# Patient Record
Sex: FEMALE | Race: WHITE
[De-identification: names, ages, dates, MRNs, and addresses within clinical notes are randomized per-mention and may not be internally consistent; named-entity substitution may affect disease eponyms.]

---

## 2017-01-03 ENCOUNTER — HOSPITAL ENCOUNTER (INPATIENT)
Dept: HOSPITAL 74 - JER | Age: 82
LOS: 3 days | Discharge: HOME | DRG: 811 | End: 2017-01-06
Payer: COMMERCIAL

## 2017-01-03 VITALS — BODY MASS INDEX: 30.7 KG/M2

## 2017-01-03 DIAGNOSIS — I44.7: ICD-10-CM

## 2017-01-03 DIAGNOSIS — G90.01: ICD-10-CM

## 2017-01-03 DIAGNOSIS — N18.2: ICD-10-CM

## 2017-01-03 DIAGNOSIS — Z95.0: ICD-10-CM

## 2017-01-03 DIAGNOSIS — Z86.73: ICD-10-CM

## 2017-01-03 DIAGNOSIS — D50.8: Primary | ICD-10-CM

## 2017-01-03 DIAGNOSIS — Z86.711: ICD-10-CM

## 2017-01-03 DIAGNOSIS — I12.9: ICD-10-CM

## 2017-01-03 DIAGNOSIS — N17.9: ICD-10-CM

## 2017-01-03 DIAGNOSIS — F02.80: ICD-10-CM

## 2017-01-03 DIAGNOSIS — K64.8: ICD-10-CM

## 2017-01-03 DIAGNOSIS — F41.9: ICD-10-CM

## 2017-01-03 DIAGNOSIS — I25.118: ICD-10-CM

## 2017-01-03 DIAGNOSIS — I25.2: ICD-10-CM

## 2017-01-03 DIAGNOSIS — E53.8: ICD-10-CM

## 2017-01-03 DIAGNOSIS — K31.811: ICD-10-CM

## 2017-01-03 DIAGNOSIS — G20: ICD-10-CM

## 2017-01-03 DIAGNOSIS — E78.5: ICD-10-CM

## 2017-01-03 DIAGNOSIS — K44.9: ICD-10-CM

## 2017-01-03 DIAGNOSIS — Z87.891: ICD-10-CM

## 2017-01-03 DIAGNOSIS — Z95.5: ICD-10-CM

## 2017-01-03 DIAGNOSIS — J44.9: ICD-10-CM

## 2017-01-03 DIAGNOSIS — G30.9: ICD-10-CM

## 2017-01-03 DIAGNOSIS — I48.0: ICD-10-CM

## 2017-01-03 LAB
ALBUMIN SERPL-MCNC: 3.1 G/DL (ref 3.4–5)
ALP SERPL-CCNC: 100 U/L (ref 45–117)
ALT SERPL-CCNC: 14 U/L (ref 12–78)
ANION GAP SERPL CALC-SCNC: 6 MMOL/L (ref 8–16)
ANISOCYTOSIS BLD QL SMEAR: (no result)
AST SERPL-CCNC: 14 U/L (ref 15–37)
BASO STIPL BLD QL SMEAR: (no result)
BASOPHILS # BLD: 0.7 % (ref 0–2)
BILIRUB SERPL-MCNC: 0.1 MG/DL (ref 0.2–1)
CALCIUM SERPL-MCNC: 8.3 MG/DL (ref 8.5–10.1)
CO2 SERPL-SCNC: 27 MMOL/L (ref 21–32)
CREAT SERPL-MCNC: 1.6 MG/DL (ref 0.55–1.02)
DEPRECATED RDW RBC AUTO: 18.3 % (ref 11.6–15.6)
DEPRECATED RDW RBC AUTO: 18.5 % (ref 11.6–15.6)
EOSINOPHIL # BLD: 1.5 % (ref 0–4.5)
GLUCOSE SERPL-MCNC: 110 MG/DL (ref 74–106)
HYPOCHROMIA BLD QL SMEAR: (no result)
INR BLD: 2.1 (ref 0.82–1.09)
MCH RBC QN AUTO: 20.2 PG (ref 25.7–33.7)
MCH RBC QN AUTO: 20.4 PG (ref 25.7–33.7)
MCHC RBC AUTO-ENTMCNC: 28.8 G/DL (ref 32–36)
MCHC RBC AUTO-ENTMCNC: 29 G/DL (ref 32–36)
MCV RBC: 69.7 FL (ref 80–96)
MCV RBC: 71.1 FL (ref 80–96)
MICROCYTES BLD QL SMEAR: (no result)
NEUTROPHILS # BLD: 75 % (ref 42.8–82.8)
OVALOCYTES BLD QL SMEAR: (no result)
PLATELET # BLD AUTO: 350 K/MM3 (ref 134–434)
PLATELET # BLD EST: ADEQUATE 10*3/UL
PLATELET COMMENT2: (no result)
PMV BLD: (no result) FL (ref 7.5–11.1)
PMV BLD: 6.6 FL (ref 7.5–11.1)
POIKILOCYTOSIS BLD QL SMEAR: (no result)
POLYCHROMASIA BLD QL SMEAR: (no result)
PROT SERPL-MCNC: 6.7 G/DL (ref 6.4–8.2)
PT PNL PPP: 23.4 SEC (ref 9.98–11.88)
TARGETS BLD QL SMEAR: (no result)
TROPONIN I SERPL-MCNC: < 0.02 NG/ML (ref 0–0.05)
WBC # BLD AUTO: 7.4 K/MM3 (ref 4–10)
WBC # BLD AUTO: 8.4 K/MM3 (ref 4–10)

## 2017-01-03 PROCEDURE — 30233N1 TRANSFUSION OF NONAUTOLOGOUS RED BLOOD CELLS INTO PERIPHERAL VEIN, PERCUTANEOUS APPROACH: ICD-10-PCS

## 2017-01-03 PROCEDURE — P9058 RBC, L/R, CMV-NEG, IRRAD: HCPCS

## 2017-01-03 NOTE — PDOC
History of Present Illness





- General


History Source: Family


Exam Limitations: Dementia





- History of Present Illness


Initial Comments: 





01/03/17 20:17


The patient is a 85 .year old female with a pacemaker and a PMH of anemia, 

stroke, MI, HTN, hyperlipidemia, COPD, CAD, and dementia presents to the ED 

with two weeks of dyspnea on exertion.  As per daughter at bed side, notes 

patient is SOB while walking to the bathroom.  Patients SOB is relieved upon 

rest. Daughter denies chest pain, coughing fever, chills, diaphoresis.





Daughter denies urinary complaints, abdominal pain, vomiting, diarrhea. 

Daughter denies any recent travels or sick contacts.  Patient is able to 

ambulate with a walker. 





Allergies: penicillin


Past surgical history: partial left lung removed (1995), cholecystectomy 


Social history: denies tobacco use and alcohol use


PCP -  Dr. Arun Rowan








<Nhung Nuñez - Last Filed: 01/03/17 20:53>





<Celine Almanzar - Last Filed: 01/04/17 00:15>





- General


History Source: Patient, Family





<ChanoKris - Last Filed: 01/04/17 00:18>





- General


Chief Complaint: Shortness of Breath


Stated Complaint: PCP SENT, SOB, CHEST DISCOMFORT


Time Seen by Provider: 01/03/17 19:57





Past History





<Nhung Nuñez - Last Filed: 01/03/17 20:53>





<Celine Almanzar - Last Filed: 01/04/17 00:15>





- Past Medical History


Anemia: Yes


Asthma: No


Cancer: Yes (BREAST LUMP)


Cardiac Disorders: Yes (ppm cva)


CVA: Yes


COPD: Yes


CHF: No


Dementia: Yes


Diabetes: No


GI Disorders: Yes


 Disorders: No


HTN: Yes


Hypercholesterolemia: Yes


Liver Disease: No


Psychiatric Problems: Yes


Seizures: No


Thyroid Disease: No





- Surgical History


Abdominal Surgery: No


Appendectomy: No


Cardiac Surgery: No


Cholecystectomy: Yes


Lung Surgery: Yes (partial left lung removed 1995)


Neurologic Surgery: No


Orthopedic Surgery: No





- Immunization History


Immunization Up to Date: Yes





- Psycho/Social/Smoking Cessation Hx


Anxiety: No


Suicidal Ideation: No


Smoking Status: No


Smoking History: Never smoked


Have you smoked in the past 12 months: No


Number of Cigarettes Smoked Daily: 0


If you are a former smoker, when did you quit?: 40 YEARS AGO


Information on smoking cessation initiated: No


'Breaking Loose' booklet given: 05/24/14


Hx Alcohol Use: No


Drug/Substance Use Hx: No


Substance Use Type: None


Hx Substance Use Treatment: No





<Kris Madden - Last Filed: 01/04/17 00:18>





- Past Medical History


Allergies/Adverse Reactions: 


 Allergies











Allergy/AdvReac Type Severity Reaction Status Date / Time


 


Penicillins Allergy Mild  Verified 01/03/17 15:24


 


Shellfish Allergy Mild  Verified 01/03/17 15:24


 


oats Allergy   Verified 01/03/17 15:24











Home Medications: 


Ambulatory Orders





Isosorbide Mononitrate [Monoket -] 30 mg PO DAILY #0 tab.sr.24h 02/23/12 


Ramipril [Altace] 2.5 mg PO 1200 #0 capsule 02/23/12 


Ranitidine HCl [Zantac] 150 mg PO BID #0 tablet 02/23/12 


Simvastatin [Zocor -] 20 mg PO HS 05/24/14 


Diltiazem Cd [Cardizem Cd -] 180 mg PO DAILY 01/08/16 


Gabapentin [Neurontin] 100 mg PO BID 01/08/16 


Cholecalciferol (Vitamin D3) [Vitamin D3] 5,000 unit PO WEEKLY 01/29/16 


Ferrous Sulfate [Feosol] 325 mg PO MOWEFR 01/29/16 


Quetiapine Fumarate [Seroquel -] 50 mg PO BID  tablet 02/02/16 


Carvedilol [Coreg -] 6.25 mg PO BID #60 tablet 02/14/16 


Warfarin Na [Coumadin -] 1 mg PO HS #0  02/14/16 


Aspirin [Virgil Chewable Aspirin] 81 mg PO DAILY 07/28/16 











**Review of Systems





- Review of Systems


Able to Perform ROS?: Yes


Comments:: 


01/03/17 20:36


**ROS given by daughter


CONSTITUTIONAL:


Absent: fever, chills, diaphoresis, generalized weakness, malaise, loss of 

appetite


HEENT:


Absent: rhinorrhea, nasal congestion, throat pain, throat swelling, difficulty 

swallowing, mouth swelling,


ear pain, eye pain, visual Changes


CARDIOVASCULAR:


Absent: chest pain, syncope, palpitations, irregular heart rate, lightheadedness

, peripheral edema


RESPIRATORY:


+Dyspnea on exertion (2 weeks) Absent: cough, orthopnea, wheezing, stridor, 

hemoptysis


GASTROINTESTINAL:


Absent: abdominal pain, abdominal distension, nausea, vomiting, diarrhea, 

constipation, melena,


hematochezia


GENITOURINARY:


Absent: dysuria, frequency, urgency, hesitancy, hematuria, flank pain, genital 

pain


MUSCULOSKELETAL:


Absent: myalgia, arthralgia, joint swelling


SKIN:


Absent: rash, itching, pallor


NEUROLOGIC:


Absent: headache, focal weakness or paresthesias, dizziness, unsteady gait, 

seizure, mental status


changes, bladder or bowel incontinence











<Nhung Nuñez - Last Filed: 01/03/17 20:53>





*Physical Exam





- Vital Signs


 Last Vital Signs











Temp Pulse Resp BP Pulse Ox


 


 97.6 F   69   20   121/57   100 


 


 01/03/17 15:24  01/03/17 15:24  01/03/17 15:24  01/03/17 15:24  01/03/17 15:24














- Physical Exam


Comments: 





01/03/17 20:37


GENERAL:


Well developed, well nourished. Awake and alert. No acute distress.


HEENT:


Normocephalic, atraumatic. PERRLA, EOMI. No raccoon or montoya signs. No 

conjunctival pallor. Sclera are non-icteric. Moist mucous


membranes. Oropharynx is clear.


NECK:


Supple. Full ROM. No JVD. Carotid pulses 2+ and symmetric, without bruits. No 

thyromegaly. No


lymphadenopathy.


CARDIOVASCULAR:


Regular rate and rhythm. No murmurs, rubs, or gallops. Distal pulses are 2+ and 

symmetric.


PULMONARY:


No evidence of respiratory distress. Lungs clear to auscultation bilaterally. 

No wheezing, rales or rhonchi.


ABDOMINAL:


Soft. Non-tender. Obese.  No rebound or guarding. No organomegaly. Normoactive 

bowel


sounds.


MUSCULOSKELETAL


Normal range of motion at all joints. No bony deformities or tenderness. No CVA 

tenderness.


EXTREMITIES:


No cyanosis. No clubbing. No edema. No calf tenderness.


SKIN


Small bilateral abrasions to both sides of the temporal regions. Warm and dry. 

Normal capillary refill. No rashes. No jaundice.


NEUROLOGICAL:


Alert, awake, appropriate. Cranial nerves 2-12 intact. No deficits to light 

touch and temperature in face,


upper extremities and lower extremities. No motor deficits in the in face, 

upper extremities and lower


extremities. 


PSYCHIATRIC:


Cooperative. Good eye contact. Appropriate mood and affect








<Nhung Nuñez - Last Filed: 01/03/17 20:53>





- Vital Signs


 Last Vital Signs











Temp Pulse Resp BP Pulse Ox


 


 97.6 F   69   20   121/57   100 


 


 01/03/17 15:24  01/03/17 15:24  01/03/17 15:24  01/03/17 15:24  01/03/17 15:24














<Celine Almanzar - Last Filed: 01/04/17 00:15>





- Vital Signs


 Last Vital Signs











Temp Pulse Resp BP Pulse Ox


 


 97.6 F   69   20   121/57   100 


 


 01/03/17 15:24  01/03/17 15:24  01/03/17 15:24  01/03/17 15:24  01/03/17 15:24














<Kris Madden - Last Filed: 01/04/17 00:18>





**Heart Score/ECG Review





- ECG Impressions


Comment:: 





01/03/17 20:53


Sinus rhythm with premature atrial complexes with aberrant conduction @ 81 bpm


Left axis deviation


Moderate voltage criteria for LVH, may be normal variant


Inferior infarct, age undetermined


Abnormal ECG





<RaymoneliseiaNhung - Last Filed: 01/03/17 20:53>





ED Treatment Course





- LABORATORY


CBC & Chemistry Diagram: 


 01/03/17 20:27








<RaymoneliseiaNhung - Last Filed: 01/03/17 20:53>





- LABORATORY


CBC & Chemistry Diagram: 


 01/03/17 22:17





 01/03/17 21:57





- ADDITIONAL ORDERS


Additional order review: 


 Laboratory  Results











  01/03/17





  20:27


 


Fluid Tot Cell Count  Cancelled








 











  01/03/17





  20:27


 


RBC  2.91 L


 


MCV  71.1 L


 


MCHC  28.8 L


 


RDW  18.5 H


 


MPV  TNP


 


Neutrophils %  Y


 


Lymphocytes %  Y














<Celine Almanzar - Last Filed: 01/04/17 00:15>





- LABORATORY


CBC & Chemistry Diagram: 


 01/03/17 22:17





 01/03/17 21:57





- RADIOLOGY


Radiology Studies Ordered: 














 Category Date Time Status


 


 CHEST X-RAY PORTABLE* [RAD] Stat Radiology  01/03/17 19:39 Ordered














<Kris Madden - Last Filed: 01/04/17 00:18>





Medical Decision Making





- Medical Decision Making


01/04/17 00:01


Paged Dr. Oscar Dc (via answering service) at 24:01


Awaiting call back


Case discussed with Dr. Dc at 24:15








<Celine Almanzar - Last Filed: 01/04/17 00:15>





- Medical Decision Making





01/03/17 20:49


Dr. Madden: The scribe's documentation has been prepared under my direction 

and personally reviewed by me in its entirery. I confirm that the note above 

accurately reflects all work, treatment, procedures, and medical decision 

making performed by me.





<Kris Madden - Last Filed: 01/04/17 00:18>





*DC/Admit/Observation/Transfer





- Attestations


Scribe Attestion: 





01/03/17 20:38





Documentation prepared by Nhung Nuñez, acting as medical scribe for Kris Madden MD/DO.





<Nhung Nuñez - Last Filed: 01/03/17 20:53>





<Celine Almanzar - Last Filed: 01/04/17 00:15>





- Discharge Dispostion


Admit: Yes





<Kris Madden - Last Filed: 01/04/17 00:18>


Diagnosis at time of Disposition: 


Anemia


Qualifiers:


 Anemia type: unspecified type Qualified Code(s): D64.9 - Anemia, unspecified





- Discharge Dispostion


Condition at time of disposition: Stable





- Referrals

## 2017-01-04 LAB
ALBUMIN SERPL-MCNC: 2.9 G/DL (ref 3.4–5)
ALP SERPL-CCNC: 93 U/L (ref 45–117)
ALT SERPL-CCNC: 13 U/L (ref 12–78)
ANION GAP SERPL CALC-SCNC: 4 MMOL/L (ref 8–16)
AST SERPL-CCNC: 14 U/L (ref 15–37)
BILIRUB SERPL-MCNC: 0.3 MG/DL (ref 0.2–1)
CALCIUM SERPL-MCNC: 8.3 MG/DL (ref 8.5–10.1)
CO2 SERPL-SCNC: 27 MMOL/L (ref 21–32)
CREAT SERPL-MCNC: 1.4 MG/DL (ref 0.55–1.02)
DEPRECATED RDW RBC AUTO: 21.5 % (ref 11.6–15.6)
DEPRECATED RDW RBC AUTO: 21.5 % (ref 11.6–15.6)
GLUCOSE SERPL-MCNC: 76 MG/DL (ref 74–106)
INR BLD: 2.03 (ref 0.82–1.09)
MCH RBC QN AUTO: 22.6 PG (ref 25.7–33.7)
MCH RBC QN AUTO: 23.3 PG (ref 25.7–33.7)
MCHC RBC AUTO-ENTMCNC: 30.6 G/DL (ref 32–36)
MCHC RBC AUTO-ENTMCNC: 30.7 G/DL (ref 32–36)
MCV RBC: 74 FL (ref 80–96)
MCV RBC: 75.8 FL (ref 80–96)
PLATELET # BLD AUTO: 286 K/MM3 (ref 134–434)
PLATELET # BLD AUTO: 296 K/MM3 (ref 134–434)
PMV BLD: 7 FL (ref 7.5–11.1)
PMV BLD: 7.1 FL (ref 7.5–11.1)
PROT SERPL-MCNC: 6.3 G/DL (ref 6.4–8.2)
PT PNL PPP: 22.6 SEC (ref 9.98–11.88)
WBC # BLD AUTO: 6.3 K/MM3 (ref 4–10)
WBC # BLD AUTO: 6.7 K/MM3 (ref 4–10)

## 2017-01-04 RX ADMIN — RAMIPRIL SCH MG: 2.5 CAPSULE ORAL at 12:46

## 2017-01-04 RX ADMIN — RANITIDINE SCH MG: 150 TABLET ORAL at 21:22

## 2017-01-04 RX ADMIN — ATORVASTATIN CALCIUM SCH MG: 10 TABLET, FILM COATED ORAL at 21:23

## 2017-01-04 RX ADMIN — ISOSORBIDE MONONITRATE SCH MG: 30 TABLET, EXTENDED RELEASE ORAL at 12:46

## 2017-01-04 RX ADMIN — CARVEDILOL SCH MG: 6.25 TABLET, FILM COATED ORAL at 21:23

## 2017-01-04 RX ADMIN — QUETIAPINE FUMARATE SCH MG: 25 TABLET ORAL at 20:38

## 2017-01-04 RX ADMIN — FERROUS SULFATE TAB EC 324 MG (65 MG FE EQUIVALENT) SCH MG: 324 (65 FE) TABLET DELAYED RESPONSE at 10:35

## 2017-01-04 RX ADMIN — RANITIDINE SCH MG: 150 TABLET ORAL at 10:35

## 2017-01-04 RX ADMIN — GABAPENTIN SCH MG: 100 CAPSULE ORAL at 10:35

## 2017-01-04 RX ADMIN — GABAPENTIN SCH MG: 100 CAPSULE ORAL at 21:23

## 2017-01-04 RX ADMIN — CARVEDILOL SCH MG: 6.25 TABLET, FILM COATED ORAL at 10:35

## 2017-01-04 NOTE — CONS
DATE OF CONSULTATION:  01/04/2017

 

TIME OF CONSULTATION:  3:45 to 4:35 p.m.

 

CARDIOLOGY CONSULTATION 

 

REQUESTING PHYSICIAN:  Oscar Dc M.D. 

 

CHIEF COMPLAINT:  Exertional shortness of breath. 

 

HISTORY OF PRESENT ILLNESS:  The patient is an 85-year-old white female with

longstanding history of coronary artery disease status post PCI/stenting, angina

pectoris, history of anemia, hypertension, hypertensive cardiovascular disease,

status post permanent pacemaker for carotid hypersensitivity, status post

cerebrovascular accident without significant residual sequelae, history of

hypercholesterolemia, aortic valve sclerosis, chronic  left bundle branch block,

gastroesophageal reflux disease.  

 

Patient was admitted to the hospital with increasing exertional dyspnea which prior

to admission occurred on minimal exertion.  On questioning, she denies having

orthopnea or paroxysmal nocturnal dyspnea.  No history of chest pain or discomfort

either at rest or with exertion, no history of lightheadedness, dizziness, presyncope

or syncope reported.  No history of cough or expectoration.  No history of pedal

edema.  As noted in the emergency room history, patient apparently had urinary

complaints with abdominal pain, discomfort, nausea, vomiting and diarrhea.  There was

no history of melena or hematemesis.  

 

PAST MEDICAL HISTORY: 

1.  As mentioned in the history of present illness.  

2.  History of parkinsonism.

3.  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

4.  History of pulmonary thromboembolism.

5.  History of hiatus hernia. 

6.  History of chronic obstructive pulmonary disease.  

7.  History of vitamin B12 deficiency.  

8.  History of hiatus hernia.  

9.  History of angiodysplasia of the cecum.  

 

SURGICAL HISTORY:  

1.  Status post left lobectomy.  

2.  Status post cholecystectomy. 

 

SOCIAL HISTORY:  Apparently, used to smoke in the past. No current history of smoking

or alcohol use.  

 

FAMILY HISTORY:  Apparently has history of heart disease.  Brother had carcinoma. 

Mother had carcinoma of the liver and emphysema.  One of her daughters had dissection

of the thoracic aorta and has peacock's syndrome.  Also has coarctation of the aorta. 

 

 

ALLERGIES:  PENICILLIN, SHELLFISH, and IV CONTRAST.

 

MEDICATION:  Current medications are as follows.

1.  Ramipril 2.5 mg p.o. daily.

2.  Gabapentin 100 mg p.o. b.i.d.

3.  Seroquel 50 mg p.o. b.i.d.

4.  Carvedilol 6.25 mg p.o. b.i.d.

5.  Diltiazem  mg p.o. daily.

6.  Zantac 150 mg b.i.d.

7.  Lipitor 10 mg p.o. daily.

8.  Iron supplement 325 mg p.o. daily.

9.  Isosorbide mononitrate 30 mg p.o. daily. 

 

Prior to admission, patient also had been on:

1.  Simvastatin 40 mg, half tablet p.o. daily.

2.  Hydroxyzine 10 mg p.o. t.i.d. p.r.n.

3.  Furosemide 20 mg on Hpvoqnn-Addcjxxtaf-Eecjnuj.  

4.  Vitamin D3, 2000 international units p.o. daily.

5.  Senna 2 tablets p.o. daily.

6.  Nitrostat 0.4 mg sublingually p.r.n. for chest discomfort.  

 

REVIEW OF SYSTEMS:

HEENT:  No history of headaches, diplopia, blurred vision reported.  No history of

epistaxis, hoarseness, tinnitus, or deafness.  

Cardiovascular:  See history of present illness.

Respiratory:  No history of cough, expectoration or hemoptysis.  

Gastrointestinal:  See history of present illness.

Neurological:  No history of seizures, syncope, focal weakness.  No history of

dizziness.  

Endocrine:  No history of polyuria, polydipsia.  No history of intolerance to cold or

warm weather.

Musculoskeletal:  Denies any myalgias or arthralgias.  

 

PHYSICAL EXAMINATION:

General:  An 85-year-old female who is in no acute distress.  There was pallor, there

was no cyanosis, clubbing, or jaundice.  

Vital signs:  Blood pressure was 106/50 mmHg.  Pulse was 75 beats per minute  and

regular.  Weight 168 pounds.  

Neck:  Supple.  No jugulovenous distention.  Carotids were equal and upstrokes were

normal.  No bruits were heard, and no thyromegaly was present.

Heart:  No heaves or thrills.  S1 and S2 are normal.  Ejection systolic murmur, grade

1/6, was heard at the 2nd right intercostal space with early systolic leaking.  No

diastolic murmur or gallops were heard.  

Lungs:  Clear on auscultation.

Chest:  Normal AP diameter, expansion was symmetrical.  There was a well healed left

thoracotomy scar.  

Abdomen:  Protuberant, soft, and nontender.  No hepatosplenomegaly or palpable masses

were felt.  There were well healed, hypogastric scar.

Extremities:  No calf tenderness or dependent edema.  Pulses were equal.  

 

LABORATORY DATA:  As follows.  Hemoglobin on January 3, 2017, was 5.9 g with

microcytic cell indices.  WBC count was 8400.  Following transfusion, hemoglobin on

January 4, 2017, was 7.5 g with microcytic cell indices, platelet count was within

normal limits.  Sodium 143, potassium 4.5, chloride 112, CO2 of 27 mmol/L.  BUN was

26, creatinine 1.4 mg/dL.  Random glucose was 76 mg/dL.  Calcium was 8.3 mg/dL. 

Albumin was 2.9, total protein was 6.3 g/L.  

 

X-ray chest was reported as follows.  Impression:  No significant interval change or

acute lung disease is present.  

 

ECG was not available.  

 

IMPRESSION:

1.  Severe exertional dyspnea, etiology:

a.  Secondary to severe anemia, etiology to be determined most likely related to

gastrointestinal bleeding.

b.  High output left ventricular failure, needs exclusion.

2.  Coronary artery disease, status post percutaneous coronary intervention/stenting,

angina pectoris currently stable.

3.  Status post permanent pacemaker for carotid hypersensitivity.  

4.  Status post cerebrovascular accident.  

5.  Hypercholesterolemia.  

6.  History of left ventricular systolic dysfunction.  

7.  Aortic valve sclerosis.

8.  Hypertension/hypertensive cardiovascular disease.  

9.  History of chronic left bundle branch block.  

10.  History of chronic pruritus of undetermined etiology.

11.  History of gastroesophageal reflux disease.  

12.  History of vitamin D deficiency.

 

RECOMMENDATION:

1.  Concur with correction of anemia and maintain hematocrit between 25 and 30.

2.  Evaluation of anemia.

3.  Continue current therapy.

4.  _____.  

5.  Follow up CBC.

 

Prognosis guarded.

 

Thank you for your referral.

 

 

ARPIT MCKINLEY M.D.

 

[id number]

DD: 01/04/2017 16:44

DT: 01/04/2017 18:04

Job #:  [id number]

## 2017-01-04 NOTE — HP
Admitting History and Physical





- Primary Care Physician


PCP: Arun Rowan





- Admission


Chief Complaint: Unable to obtain


History of Present Illness: 


Mrs Kelsey is a pleasant 85 year old female with dementia who was brought in 

secondary to dyspnea on exertion and found to be anemic. History comes from 

daughter at bedside. She noted over the holiday weekend that Mrs Kelsey was 

becoming short of breath on minimal exertion. She did not look in distress and 

was otherwise doing well, however she was becoming very winded. Daughter did 

not notice any blood in the urine or stool and no melena. She presented to the 

office and was noted to have a drop in her oxygen saturations with minimal 

exertion. She came here and was found to be anemic. She was admitted and 

transfused 2 units.


History Source: Family Member


Limitations to Obtaining History: Dementia





- Past Medical History


CNS: Yes: Alzheimer's, CVA, Dementia, Parkinson's


Cardiovascular: Yes: HTN


Pulmonary: Yes: Cancer, Pulmonary Embolus


Gastrointestinal: Yes: Diverticulosis, Hemorrhoids, Hiatal Hernia, Other (

angiodysplasia of cecum)


...Pregnant: No


Heme/Onc: Yes: B12 Deficiency


Psych: Yes: Anxiety





- Past Surgical History


Past Surgical History: Yes: Cholecystectomy





- Smoking History


Smoking history: Former smoker


Have you smoked in the past 12 months: No


Aproximately how many cigarettes per day: 0


If you are a former smoker, when did you quit?: 40 YEARS AGO





- Alcohol/Substance Use


Hx Alcohol Use: No


History of Substance Use: reports: None





- Social History


Usual Living Arrangement: Yes: With Spouse, With Child


ADL: Family Assistance


History of Recent Travel: No





Home Medications





- Allergies


Allergies/Adverse Reactions: 


 Allergies











Allergy/AdvReac Type Severity Reaction Status Date / Time


 


Penicillins Allergy Mild  Verified 01/03/17 15:24


 


Shellfish Allergy Mild  Verified 01/03/17 15:24


 


oats Allergy   Verified 01/03/17 15:24














- Home Medications


Home Medications: 


Ambulatory Orders





Isosorbide Mononitrate [Monoket -] 30 mg PO DAILY #0 tab.sr.24h 02/23/12 


Ramipril [Altace] 2.5 mg PO 1200 #0 capsule 02/23/12 


Ranitidine HCl [Zantac] 150 mg PO BID #0 tablet 02/23/12 


Simvastatin [Zocor -] 20 mg PO HS 05/24/14 


Diltiazem Cd [Cardizem Cd -] 180 mg PO DAILY 01/08/16 


Gabapentin [Neurontin] 100 mg PO BID 01/08/16 


Cholecalciferol (Vitamin D3) [Vitamin D3] 5,000 unit PO WEEKLY 01/29/16 


Ferrous Sulfate [Feosol] 325 mg PO MOWEFR 01/29/16 


Quetiapine Fumarate [Seroquel -] 50 mg PO BID  tablet 02/02/16 


Carvedilol [Coreg -] 6.25 mg PO BID #60 tablet 02/14/16 


Warfarin Na [Coumadin -] 1 mg PO HS #0  02/14/16 


Aspirin [Virgil Chewable Aspirin] 81 mg PO DAILY 07/28/16 











Family Disease History





- Family Disease History


Family Disease History: Heart Disease: Brother, CA: Mother (emphysema, "liver 

cancer"), Respiratory: Mother, Other: Father (cva), Daughter (aneurysm)





Review of Systems


Unable to obtain ROS, reason: dementia





Physical Examination


Vital Signs: 


 Vital Signs











Temperature  97.7 F   01/04/17 02:48


 


Pulse Rate  75   01/04/17 02:48


 


Respiratory Rate  18   01/04/17 02:48


 


Blood Pressure  106/50   01/04/17 02:48


 


O2 Sat by Pulse Oximetry (%)  99   01/04/17 01:49











Constitutional: Yes: Well Nourished, No Distress, Calm


Eyes: Yes: Conjunctiva Clear, PERRL


HENT: Yes: Atraumatic, Normocephalic


Cardiovascular: Yes: Regular Rate and Rhythm.  No: Gallop, Murmur, Rub


Respiratory: Yes: Regular, CTA Bilaterally.  No: Rales, Rhonchi, Wheezes


Gastrointestinal: Yes: Normal Bowel Sounds, Soft.  No: Distention, Tenderness


Extremities: Yes: WNL


Edema: No


Labs: 


 CBC, BMP





 01/04/17 07:45 





 01/04/17 07:45 











Imaging





- Results


Chest X-ray: Report Reviewed, Image Reviewed





Problem List





- Problems


(1) Anemia


Assessment/Plan: 


-patient presents with symptomatic anemia


-transfused 2 units, will transfuse 1 more unit to place at baseline


-GI consulted, had colonoscopy/endoscopy 2 years ago 


-hematology consulted, may benefit from outpatient iron infusions





Code(s): D64.9 - ANEMIA, UNSPECIFIED   Qualifiers: 


     Anemia type: unspecified type     Qualified Code(s): D64.9 - Anemia, 

unspecified  





(2) Paroxysmal atrial fibrillation


Assessment/Plan: 


-currently in sinus rhythm


-cardiology consulted to comment on coumadin


Code(s): I48.0 - PAROXYSMAL ATRIAL FIBRILLATION





(3) Atherosclerotic heart disease


Code(s): I25.10 - ATHSCL HEART DISEASE OF NATIVE CORONARY ARTERY W/O ANG PCTRS 

  Qualifiers: 


     Coronary Disease-Associated Artery/Lesion type: native artery     Native 

vs. transplanted heart: native heart     Associated angina: without angina     

Qualified Code(s): I25.10 - Atherosclerotic heart disease of native coronary 

artery without angina pectoris  





(4) Chronic kidney disease


Assessment/Plan: 


-slightly worsened with anemia


-continue transfusion


-check in am


Code(s): N18.9 - CHRONIC KIDNEY DISEASE, UNSPECIFIED   Qualifiers: 


     Chronic kidney disease stage: stage 2 (mild)     Qualified Code(s): N18.2 

- Chronic kidney disease, stage 2 (mild)  





(5) Dementia


Assessment/Plan: 


-stable


Code(s): F03.90 - UNSPECIFIED DEMENTIA WITHOUT BEHAVIORAL DISTURBANCE   

Qualifiers: 


     Dementia type: Alzheimer's disease     Alzheimer's disease onset: 

unspecified onset     Dementia behavioral disturbance: without behavioral 

disturbance     Qualified Code(s): G30.9 - Alzheimer's disease, unspecified; 

F02.80 - Dementia in other diseases classified elsewhere without behavioral 

disturbance  





(6) Hypertension


Assessment/Plan: 


-well controlled


-continue home regimen


Code(s): I10 - ESSENTIAL (PRIMARY) HYPERTENSION   Qualifiers: 


     Hypertension type: essential hypertension     Qualified Code(s): I10 - 

Essential (primary) hypertension

## 2017-01-04 NOTE — CONS
DATE OF CONSULTATION:  2017

 

REQUESTING PHYSICIAN:  Jarrett Miller MD

 

HISTORY:  The patient is an 85-year-old female admitted through [facility] emergency room for evaluation of progressive shortness of breath.  She was

found to have a hemoglobin of 5.9.  She is receiving her third unit of packed red

blood cells and I have been asked to evaluate further.  I did see the patient in May

of 2014, at that time, for evaluation of severe iron-deficiency anemia.  She has a

history of bleeding hemorrhoids in the past and a hemorrhoidectomy was performed in

.  There has not been any recurrent gross bleeding noted this time around.  In

, she had a colonoscopy with hemorrhoidal banding.  A nonbleeding AVM was noted

in the cecum at that time.  She also underwent repeat procedures, performed by myself

in May of 2014.  At which time, EGD with biopsy was performed.  Colonoscopy was

performed.  Upper endoscopy revealed esophageal stricture, which was felt to be

either a Schatzki's ring or a peptic stricture above a large hiatal hernia.  She did

have some gastric erosions and colonoscopy revealed colon polyps, vascular ectasias

in the cecum that were cauterized using APC cautery as well as hemorrhoids. Pathology

from the procedure revealed the polyps to be fragments of hyperplastic colonic mucosa

and the transverse colon polyp was a serrated adenoma.  Currently the patient denies

any focal complaints.  Her daughter was present and she states that she is having

normal bowel movements.  There has been no reported melena.  No reported gross rectal

bleeding.  She is maintaining a healthy appetite.  There has been no reported

difficulty swallowing. 

 

PAST MEDICAL HISTORY:  Embolic/hemorrhagic CVA during cardiac catheterization,

coronary artery disease, angioplasty in , hypertension, Alzheimer's, Parkinson's

disease, pulmonary embolism with DVT, hemorrhoidal banding in 2008, left

lung cancer, history of presbyesophagus and a hiatal hernia as well as a Schatzki's

ring/peptic stricture of the distal esophagus, history of diverticulosis found on

colonoscopy, internal hemorrhoids, anxiety disorder, vascular ectasias of the colon,

large hiatal hernia.

 

PAST SURGICAL HISTORY:  Includes left lung cancer resection, hysterectomy 20 years

ago for cancer in situ, repair of fractured nose, and hemorrhoidectomy in .

 

SOCIAL HISTORY:  She was born in the [place]. She is .  She is a former

housewife.  Former smoker.  She smoked socially when she was young.  No history of

alcohol or drug abuse.

 

FAMILY HISTORY:  Father passed away from a stroke.  Mother had liver cancer and

emphysema.  Two siblings, 1  from an MI and 1 sister is unknown.  Four

children, 1 daughter has an aneurysm, 2 have hypertension, and 1 is healthy. 

 

MEDICATIONS:  Medications prior to admission include Altace, montelukast, Zantac,

Zocor, Cardizem CD, Neurontin, vitamin D3, Feosol, Seroquel, Coreg, Coumadin, and

aspirin 81 mg once daily.  Of note, per her daughter Helga, it is reported that she

has not been taking her iron supplementation regularly, as it led to constipation.  

 

REVIEW OF SYSTEMS: She does describe intermittent constipation, especially while she

was on iron supplementation.  She was admitted for evaluation of shortness of breath.

 There has been no reported vomiting.  The remainder of her GI review of systems is

noted in the history of present illness. 

 

PHYSICAL EXAMINATION:   

General:  Patient is found lying comfortably in her bed.  She appeared to be in no

apparent distress.

Vitals:  She is afebrile, pulse 65, blood pressure 116/59.

HEENT:  Sclerae are anicteric.  

Neck:  Supple.  

Heart:  Examination of the heart revealed a regular rate and rhythm.  She did have a

2/6 systolic murmur, heard best at the right sternal border.  

Lungs:  Diminished breath sounds at the bases bilaterally; however, she had poor

inspiratory effort.  

Abdomen:  Examination of the abdomen showed it was not distended, soft.  She had

normoactive bowel sounds and no hepatosplenomegaly is appreciated.  No masses are

palpated.  No hernias detected.  No tenderness is elicited.  

Extremities:  No lower extremity edema.  

Rectal:  Digital rectal examination showed no external lesions and no masses.  She

had light brown stool in the rectal vault, which is trace positive.  

 

LABORATORY EVALUATION:  White blood count 6.3, hemoglobin 7.5, hematocrit 24.4, MCV

of 75.8, platelets of 295, INR of 2.03, sodium of 143, potassium 4.5, chloride 112,

bicarbonate 27, BUN 26, creatinine 1.4, glucose of 76.  Iron studies are pending. 

AST of 14, ALT of 13, alkaline phosphatase of 93.  Total bilirubin is 0.3 with an

albumin of 2.9.  Stool for occult blood was sent and was positive.   

 

RADIOLOGY REPORTS:  Chest x-ray revealed no significant interval change.  

 

IMPRESSION:   An 85-year-old female with profound microcytic anemia.  Previous

endoscopic evaluations revealed large hiatal hernia with Chauncey's erosions as well

as AVMs of the colon, all of which could be contributory to her anemia.  I did

explain to Helga, the patient's daughter, that there could be other ectasias in

other areas of the intestinal tract such as the small bowel and the fact that she is

on an aspirin as well as Coumadin can make bleeding/oozing from these blood vessels

more likely to occur.  There has been no overt bleeding, which makes me suspect that

there is a slow bleeding component to her anemia.  At this point the patient's

daughter, Helga, would like an attempt at conservative measures including blood

transfusion and iron supplementation.  IV iron supplementation should be considered

given that she was unable to tolerate p.o. iron for long periods of time.  If blood

counts continue to dwindle and she is not responding to conservative measures, then

repeat upper endoscopy and colonoscopy would be considered.  I would continue GI

prophylaxis with an H2 blocker and other recommendations pending the above.  

 

I thank you for the consulting opportunity.  

 

 

RADHA FRANKS DO [id number]

DD: 2017 18:54

DT: 2017 20:10

Job #:  [id number]

## 2017-01-05 LAB
ANION GAP SERPL CALC-SCNC: 6 MMOL/L (ref 8–16)
BASOPHILS # BLD: 0.8 % (ref 0–2)
CALCIUM SERPL-MCNC: 8.1 MG/DL (ref 8.5–10.1)
CO2 SERPL-SCNC: 27 MMOL/L (ref 21–32)
CREAT SERPL-MCNC: 1.3 MG/DL (ref 0.55–1.02)
DEPRECATED RDW RBC AUTO: 20.5 % (ref 11.6–15.6)
EOSINOPHIL # BLD: 4.1 % (ref 0–4.5)
GLUCOSE SERPL-MCNC: 75 MG/DL (ref 74–106)
INR BLD: 1.63 (ref 0.82–1.09)
IRON SERPL-MCNC: 30 UG/DL (ref 27–139)
MAGNESIUM SERPL-MCNC: 2.2 MG/DL (ref 1.8–2.4)
MCH RBC QN AUTO: 24.4 PG (ref 25.7–33.7)
MCHC RBC AUTO-ENTMCNC: 32 G/DL (ref 32–36)
MCV RBC: 76.1 FL (ref 80–96)
NEUTROPHILS # BLD: 63 % (ref 42.8–82.8)
PHOSPHATE SERPL-MCNC: 4.4 MG/DL (ref 2.5–4.9)
PLATELET # BLD AUTO: 262 K/MM3 (ref 134–434)
PMV BLD: 6.8 FL (ref 7.5–11.1)
PT PNL PPP: 18.1 SEC (ref 9.98–11.88)
TIBC SERPL-MCNC: 326 UG/DL (ref 250–450)
UIBC SERPL-MCNC: 296 UG/DL (ref 118–369)
WBC # BLD AUTO: 5.7 K/MM3 (ref 4–10)

## 2017-01-05 RX ADMIN — CARVEDILOL SCH MG: 6.25 TABLET, FILM COATED ORAL at 09:51

## 2017-01-05 RX ADMIN — ISOSORBIDE MONONITRATE SCH MG: 30 TABLET, EXTENDED RELEASE ORAL at 09:51

## 2017-01-05 RX ADMIN — QUETIAPINE FUMARATE SCH MG: 25 TABLET ORAL at 09:51

## 2017-01-05 RX ADMIN — RANITIDINE SCH MG: 150 TABLET ORAL at 21:23

## 2017-01-05 RX ADMIN — CARVEDILOL SCH MG: 6.25 TABLET, FILM COATED ORAL at 21:23

## 2017-01-05 RX ADMIN — GABAPENTIN SCH MG: 100 CAPSULE ORAL at 21:23

## 2017-01-05 RX ADMIN — RANITIDINE SCH MG: 150 TABLET ORAL at 09:51

## 2017-01-05 RX ADMIN — ATORVASTATIN CALCIUM SCH MG: 10 TABLET, FILM COATED ORAL at 21:23

## 2017-01-05 RX ADMIN — GABAPENTIN SCH MG: 100 CAPSULE ORAL at 09:52

## 2017-01-05 RX ADMIN — RAMIPRIL SCH MG: 2.5 CAPSULE ORAL at 16:01

## 2017-01-05 RX ADMIN — QUETIAPINE FUMARATE SCH MG: 25 TABLET ORAL at 21:23

## 2017-01-05 NOTE — PN
Progress Note, Physician


History of Present Illness: 


Seen by Dr. Parrish yesterday, dyspnea improving post transfusion, denies chest 

pain. 





- Current Medication List


Current Medications: 


Active Medications





Atorvastatin Calcium (Lipitor -)  10 mg PO HS [facility]


   Last Admin: 01/04/17 21:23 Dose:  10 mg


Carvedilol (Coreg -)  6.25 mg PO BID [facility]


   Last Admin: 01/05/17 09:51 Dose:  6.25 mg


Diltiazem HCl (Cardizem Cd -)  180 mg PO DAILY [facility]


   Last Admin: 01/05/17 09:51 Dose:  180 mg


Ferrous Sulfate (Feosol -)  325 mg PO MoWeFr@1000 [facility]


   Last Admin: 01/04/17 10:35 Dose:  325 mg


Gabapentin (Neurontin -)  100 mg PO BID [facility]


   Last Admin: 01/05/17 09:52 Dose:  100 mg


Isosorbide Mononitrate (Imdur -)  30 mg PO DAILY [facility]


   Last Admin: 01/05/17 09:51 Dose:  30 mg


Quetiapine Fumarate (Seroquel -)  50 mg PO BID@0800,2000 [facility]


   Last Admin: 01/05/17 09:51 Dose:  50 mg


Ramipril (Altace -)  2.5 mg PO DAILY@1200 [facility]


   Last Admin: 01/04/17 12:46 Dose:  2.5 mg


Ranitidine HCl (Zantac -)  150 mg PO BID [facility]


   Last Admin: 01/05/17 09:51 Dose:  150 mg











- Objective


Vital Signs: 


 Vital Signs











Temperature  97.8 F   01/05/17 07:23


 


Pulse Rate  78   01/05/17 09:22


 


Respiratory Rate  20   01/05/17 07:23


 


Blood Pressure  136/57   01/05/17 07:23


 


O2 Sat by Pulse Oximetry (%)  98   01/05/17 09:22











Constitutional: Yes: No Distress, Calm


Neck: Yes: Supple


Cardiovascular: Yes: Regular Rate and Rhythm


Respiratory: Yes: Regular, CTA Bilaterally


Gastrointestinal: Yes: Soft, Hypoactive Bowel Sounds


Edema: No


Labs: 


 CBC, BMP





 01/05/17 06:00 





 01/05/17 08:30 





 INR, PTT











INR  1.63  (0.82-1.09)  H  01/05/17  06:00    














Problem List





- Problems


(1) Anemia


Code(s): D64.9 - ANEMIA, UNSPECIFIED   Qualifiers: 


     Anemia type: unspecified type     Qualified Code(s): D64.9 - Anemia, 

unspecified  





(2) Systolic dysfunction without heart failure


Code(s): I51.9 - HEART DISEASE, UNSPECIFIED





(3) Paroxysmal atrial fibrillation


Code(s): I48.0 - PAROXYSMAL ATRIAL FIBRILLATION





(4) GI bleed


Code(s): K92.2 - GASTROINTESTINAL HEMORRHAGE, UNSPECIFIED   Qualifiers: 


     GI bleed type/associated pathology: angiodysplasia of stomach and duodenum

        Qualified Code(s): K31.811 - Angiodysplasia of stomach and duodenum 

with bleeding  





(5) Atherosclerotic heart disease


Code(s): I25.10 - ATHSCL HEART DISEASE OF NATIVE CORONARY ARTERY W/O ANG PCTRS 

  Qualifiers: 


     Coronary Disease-Associated Artery/Lesion type: native artery     Native 

vs. transplanted heart: native heart     Associated angina: without angina     

   Qualified Code(s): I25.10 - Atherosclerotic heart disease of native coronary 

artery without angina pectoris  





(6) Chronic kidney disease


Code(s): N18.9 - CHRONIC KIDNEY DISEASE, UNSPECIFIED   Qualifiers: 


     Chronic kidney disease stage: stage 2 (mild)     Qualified Code(s): N18.2 

- Chronic kidney disease, stage 2 (mild)  





(7) Dementia


Code(s): F03.90 - UNSPECIFIED DEMENTIA WITHOUT BEHAVIORAL DISTURBANCE   

Qualifiers: 


     Dementia type: Alzheimer's disease     Alzheimer's disease onset: 

unspecified onset     Dementia behavioral disturbance: without behavioral 

disturbance     Qualified Code(s): G30.9 - Alzheimer's disease, unspecified; 

F02.80 - Dementia in other diseases classified elsewhere without behavioral 

disturbance  





(8) Hypercholesteremia


Code(s): E78.0 - PURE HYPERCHOLESTEROLEMIA * DO NOT USE *





(9) Hypertension


Code(s): I10 - ESSENTIAL (PRIMARY) HYPERTENSION   Qualifiers: 


     Hypertension type: essential hypertension     Qualified Code(s): I10 - 

Essential (primary) hypertension  





(10) Presence of permanent cardiac pacemaker


Code(s): Z95.0 - PRESENCE OF CARDIAC PACEMAKER





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


Code(s): G90.01 - CAROTID SINUS SYNCOPE








Assessment/Plan


1. PAUL referable to severe anemia and high output failure with underlying 

hiatal hernia/Chauncey erosions, colonic AVM


2. CAD s/p PCI (stent), angina pectoris


3. s/p PPM for carotid hypersensitivity


4. h/o stroke


5. Hyperlipidemia


6. LV systolic dysfunction


7. HTN/HCVD


8. PAF->SR [id number] of 6 off a/c currently


9. Acute on CKD (pre-renal) improving post transfusion


10. Dementia





P:1. Monitor Hgb post transfusion, GI eval appreciated, iron supplementation


2. Continue Lipitor 10 qhs, carvedilol 6.25 bid, Cardizem  qd, Imdur 30 qd

, Altace 2,5 qd


3. GI prophylaxis, would resume coumadin per INR with caution one month after 

healing, hemostasis achieved 


4. Plan of care d/w patient and

## 2017-01-05 NOTE — EKG
Test Reason : 

Blood Pressure : ***/*** mmHG

Vent. Rate : 074 BPM     Atrial Rate : 074 BPM

   P-R Int : 172 ms          QRS Dur : 096 ms

    QT Int : 394 ms       P-R-T Axes : 057 -33 020 degrees

   QTc Int : 437 ms

 

SINUS RHYTHM WITH PREMATURE SUPRAVENTRICULAR COMPLEXES

LEFT AXIS DEVIATION

MODERATE VOLTAGE CRITERIA FOR LVH, MAY BE NORMAL VARIANT

INFERIOR INFARCT (CITED ON OR BEFORE 07-FEB-2016)

ABNORMAL ECG

WHEN COMPARED WITH ECG OF 09-FEB-2016 15:05,

QRS DURATION HAS DECREASED

NON-SPECIFIC CHANGE IN ST SEGMENT IN ANTERIOR LEADS

Confirmed by STEPHANIE DHALIWAL MD ([id number]) on 1/5/2017 4:29:10 PM

 

Referred By:             Confirmed By:STEPHANIE DHALIWAL MD

## 2017-01-05 NOTE — PN
Progress Note, Physician


Chief Complaint: 


Ms Kelsey says she is feeling well today. No cp, sob, n/v.





- Current Medication List


Current Medications: 


Active Medications





Atorvastatin Calcium (Lipitor -)  [facility] mg PO HS [facility]


   Last Admin: 01/04/17 21:23 Dose:  10 mg


Carvedilol (Coreg -)  6.25 mg PO BID [facility]


   Last Admin: 01/05/17 09:51 Dose:  6.25 mg


Diltiazem HCl (Cardizem Cd -)  180 mg PO DAILY [facility]


   Last Admin: 01/05/17 09:51 Dose:  180 mg


Ferrous Sulfate (Feosol -)  325 mg PO MoWeFr@1000 [facility]


   Last Admin: 01/04/17 10:35 Dose:  325 mg


Gabapentin (Neurontin -)  100 mg PO BID [facility]


   Last Admin: 01/05/17 09:52 Dose:  100 mg


Isosorbide Mononitrate (Imdur -)  30 mg PO DAILY [facility]


   Last Admin: 01/05/17 09:51 Dose:  30 mg


Quetiapine Fumarate (Seroquel -)  50 mg PO BID@0800,2000 [facility]


   Last Admin: 01/05/17 09:51 Dose:  50 mg


Ramipril (Altace -)  2.5 mg PO DAILY@1200 [facility]


   Last Admin: 01/04/17 12:46 Dose:  2.5 mg


Ranitidine HCl (Zantac -)  150 mg PO BID [facility]


   Last Admin: 01/05/17 09:51 Dose:  150 mg











- Objective


Vital Signs: 


 Vital Signs











Temperature  97.8 F   01/05/17 07:23


 


Pulse Rate  78   01/05/17 09:22


 


Respiratory Rate  20   01/05/17 07:23


 


Blood Pressure  136/57   01/05/17 07:23


 


O2 Sat by Pulse Oximetry (%)  98   01/05/17 09:22











Constitutional: Yes: Well Nourished, No Distress, Calm


Cardiovascular: Yes: Regular Rate and Rhythm.  No: Gallop, Murmur, Rub


Respiratory: Yes: Regular, CTA Bilaterally.  No: Rales, Rhonchi, Wheezes


Gastrointestinal: Yes: Normal Bowel Sounds, Soft.  No: Distention, Tenderness


Extremities: Yes: WNL


Edema: No


Labs: 


 CBC, BMP





 01/05/17 06:00 





 01/05/17 08:30 





 INR, PTT











INR  1.63  (0.82-1.09)  H  01/05/17  06:00    














Problem List





- Problems


(1) Anemia


Code(s): D64.9 - ANEMIA, UNSPECIFIED   Qualifiers: 


     Anemia type: unspecified type     Qualified Code(s): D64.9 - Anemia, 

unspecified  





(2) Paroxysmal atrial fibrillation


Code(s): I48.0 - PAROXYSMAL ATRIAL FIBRILLATION





(3) Atherosclerotic heart disease


Code(s): I25.10 - ATHSCL HEART DISEASE OF NATIVE CORONARY ARTERY W/O ANG PCTRS 

  Qualifiers: 


     Coronary Disease-Associated Artery/Lesion type: native artery     Native 

vs. transplanted heart: native heart     Associated angina: without angina     

   Qualified Code(s): I25.10 - Atherosclerotic heart disease of native coronary 

artery without angina pectoris  





(4) Chronic kidney disease


Code(s): N18.9 - CHRONIC KIDNEY DISEASE, UNSPECIFIED   Qualifiers: 


     Chronic kidney disease stage: stage 2 (mild)     Qualified Code(s): N18.2 

- Chronic kidney disease, stage 2 (mild)  





(5) Dementia


Code(s): F03.90 - UNSPECIFIED DEMENTIA WITHOUT BEHAVIORAL DISTURBANCE   

Qualifiers: 


     Dementia type: Alzheimer's disease     Alzheimer's disease onset: 

unspecified onset     Dementia behavioral disturbance: without behavioral 

disturbance     Qualified Code(s): G30.9 - Alzheimer's disease, unspecified; 

F02.80 - Dementia in other diseases classified elsewhere without behavioral 

disturbance  





(6) Hypertension


Code(s): I10 - ESSENTIAL (PRIMARY) HYPERTENSION   Qualifiers: 


     Hypertension type: essential hypertension     Qualified Code(s): I10 - 

Essential (primary) hypertension  








Assessment/Plan


(1) Anemia


Assessment/Plan: 


-patient improving, will give 1 more unit of blood to bring to baseline


-appreciate GI assistance


-awaiting hematology consult


-possible discharge tomorrow


Code(s): D64.9 - ANEMIA, UNSPECIFIED   Qualifiers: 


     Anemia type: unspecified type     Qualified Code(s): D64.9 - Anemia, 

unspecified  





(2) Paroxysmal atrial fibrillation


Assessment/Plan: 


-currently in sinus rhythm


-appreciate cardiology assistance


-holding coumadin per cardiology recommendation


Code(s): I48.0 - PAROXYSMAL ATRIAL FIBRILLATION





(3) Atherosclerotic heart disease


-stable


-continue home medication


Code(s): I25.10 - ATHSCL HEART DISEASE OF NATIVE CORONARY ARTERY W/O ANG PCTRS 

  Qualifiers: 


     Coronary Disease-Associated Artery/Lesion type: native artery     Native 

vs. transplanted heart: native heart     Associated angina: without angina     

Qualified Code(s): I25.10 - Atherosclerotic heart disease of native coronary 

artery without angina pectoris  





(4) Chronic kidney disease


Assessment/Plan: 


-improved with transfusion


Code(s): N18.9 - CHRONIC KIDNEY DISEASE, UNSPECIFIED   Qualifiers: 


     Chronic kidney disease stage: stage 2 (mild)     Qualified Code(s): N18.2 

- Chronic kidney disease, stage 2 (mild)  





(5) Dementia


Assessment/Plan: 


-stable


Code(s): F03.90 - UNSPECIFIED DEMENTIA WITHOUT BEHAVIORAL DISTURBANCE   

Qualifiers: 


     Dementia type: Alzheimer's disease     Alzheimer's disease onset: 

unspecified onset     Dementia behavioral disturbance: without behavioral 

disturbance     Qualified Code(s): G30.9 - Alzheimer's disease, unspecified; 

F02.80 - Dementia in other diseases classified elsewhere without behavioral 

disturbance  





(6) Hypertension


Assessment/Plan: 


-well controlled


-continue home regimen


Code(s): I10 - ESSENTIAL (PRIMARY) HYPERTENSION   Qualifiers: 


     Hypertension type: essential hypertension     Qualified Code(s): I10 - 

Essential (primary) hypertension

## 2017-01-05 NOTE — CONSULT
Consult - text type





- Consultation


Consultation Note: 


The patient is a 85 .year old female with a pacemaker and a PMH of anemia, 

stroke, MI, HTN, hyperlipidemia, COPD, CAD, and dementia presents to the ED 

with two weeks of dyspnea on exertion.  


Daughter denies urinary complaints, abdominal pain, vomiting, diarrhea. 

Daughter denies any recent travels or sick contacts.  Patient is able to 

ambulate with a walker. 





Allergies: penicillin


Past surgical history: partial left lung removed (1995), cholecystectomy 


Social history: denies tobacco use and alcohol use








- Past Medical History


Anemia: Yes


Cancer: Yes (BREAST LUMP)


Cardiac Disorders: Yes (ppm cva)


cad


CVA: Yes


COPD: Yes


Dementia: Yes


GI Disorders: Yes


HTN: Yes


Hypercholesterolemia: Yes








- Surgical History


Cholecystectomy: Yes


Lung Surgery: Yes (partial left lung removed 1995)








- Immunization History


Immunization Up to Date: Yes





- Psycho/Social/Smoking Cessation Hx


Smoking History: Never smoked





- Past Medical History


Allergies/Adverse Reactions: 


 Allergies











Allergy/AdvReac Type Severity Reaction Status Date / Time


 


Penicillins Allergy Mild  Verified 01/03/17 15:24


 


Shellfish Allergy Mild  Verified 01/03/17 15:24


 


oats Allergy   Verified 01/03/17 15:24











Home Medications: 


Ambulatory Orders





Isosorbide Mononitrate [Monoket -] 30 mg PO DAILY #0 tab.sr.24h 02/23/12 


Ramipril [Altace] 2.5 mg PO 1200 #0 capsule 02/23/12 


Ranitidine HCl [Zantac] 150 mg PO BID #0 tablet 02/23/12 


Simvastatin [Zocor -] 20 mg PO HS 05/24/14 


Diltiazem Cd [Cardizem Cd -] 180 mg PO DAILY 01/08/16 


Gabapentin [Neurontin] 100 mg PO BID 01/08/16 


Cholecalciferol (Vitamin D3) [Vitamin D3] 5,000 unit PO WEEKLY 01/29/16 


Ferrous Sulfate [Feosol] 325 mg PO MOWEFR 01/29/16 


Quetiapine Fumarate [Seroquel -] 50 mg PO BID  tablet 02/02/16 


Carvedilol [Coreg -] 6.25 mg PO BID #60 tablet 02/14/16 


Warfarin Na [Coumadin -] 1 mg PO HS #0  02/14/16 


Aspirin [Virgil Chewable Aspirin] 81 mg PO DAILY 07/28/16 





 Current Medications





Atorvastatin Calcium (Lipitor -)  10 mg PO HS [facility]


   Last Admin: 01/05/17 21:23 Dose:  10 mg


Carvedilol (Coreg -)  6.25 mg PO BID [facility]


   Last Admin: 01/05/17 21:23 Dose:  6.25 mg


Diltiazem HCl (Cardizem Cd -)  180 mg PO DAILY [facility]


   Last Admin: 01/05/17 09:51 Dose:  180 mg


Ferrous Sulfate (Feosol -)  325 mg PO MoWeFr@1000 [facility]


   Last Admin: 01/04/17 10:35 Dose:  325 mg


Gabapentin (Neurontin -)  100 mg PO BID [facility]


   Last Admin: 01/05/17 21:23 Dose:  100 mg


Isosorbide Mononitrate (Imdur -)  30 mg PO DAILY [facility]


   Last Admin: 01/05/17 09:51 Dose:  30 mg


Quetiapine Fumarate (Seroquel -)  50 mg PO BID@0800,2000 [facility]


   Last Admin: 01/06/17 08:06 Dose:  50 mg


Ramipril (Altace -)  2.5 mg PO DAILY@1200 [facility]


   Last Admin: 01/05/17 16:01 Dose:  2.5 mg


Ranitidine HCl (Zantac -)  150 mg PO BID [facility]


   Last Admin: 01/05/17 21:23 Dose:  150 mg














*Physical Exam








 Last Vital Signs











Temp Pulse Resp BP Pulse Ox


 


 98.1 F   65   20   134/53   98 


 


 01/06/17 06:00  01/06/17 06:00  01/06/17 06:00  01/06/17 06:00  01/05/17 21:00














HEENT: OMAR, EOM Intact


Oropharynx: No thrush, No mucositis


Cor: RSR, No murmurs, No gallops


Lungs: Clear to P&A


Abd: Soft, Normal bowel sounds, No organomegaly


Ext:No significant edema








A/P





84 y/o patient with htn, hyperlipidemia, cad, stroke,cad, copd, dementia,iron 

deficiency anemia


gi consult noted---endoscopic evaluations showed lisa erosions, avms in 

colon.





iron deficiency anemia -- iron saturation low---due to avms


on feosol





will consider iv iron





discuuses

## 2017-01-06 VITALS — HEART RATE: 70 BPM | TEMPERATURE: 97.3 F

## 2017-01-06 VITALS — SYSTOLIC BLOOD PRESSURE: 134 MMHG | DIASTOLIC BLOOD PRESSURE: 53 MMHG

## 2017-01-06 LAB
ANION GAP SERPL CALC-SCNC: 5 MMOL/L (ref 8–16)
BASOPHILS # BLD: 0.6 % (ref 0–2)
CALCIUM SERPL-MCNC: 7.8 MG/DL (ref 8.5–10.1)
CO2 SERPL-SCNC: 28 MMOL/L (ref 21–32)
CREAT SERPL-MCNC: 1.2 MG/DL (ref 0.55–1.02)
DEPRECATED RDW RBC AUTO: 20.6 % (ref 11.6–15.6)
DEPRECATED RDW RBC AUTO: 20.9 % (ref 11.6–15.6)
EOSINOPHIL # BLD: 4.1 % (ref 0–4.5)
GLUCOSE SERPL-MCNC: 68 MG/DL (ref 74–106)
MAGNESIUM SERPL-MCNC: 2.1 MG/DL (ref 1.8–2.4)
MCH RBC QN AUTO: 25.1 PG (ref 25.7–33.7)
MCH RBC QN AUTO: 25.5 PG (ref 25.7–33.7)
MCHC RBC AUTO-ENTMCNC: 32.2 G/DL (ref 32–36)
MCHC RBC AUTO-ENTMCNC: 32.8 G/DL (ref 32–36)
MCV RBC: 77.6 FL (ref 80–96)
MCV RBC: 77.8 FL (ref 80–96)
NEUTROPHILS # BLD: 65.7 % (ref 42.8–82.8)
PHOSPHATE SERPL-MCNC: 4 MG/DL (ref 2.5–4.9)
PLATELET # BLD AUTO: 234 K/MM3 (ref 134–434)
PLATELET # BLD AUTO: 243 K/MM3 (ref 134–434)
PMV BLD: 7 FL (ref 7.5–11.1)
PMV BLD: 7.1 FL (ref 7.5–11.1)
WBC # BLD AUTO: 6.3 K/MM3 (ref 4–10)
WBC # BLD AUTO: 6.3 K/MM3 (ref 4–10)

## 2017-01-06 RX ADMIN — RANITIDINE SCH MG: 150 TABLET ORAL at 10:00

## 2017-01-06 RX ADMIN — FERROUS SULFATE TAB EC 324 MG (65 MG FE EQUIVALENT) SCH MG: 324 (65 FE) TABLET DELAYED RESPONSE at 10:00

## 2017-01-06 RX ADMIN — RAMIPRIL SCH MG: 2.5 CAPSULE ORAL at 13:37

## 2017-01-06 RX ADMIN — CARVEDILOL SCH MG: 6.25 TABLET, FILM COATED ORAL at 10:00

## 2017-01-06 RX ADMIN — ISOSORBIDE MONONITRATE SCH MG: 30 TABLET, EXTENDED RELEASE ORAL at 10:00

## 2017-01-06 RX ADMIN — QUETIAPINE FUMARATE SCH MG: 25 TABLET ORAL at 08:06

## 2017-01-06 RX ADMIN — GABAPENTIN SCH MG: 100 CAPSULE ORAL at 10:00

## 2017-01-06 NOTE — DS
Physical Examination


Vital Signs: 


 Vital Signs











Temperature  97.3 F L  01/06/17 14:05


 


Pulse Rate  70   01/06/17 14:05


 


Respiratory Rate  20   01/06/17 14:05


 


Blood Pressure  134/53   01/06/17 06:00


 


O2 Sat by Pulse Oximetry (%)  98   01/05/17 21:00











Labs: 


 CBC, BMP





 01/06/17 06:00 





 01/06/17 06:00 











Discharge Summary


Reason For Visit: ANEMIA


Current Active Problems





Carotid sinus hypersensitivity (Acute) 


Closed sacral fracture (Acute) 


Deficient knowledge of percutaneous coronary intervention (PCI) and stenting (

Acute) 


Pacemaker (Acute) 


Pubic ramus fracture (Acute) 


Systolic dysfunction without heart failure (Chronic) 








Condition: Stable





- Instructions


Diet, Activity, Other Instructions: 


resume previous diet and activity


Referrals: 


Arun Rowan MD [Primary Care Provider] - 


Lee Parrish MD [Staff Physician] - 


Abdulaziz Stern MD [Staff Physician] - 


Maykel Kamara MD [Staff Physician] - 


Disposition: HOME





- Home Medications


Comprehensive Discharge Medication List: 


Ambulatory Orders





Isosorbide Mononitrate [Monoket -] 30 mg PO DAILY #0 tab.sr.24h 02/23/12 


Ramipril [Altace] 2.5 mg PO 1200 #0 capsule 02/23/12 


Ranitidine HCl [Zantac] 150 mg PO BID #0 tablet 02/23/12 


Simvastatin [Zocor -] 20 mg PO HS 05/24/14 


Diltiazem Cd [Cardizem Cd -] 180 mg PO DAILY 01/08/16 


Gabapentin [Neurontin] 100 mg PO BID 01/08/16 


Cholecalciferol (Vitamin D3) [Vitamin D3] 5,000 unit PO WEEKLY 01/29/16 


Ferrous Sulfate [Feosol] 325 mg PO MOWEFR 01/29/16 


Quetiapine Fumarate [Seroquel -] 50 mg PO BID  tablet 02/02/16 


Carvedilol [Coreg -] 6.25 mg PO BID #60 tablet 02/14/16

## 2017-01-06 NOTE — PN
Progress Note (short form)





- Note


Progress Note: 


Patient seen and examined


Discussed with daughter at bedside.


Patient known to me since lung cancer surgery in 1995. 


Presented with profound anemia secondary to bleeding from stomach and duodenal 

AVMs.


Multiple co- morbid medical issues. 


 Last Vital Signs











Temp Pulse Resp BP Pulse Ox


 


 98.1 F   65   20   134/53   98 


 


 01/06/17 06:00  01/06/17 06:00  01/06/17 06:00  01/06/17 06:00  01/05/17 21:00











HEENT: OMAR, EOM Intact


Oropharynx: No thrush, No mucositis,dentures


Cor: seems irregular, No murmurs, No gallops


Lungs: Clear to P&A


Abd: Soft, Normal bowel sounds, No organomegaly


Ext:No significant edema


Skin: No rashes, Integument intact


 CBC, BMP





 01/06/17 06:00 





 01/06/17 06:00 





 Current Medications











Generic Name Dose Route Start Last Admin





  Trade Name Freq  PRN Reason Stop Dose Admin


 


Atorvastatin Calcium  10 mg 01/04/17 22:00 01/05/17 21:23





  Lipitor -  PO   10 mg





  HS HENRY   Administration


 


Carvedilol  6.25 mg 01/04/17 10:00 01/06/17 10:00





  Coreg -  PO   6.25 mg





  BID HENRY   Administration


 


Diltiazem HCl  180 mg 01/04/17 10:00 01/06/17 10:00





  Cardizem Cd -  PO   180 mg





  DAILY HENRY   Administration


 


Ferrous Sulfate  325 mg 01/04/17 10:00 01/06/17 10:00





  Feosol -  PO   325 mg





  MoWeFr@1000 HENRY   Administration


 


Gabapentin  100 mg 01/04/17 10:00 01/06/17 10:00





  Neurontin -  PO   100 mg





  BID HENRY   Administration


 


Isosorbide Mononitrate  30 mg 01/04/17 10:44 01/06/17 10:00





  Imdur -  PO   30 mg





  DAILY HENRY   Administration


 


Quetiapine Fumarate  50 mg 01/04/17 20:00 01/06/17 08:06





  Seroquel -  PO   50 mg





  BID@0800,2000 HENRY   Administration


 


Ramipril  2.5 mg 01/04/17 12:00 01/05/17 16:01





  Altace -  PO   2.5 mg





  DAILY@1200 HENRY   Administration


 


Ranitidine HCl  150 mg 01/04/17 10:00 01/06/17 10:00





  Zantac -  PO   150 mg





  BID HENRY   Administration








Impression:





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


Hx of PAF.


Currently off a/c. 


In future, need to assess question of risk/benefit ratio of A/c in setting of 

PAF





Plan:





Will give IV Venofer.


Would continue outpatient oral Fe++. If not tolerated, can receive IV venofer 

as outpatient

## 2017-12-26 ENCOUNTER — HOSPITAL ENCOUNTER (OUTPATIENT)
Dept: HOSPITAL 74 - FER | Age: 82
Setting detail: OBSERVATION
Discharge: HOME | End: 2017-12-26
Attending: INTERNAL MEDICINE | Admitting: INTERNAL MEDICINE
Payer: COMMERCIAL

## 2017-12-26 VITALS — TEMPERATURE: 98.7 F | SYSTOLIC BLOOD PRESSURE: 127 MMHG | DIASTOLIC BLOOD PRESSURE: 57 MMHG | HEART RATE: 76 BPM

## 2017-12-26 VITALS — BODY MASS INDEX: 32.9 KG/M2

## 2017-12-26 DIAGNOSIS — Z79.82: ICD-10-CM

## 2017-12-26 DIAGNOSIS — Z87.891: ICD-10-CM

## 2017-12-26 DIAGNOSIS — Z86.711: ICD-10-CM

## 2017-12-26 DIAGNOSIS — Z86.73: ICD-10-CM

## 2017-12-26 DIAGNOSIS — Z90.2: ICD-10-CM

## 2017-12-26 DIAGNOSIS — Z88.0: ICD-10-CM

## 2017-12-26 DIAGNOSIS — K21.9: ICD-10-CM

## 2017-12-26 DIAGNOSIS — Z79.01: ICD-10-CM

## 2017-12-26 DIAGNOSIS — N18.9: ICD-10-CM

## 2017-12-26 DIAGNOSIS — Z95.0: ICD-10-CM

## 2017-12-26 DIAGNOSIS — E78.5: ICD-10-CM

## 2017-12-26 DIAGNOSIS — D64.9: ICD-10-CM

## 2017-12-26 DIAGNOSIS — G20: ICD-10-CM

## 2017-12-26 DIAGNOSIS — I48.0: ICD-10-CM

## 2017-12-26 DIAGNOSIS — Z85.118: ICD-10-CM

## 2017-12-26 DIAGNOSIS — I25.10: ICD-10-CM

## 2017-12-26 DIAGNOSIS — Z91.013: ICD-10-CM

## 2017-12-26 DIAGNOSIS — I12.9: ICD-10-CM

## 2017-12-26 DIAGNOSIS — J44.9: ICD-10-CM

## 2017-12-26 DIAGNOSIS — F02.80: ICD-10-CM

## 2017-12-26 DIAGNOSIS — R55: Primary | ICD-10-CM

## 2017-12-26 LAB
ALBUMIN SERPL-MCNC: 3.1 G/DL (ref 3.5–5)
ALP SERPL-CCNC: 77 U/L (ref 32–92)
ALT SERPL-CCNC: 6 U/L (ref 10–40)
AMORPH PHOS CRY URNS QL MICRO: (no result) /HPF
ANION GAP SERPL CALC-SCNC: 3 MMOL/L (ref 8–16)
ANION GAP SERPL CALC-SCNC: 6 MMOL/L (ref 8–16)
APTT BLD: 28.4 SECONDS (ref 24–38.9)
AST SERPL-CCNC: 30 U/L (ref 10–42)
BACTERIA #/AREA URNS HPF: (no result) /HPF
BASOPHILS # BLD: 1.8 % (ref 0–2)
BILIRUB SERPL-MCNC: 0.9 MG/DL (ref 0.2–1)
BUN SERPL-MCNC: 20 MG/DL (ref 7–18)
BUN SERPL-MCNC: 24 MG/DL (ref 7–18)
CALCIUM SERPL-MCNC: 8.1 MG/DL (ref 8.4–10.2)
CALCIUM SERPL-MCNC: 8.6 MG/DL (ref 8.4–10.2)
CHLORIDE SERPL-SCNC: 111 MMOL/L (ref 98–107)
CHLORIDE SERPL-SCNC: 114 MMOL/L (ref 98–107)
CO2 SERPL-SCNC: 23 MMOL/L (ref 22–28)
CO2 SERPL-SCNC: 23 MMOL/L (ref 22–28)
COLOR UR: YELLOW
CREAT SERPL-MCNC: 1.2 MG/DL (ref 0.6–1.3)
CREAT SERPL-MCNC: 1.4 MG/DL (ref 0.6–1.3)
DEPRECATED RDW RBC AUTO: 15.1 % (ref 11.6–15.6)
EOSINOPHIL # BLD: 2.7 % (ref 0–4.5)
EPITH CASTS URNS QL MICRO: (no result) /HPF
GLUCOSE SERPL-MCNC: 106 MG/DL (ref 74–106)
GLUCOSE SERPL-MCNC: 163 MG/DL (ref 74–106)
HCT VFR BLD CALC: 28.4 % (ref 32.4–45.2)
HGB BLD-MCNC: 9.1 GM/DL (ref 10.7–15.3)
INR BLD: 2.26 (ref 0.82–1.09)
KETONES UR QL STRIP: (no result)
LYMPHOCYTES # BLD: 13.2 % (ref 8–40)
MCH RBC QN AUTO: 26.4 PG (ref 25.7–33.7)
MCHC RBC AUTO-ENTMCNC: 32 G/DL (ref 32–36)
MCV RBC: 82.4 FL (ref 80–96)
MONOCYTES # BLD AUTO: 8.3 % (ref 3.8–10.2)
NEUTROPHILS # BLD: 74 % (ref 42.8–82.8)
PH UR: 6 [PH] (ref 4.5–8)
PLATELET # BLD AUTO: 243 K/MM3 (ref 134–434)
PMV BLD: 8.5 FL (ref 7.5–11.1)
POTASSIUM SERPLBLD-SCNC: 4 MMOL/L (ref 3.5–5.1)
POTASSIUM SERPLBLD-SCNC: 5.6 MMOL/L (ref 3.5–5.1)
PROT SERPL-MCNC: 6.3 G/DL (ref 6.4–8.3)
PROT UR QL STRIP: (no result)
PT PNL PPP: 24.9 SEC (ref 10.2–13)
RBC # BLD AUTO: (no result) /HPF (ref 0–3)
RBC # BLD AUTO: 3.45 M/MM3 (ref 3.6–5.2)
SODIUM SERPL-SCNC: 140 MMOL/L (ref 136–145)
SODIUM SERPL-SCNC: 140 MMOL/L (ref 136–145)
SP GR UR: 1.02 (ref 1–1.02)
UROBILINOGEN UR STRIP-MCNC: 0.2 MG/DL (ref 0.2–1)
WBC # BLD AUTO: 8.3 K/MM3 (ref 4–10.8)
WBC # UR AUTO: (no result) /UL (ref 0–5)

## 2017-12-26 PROCEDURE — G0378 HOSPITAL OBSERVATION PER HR: HCPCS

## 2017-12-26 PROCEDURE — 3E0337Z INTRODUCTION OF ELECTROLYTIC AND WATER BALANCE SUBSTANCE INTO PERIPHERAL VEIN, PERCUTANEOUS APPROACH: ICD-10-PCS | Performed by: EMERGENCY MEDICINE

## 2017-12-26 NOTE — PDOC
*Physical Exam





- Vital Signs


 Last Vital Signs











Temp Pulse Resp BP Pulse Ox


 


 97.4 F L  68   24   100/87   98 


 


 12/26/17 15:05  12/26/17 18:49  12/26/17 18:49  12/26/17 18:49  12/26/17 18:49














ED Treatment Course





- LABORATORY


CBC & Chemistry Diagram: 


 12/26/17 15:50





 12/26/17 21:45





- ADDITIONAL ORDERS


Additional order review: 


 Laboratory  Results











  12/26/17 12/26/17 12/26/17





  17:14 16:24 15:50


 


PT with INR   


 


INR   


 


PTT (Actin FS)   


 


Sodium    140


 


Potassium    5.6 H D


 


Chloride    111 H


 


Carbon Dioxide    23


 


Anion Gap    6 L


 


BUN    24 H D


 


Creatinine    1.4 H


 


Creat Clearance w eGFR    35.65


 


Random Glucose    106  D


 


Lactic Acid   1.6 


 


Calcium    8.6


 


Total Bilirubin    0.9  D


 


AST    30  D


 


ALT    6 L D


 


Alkaline Phosphatase    77


 


Troponin I    0.03


 


Total Protein    6.3 L


 


Albumin    3.1 L


 


Urine Color  Yellow  


 


Urine Appearance  Clear  


 


Urine pH  6.0  


 


Ur Specific Gravity  1.025  


 


Urine Protein  2+ H  


 


Urine Glucose (UA)  Negative  


 


Urine Ketones  1+ H  


 


Urine Blood  Negative  


 


Urine Nitrite  Negative  


 


Urine Bilirubin  Negative  


 


Urine Urobilinogen  0.2  


 


Ur Leukocyte Esterase  Trace H  


 


Urine RBC  0-2  


 


Urine WBC  5-10  


 


Ur Epithelial Cells  Few  


 


Amorphous Urates  Few  


 


Urine Bacteria  Few  














  12/26/17





  15:00


 


PT with INR  24.9 H


 


INR  2.26 H


 


PTT (Actin FS)  28.4


 


Sodium 


 


Potassium 


 


Chloride 


 


Carbon Dioxide 


 


Anion Gap 


 


BUN 


 


Creatinine 


 


Creat Clearance w eGFR 


 


Random Glucose 


 


Lactic Acid 


 


Calcium 


 


Total Bilirubin 


 


AST 


 


ALT 


 


Alkaline Phosphatase 


 


Troponin I 


 


Total Protein 


 


Albumin 


 


Urine Color 


 


Urine Appearance 


 


Urine pH 


 


Ur Specific Gravity 


 


Urine Protein 


 


Urine Glucose (UA) 


 


Urine Ketones 


 


Urine Blood 


 


Urine Nitrite 


 


Urine Bilirubin 


 


Urine Urobilinogen 


 


Ur Leukocyte Esterase 


 


Urine RBC 


 


Urine WBC 


 


Ur Epithelial Cells 


 


Amorphous Urates 


 


Urine Bacteria 








 











  12/26/17





  15:50


 


RBC  3.45 L


 


MCV  82.4


 


MCHC  32.0


 


RDW  15.1  D


 


MPV  8.5


 


Neutrophils %  74.0


 


Lymphocytes %  13.2  D


 


Monocytes %  8.3


 


Eosinophils %  2.7


 


Basophils %  1.8  D














- Medications


Given in the ED: 


ED Medications














Discontinued Medications














Generic Name Dose Route Start Last Admin





  Trade Name Freq  PRN Reason Stop Dose Admin


 


Sodium Chloride  2,000 ml  12/26/17 15:57  12/26/17 16:38





  Normal Saline -  IV  12/26/17 15:58  2,000 ml





  ONCE STA   Administration














Progress Note





- Progress Note


Progress Note: 





Patient was to be transferred to [facility] because of persistent hypotension 

however there is no bed at [facility] and rather than holding patient in the ER 

all night patient has now received 2 L of Normal Saline, her vitals are stable 

with a systolic blood pressure of 140 which is her baseline blood pressure. In 

addition to that she has a normal heart rate. Her cardiac enzymes are negative 

and her lactic acid is normal. She has a normal white count and no left shift. 

And her chemistries were otherwise unremarkable.





If there is a change in patient's clinical condition after she is transferred 

to the floor possible transfer to [facility] can be reevaluated at that time.





Discussed admission with hospitalist who accepted patient for tele observation. 














*DC/Admit/Observation/Transfer


Diagnosis at time of Disposition: 


 Syncope








- Discharge Dispostion


Condition at time of disposition: Improved





- Referrals


Referrals: 


Arun Rowan MD [Staff Physician] - 1 Week


Lee Parrish MD [Staff Physician] - 1 Week





- Patient Instructions


Printed Discharge Instructions:  DI for Syncope in Adults (Fainting)


Additional Instructions: 


Please follow up with Dr. Rowna and Dr. Costello within one week of your discharge.





Please remember to stay well-hydrated.





Try not to strain when you go to the toilet. 





Return to the emergency department for any new or worsening symptoms.





- Post Discharge Activity

## 2017-12-26 NOTE — DS
Physical Exam: 


SUBJECTIVE: Patient seen and examined








OBJECTIVE:





 Vital Signs











 Period  Temp  Pulse  Resp  BP Sys/Card  Pulse Ox


 


 Last 24 Hr  97.4 F-98.7 F  62-76  18-24  /  97-99








PHYSICAL EXAM





GENERAL: The patient is awake, alert, and fully oriented, in no acute distress.


HEAD: Normal with no signs of trauma.


EYES: PERRL, extraocular movements intact, sclera anicteric, conjunctiva clear. 


ENT: Ears normal, nares patent, oropharynx clear without exudates, moist mucous 

membranes.


NECK: Trachea midline, full range of motion, supple. 


LUNGS: Breath sounds equal, clear to auscultation bilaterally, no wheezes, no 

crackles, no accessory muscle use. 


HEART: Regular rate and rhythm, S1, S2 without murmur, rub or gallop.


ABDOMEN: Soft, nontender, nondistended, normoactive bowel sounds, no guarding, 

no rebound, no hepatosplenomegaly, no masses.


EXTREMITIES: 2+ pulses, warm, well-perfused, no edema. 


NEUROLOGICAL: Cranial nerves II through XII grossly intact. Normal speech, gait 

not observed.


PSYCH: Normal mood, normal affect.


SKIN: Warm, dry, normal turgor, no rashes or lesions noted.





LABS


 Laboratory Results - last 24 hr











  12/26/17 12/26/17 12/26/17





  15:00 15:50 15:50


 


WBC    8.3  D


 


RBC    3.45 L


 


Hgb    9.1 L


 


Hct    28.4 L


 


MCV    82.4


 


MCH    26.4  D


 


MCHC    32.0


 


RDW    15.1  D


 


Plt Count    243  D


 


MPV    8.5


 


Neutrophils %    74.0


 


Lymphocytes %    13.2  D


 


Monocytes %    8.3


 


Eosinophils %    2.7


 


Basophils %    1.8  D


 


PT with INR  24.9 H  


 


INR  2.26 H  


 


PTT (Actin FS)  28.4  


 


Sodium   140 


 


Potassium   5.6 H D 


 


Chloride   111 H 


 


Carbon Dioxide   23 


 


Anion Gap   6 L 


 


BUN   24 H D 


 


Creatinine   1.4 H 


 


Creat Clearance w eGFR   35.65 


 


Random Glucose   106  D 


 


Lactic Acid   


 


Calcium   8.6 


 


Total Bilirubin   0.9  D 


 


AST   30  D 


 


ALT   6 L D 


 


Alkaline Phosphatase   77 


 


Troponin I   0.03 


 


Total Protein   6.3 L 


 


Albumin   3.1 L 


 


Urine Color   


 


Urine Appearance   


 


Urine pH   


 


Ur Specific Gravity   


 


Urine Protein   


 


Urine Glucose (UA)   


 


Urine Ketones   


 


Urine Blood   


 


Urine Nitrite   


 


Urine Bilirubin   


 


Urine Urobilinogen   


 


Ur Leukocyte Esterase   


 


Urine RBC   


 


Urine WBC   


 


Ur Epithelial Cells   


 


Amorphous Urates   


 


Urine Bacteria   














  12/26/17 12/26/17 12/26/17





  16:24 17:14 21:45


 


WBC   


 


RBC   


 


Hgb   


 


Hct   


 


MCV   


 


MCH   


 


MCHC   


 


RDW   


 


Plt Count   


 


MPV   


 


Neutrophils %   


 


Lymphocytes %   


 


Monocytes %   


 


Eosinophils %   


 


Basophils %   


 


PT with INR   


 


INR   


 


PTT (Actin FS)   


 


Sodium    140


 


Potassium    4.0  D


 


Chloride    114 H


 


Carbon Dioxide    23


 


Anion Gap    3 L


 


BUN    20 H


 


Creatinine    1.2


 


Creat Clearance w eGFR   


 


Random Glucose    163 H D


 


Lactic Acid  1.6  


 


Calcium    8.1 L


 


Total Bilirubin   


 


AST   


 


ALT   


 


Alkaline Phosphatase   


 


Troponin I   


 


Total Protein   


 


Albumin   


 


Urine Color   Yellow 


 


Urine Appearance   Clear 


 


Urine pH   6.0 


 


Ur Specific Gravity   1.025 


 


Urine Protein   2+ H 


 


Urine Glucose (UA)   Negative 


 


Urine Ketones   1+ H 


 


Urine Blood   Negative 


 


Urine Nitrite   Negative 


 


Urine Bilirubin   Negative 


 


Urine Urobilinogen   0.2 


 


Ur Leukocyte Esterase   Trace H 


 


Urine RBC   0-2 


 


Urine WBC   5-10 


 


Ur Epithelial Cells   Few 


 


Amorphous Urates   Few 


 


Urine Bacteria   Few 














  12/26/17





  22:20


 


WBC 


 


RBC 


 


Hgb 


 


Hct 


 


MCV 


 


MCH 


 


MCHC 


 


RDW 


 


Plt Count 


 


MPV 


 


Neutrophils % 


 


Lymphocytes % 


 


Monocytes % 


 


Eosinophils % 


 


Basophils % 


 


PT with INR 


 


INR 


 


PTT (Actin FS) 


 


Sodium 


 


Potassium 


 


Chloride 


 


Carbon Dioxide 


 


Anion Gap 


 


BUN 


 


Creatinine 


 


Creat Clearance w eGFR 


 


Random Glucose 


 


Lactic Acid 


 


Calcium 


 


Total Bilirubin 


 


AST 


 


ALT 


 


Alkaline Phosphatase 


 


Troponin I  0.04


 


Total Protein 


 


Albumin 


 


Urine Color 


 


Urine Appearance 


 


Urine pH 


 


Ur Specific Gravity 


 


Urine Protein 


 


Urine Glucose (UA) 


 


Urine Ketones 


 


Urine Blood 


 


Urine Nitrite 


 


Urine Bilirubin 


 


Urine Urobilinogen 


 


Ur Leukocyte Esterase 


 


Urine RBC 


 


Urine WBC 


 


Ur Epithelial Cells 


 


Amorphous Urates 


 


Urine Bacteria 











HOSPITAL COURSE:





Date of Admission:12/26/17





Date of Discharge: 12/26/17





Pre hospital course


85 year-old female with a PMH significant for HTN, HLD, CAD s/p stenting s/p PPM

, systolic heart failure, h/o CVA, paroxysmal atrial fibrillation, h/o PE,  

Parkinson's disease, dementia, and angiodysplasia of the cecum s/p GI bleed. 

Patient's daughter, who is her full-time care taker, states patient frequently 

sits for long periods of time on the toilet. She did the same today. Then while 

straining, she passed out. The daughter witnessed the entire episode. The 

patient did not fall or hit her head. She came to after a few minutes and was 

transported to the ED by EMS. The patient has not been ill. She has not had 

fever, sweats, chills. She has not complained of chest pain, palpitations, SOB, 

PAUL, orthopnea, or lower extremity edema. She has not had sick contacts. She 

has not taken antibiotics. 








ER course 


(1) BP 79/56


(2) K 5.6 (hemolyzed)


(3) ECG: V-paced at 68bpm





Subsequent hospital course


Syncope


   --vasovagal episode while sitting/straining on the toilet


   --episode witnessed by daughter, no fall, no head trauma


   --initially hypotensive, given 2L fluid in ED with serial normotensive BP 

readings following resuscitation


   --patient back to baseline per daughter; has been appropriately interacting 

with family, eating


   --serial troponins negative





Hypertension


   --normotensive 





Paroxysmal atrial fibrillation


h/o PE


   --V-paced rhythm on ECG


   --on warfarin, INR therapeutic @ 2.26





Coronary artery disease


   --on isosorbide, simvastatin, cardizem, carvedilol, ASA





Dementia


   --on Seroquel





r/o hyperkalemia


   --initial K 5.6, reported hemolyzed; repeat wnl





Chronic kidney disease


   --Cr 1.4, baseline 1.2


   --given fluids in ED, repeat Cr 1.2








Minutes to complete discharge: 35





Discharge Summary


Reason For Visit: SYNCOPE


Current Active Problems





Syncope (Acute)








Condition: Improved





- Instructions


Diet, Activity, Other Instructions: 


Please follow up with Dr. Rowan and Dr. Costello within one week of your discharge.





Please remember to stay well-hydrated.





Try not to strain when you go to the toilet. 





Return to the emergency department for any new or worsening symptoms.


Referrals: 


Arun Rowan MD [Staff Physician] - 1 Week


Lee Parrish MD [Staff Physician] - 1 Week





- Home Medications


Comprehensive Discharge Medication List: 


Ambulatory Orders





Isosorbide Mononitrate [Monoket -] 30 mg PO DAILY #0 tab.sr.24h 02/23/12 


Ramipril [Altace] 2.5 mg PO 1200 #0 capsule 02/23/12 


Ranitidine HCl [Zantac] 150 mg PO BID #0 tablet 02/23/12 


Simvastatin [Zocor -] 20 mg PO HS 05/24/14 


Diltiazem Cd [Cardizem Cd -] 180 mg PO DAILY 01/08/16 


Gabapentin [Neurontin] 100 mg PO BID 01/08/16 


Cholecalciferol (Vitamin D3) [Vitamin D3] 2,000 unit PO DAILY 01/29/16 


Ferrous Sulfate [Feosol] 325 mg PO MOWEFR 01/29/16 


Quetiapine Fumarate [Seroquel -] 50 mg PO BID  tablet 02/02/16 


Carvedilol [Coreg -] 6.25 mg PO BID #60 tablet 02/14/16 


Aspirin [Adult Aspirin Regimen] 81 mg PO DAILY 12/26/17 


Hydroxyzine HCl 10 mg PO DAILY 12/26/17 


Warfarin Na [Coumadin -] 2 mg PO DAILY 12/26/17 








This patient is new to me today: No


Emergency Visit: Yes


Care time: The patient presented to the Emergency Department on the above date 

and was hospitalized for further evaluation of their emergent condition.


Critical Care patient: No





- Discharge Referral


Referred to [facility] No

## 2017-12-26 NOTE — HP
CHIEF COMPLAINT: Syncope





PCP: 





HISTORY OF PRESENT ILLNESS:


85 year-old female with a PMH significant for HTN, HLD, CAD s/p stenting s/p PPM

, systolic heart failure, h/o CVA, paroxysmal atrial fibrillation, h/o PE,  

Parkinson's disease, dementia, and angiodysplasia of the cecum s/p GI bleed. 

Patient's daughter, who is her full-time care taker, states patient frequently 

sits for long periods of time on the toilet. She did the same today. Then while 

straining, she passed out. The daughter witnessed the entire episode. The 

patient did not fall or hit her head. She came to after a few minutes and was 

transported to the ED by EMS. The patient has not been ill. She has not had 

fever, sweats, chills. She has not complained of chest pain, palpitations, SOB, 

PAUL, orthopnea, or lower extremity edema. She has not had sick contacts. She 

has not taken antibiotics. 


PCP: Dr. Rowan


Cardiology: Dr. Costello





ER course was notable for:


(1) BP 79/56


(2) K 5.6 (hemolyzed)


(3) ECG: V-paced at 68bpm





Recent Travel


No





PAST MEDICAL HISTORY


Hypertension


CVA


Paroxysmal atrial fibrillation


Parkinson's Disease


Dementia


h/o pulmonary embolus





PAST SURGICAL HISTORY


Choleycystectomy





Social History:


Smoking: quit 40 years ago


Alcohol: no


Drugs: no





Family History: non contributory





Allergies


Penicillins Allergy (Mild, Verified 12/26/17 16:46)


Shellfish Allergy (Mild, Verified 12/26/17 16:46)


Oats Allergy (Verified 12/26/17 16:47)


 








HOME MEDICATIONS:


 Home Medications











 Medication  Instructions  Recorded


 


Isosorbide Mononitrate [Monoket -] 30 mg PO DAILY #0 tab.sr.24h 02/23/12


 


Ramipril [Altace] 2.5 mg PO 1200 #0 capsule 02/23/12


 


Ranitidine HCl [Zantac] 150 mg PO BID #0 tablet 02/23/12


 


Simvastatin [Zocor -] 20 mg PO HS 05/24/14


 


Diltiazem Cd [Cardizem Cd -] 180 mg PO DAILY 01/08/16


 


Gabapentin [Neurontin] 100 mg PO BID 01/08/16


 


Cholecalciferol (Vitamin D3) 2,000 unit PO DAILY 01/29/16





[Vitamin D3]  


 


Ferrous Sulfate [Feosol] 325 mg PO MOWEFR 01/29/16


 


Quetiapine Fumarate [Seroquel -] 50 mg PO BID  tablet 02/02/16


 


Carvedilol [Coreg -] 6.25 mg PO BID #60 tablet 02/14/16


 


Aspirin [Adult Aspirin Regimen] 81 mg PO DAILY 12/26/17


 


Hydroxyzine HCl 10 mg PO DAILY 12/26/17


 


Warfarin Na [Coumadin] 2 mg PO DAILY 12/26/17








REVIEW OF SYSTEMS


CONSTITUTIONAL: 


Absent:  fever, chills, diaphoresis, generalized weakness, malaise, loss of 

appetite, weight change


HEENT: 


Absent:  rhinorrhea, nasal congestion, throat pain, throat swelling, difficulty 

swallowing, mouth swelling, ear pain, eye pain, visual changes


CARDIOVASCULAR: 


Absent: chest pain, syncope, palpitations, irregular heart rate, lightheadedness

, peripheral edema


RESPIRATORY: 


Absent: cough, shortness of breath, dyspnea with exertion, orthopnea, wheezing, 

stridor, hemoptysis


GASTROINTESTINAL:


Absent: abdominal pain, abdominal distension, nausea, vomiting, diarrhea, 

constipation, melena, hematochezia


GENITOURINARY: 


Absent: dysuria, frequency, urgency, hesitancy, hematuria, flank pain, genital 

pain


MUSCULOSKELETAL: 


Absent: myalgia, arthralgia, joint swelling, back pain, neck pain


SKIN: 


Absent: rash, itching, pallor


HEMATOLOGIC/IMMUNOLOGIC: 


Absent: easy bleeding, easy bruising, lymphadenopathy, frequent infections


ENDOCRINE:


Absent: unexplained weight gain, unexplained weight loss, heat intolerance, 

cold intolerance


NEUROLOGIC: 


Absent: headache, focal weakness or paresthesias, dizziness, unsteady gait, 

seizure, mental status changes, bladder or bowel incontinence


PSYCHIATRIC: 


Absent: anxiety, depression, suicidal or homicidal ideation, hallucinations.








PHYSICAL EXAMINATION


 Vital Signs - 24 hr











  12/26/17 12/26/17 12/26/17





  15:05 15:45 16:20


 


Temperature 97.4 F L  


 


Pulse Rate 62  


 


Pulse Rate [  64 67





Right Radial]   


 


Respiratory 20 20 





Rate   


 


Blood Pressure 92/50  


 


Blood Pressure  79/56 94/65





[Right Arm]   


 


O2 Sat by Pulse 97  





Oximetry (%)   














  12/26/17 12/26/17 12/26/17





  17:00 18:09 18:49


 


Temperature   


 


Pulse Rate   


 


Pulse Rate [ 68 67 68





Right Radial]   


 


Respiratory  20 24





Rate   


 


Blood Pressure   


 


Blood Pressure 105/91 98/72 100/87





[Right Arm]   


 


O2 Sat by Pulse   98





Oximetry (%)   














  12/26/17 12/26/17





  20:00 20:01


 


Temperature  


 


Pulse Rate  72


 


Pulse Rate [ 72 





Right Radial]  


 


Respiratory 20 





Rate  


 


Blood Pressure  


 


Blood Pressure 142/100 





[Right Arm]  


 


O2 Sat by Pulse 99 97





Oximetry (%)  











GENERAL: A&Ox2 


HEAD: Normal with no signs of trauma.


EYES: Pupils equal, round and reactive to light, extraocular movements intact, 

sclera anicteric, conjunctiva clear. No lid lag.


EARS, NOSE, THROAT: Ears normal, nares patent, oropharynx clear without 

exudates. Moist mucous membranes.


NECK: Normal range of motion, supple without lymphadenopathy, JVD, or masses.


LUNGS: Breath sounds equal, clear to auscultation bilaterally. No wheezes, and 

no crackles. No accessory muscle use.


HEART: Regular rate and rhythm, normal S1 and S2; +murmur


ABDOMEN: Soft, nontender, not distended, normoactive bowel sounds, no guarding, 

no rebound, no masses.  No hepatomegaly or  splenomegaly. 


MUSCULOSKELETAL: Normal range of motion at all joints. No bony deformities or 

tenderness. No CVA tenderness.


UPPER EXTREMITIES: 2+ pulses, warm, well-perfused. No cyanosis. No clubbing. No 

peripheral edema.


LOWER EXTREMITIES: 2+ pulses, warm, well-perfused. No calf tenderness. No 

peripheral edema. 


NEUROLOGICAL:  Cranial nerves II-XII intact. Normal speech. Normal gait.





 Laboratory Results - last 24 hr











  12/26/17 12/26/17 12/26/17





  15:00 15:50 15:50


 


WBC    8.3  D


 


RBC    3.45 L


 


Hgb    9.1 L


 


Hct    28.4 L


 


MCV    82.4


 


MCH    26.4  D


 


MCHC    32.0


 


RDW    15.1  D


 


Plt Count    243  D


 


MPV    8.5


 


Neutrophils %    74.0


 


Lymphocytes %    13.2  D


 


Monocytes %    8.3


 


Eosinophils %    2.7


 


Basophils %    1.8  D


 


PT with INR  24.9 H  


 


INR  2.26 H  


 


PTT (Actin FS)  28.4  


 


Sodium   140 


 


Potassium   5.6 H D 


 


Chloride   111 H 


 


Carbon Dioxide   23 


 


Anion Gap   6 L 


 


BUN   24 H D 


 


Creatinine   1.4 H 


 


Creat Clearance w eGFR   35.65 


 


Random Glucose   106  D 


 


Lactic Acid   


 


Calcium   8.6 


 


Total Bilirubin   0.9  D 


 


AST   30  D 


 


ALT   6 L D 


 


Alkaline Phosphatase   77 


 


Troponin I   0.03 


 


Total Protein   6.3 L 


 


Albumin   3.1 L 


 


Urine Color   


 


Urine Appearance   


 


Urine pH   


 


Ur Specific Gravity   


 


Urine Protein   


 


Urine Glucose (UA)   


 


Urine Ketones   


 


Urine Blood   


 


Urine Nitrite   


 


Urine Bilirubin   


 


Urine Urobilinogen   


 


Ur Leukocyte Esterase   


 


Urine RBC   


 


Urine WBC   


 


Ur Epithelial Cells   


 


Amorphous Urates   


 


Urine Bacteria   














  12/26/17 12/26/17





  16:24 17:14


 


WBC  


 


RBC  


 


Hgb  


 


Hct  


 


MCV  


 


MCH  


 


MCHC  


 


RDW  


 


Plt Count  


 


MPV  


 


Neutrophils %  


 


Lymphocytes %  


 


Monocytes %  


 


Eosinophils %  


 


Basophils %  


 


PT with INR  


 


INR  


 


PTT (Actin FS)  


 


Sodium  


 


Potassium  


 


Chloride  


 


Carbon Dioxide  


 


Anion Gap  


 


BUN  


 


Creatinine  


 


Creat Clearance w eGFR  


 


Random Glucose  


 


Lactic Acid  1.6 


 


Calcium  


 


Total Bilirubin  


 


AST  


 


ALT  


 


Alkaline Phosphatase  


 


Troponin I  


 


Total Protein  


 


Albumin  


 


Urine Color   Yellow


 


Urine Appearance   Clear


 


Urine pH   6.0


 


Ur Specific Gravity   1.025


 


Urine Protein   2+ H


 


Urine Glucose (UA)   Negative


 


Urine Ketones   1+ H


 


Urine Blood   Negative


 


Urine Nitrite   Negative


 


Urine Bilirubin   Negative


 


Urine Urobilinogen   0.2


 


Ur Leukocyte Esterase   Trace H


 


Urine RBC   0-2


 


Urine WBC   5-10


 


Ur Epithelial Cells   Few


 


Amorphous Urates   Few


 


Urine Bacteria   Few











ASSESSMENT/PLAN


85 year-old female with a PMH significant for HTN, HLD, CAD s/p stenting s/p PPM

, systolic heart failure, h/o CVA, paroxysmal atrial fibrillation, h/o PE,  

Parkinson's disease, dementia, and angiodysplasia of the cecum s/p GI bleed. 

Placed on observation for syncopal episode. 





Syncope


   --vasovagal episode while sitting/straining on the toilet


   --episode witnessed by daughter, no fall, no head trauma


   --initially hypotensive, given 2L fluid in ED with serial normotensive BP 

readings following resuscitation


   --patient back to baseline per daughter; has been appropriately interacting 

with family, eating





Hypertension


   --normotensive 





Paroxysmal atrial fibrillation


h/o PE


   --V-paced rhythm on ECG


   --on warfarin, INR therapeutic @ 2.26





Coronary artery disease


   --on isosorbide, simvastatin, cardizem, carvedilol, ASA





Dementia


   --on Seroquel





Hyperkalemia


   --initial K 5.6, reported hemolyzed; will get repeat





Chronic kidney disease


   --Cr 1.4, baseline 1.2


   --given fluids in ED, repeat bmp pending





DVT prophylaxis: on coumadin





Dispo: continued observation




















Visit type





- Emergency Visit


Emergency Visit: Yes


ED Registration Date: 12/26/17


Care time: The patient presented to the Emergency Department on the above date 

and was hospitalized for further evaluation of their emergent condition.





- New Patient


This patient is new to me today: Yes


Date on this admission: 12/26/17





- Critical Care


Critical Care patient: No

## 2017-12-26 NOTE — PDOC
History of Present Illness





- General


History Source: Patient


Exam Limitations: No Limitations





- History of Present Illness


Initial Comments: 





12/26/17 16:25


86 y.o female with significant past medical history of anemia, CVA, HTN, HLD, 

COPD, and CAD s/p pacemaker, who presents to the emergency room today BIBA from 

home s/p witnessed syncopal episode while having a bowel movement just prior to 

arrival to the ED. Denies head trauma, and LOC. The patients daughter explains 

that the patient became weak and limp while straining on the toilet. Denies 

bloody or tarry stool. The daughter states that this has occurred once before 

when the patient takes all of her medications at the same time. The patient is 

acting and talking normally as per her daughter. She is ambulatory with a 

walker at baseline. The daughter notes that she recently had bloodwork done by 

her PCP last week, which came back normal with her Coumadin level around 2.3. 





Denies fever, chills, nausea, vomiting. 


Denies bloody or tarry stool. 


Denies recent illness. 


Denies chest pain, SOB, cough. 


Denies leg swelling. 





Allergies: penicillin, shellfish


Surgical hx: cholecystectomy, Partial left lobectomy s/p Lung Cancer 1995 


Social Hx: Former tobacco use. The patient lives with her daughter who is her 

primary care taker.


PCP: Dr. Pablo Rowan 


Cardiologist: Dr. Parrish








<Mishel Chahal - Last Filed: 12/26/17 16:25>





<Galen Wright - Last Filed: 12/26/17 18:39>





- General


Chief Complaint: Syncope/Near Syncope


Stated Complaint: VASOVAGAL EPISODE


Time Seen by Provider: 12/26/17 15:25





Past History





<Mishel Chahal - Last Filed: 12/26/17 16:25>





- Past Medical History


Anemia: Yes


Asthma: No


Cancer: Yes (BREAST LUMP)


Cardiac Disorders: Yes (ppm cva hx stroke)


CVA: Yes


COPD: Yes


CHF: No


Dementia: Yes


Diabetes: No


GI Disorders: Yes (GI bleed, GERD)


 Disorders: No


HTN: Yes


Hypercholesterolemia: Yes


Liver Disease: No


Psychiatric Problems: Yes


Seizures: No


Thyroid Disease: No





- Surgical History


Abdominal Surgery: No


Appendectomy: No


Cardiac Surgery: No


Cholecystectomy: Yes


Lung Surgery: Yes (partial left lung removed 1995)


Neurologic Surgery: No


Orthopedic Surgery: No





- Immunization History


Immunization Up to Date: Yes





- Suicide/Smoking/Psychosocial Hx


Smoking Status: No


Smoking History: Former smoker


Have you smoked in the past 12 months: No


Number of Cigarettes Smoked Daily: 0


If you are a former smoker, when did you quit?: 40 YEARS AGO


'Breaking Loose' booklet given: 05/24/14


Hx Alcohol Use: No


Drug/Substance Use Hx: No


Substance Use Type: None


Hx Substance Use Treatment: No





<Galen Wright - Last Filed: 12/26/17 18:39>





- Past Medical History


Allergies/Adverse Reactions: 


 Allergies











Allergy/AdvReac Type Severity Reaction Status Date / Time


 


Penicillins Allergy Mild  Verified 12/26/17 16:46


 


Shellfish Allergy Mild  Verified 12/26/17 16:46


 


oats Allergy   Verified 12/26/17 16:47











Home Medications: 


Ambulatory Orders





Isosorbide Mononitrate [Monoket -] 30 mg PO DAILY #0 tab.sr.24h 02/23/12 


Ramipril [Altace] 2.5 mg PO 1200 #0 capsule 02/23/12 


Ranitidine HCl [Zantac] 150 mg PO BID #0 tablet 02/23/12 


Simvastatin [Zocor -] 20 mg PO HS 05/24/14 


Diltiazem Cd [Cardizem Cd -] 180 mg PO DAILY 01/08/16 


Gabapentin [Neurontin] 100 mg PO BID 01/08/16 


Cholecalciferol (Vitamin D3) [Vitamin D3] 2,000 unit PO DAILY 01/29/16 


Ferrous Sulfate [Feosol] 325 mg PO MOWEFR 01/29/16 


Quetiapine Fumarate [Seroquel -] 50 mg PO BID  tablet 02/02/16 


Carvedilol [Coreg -] 6.25 mg PO BID #60 tablet 02/14/16 


Aspirin [Adult Aspirin Regimen] 81 mg PO DAILY 12/26/17 


Hydroxyzine HCl 10 mg PO DAILY 12/26/17 


Warfarin Na [Coumadin] 2 mg PO DAILY 12/26/17 











**Review of Systems





- Review of Systems


Able to Perform ROS?: Yes


Comments:: 





12/26/17 16:25


A complete review of 10 out of 10 review of systems is taken and is negative 

apart from what is previously mentioned below and in the HPI.





<Mishel Chahal - Last Filed: 12/26/17 16:25>





*Physical Exam





- Physical Exam


Comments: 





12/26/17 16:25


Vitals: Triage Vital signs reviewed


General Appearance:  no acute distress, well nourished well developed


Head: Atraumatic


Eyes: Pupils equal reactive round, extraocular movement intact


Cardiac: Regular rate and rhythym, no murmurs, no rubs, no gallops


Lungs: Clear to auscultation bilateral, good air movement bilaterally


Abdomen:  Soft, non distended, normal bowel sounds, non tender to palpation


Extremities: Full range of motion to all extremities, no cyanosis, clubbing, or 

edema


Skin:  Warm and dry, no rashes or lesions, no rash, no petechiae


Neuro:  Cranial Nerves 2-12 grossly intact, Strength intact to all extremities, 

Sensation intact to all extremities


Psych: Normal mood, normal affect








<Mishel Chahal - Last Filed: 12/26/17 16:25>





**Heart Score/ECG Review


  ** #1





12/26/17 15:56


EKG performed at 15:39 demonstrates rate of 68, paced rhythm. 





<Mishel Chahal - Last Filed: 12/26/17 16:25>





ED Treatment Course





- LABORATORY


CBC & Chemistry Diagram: 


 12/26/17 15:50





 12/26/17 15:50





<Mishel Chahal - Last Filed: 12/26/17 16:25>





- LABORATORY


CBC & Chemistry Diagram: 


 12/26/17 15:50





 12/26/17 15:50





<Galen Wright - Last Filed: 12/26/17 18:39>





Medical Decision Making





- Medical Decision Making





12/26/17 18:38





History and examination consistent with vasovagal syncope upon arrival to the 

emergency Department patient with attention systolic of 79. Patient mentating 

well no acute complaints labs cultures lactic ordered





IV fluids ordered





Reevaluation 6 PM no clear evidence of infection on chest x-ray urinalysis or 

laboratory analysis. Lactic acid within normal limits





However despite IV fluid resuscitation patient blood pressure not back to her 

baseline. Family did state that she took all of her blood pressure medications 

all at the same time and this may be a contributing factor given patient's age 

comorbidities and slight hypotension with syncopal episode earlier we will 

admit to telemetry at [facility] for further management.








<Galen Wright - Last Filed: 12/26/17 18:39>





*DC/Admit/Observation/Transfer





- Attestations


Scribe Attestion: 





12/26/17 16:26





Documentation prepared by YAZAN Figueroa, acting as medical scribe 

for Galen Wright MD





<Mishel Chahal - Last Filed: 12/26/17 16:25>





- Discharge Dispostion


Admit: Yes





<Galen Wright - Last Filed: 12/26/17 18:39>


Diagnosis at time of Disposition: 


 Syncope








- Discharge Dispostion


Condition at time of disposition: Fair

## 2017-12-27 NOTE — EKG
Test Reason : 

Blood Pressure : ***/*** mmHG

Vent. Rate : 068 BPM     Atrial Rate : 065 BPM

   P-R Int : 000 ms          QRS Dur : 144 ms

    QT Int : 468 ms       P-R-T Axes : 190 -72 140 degrees

   QTc Int : 497 ms

 

*** POOR DATA QUALITY, INTERPRETATION MAY BE ADVERSELY AFFECTED

Ventricular-paced rhythm

Underlying rhythm appears to be atrial fibrillation

ABNORMAL ECG

WHEN COMPARED WITH ECG OF 03-JAN-2017 15:24,

ELECTRONIC VENTRICULAR PACEMAKER HAS REPLACED SINUS RHYTHM

Confirmed by EUGENE RUANO MD ([id number]) on 12/27/2017 11:38:17 AM

 

Referred By: DR BRADY           Confirmed By:EUGENE RUANO MD

## 2018-01-26 ENCOUNTER — HOSPITAL ENCOUNTER (INPATIENT)
Dept: HOSPITAL 74 - JER | Age: 83
LOS: 6 days | Discharge: HOME | DRG: 813 | End: 2018-02-01
Attending: INTERNAL MEDICINE | Admitting: INTERNAL MEDICINE
Payer: COMMERCIAL

## 2018-01-26 VITALS — BODY MASS INDEX: 34.3 KG/M2

## 2018-01-26 DIAGNOSIS — I69.351: ICD-10-CM

## 2018-01-26 DIAGNOSIS — F02.80: ICD-10-CM

## 2018-01-26 DIAGNOSIS — K44.9: ICD-10-CM

## 2018-01-26 DIAGNOSIS — I95.9: ICD-10-CM

## 2018-01-26 DIAGNOSIS — R19.7: ICD-10-CM

## 2018-01-26 DIAGNOSIS — D68.32: Primary | ICD-10-CM

## 2018-01-26 DIAGNOSIS — I48.91: ICD-10-CM

## 2018-01-26 DIAGNOSIS — J44.9: ICD-10-CM

## 2018-01-26 DIAGNOSIS — E86.0: ICD-10-CM

## 2018-01-26 DIAGNOSIS — D50.9: ICD-10-CM

## 2018-01-26 DIAGNOSIS — E78.5: ICD-10-CM

## 2018-01-26 DIAGNOSIS — Z86.711: ICD-10-CM

## 2018-01-26 DIAGNOSIS — K21.9: ICD-10-CM

## 2018-01-26 DIAGNOSIS — G20: ICD-10-CM

## 2018-01-26 DIAGNOSIS — Z66: ICD-10-CM

## 2018-01-26 DIAGNOSIS — K55.21: ICD-10-CM

## 2018-01-26 DIAGNOSIS — Z90.2: ICD-10-CM

## 2018-01-26 DIAGNOSIS — Z87.891: ICD-10-CM

## 2018-01-26 DIAGNOSIS — E53.8: ICD-10-CM

## 2018-01-26 DIAGNOSIS — I11.0: ICD-10-CM

## 2018-01-26 DIAGNOSIS — Z95.0: ICD-10-CM

## 2018-01-26 DIAGNOSIS — Z95.5: ICD-10-CM

## 2018-01-26 DIAGNOSIS — I25.118: ICD-10-CM

## 2018-01-26 DIAGNOSIS — D62: ICD-10-CM

## 2018-01-26 DIAGNOSIS — Z85.118: ICD-10-CM

## 2018-01-26 DIAGNOSIS — Z79.01: ICD-10-CM

## 2018-01-26 DIAGNOSIS — I48.0: ICD-10-CM

## 2018-01-26 DIAGNOSIS — I50.32: ICD-10-CM

## 2018-01-26 LAB
ALBUMIN SERPL-MCNC: 3.1 G/DL (ref 3.4–5)
ALP SERPL-CCNC: 85 U/L (ref 45–117)
ALT SERPL-CCNC: 13 U/L (ref 12–78)
ANION GAP SERPL CALC-SCNC: 8 MMOL/L (ref 8–16)
APTT BLD: 38.2 SECONDS (ref 26.9–34.4)
AST SERPL-CCNC: 10 U/L (ref 15–37)
BASOPHILS # BLD: 0.7 % (ref 0–2)
BASOPHILS # BLD: 1 % (ref 0–2)
BILIRUB SERPL-MCNC: 0.2 MG/DL (ref 0.2–1)
BNP SERPL-MCNC: 6971.03 PG/ML (ref 5–450)
BUN SERPL-MCNC: 26 MG/DL (ref 7–18)
CALCIUM SERPL-MCNC: 8 MG/DL (ref 8.5–10.1)
CHLORIDE SERPL-SCNC: 111 MMOL/L (ref 98–107)
CO2 SERPL-SCNC: 25 MMOL/L (ref 21–32)
CREAT SERPL-MCNC: 1.6 MG/DL (ref 0.55–1.02)
DEPRECATED RDW RBC AUTO: 16 % (ref 11.6–15.6)
DEPRECATED RDW RBC AUTO: 16.1 % (ref 11.6–15.6)
EOSINOPHIL # BLD: 1.8 % (ref 0–4.5)
EOSINOPHIL # BLD: 2 % (ref 0–4.5)
GLUCOSE SERPL-MCNC: 98 MG/DL (ref 74–106)
HCT VFR BLD CALC: 19.9 % (ref 32.4–45.2)
HCT VFR BLD CALC: 23.1 % (ref 32.4–45.2)
HGB BLD-MCNC: 6 GM/DL (ref 10.7–15.3)
HGB BLD-MCNC: 7.2 GM/DL (ref 10.7–15.3)
INR BLD: 3.12 (ref 0.82–1.09)
LYMPHOCYTES # BLD: 15.9 % (ref 8–40)
LYMPHOCYTES # BLD: 18.6 % (ref 8–40)
MCH RBC QN AUTO: 23.8 PG (ref 25.7–33.7)
MCH RBC QN AUTO: 24.3 PG (ref 25.7–33.7)
MCHC RBC AUTO-ENTMCNC: 30.2 G/DL (ref 32–36)
MCHC RBC AUTO-ENTMCNC: 31.1 G/DL (ref 32–36)
MCV RBC: 78.2 FL (ref 80–96)
MCV RBC: 78.7 FL (ref 80–96)
MONOCYTES # BLD AUTO: 10.7 % (ref 3.8–10.2)
MONOCYTES # BLD AUTO: 12.9 % (ref 3.8–10.2)
NEUTROPHILS # BLD: 65.5 % (ref 42.8–82.8)
NEUTROPHILS # BLD: 70.9 % (ref 42.8–82.8)
PH BLDV: 7.33 [PH] (ref 7.32–7.42)
PLATELET # BLD AUTO: 268 K/MM3 (ref 134–434)
PLATELET # BLD AUTO: 278 K/MM3 (ref 134–434)
PMV BLD: 7.5 FL (ref 7.5–11.1)
PMV BLD: 7.6 FL (ref 7.5–11.1)
POTASSIUM SERPLBLD-SCNC: 4.4 MMOL/L (ref 3.5–5.1)
PROT SERPL-MCNC: 6.2 G/DL (ref 6.4–8.2)
PT PNL PPP: 35.3 SEC (ref 9.98–11.88)
RBC # BLD AUTO: 2.53 M/MM3 (ref 3.6–5.2)
RBC # BLD AUTO: 2.96 M/MM3 (ref 3.6–5.2)
SODIUM SERPL-SCNC: 144 MMOL/L (ref 136–145)
VENOUS PC02: 47.4 MMHG (ref 38–52)
VENOUS PO2: 28.5 MMHG (ref 28–48)
WBC # BLD AUTO: 4.8 K/MM3 (ref 4–10)
WBC # BLD AUTO: 5.2 K/MM3 (ref 4–10)

## 2018-01-26 PROCEDURE — P9058 RBC, L/R, CMV-NEG, IRRAD: HCPCS

## 2018-01-26 PROCEDURE — P9038 RBC IRRADIATED: HCPCS

## 2018-01-26 PROCEDURE — 30233H1 TRANSFUSION OF NONAUTOLOGOUS WHOLE BLOOD INTO PERIPHERAL VEIN, PERCUTANEOUS APPROACH: ICD-10-PCS | Performed by: INTERNAL MEDICINE

## 2018-01-26 RX ADMIN — RANITIDINE SCH MG: 150 TABLET ORAL at 22:37

## 2018-01-26 RX ADMIN — ATORVASTATIN CALCIUM SCH MG: 10 TABLET, FILM COATED ORAL at 22:36

## 2018-01-26 RX ADMIN — GABAPENTIN SCH MG: 100 CAPSULE ORAL at 22:36

## 2018-01-26 NOTE — PN
Progress Note (short form)





- Note


Progress Note: 





GI Consult: Nara was examined in the ER with her daughters providing history. 

Will complete consultation tomorrow. Suspect vascular ectasia bleed.

## 2018-01-26 NOTE — PDOC
History of Present Illness





- General


Stated Complaint: Shortness of Breath


Time Seen by Provider: 01/26/18 12:04





- History of Present Illness


Initial Comments: 


 86 year old female with history of HTN, CHF (EF 17%), CVA (2006 with residual 

right sided sensitivity), chronic anemia, CAD s/p stenting pacemaker, 

paroxysmal atrial fibrillation, and remote PE, Parkinson's disease with dementia

, and angiodysplasia of the cecum (c/w GI bleed) presenting with hypotension in 

the setting of diarrhea, sob, and general malaise. The symptoms started two 

days prior with NB diarrhea which evolved into SOB and malaise today. Denies 

fevers, nausea, vomiting, diarrhea, palpitations, constipation, chest pain, or 

blood from any orifice. Her PCP is Dr. Rowan and her cardiologist is Dr. Parrish


01/26/18 12:16








Past History





- Past Medical History


Allergies/Adverse Reactions: 


 Allergies











Allergy/AdvReac Type Severity Reaction Status Date / Time


 


oats Allergy Intermediate  Verified 01/26/18 12:25


 


Penicillins Allergy Mild  Verified 01/26/18 12:25


 


Shellfish Allergy Mild  Verified 01/26/18 12:25











Home Medications: 


Ambulatory Orders





Isosorbide Mononitrate [Monoket -] 30 mg PO DAILY #0 tab.sr.24h 02/23/12 


Ramipril [Altace] 2.5 mg PO 1200 #0 capsule 02/23/12 


Ranitidine HCl [Zantac] 150 mg PO BID #0 tablet 02/23/12 


Simvastatin [Zocor -] 20 mg PO HS 05/24/14 


Diltiazem Cd [Cardizem Cd -] 180 mg PO DAILY 01/08/16 


Gabapentin [Neurontin] 100 mg PO BID 01/08/16 


Cholecalciferol (Vitamin D3) [Vitamin D3] 2,000 unit PO DAILY 01/29/16 


Ferrous Sulfate [Feosol] 325 mg PO MOWEFR 01/29/16 


Quetiapine Fumarate [Seroquel -] 50 mg PO BID  tablet 02/02/16 


Carvedilol [Coreg -] 6.25 mg PO BID #60 tablet 02/14/16 


Aspirin [Adult Aspirin Regimen] 81 mg PO DAILY 12/26/17 


Hydroxyzine HCl 10 mg PO DAILY 12/26/17 


Warfarin Na [Coumadin -] 2 mg PO DAILY 12/26/17 








Anemia: Yes


Asthma: No


Cancer: Yes (BREAST LUMP)


Cardiac Disorders: Yes (ppm cva hx stroke)


CVA: Yes


COPD: Yes


CHF: No


DVT: No


Dementia: Yes


Diabetes: No


GI Disorders: Yes (GI bleed, GERD)


 Disorders: No


HTN: Yes


Hypercholesterolemia: Yes


Liver Disease: No


Psychiatric Problems: Yes


Seizures: No


Thyroid Disease: No





- Surgical History


Abdominal Surgery: No


Appendectomy: No


Cardiac Surgery: No


Cholecystectomy: Yes


Lung Surgery: Yes (partial left lung removed 1995)


Neurologic Surgery: No


Orthopedic Surgery: No





- Immunization History


Immunization Up to Date: Yes





- Suicide/Smoking/Psychosocial Hx


Smoking Status: No


Smoking History: Former smoker


Have you smoked in the past 12 months: No


Number of Cigarettes Smoked Daily: 0


If you are a former smoker, when did you quit?: 40 YEARS AGO


'Breaking Loose' booklet given: 05/24/14


Hx Alcohol Use: No


Drug/Substance Use Hx: No


Substance Use Type: None


Hx Substance Use Treatment: No





**Review of Systems





- Review of Systems


Constitutional: No: Chills, Diaphoresis


HEENTM: No: Blurred Vision


Respiratory: Yes: Shortness of Breath.  No: Cough, Stridor, Wheezing


Cardiac (ROS): Yes: Irregular Heart Rate, Syncope.  No: Chest Pain, Edema, 

Palpitations, Chest Tightness


ABD/GI: Yes: Diarrhea.  No: Abdominal Distended, Nausea, Vomiting


: No: Dysuria, Discharge, Frequency, Urgency


Neurological: No: Headache, Numbness, Paresthesia


Endocrine: No: Excessive Sweating, Intolerance to Heat


Hematologic/Lymphatic: Yes: Anemia





*Physical Exam





- Physical Exam


General Appearance: Yes: Nourished, Appropriately Dressed.  No: Apparent 

Distress


HEENT: positive: EOMI, KAY, Normal ENT Inspection, Normal Voice


Neck: positive: Normal Thyroid, Supple.  negative: Tender, Trachea midline (

slightly deviated to the left), Rigid


Respiratory/Chest: negative: Chest Tender, Lungs Clear (Bibasilar crackles), 

Normal Breath Sounds, Respiratory Distress


Cardiovascular: positive: Regular Rhythm, Regular Rate


Gastrointestinal/Abdominal: positive: Normal Bowel Sounds, Flat, Soft.  negative

: Tender


Musculoskeletal: negative: Normal Inspection (Right sided hypersentivity with 

lack of voluntary movement as residual from stroke.)


Extremity: positive: Normal Capillary Refill, Normal Inspection, Tender (tender 

right UE).  negative: Normal Range of Motion


Integumentary: positive: Normal Color, Dry, Warm


Neurologic: positive: Alert, Normal Mood/Affect, Normal Response.  negative: 

Fully Oriented (To person)





ED Treatment Course





- LABORATORY


CBC & Chemistry Diagram: 


 01/26/18 12:15





 01/26/18 12:04





Medical Decision Making





- Medical Decision Making


 86 year old female with history of CA (no recent chemo), CHF (EF 17% 2/16),and 

chronic anemia (vascular dysplasia) presenting with hypoxia on pulse ox, 

diarrhea, and general malaise for the past two days. This is most concerning 

for some underlying infection. Will order sepsis lab order set and gently 

hydrate with 250 mL. Patient's pressures are in 90s systolic and SATing well on 

RA. will reevaluate after labs return.


01/26/18 12:33





HgB of 6.0 on labs with BNP of 7K but relatively clear CXR. Will transfuse and 

admit for workup of anemia.


01/26/18 15:10








*DC/Admit/Observation/Transfer


Diagnosis at time of Disposition: 


Anemia


Qualifiers:


 Anemia type: other cause Other causes of anemia: other cause, not classified 

Qualified Code(s): D64.89 - Other specified anemias








- Discharge Dispostion


Disposition: HOME


Condition at time of disposition: Stable


Admit: Yes





- Referrals


Referrals: 


Arun Rowan MD [Primary Care Provider] - 





- Patient Instructions





- Post Discharge Activity

## 2018-01-26 NOTE — PDOC
Attending Attestation





- Resident


Resident Name: Leida Richardson





- ED Attending Attestation


I have performed the following: I have examined & evaluated the patient, The 

case was reviewed & discussed with the resident, I agree w/resident's findings 

& plan, Exceptions are as noted





- HPI


HPI: 





01/26/18 13:19


"Patient is a 86 year old female with a significant past medical history of HTN

, HLD, CAD s/p stenting s/p PPM, systolic heart failure, h/o CVA, paroxysmal 

atrial fibrillation, PE,  Parkinson's disease, dementia, and angiodysplasia of 

the cecum s/p GI bleed who presents to the ED with complaints of SOB that began 

earlier this week.  As per patient's daughter, patient began to experiencing 

diarrhea and weakness earlier this week prompting her to schedule a home 

physician visit.  She reports physician reported nothing abnormal but does 

state she scheduled a second at home physician visit after diarrhea continued.  

Patient's daughter states patient began to experience Sob with exertion 2 days 

ago, stating patient was unable to walk 2 steps without getting winded. Pt also 

was unable to lay flat due to difficulty breathing.  She reports patient began 

to experience general malaise this morning, prompting her to call her PCP who 

then referred patient to the ED for further evaluation.





Denies chest pain. Denies fevers, chills. Denies nausea, vomiting. Denies 

contact with sick individuals, out of state travelling. Denies any other 

symptoms. 





Allergies: Penicillin, Shellfish, Oats. 


Social history: Former smoker (40 years). No alcohol. No illicit drugs. 


Surgical history: Cholecystectomy


PMD: Dr. Rowan


Cardiologist: Dr. Parrish





- Physicial Exam


PE: 





01/30/18 07:47


"GENERAL: Awake, alert, and fully oriented, in no acute distress


HEAD: No signs of trauma


EYES: PERRLA, EOMI, sclera anicteric, conjunctiva clear


ENT: Auricles normal inspection, hearing grossly normal, nares patent, 

oropharynx clear without exudates. Moist mucosa


NECK: Nontender, no stepoffs, Normal ROM, supple, no lymphadenopathy, JVD, or 

masses


LUNGS: bibasilar rales


HEART: Regular rate and rhythm, normal S1 and S2, no murmurs, rubs or gallops


ABDOMEN: Soft, nontender, normoactive bowel sounds.  No guarding, no rebound.  

No masses


EXTREMITIES: Normal range of motion, no edema.  No clubbing or cyanosis. No 

cords, erythema, or tenderness


NEUROLOGICAL: Cranial nerves II through XII intact. 5/5 strength and sensation 

in all extremities, Normal speech, normal gait


SKIN: Warm, Dry, normal turgor, no rashes or lesions noted.


"





- Medical Decision Making





01/26/18 13:18


86 F with SOB. Exam notable for bibasilar rales. Concerning for CHF 

exacerbation (last echo showed EF 17%). Pt also markedly pale on exam, 

concerning for anemia. Pt has h/o GI bleed and is on coumadin.


- Labs, T&S, trop, BNP


- CXR


- Gentle 250cc bolus for hypotension


- Inotropes as needed

## 2018-01-26 NOTE — HP
Admitting History and Physical





- Primary Care Physician


PCP: Arun Rowan





- Admission


Chief Complaint: diarrhea, weakness


History of Present Illness: 


 is an 86 year old female with pmh of HTN, Diastolic heart failure(EF17%)

, Paroxysmal Afib, Parkinson Disease w/ dementia, CVA(2006), Chronic anemia, 

CAD s/p PPM, lung Ca(s/p lobectomy), and GI bleed, who presents with 4 days of 

intermittent diarrhea, generalized weakness, and increasing sob on exertion. 

Daughter reports she noticed pt has been sleeping more, and hasnt been able to 

ambulate at home as well as before without sob. Pt takes po iron at home,this 

was stopped because of gi discomfort. Daughter reports she and her sis had some 

gastroenteritis recently. Pt also reports dysuria. Pt denies any chest pain, 

fevers, chills, hematochezia,melena, hematoemesis, recent hospitalization, 

recent use of antibiotics, or recent travel.  


History Source: Patient, Family Member, Caregiver





- Past Medical History


CNS: Yes: Alzheimer's, CVA, Dementia, Parkinson's


Cardiovascular: Yes: HTN


Pulmonary: Yes: Cancer, Pulmonary Embolus


Gastrointestinal: Yes: Diverticulosis, GI Bleed, Hemorrhoids, Hiatal Hernia, 

Other (angiodysplasia of cecum)


Heme/Onc: Yes: Anemia, B12 Deficiency


Psych: Yes: Anxiety





- Past Surgical History


Past Surgical History: Yes: Cholecystectomy, Colonoscopy, Hysterectomy, 

Permanent Pacemaker


Additional Past Surgical History: 


lobectomy





- Advance Directives


Advance Directives: Yes: DNR





- Smoking History


Smoking history: Former smoker


Have you smoked in the past 12 months: No


Aproximately how many cigarettes per day: 0


If you are a former smoker, when did you quit?: 40 YEARS AGO





- Alcohol/Substance Use


Hx Alcohol Use: No


History of Substance Use: reports: None





- Social History


ADL: Family Assistance


History of Recent Travel: No





Home Medications





- Allergies


Allergies/Adverse Reactions: 


 Allergies











Allergy/AdvReac Type Severity Reaction Status Date / Time


 


oats Allergy Intermediate  Verified 01/26/18 12:25


 


Penicillins Allergy Mild  Verified 01/26/18 12:25


 


Shellfish Allergy Mild  Verified 01/26/18 12:25














- Home Medications


Home Medications: 


Ambulatory Orders





Isosorbide Mononitrate [Monoket -] 30 mg PO DAILY #0 tab.sr.24h 02/23/12 


Ramipril [Altace] 2.5 mg PO 1200 #0 capsule 02/23/12 


Ranitidine HCl [Zantac] 150 mg PO BID #0 tablet 02/23/12 


Simvastatin [Zocor -] 20 mg PO HS 05/24/14 


Diltiazem Cd [Cardizem Cd -] 180 mg PO DAILY 01/08/16 


Gabapentin [Neurontin] 100 mg PO BID 01/08/16 


Cholecalciferol (Vitamin D3) [Vitamin D3] 2,000 unit PO DAILY 01/29/16 


Ferrous Sulfate [Feosol] 325 mg PO MOWEFR 01/29/16 


Quetiapine Fumarate [Seroquel -] 50 mg PO BID  tablet 02/02/16 


Carvedilol [Coreg -] 6.25 mg PO BID #60 tablet 02/14/16 


Aspirin [Adult Aspirin Regimen] 81 mg PO DAILY 12/26/17 


Hydroxyzine HCl 10 mg PO DAILY 12/26/17 


Warfarin Na [Coumadin -] 2 mg PO DAILY 12/26/17 











Family Disease History





- Family Disease History


Family Disease History: Heart Disease: Brother, Sister, CA: Mother (emphysema, 

"liver cancer"), Respiratory: Mother, Other: Father (cva), Daughter (aneurysm)





Review of Systems


Findings/Remarks: 


As per HPI





Physical Examination


Vital Signs: 


 Vital Signs











Temperature  98.5 F   01/26/18 14:07


 


Pulse Rate  69   01/26/18 14:07


 


Respiratory Rate  25 H  01/26/18 14:07


 


Blood Pressure  99/57   01/26/18 14:07


 


O2 Sat by Pulse Oximetry (%)  99   01/26/18 14:07











Constitutional: Yes: No Distress, Calm


Eyes: Yes: Conjunctiva Clear


Cardiovascular: Yes: Pulse Irregular, Murmur, S1, S2


Respiratory: Yes: Regular, Diminished, Rales (bibasilar)


Gastrointestinal: Yes: WNL, Normal Bowel Sounds, Soft, Abdomen, Obese.  No: 

Distention, Tenderness


Extremities: Yes: WNL.  No: Erythema


Edema: No


Neurological: Yes: WNL, Alert


Psychiatric: Yes: WNL, Alert


Labs: 


 CBC, BMP





 01/26/18 12:15 





 01/26/18 12:04 











Imaging





- Results


Chest X-ray: Report Reviewed





Problem List





- Problems


(1) Diarrhea


Assessment/Plan: 


Intermittent, 6 episodes/day, appears chronic or related to stopping iron


cdiff test ordered


Monitor bmp


GI consulted


Code(s): R19.7 - DIARRHEA, UNSPECIFIED   


Qualifiers: 


   Diarrhea type: unspecified type   Qualified Code(s): R19.7 - Diarrhea, 

unspecified   





(2) Anemia


Assessment/Plan: 


Acute on Chronic anemia, pt hypotensive


Pt receiving 1unit pbcs


Monitor post transfusion volume status considering chf


Post transfusion cbc 


Hold off on IVF considering chf


Hematology consulted 


Code(s): D64.9 - ANEMIA, UNSPECIFIED   


Qualifiers: 


   Other causes of anemia: chronic disease, other 





(3) Dehydration


Assessment/Plan: 


secondary to diarrhea 


monitor vitals


Hold off on IVF for now


Encourage po intake


Code(s): E86.0 - DEHYDRATION   





(4) Pacemaker


Assessment/Plan: 


ventricular paced


Code(s): Z95.0 - PRESENCE OF CARDIAC PACEMAKER   





(5) Atherosclerotic heart disease


Assessment/Plan: 


s/p PPM


holding aspirin


continue simvastatin


Code(s): I25.10 - ATHSCL HEART DISEASE OF NATIVE CORONARY ARTERY W/O ANG PCTRS 

  


Qualifiers: 


   Coronary Disease-Associated Artery/Lesion type: native artery   Native vs. 

transplanted heart: native heart   Associated angina: without angina   

Qualified Code(s): I25.10 - Atherosclerotic heart disease of native coronary 

artery without angina pectoris   





(6) Atrial fibrillation


Assessment/Plan: 


coagulated on coumadin, supratherapeutic


hold coumadin tonight


monitor inr


Code(s): I48.91 - UNSPECIFIED ATRIAL FIBRILLATION   


Qualifiers: 


   Atrial fibrillation type: paroxysmal   Qualified Code(s): I48.0 - Paroxysmal 

atrial fibrillation   





(7) Chronic kidney disease


Assessment/Plan: 


Cr elevated secondary to fluid loss


will monitor 


Code(s): N18.9 - CHRONIC KIDNEY DISEASE, UNSPECIFIED   


Qualifiers: 


   Chronic kidney disease stage: stage 2 (mild)   Qualified Code(s): N18.2 - 

Chronic kidney disease, stage 2 (mild)   





(8) Hypertension


Assessment/Plan: 


hold meds for now considering hypotension/anemia


Code(s): I10 - ESSENTIAL (PRIMARY) HYPERTENSION   


Qualifiers: 


   Hypertension type: essential hypertension   Qualified Code(s): I10 - 

Essential (primary) hypertension   





(9) Weakness


Assessment/Plan: 


secondary to acute anemia


will treat underlying illness and monitor for improvement 


Code(s): R53.1 - WEAKNESS   





(10) CHF (congestive heart failure)


Assessment/Plan: 


chronic, stable


low Na diet


daily weights


will monitor 


cardiology consulted


Code(s): I50.9 - HEART FAILURE, UNSPECIFIED   


Qualifiers: 


   Congestive heart failure type: diastolic   Congestive heart failure 

chronicity: chronic   Qualified Code(s): I50.32 - Chronic diastolic (congestive

) heart failure   





(11) Dementia


Assessment/Plan: 


stable


continue home meds


Code(s): F03.90 - UNSPECIFIED DEMENTIA WITHOUT BEHAVIORAL DISTURBANCE   


Qualifiers: 


   Dementia type: Parkinson's disease   Dementia behavioral disturbance: 

without behavioral disturbance   Qualified Code(s): G20 - Parkinson's disease; 

F02.80 - Dementia in other diseases classified elsewhere without behavioral 

disturbance; F02.80 - Dementia in other diseases classified elsewhere without 

behavioral disturbance; F02.80 - Dementia in other diseases classified 

elsewhere without behavioral disturbance   





(12) Dysuria


Assessment/Plan: 


Pt reports dysuria, pt afebrile, wbc wnl


UA/UC ordered 


Code(s): R30.0 - DYSURIA

## 2018-01-27 LAB
ALBUMIN SERPL-MCNC: 2.7 G/DL (ref 3.4–5)
ALP SERPL-CCNC: 78 U/L (ref 45–117)
ALT SERPL-CCNC: 12 U/L (ref 12–78)
ANION GAP SERPL CALC-SCNC: 9 MMOL/L (ref 8–16)
APPEARANCE UR: CLEAR
APTT BLD: 32.7 SECONDS (ref 26.9–34.4)
AST SERPL-CCNC: 14 U/L (ref 15–37)
BASOPHILS # BLD: 0.7 % (ref 0–2)
BASOPHILS # BLD: 0.8 % (ref 0–2)
BILIRUB SERPL-MCNC: 0.4 MG/DL (ref 0.2–1)
BILIRUB UR STRIP.AUTO-MCNC: NEGATIVE MG/DL
BUN SERPL-MCNC: 20 MG/DL (ref 7–18)
CALCIUM SERPL-MCNC: 7.8 MG/DL (ref 8.5–10.1)
CHLORIDE SERPL-SCNC: 115 MMOL/L (ref 98–107)
CO2 SERPL-SCNC: 23 MMOL/L (ref 21–32)
COLOR UR: (no result)
CREAT SERPL-MCNC: 1.4 MG/DL (ref 0.55–1.02)
DEPRECATED RDW RBC AUTO: 16.2 % (ref 11.6–15.6)
DEPRECATED RDW RBC AUTO: 16.8 % (ref 11.6–15.6)
EOSINOPHIL # BLD: 1.8 % (ref 0–4.5)
EOSINOPHIL # BLD: 2.2 % (ref 0–4.5)
EPITH CASTS URNS QL MICRO: (no result) /HPF
GLUCOSE SERPL-MCNC: 76 MG/DL (ref 74–106)
HCT VFR BLD CALC: 21.8 % (ref 32.4–45.2)
HCT VFR BLD CALC: 26.3 % (ref 32.4–45.2)
HGB BLD-MCNC: 6.9 GM/DL (ref 10.7–15.3)
HGB BLD-MCNC: 8.4 GM/DL (ref 10.7–15.3)
INR BLD: 2.56 (ref 0.82–1.09)
KETONES UR QL STRIP: NEGATIVE
LEUKOCYTE ESTERASE UR QL STRIP.AUTO: (no result)
LYMPHOCYTES # BLD: 14.1 % (ref 8–40)
LYMPHOCYTES # BLD: 18.4 % (ref 8–40)
MAGNESIUM SERPL-MCNC: 1.8 MG/DL (ref 1.8–2.4)
MCH RBC QN AUTO: 24.2 PG (ref 25.7–33.7)
MCH RBC QN AUTO: 25.4 PG (ref 25.7–33.7)
MCHC RBC AUTO-ENTMCNC: 31.4 G/DL (ref 32–36)
MCHC RBC AUTO-ENTMCNC: 31.9 G/DL (ref 32–36)
MCV RBC: 77.1 FL (ref 80–96)
MCV RBC: 79.8 FL (ref 80–96)
MONOCYTES # BLD AUTO: 10.8 % (ref 3.8–10.2)
MONOCYTES # BLD AUTO: 12.3 % (ref 3.8–10.2)
MUCOUS THREADS URNS QL MICRO: (no result)
NEUTROPHILS # BLD: 67.8 % (ref 42.8–82.8)
NEUTROPHILS # BLD: 71.1 % (ref 42.8–82.8)
NITRITE UR QL STRIP: NEGATIVE
PH UR: 5 [PH] (ref 5–8)
PHOSPHATE SERPL-MCNC: 4.2 MG/DL (ref 2.5–4.9)
PLATELET # BLD AUTO: 248 K/MM3 (ref 134–434)
PLATELET # BLD AUTO: 273 K/MM3 (ref 134–434)
PMV BLD: 7.3 FL (ref 7.5–11.1)
PMV BLD: 7.6 FL (ref 7.5–11.1)
POTASSIUM SERPLBLD-SCNC: 4 MMOL/L (ref 3.5–5.1)
PROT SERPL-MCNC: 5.7 G/DL (ref 6.4–8.2)
PROT UR QL STRIP: NEGATIVE
PROT UR QL STRIP: NEGATIVE
PT PNL PPP: 28.9 SEC (ref 9.98–11.88)
RBC # BLD AUTO: 2.83 M/MM3 (ref 3.6–5.2)
RBC # BLD AUTO: 3.3 M/MM3 (ref 3.6–5.2)
RBC # UR STRIP: NEGATIVE /UL
SODIUM SERPL-SCNC: 147 MMOL/L (ref 136–145)
SP GR UR: 1.01 (ref 1–1.03)
UROBILINOGEN UR STRIP-MCNC: NEGATIVE MG/DL (ref 0.2–1)
WBC # BLD AUTO: 4.9 K/MM3 (ref 4–10)
WBC # BLD AUTO: 5.4 K/MM3 (ref 4–10)

## 2018-01-27 RX ADMIN — CARVEDILOL SCH MG: 6.25 TABLET, FILM COATED ORAL at 10:53

## 2018-01-27 RX ADMIN — ATORVASTATIN CALCIUM SCH MG: 10 TABLET, FILM COATED ORAL at 22:03

## 2018-01-27 RX ADMIN — PANTOPRAZOLE SODIUM SCH MG: 40 TABLET, DELAYED RELEASE ORAL at 10:53

## 2018-01-27 RX ADMIN — ISOSORBIDE MONONITRATE SCH MG: 30 TABLET, EXTENDED RELEASE ORAL at 10:53

## 2018-01-27 RX ADMIN — CARVEDILOL SCH MG: 6.25 TABLET, FILM COATED ORAL at 22:03

## 2018-01-27 RX ADMIN — ACETAMINOPHEN PRN MG: 325 TABLET ORAL at 16:51

## 2018-01-27 RX ADMIN — GABAPENTIN SCH MG: 100 CAPSULE ORAL at 22:03

## 2018-01-27 RX ADMIN — RANITIDINE SCH MG: 150 TABLET ORAL at 22:03

## 2018-01-27 RX ADMIN — VITAMIN D, TAB 1000IU (100/BT) SCH UNIT: 25 TAB at 10:53

## 2018-01-27 RX ADMIN — RANITIDINE SCH MG: 150 TABLET ORAL at 10:54

## 2018-01-27 RX ADMIN — RAMIPRIL SCH MG: 2.5 CAPSULE ORAL at 10:54

## 2018-01-27 RX ADMIN — PANTOPRAZOLE SODIUM SCH MG: 40 TABLET, DELAYED RELEASE ORAL at 22:03

## 2018-01-27 RX ADMIN — GABAPENTIN SCH MG: 100 CAPSULE ORAL at 10:53

## 2018-01-27 NOTE — EKG
Test Reason : 

Blood Pressure : ***/*** mmHG

Vent. Rate : 079 BPM     Atrial Rate : 208 BPM

   P-R Int : 000 ms          QRS Dur : 094 ms

    QT Int : 356 ms       P-R-T Axes : 000 -37 070 degrees

   QTc Int : 408 ms

 

ATRIAL FIBRILLATION

Ventricular-paced rhythm

LEFT AXIS DEVIATION

LOW VOLTAGE QRS

INFERIOR INFARCT , AGE UNDETERMINED

CANNOT RULE OUT ANTERIOR INFARCT , AGE UNDETERMINED

ABNORMAL ECG

 

Confirmed by MD JANEY, DEJUAN ([id number]) on 1/27/2018 12:20:18 PM

 

Referred By:             Confirmed By:DEJUAN TOTH MD

## 2018-01-27 NOTE — CON.GI
Consult


Consult Specialty:: Gastroenterology


Referred by:: Dr Miller


Reason for Consultation:: Anemia





- History of Present Illness


Chief Complaint: SOB and weakness


History of Present Illness: 





86F with OMS has been found by her daughters ( who provided this data when I 

saw Nara in the ER last evening) to be increasingly lethargic and dyspneic with 

minimal exertion. She takes iron for chronic iron deficiency anemia but this 

was stooped due to worseinig constipation. She has noted some loose stools 

soince stopping it. No hematemesis or rectal bleeding. No abdominal pain. Nara 

has been eating well. She last had an EGD and colonoscopy with Dr. Strong on 5/ 27/14 when she was found to have Chauncey erosions in her large hiatal hernia 

and a short segment esophageal stricture. No Hancock's on biopsies.She denies 

dysphagia. Colonoscopy led to the removal of transverse colon serrated adenoma 

and a hyperplastic hepatic polyp. Cecal angiodysplasias were cauterized and she 

was found to have moderate sigmoid diverticulosis. She remains on coumadin.  

See data attached to the chart





- History Source


History Provided By: Family Member


Limitations to Obtaining History: Dementia





- Past Medical History


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 ), 

Dementia, Parkinson's


Cardio/Vascular: Yes: Aneurysm (AAA), CAD (angioplasty 2005), Deep Vein 

Thrombosis, HTN, Other (PPM)


Pulmonary: Yes: Cancer (left sided lung cancer resected [facility]), Pulmonary 

Embolus


Gastrointestinal: Yes: Constipation, Diverticulosis, GI Bleed, Hemorrhoids (

banded 2008), Hiatal Hernia (with Chauncey erosions 2014), Other (angiodysplasia 

of cecum, serrated tx colon adenoma removed [id number])


Reproductive: Yes: Postmenopausal, Other (hysterectomy for carcinoma in situ )


...Pregnant: No


Psych: Yes: Anxiety





- Past Surgical History


Past Surgical History: Yes: Cholecystectomy, Colonoscopy, Hysterectomy, 

Permanent Pacemaker, Thoracotomy (left cancer thoracotomy 1999 [facility]), Upper 

Endoscopy


Additional Surgical History: hemorrhoidectomy 2009.  nasal fracture repair





- Alcohol/Substance Use


Hx Alcohol Use: No


History of Substance Use: reports: None





- Smoking History


Smoking history: Former smoker


Have you smoked in the past 12 months: No


Aproximately how many cigarettes per day: 0


If you are a former smoker, when did you quit?: 40 YEARS AGO





- Social History


Usual Living Arrangement: With Child


ADL: Family Assistance


Occupation: housewife


Place of Birth: [place]


History of Recent Travel: No





Home Medications





- Allergies


Allergies/Adverse Reactions: 


 Allergies











Allergy/AdvReac Type Severity Reaction Status Date / Time


 


oats Allergy Intermediate  Verified 01/26/18 12:25


 


Penicillins Allergy Mild  Verified 01/26/18 12:25


 


Shellfish Allergy Mild  Verified 01/26/18 12:25














- Home Medications


Home Medications: 


Ambulatory Orders





Isosorbide Mononitrate [Monoket -] 30 mg PO DAILY #0 tab.sr.24h 02/23/12 


Ramipril [Altace] 2.5 mg PO 1200 #0 capsule 02/23/12 


Ranitidine HCl [Zantac] 150 mg PO BID #0 tablet 02/23/12 


Simvastatin [Zocor -] 20 mg PO HS 05/24/14 


Diltiazem Cd [Cardizem Cd -] 180 mg PO DAILY 01/08/16 


Gabapentin [Neurontin] 100 mg PO BID 01/08/16 


Cholecalciferol (Vitamin D3) [Vitamin D3] 2,000 unit PO DAILY 01/29/16 


Ferrous Sulfate [Feosol] 325 mg PO MOWEFR 01/29/16 


Quetiapine Fumarate [Seroquel -] 50 mg PO BID  tablet 02/02/16 


Carvedilol [Coreg -] 6.25 mg PO BID #60 tablet 02/14/16 


Aspirin [Adult Aspirin Regimen] 81 mg PO DAILY 12/26/17 


Warfarin Na [Coumadin -] 1 mg PO Q2D 12/26/17 


Pantoprazole Sodium [Protonix] 40 mg PO BID 01/26/18 











Family Disease History





- Family Disease History


Family Disease History: Heart Disease: Brother, Sister, CA: Mother (emphysema, 

"liver cancer"), Respiratory: Mother, Other: Father (cva), Daughter (aneurysm)





Review of Systems





- Review of Systems


Constitutional: reports: Lethargy, Malaise, Weakness


Eyes: reports: No Symptoms


HENT: reports: No Symptoms


Neck: reports: No Symptoms


Cardiovascular: reports: Palpitations


Respiratory: reports: Exercise Intolerance, SOB on Exertion


Gastrointestinal: reports: Diarrhea





Physical Exam-GI


Vital Signs: 


 Vital Signs











Temperature  97.9 F   01/27/18 09:00


 


Pulse Rate  112 H  01/27/18 09:00


 


Respiratory Rate  20   01/27/18 09:00


 


Blood Pressure  141/92   01/27/18 09:00


 


O2 Sat by Pulse Oximetry (%)  99   01/27/18 09:00








CBC,CMP











WBC  4.9 K/mm3 (4.0-10.0)   01/27/18  06:00    


 


RBC  2.83 M/mm3 (3.60-5.2)  L  01/27/18  06:00    


 


Hgb  6.9 GM/dL (10.7-15.3)  L*  01/27/18  06:00    


 


Hct  21.8 % (32.4-45.2)  L  01/27/18  06:00    


 


MCV  77.1 fl (80-96)  L  01/27/18  06:00    


 


MCH  24.2 pg (25.7-33.7)  L  01/27/18  06:00    


 


MCHC  31.4 g/dl (32.0-36.0)  L  01/27/18  06:00    


 


RDW  16.2 % (11.6-15.6)  H  01/27/18  06:00    


 


Plt Count  248 K/MM3 (134-434)   01/27/18  06:00    


 


MPV  7.6 fl (7.5-11.1)   01/27/18  06:00    


 


Neutrophils %  67.8 % (42.8-82.8)   01/27/18  06:00    


 


Lymphocytes %  18.4 % (8-40)   01/27/18  06:00    


 


Monocytes %  10.8 % (3.8-10.2)  H  01/27/18  06:00    


 


Eosinophils %  2.2 % (0-4.5)   01/27/18  06:00    


 


Basophils %  0.8 % (0-2.0)   01/27/18  06:00    


 


Sodium  147 mmol/L (136-145)  H  01/27/18  06:00    


 


Potassium  4.0 mmol/L (3.5-5.1)   01/27/18  06:00    


 


Chloride  115 mmol/L ()  H  01/27/18  06:00    


 


Carbon Dioxide  23 mmol/L (21-32)   01/27/18  06:00    


 


Anion Gap  9  (8-16)   01/27/18  06:00    


 


BUN  20 mg/dL (7-18)  H  01/27/18  06:00    


 


Creatinine  1.4 mg/dL (0.55-1.02)  H  01/27/18  06:00    


 


Creat Clearance w eGFR  35.65  (>60)   01/27/18  06:00    


 


Random Glucose  76 mg/dL ()   01/27/18  06:00    


 


Lactic Acid  1.1 mmol/L (0.0-2.0)   01/26/18  13:00    


 


Calcium  7.8 mg/dL (8.5-10.1)  L  01/27/18  06:00    


 


Phosphorus  4.2 mg/dL (2.5-4.9)   01/27/18  06:00    


 


Magnesium  1.8 mg/dL (1.8-2.4)   01/27/18  06:00    


 


Ferritin  Cancelled   01/27/18  06:00    


 


Total Bilirubin  0.4 mg/dL (0.2-1.0)  D 01/27/18  06:00    


 


AST  14 U/L (15-37)  L  01/27/18  06:00    


 


ALT  12 U/L (12-78)   01/27/18  06:00    


 


Alkaline Phosphatase  78 U/L ()   01/27/18  06:00    


 


Creatine Kinase  73 IU/L ()   01/26/18  12:04    


 


Troponin I  < 0.02 ng/ml (0.00-0.05)   01/26/18  12:04    


 


B-Natriuretic Peptide  6971.03 pg/ml (5-450)  H  01/26/18  12:04    


 


Total Protein  5.7 g/dl (6.4-8.2)  L  01/27/18  06:00    


 


Albumin  2.7 g/dl (3.4-5.0)  L  01/27/18  06:00    








 Current Medications











Generic Name Dose Route Start Last Admin





  Trade Name Freq  PRN Reason Stop Dose Admin


 


Atorvastatin Calcium  10 mg  01/26/18 22:00  01/26/18 22:36





  Lipitor -  PO   10 mg





  HS [facility]   Administration


 


Carvedilol  6.25 mg  01/27/18 10:00  01/27/18 10:53





  Coreg -  PO   6.25 mg





  BID [facility]   Administration


 


Cholecalciferol  2,000 unit  01/27/18 10:00  01/27/18 10:53





  Vitamin D3 -  PO   2,000 unit





  DAILY [facility]   Administration


 


Diltiazem HCl  180 mg  01/27/18 10:00  01/27/18 10:53





  Cardizem Cd -  PO   180 mg





  DAILY [facility]   Administration


 


Ferrous Sulfate  325 mg  01/29/18 10:00  





  Feosol -  PO   





  MoWeFr@1000 [facility]EN[facility]   


 


Gabapentin  100 mg  01/26/18 22:00  01/27/18 10:53





  Neurontin -  PO   100 mg





  BID [facility]   Administration


 


Hydroxyzine HCl  10 mg  01/27/18 10:00  





  Atarax -  PO   





  DAILY [facility]   


 


Isosorbide Mononitrate  30 mg  01/27/18 10:00  01/27/18 10:53





  Imdur -  PO   30 mg





  DAILY [facility]   Administration


 


Pantoprazole Sodium  40 mg  01/27/18 10:00  01/27/18 10:53





  Protonix -  PO   40 mg





  BID [facility]   Administration


 


Quetiapine Fumarate  50 mg  01/26/18 22:00  01/27/18 10:55





  Seroquel -  PO   50 mg





  BID [facility]   Administration


 


Ramipril  2.5 mg  01/27/18 10:00  01/27/18 10:54





  Altace -  PO   2.5 mg





  DAILY [facility]   Administration


 


Ranitidine HCl  150 mg  01/26/18 22:00  01/27/18 10:54





  Zantac -  PO   150 mg





  BID [facility]   Administration


 


Warfarin Sodium  1 mg  01/27/18 18:00  





  Coumadin -  PO   





  Q2D@1800 [facility]   











Constitutional: Yes: Calm


Eyes: Yes: Conjunctiva Clear


HENT: Yes: Atraumatic


Neck: Yes: Supple


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

left sided PPM)


Respiratory: Yes: CTA Bilaterally


Gastrointestinal Inspection: Yes: Distention, Scars (healed lap choly and 

suprapubic incisions)


...Auscultate: Yes: Normoactive Bowel Sounds


...Palpate: Yes: Soft, Other (nontender)


...Percussion: Yes: Tympanitic


...Rectal Exam: Yes: Guaiac Positive (dark brown but strongly guaiac positive 

stool), Hemorrhoids/External


Neurological: Yes: Other (communicative but confused)


Labs: 


 CBC, BMP





 01/27/18 06:00 





 01/27/18 06:00 





 INR, PTT











INR  2.56  (0.82-1.09)  H  01/27/18  06:00    














Problem List





- Problems


(1) Iron deficiency anemia due to chronic blood loss


Assessment/Plan: 


Suspect anemia and occult bleeding reflects bleeding from the cecal and 

probably small bowel vascular ectasia aggravated by her supratherapeutic INR. 

Given constipation problems leading to noncompliance would strongly consider 

parenteral replacement. Would also consider need for continued warfarin. Would 

aspirin suffice? PPI should be continued to prevent recurrent Chauncey erosion 

bleeding. Daughters do not want elective endoscopy tro be undertaken.  


Code(s): D50.0 - IRON DEFICIENCY ANEMIA SECONDARY TO BLOOD LOSS (CHRONIC)   





(2) Angiodysplasia of cecum


Code(s): K55.20 - ANGIODYSPLASIA OF COLON WITHOUT HEMORRHAGE   





(3) Diverticula of colon


Code(s): K57.30 - DVRTCLOS OF LG INT W/O PERFORATION OR ABSCESS W/O BLEEDING   





(4) Serrated adenoma of colon


Code(s): D12.6 - BENIGN NEOPLASM OF COLON, UNSPECIFIED   





(5) Chauncey lesion, chronic


Code(s): K25.7 - CHRONIC GASTRIC ULCER WITHOUT HEMORRHAGE OR PERFORATION   





(6) Hiatal hernia with GERD


Code(s): K21.9 - GASTRO-ESOPHAGEAL REFLUX DISEASE WITHOUT ESOPHAGITIS; K44.9 - 

DIAPHRAGMATIC HERNIA WITHOUT OBSTRUCTION OR GANGRENE   





(7) Constipation by delayed colonic transit


Code(s): K59.01 - SLOW TRANSIT CONSTIPATION   





(8) Stricture esophagus


Code(s): K22.2 - ESOPHAGEAL OBSTRUCTION

## 2018-01-27 NOTE — HP
Admitting History and Physical





- Admission


Chief Complaint: Anemia, concern for GI bleed


History of Present Illness: 





 is an 87 y/o female with a PMHx of 


History Source: Patient, Family Member


Limitations to Obtaining History: No Limitations





- Past Medical History


CNS: Yes: Alzheimer's, CVA, Dementia, Parkinson's


Cardiovascular: Yes: HTN


Pulmonary: Yes: Cancer, Pulmonary Embolus


Gastrointestinal: Yes: Diverticulosis, GI Bleed, Hemorrhoids, Hiatal Hernia, 

Other (angiodysplasia of cecum)


...Pregnant: No


Heme/Onc: Yes: Anemia, B12 Deficiency


Psych: Yes: Anxiety





- Past Surgical History


Past Surgical History: Yes: Cholecystectomy, Colonoscopy, Hysterectomy, 

Permanent Pacemaker


Additional Past Surgical History: 


lobectomy





- Advance Directives


Advance Directives: Yes: DNR





- Smoking History


Smoking history: Former smoker


Have you smoked in the past 12 months: No


Aproximately how many cigarettes per day: 0


If you are a former smoker, when did you quit?: 40 YEARS AGO





- Alcohol/Substance Use


Hx Alcohol Use: No


History of Substance Use: reports: None





- Social History


ADL: Family Assistance


History of Recent Travel: No





Home Medications





- Allergies


Allergies/Adverse Reactions: 


 Allergies











Allergy/AdvReac Type Severity Reaction Status Date / Time


 


oats Allergy Intermediate  Verified 01/26/18 12:25


 


Penicillins Allergy Mild  Verified 01/26/18 12:25


 


Shellfish Allergy Mild  Verified 01/26/18 12:25














- Home Medications


Home Medications: 


Ambulatory Orders





Isosorbide Mononitrate [Monoket -] 30 mg PO DAILY #0 tab.sr.24h 02/23/12 


Ramipril [Altace] 2.5 mg PO 1200 #0 capsule 02/23/12 


Ranitidine HCl [Zantac] 150 mg PO BID #0 tablet 02/23/12 


Simvastatin [Zocor -] 20 mg PO HS 05/24/14 


Diltiazem Cd [Cardizem Cd -] 180 mg PO DAILY 01/08/16 


Gabapentin [Neurontin] 100 mg PO BID 01/08/16 


Cholecalciferol (Vitamin D3) [Vitamin D3] 2,000 unit PO DAILY 01/29/16 


Ferrous Sulfate [Feosol] 325 mg PO MOWEFR 01/29/16 


Quetiapine Fumarate [Seroquel -] 50 mg PO BID  tablet 02/02/16 


Carvedilol [Coreg -] 6.25 mg PO BID #60 tablet 02/14/16 


Aspirin [Adult Aspirin Regimen] 81 mg PO DAILY 12/26/17 


Warfarin Na [Coumadin -] 1 mg PO Q2D 12/26/17 


Pantoprazole Sodium [Protonix] 40 mg PO BID 01/26/18 











Family Disease History





- Family Disease History


Family Disease History: Heart Disease: Brother, Sister, CA: Mother (emphysema, 

"liver cancer"), Respiratory: Mother, Other: Father (cva), Daughter (aneurysm)





Review of Systems





- Review of Systems


Constitutional: reports: Lethargy, Weakness


Eyes: reports: No Symptoms


HENT: reports: No Symptoms


Neck: reports: No Symptoms


Cardiovascular: reports: Edema


Respiratory: reports: No Symptoms


Gastrointestinal: reports: No Symptoms, Diarrhea


Genitourinary: reports: No Symptoms


Breasts: reports: No Symptoms Reported


Musculoskeletal: reports: No Symptoms


Integumentary: reports: No Symptoms


Neurological: reports: No Symptoms


Endocrine: reports: No Symptoms


Hematology/Lymphatic: reports: No Symptoms


Psychiatric: reports: No Symptoms





Physical Examination


Vital Signs: 


 Vital Signs











Temperature  97.9 F   01/27/18 09:00


 


Pulse Rate  112 H  01/27/18 09:00


 


Respiratory Rate  20   01/27/18 09:00


 


Blood Pressure  141/92   01/27/18 09:00


 


O2 Sat by Pulse Oximetry (%)  99   01/26/18 22:00











Constitutional: Yes: Well Nourished, Calm


Eyes: Yes: WNL


HENT: Yes: WNL


Neck: Yes: WNL


Cardiovascular: Yes: WNL


Respiratory: Yes: WNL


Gastrointestinal: Yes: WNL


Renal/: Yes: WNL


Breast(s): Yes: WNL


Musculoskeletal: Yes: WNL


Integumentary: Yes: WNL


Neurological: Yes: WNL


...Motor Strength: WNL


Psychiatric: Yes: WNL


Labs: 


 CBC, BMP





 01/27/18 06:00 





 01/27/18 06:00 











Problem List





- Problems


(1) Anemia


Code(s): D64.9 - ANEMIA, UNSPECIFIED   


Qualifiers: 


   Other causes of anemia: chronic disease, other 





Assessment/Plan





Ms

## 2018-01-27 NOTE — PN
Progress Note, Physician


Chief Complaint: 


Pt lying in bed in no acute distress. Pt reports malaise but otherwise feeling 

okay. Denies chest pain, sob, fevers, or n/v/d.





- Current Medication List


Current Medications: 


Active Medications





Atorvastatin Calcium (Lipitor -)  10 mg PO HS [facility]


   Last Admin: 01/26/18 22:36 Dose:  10 mg


Carvedilol (Coreg -)  6.25 mg PO BID [facility]


Cholecalciferol (Vitamin D3 -)  2,000 unit PO DAILY [facility]


Diltiazem HCl (Cardizem Cd -)  180 mg PO DAILY [facility]


Ferrous Sulfate (Feosol -)  325 mg PO MoWeFr@1000 [facility]


Furosemide (Lasix Injection -)  20 mg IVPUSH ONCE ONE


   Stop: 01/27/18 12:01


Gabapentin (Neurontin -)  100 mg PO BID [facility]


   Last Admin: 01/26/18 22:36 Dose:  100 mg


Hydroxyzine HCl (Atarax -)  10 mg PO DAILY [facility]


Isosorbide Mononitrate (Imdur -)  30 mg PO DAILY [facility]


Pantoprazole Sodium (Protonix -)  40 mg PO BID [facility]


Quetiapine Fumarate (Seroquel -)  50 mg PO BID [facility]


   Last Admin: 01/26/18 22:39 Dose:  25 mg


Ramipril (Altace -)  2.5 mg PO DAILY [facility]


Ranitidine HCl (Zantac -)  150 mg PO BID [facility]


   Last Admin: 01/26/18 22:37 Dose:  150 mg


Warfarin Sodium (Coumadin -)  1 mg PO Q2D@1800 [facility]











- Objective


Vital Signs: 


 Vital Signs











Temperature  98.1 F   01/27/18 05:37


 


Pulse Rate  121 H  01/27/18 05:37


 


Respiratory Rate  20   01/27/18 05:37


 


Blood Pressure  119/77   01/27/18 05:37


 


O2 Sat by Pulse Oximetry (%)  99   01/26/18 22:00











Constitutional: Yes: No Distress, Calm


Eyes: Yes: WNL, Conjunctiva Clear


Cardiovascular: Yes: Tachycardia, Pulse Irregular, Murmur, S1, S2.  No: Gallop, 

Rub


Respiratory: Yes: Diminished, On Nasal O2, Rales (bibasilar).  No: Accessory 

Muscle Use, Rhonchi, SOB, Tachypnea


Gastrointestinal: Yes: WNL, Normal Bowel Sounds, Soft, Abdomen, Obese.  No: 

Distention, Tenderness


Musculoskeletal: Yes: WNL


Extremities: Yes: WNL


Edema: No


Neurological: Yes: WNL, Alert, Oriented


Psychiatric: Yes: WNL, Alert, Oriented


Labs: 


 CBC, BMP





 01/27/18 06:00 





 01/27/18 06:00 





 INR, PTT











INR  2.56  (0.82-1.09)  H  01/27/18  06:00    














Problem List





- Problems


(1) Anemia


Code(s): D64.9 - ANEMIA, UNSPECIFIED   


Qualifiers: 


   Other causes of anemia: chronic disease, other 





(2) Diarrhea


Code(s): R19.7 - DIARRHEA, UNSPECIFIED   


Qualifiers: 


   Diarrhea type: unspecified type   Qualified Code(s): R19.7 - Diarrhea, 

unspecified   





(3) Dehydration


Code(s): E86.0 - DEHYDRATION   





(4) Pacemaker


Code(s): Z95.0 - PRESENCE OF CARDIAC PACEMAKER   





(5) Atherosclerotic heart disease


Code(s): I25.10 - ATHSCL HEART DISEASE OF NATIVE CORONARY ARTERY W/O ANG PCTRS 

  


Qualifiers: 


   Coronary Disease-Associated Artery/Lesion type: native artery   Native vs. 

transplanted heart: native heart   Associated angina: without angina   

Qualified Code(s): I25.10 - Atherosclerotic heart disease of native coronary 

artery without angina pectoris   





(6) Atrial fibrillation


Code(s): I48.91 - UNSPECIFIED ATRIAL FIBRILLATION   


Qualifiers: 


   Atrial fibrillation type: paroxysmal   Qualified Code(s): I48.0 - Paroxysmal 

atrial fibrillation   





(7) Chronic kidney disease


Code(s): N18.9 - CHRONIC KIDNEY DISEASE, UNSPECIFIED   


Qualifiers: 


   Chronic kidney disease stage: stage 2 (mild)   Qualified Code(s): N18.2 - 

Chronic kidney disease, stage 2 (mild)   





(8) Hypertension


Code(s): I10 - ESSENTIAL (PRIMARY) HYPERTENSION   


Qualifiers: 


   Hypertension type: essential hypertension   Qualified Code(s): I10 - 

Essential (primary) hypertension   





(9) Weakness


Code(s): R53.1 - WEAKNESS   





(10) CHF (congestive heart failure)


Code(s): I50.9 - HEART FAILURE, UNSPECIFIED   


Qualifiers: 


   Congestive heart failure type: diastolic   Congestive heart failure 

chronicity: chronic   Qualified Code(s): I50.32 - Chronic diastolic (congestive

) heart failure   





(11) Dementia


Code(s): F03.90 - UNSPECIFIED DEMENTIA WITHOUT BEHAVIORAL DISTURBANCE   


Qualifiers: 


   Dementia type: Parkinson's disease   Dementia behavioral disturbance: 

without behavioral disturbance   Qualified Code(s): G20 - Parkinson's disease; 

F02.80 - Dementia in other diseases classified elsewhere without behavioral 

disturbance; F02.80 - Dementia in other diseases classified elsewhere without 

behavioral disturbance; F02.80 - Dementia in other diseases classified 

elsewhere without behavioral disturbance   





(12) Dysuria


Code(s): R30.0 - DYSURIA   





Assessment/Plan


(1) Anemia


Assessment/Plan: 


Acute on Chronic anemia, hx of gi bleed, h/h 6.9/21.8


pt received 1 unit prbcs, 2nt unit today


20mg lasix/CBC post transfusion


Monitor post transfusion volume status considering chf


Hematology consulted for further management of iron def anemia 


GI consulted 


Code(s): D64.9 - ANEMIA, UNSPECIFIED   


Qualifiers: 


   Other causes of anemia: chronic disease, other 





(2) Diarrhea


Assessment/Plan: 


no episodes today


cdiff test pending


will monitor for now


GI consulted


Code(s): R19.7 - DIARRHEA, UNSPECIFIED   


Qualifiers: 


   Diarrhea type: unspecified type   Qualified Code(s): R19.7 - Diarrhea, 

unspecified   





(3) Dehydration


Assessment/Plan: 


secondary to diarrhea 


Improving


Hold off on IVF for now


Encourage po intake


Code(s): E86.0 - DEHYDRATION   





(4) Pacemaker


Assessment/Plan: 


ventricular paced


Code(s): Z95.0 - PRESENCE OF CARDIAC PACEMAKER   





(5) Atherosclerotic heart disease


Assessment/Plan: 


s/p PPM


holding aspirin 


continue simvastatin


Code(s): I25.10 - ATHSCL HEART DISEASE OF NATIVE CORONARY ARTERY W/O ANG PCTRS 

  


Qualifiers: 


   Coronary Disease-Associated Artery/Lesion type: native artery   Native vs. 

transplanted heart: native heart   Associated angina: without angina   

Qualified Code(s): I25.10 - Atherosclerotic heart disease of native coronary 

artery without angina pectoris   





(6) Atrial fibrillation


Assessment/Plan: 


coagulated on coumadin, therapeutic


coumadin 1mg tonight


monitor inr


Code(s): I48.91 - UNSPECIFIED ATRIAL FIBRILLATION   


Qualifiers: 


   Atrial fibrillation type: paroxysmal   Qualified Code(s): I48.0 - Paroxysmal 

atrial fibrillation   





(7) Chronic kidney disease


Assessment/Plan: 


Improved


Code(s): N18.9 - CHRONIC KIDNEY DISEASE, UNSPECIFIED   


Qualifiers: 


   Chronic kidney disease stage: stage 2 (mild)   Qualified Code(s): N18.2 - 

Chronic kidney disease, stage 2 (mild)   





(8) Hypertension


Assessment/Plan: 


continue home meds


avoid aggressive bp control in the setting of hypotension


Code(s): I10 - ESSENTIAL (PRIMARY) HYPERTENSION   


Qualifiers: 


   Hypertension type: essential hypertension   Qualified Code(s): I10 - 

Essential (primary) hypertension   





(9) Weakness


Assessment/Plan: 


secondary to acute anemia


improving


will treat underlying illness and monitor for improvement 


Code(s): R53.1 - WEAKNESS   





(10) CHF (congestive heart failure)


Assessment/Plan: 


chronic, stable, EF 17%


low Na diet


daily weights


will monitor 


cardiology consulted


Code(s): I50.9 - HEART FAILURE, UNSPECIFIED   


Qualifiers: 


   Congestive heart failure type: diastolic   Congestive heart failure 

chronicity: chronic   Qualified Code(s): I50.32 - Chronic diastolic (congestive

) heart failure   





(11) Dementia


Assessment/Plan: 


stable


continue home meds


Code(s): F03.90 - UNSPECIFIED DEMENTIA WITHOUT BEHAVIORAL DISTURBANCE   


Qualifiers: 


   Dementia type: Parkinson's disease   Dementia behavioral disturbance: 

without behavioral disturbance   Qualified Code(s): G20 - Parkinson's disease; 

F02.80 - Dementia in other diseases classified elsewhere without behavioral 

disturbance; F02.80 - Dementia in other diseases classified elsewhere without 

behavioral disturbance; F02.80 - Dementia in other diseases classified 

elsewhere without behavioral disturbance   





(12) Dysuria


Assessment/Plan: 


Pt reports dysuria, pt afebrile, wbc wnl


UA/UC pending


Code(s): R30.0 - DYSURIA

## 2018-01-27 NOTE — CONSULT
Consult


Reason for Consultation:: Anemia, concern for GI Bleed





- History of Present Illness


Chief Complaint: weakness, fatigue


History of Present Illness: 





 is an 87 y/o female with a PMHx of HTN, CHF, A.fib, dementia, CVA, CAD, 

lung Ca (s/p lobectomy) with a history of GI bleeding in the past and chronic 

anemia. Per history obtained from daughter at bedside, patient had a recent 

admission a few months ago where IV iron was recommended to them but she is not 

sure if patient is compliant with this advice or not. Pt says she came into the 

hospital as she has been feeling more weak and tired recently. Her other 

daughter who is NOK and lives with her noticed that she is becoming sob with 

walking a few steps. A few weeks ago, she had diarrhea - possibly 

gastroenteritis. She does not report any change in stool habits, recent weight 

loss etc. 


 





- History Source


History Provided By: Patient, Family Member


Limitations to Obtaining History: No Limitations





- Past Medical History


CNS: Yes: Alzheimer's, CVA, Dementia, Parkinson's


Cardio/Vascular: Yes: HTN


Pulmonary: Yes: Cancer, Pulmonary Embolus


Gastrointestinal: Yes: Diverticulosis, GI Bleed, Hemorrhoids, Hiatal Hernia, 

Other (angiodysplasia of cecum)


...Pregnant: No


Psych: Yes: Anxiety





- Past Surgical History


Past Surgical History: Yes: Cholecystectomy, Colonoscopy, Hysterectomy, 

Permanent Pacemaker





- Alcohol/Substance Use


Hx Alcohol Use: No


History of Substance Use: reports: None





- Smoking History


Smoking history: Former smoker


Have you smoked in the past 12 months: No


Aproximately how many cigarettes per day: 0


If you are a former smoker, when did you quit?: 40 YEARS AGO





- Social History


Usual Living Arrangement: With Child


ADL: Family Assistance


History of Recent Travel: No





Home Medications





- Allergies


Allergies/Adverse Reactions: 


 Allergies











Allergy/AdvReac Type Severity Reaction Status Date / Time


 


oats Allergy Intermediate  Verified 01/26/18 12:25


 


Penicillins Allergy Mild  Verified 01/26/18 12:25


 


Shellfish Allergy Mild  Verified 01/26/18 12:25














- Home Medications


Home Medications: 


Ambulatory Orders





Isosorbide Mononitrate [Monoket -] 30 mg PO DAILY #0 tab.sr.24h 02/23/12 


Ramipril [Altace] 2.5 mg PO 1200 #0 capsule 02/23/12 


Ranitidine HCl [Zantac] 150 mg PO BID #0 tablet 02/23/12 


Simvastatin [Zocor -] 20 mg PO HS 05/24/14 


Diltiazem Cd [Cardizem Cd -] 180 mg PO DAILY 01/08/16 


Gabapentin [Neurontin] 100 mg PO BID 01/08/16 


Cholecalciferol (Vitamin D3) [Vitamin D3] 2,000 unit PO DAILY 01/29/16 


Ferrous Sulfate [Feosol] 325 mg PO MOWEFR 01/29/16 


Quetiapine Fumarate [Seroquel -] 50 mg PO BID  tablet 02/02/16 


Carvedilol [Coreg -] 6.25 mg PO BID #60 tablet 02/14/16 


Aspirin [Adult Aspirin Regimen] 81 mg PO DAILY 12/26/17 


Warfarin Na [Coumadin -] 1 mg PO Q2D 12/26/17 


Pantoprazole Sodium [Protonix] 40 mg PO BID 01/26/18 











Family Disease History





- Family Disease History


Family Disease History: Heart Disease: Brother, Sister, CA: Mother (emphysema, 

"liver cancer"), Respiratory: Mother, Other: Father (cva), Daughter (aneurysm)





Review of Systems





- Review of Systems


Constitutional: reports: Weakness


Eyes: reports: No Symptoms


HENT: reports: No Symptoms


Neck: reports: No Symptoms


Cardiovascular: reports: No Symptoms


Respiratory: reports: No Symptoms, SOB


Gastrointestinal: reports: No Symptoms


Genitourinary: reports: No Symptoms


Breasts: reports: No Symptoms Reported


Musculoskeletal: reports: No Symptoms


Integumentary: reports: No Symptoms


Neurological: reports: No Symptoms


Endocrine: reports: No Symptoms


Hematology/Lymphatic: reports: No Symptoms


Psychiatric: reports: No Symptoms





Physical Exam


Vital Signs: 


 Vital Signs











Temperature  97.9 F   01/27/18 09:00


 


Pulse Rate  112 H  01/27/18 09:00


 


Respiratory Rate  20   01/27/18 09:00


 


Blood Pressure  141/92   01/27/18 09:00


 


O2 Sat by Pulse Oximetry (%)  99   01/26/18 22:00











Constitutional: Yes: Well Nourished, Calm


Eyes: Yes: WNL


HENT: Yes: WNL


Neck: Yes: WNL


Cardiovascular: Yes: Pulse Irregular


Respiratory: Yes: WNL


Gastrointestinal: Yes: WNL


...Rectal Exam: Yes: WNL


Renal/: Yes: WNL


Breast(s): Yes: WNL


Musculoskeletal: Yes: WNL


Extremities: Yes: WNL


Labs: 


 CBC, BMP





 01/27/18 06:00 





 01/27/18 06:00 











Problem List





- Problems


(1) Anemia


Code(s): D64.9 - ANEMIA, UNSPECIFIED   


Qualifiers: 


   Other causes of anemia: chronic disease, other 





Assessment/Plan





87 y/o female with multiple medical commodities admitted with SOB, weakness, 

Hgb at presentation was 6 with MCV of 77.





-patient receiving pRBC transfusions currently


-awaiting iron studies, will follow-up on the same tomorrow


-would recommend checking retic count as well


-low MCV is suggestive of possibly iron deficiency anemia but would like to 

wait for 


iron panel results


-advice checking stool guiac and GI work-up/consult for sources of occult 

bleeding


-BNP very elevated on presentation, likely superimposed CHF as well? check TTE


-agree with infectious work-up fir dysuria symptoms


-discussed possibility of receiving IV iron as outpatient upon discharge weekly 

if found to be iron deficient, patient and her daughter are receptive to this 

idea.

## 2018-01-28 LAB
ANION GAP SERPL CALC-SCNC: 9 MMOL/L (ref 8–16)
APPEARANCE UR: CLEAR
BACTERIA #/AREA URNS HPF: (no result) /HPF
BASOPHILS # BLD: 0.5 % (ref 0–2)
BASOPHILS # BLD: 0.7 % (ref 0–2)
BILIRUB UR STRIP.AUTO-MCNC: NEGATIVE MG/DL
BUN SERPL-MCNC: 18 MG/DL (ref 7–18)
CALCIUM SERPL-MCNC: 8.1 MG/DL (ref 8.5–10.1)
CHLORIDE SERPL-SCNC: 114 MMOL/L (ref 98–107)
CO2 SERPL-SCNC: 24 MMOL/L (ref 21–32)
COLOR UR: (no result)
CREAT SERPL-MCNC: 1.5 MG/DL (ref 0.55–1.02)
DEPRECATED RDW RBC AUTO: 16.6 % (ref 11.6–15.6)
DEPRECATED RDW RBC AUTO: 16.8 % (ref 11.6–15.6)
EOSINOPHIL # BLD: 3.7 % (ref 0–4.5)
EOSINOPHIL # BLD: 4.2 % (ref 0–4.5)
EPITH CASTS URNS QL MICRO: (no result) /HPF
GLUCOSE SERPL-MCNC: 74 MG/DL (ref 74–106)
HCT VFR BLD CALC: 25 % (ref 32.4–45.2)
HCT VFR BLD CALC: 29.9 % (ref 32.4–45.2)
HGB BLD-MCNC: 7.9 GM/DL (ref 10.7–15.3)
HGB BLD-MCNC: 9.8 GM/DL (ref 10.7–15.3)
INR BLD: 2.15 (ref 0.82–1.09)
KETONES UR QL STRIP: NEGATIVE
LEUKOCYTE ESTERASE UR QL STRIP.AUTO: (no result)
LYMPHOCYTES # BLD: 10.7 % (ref 8–40)
LYMPHOCYTES # BLD: 13.4 % (ref 8–40)
MAGNESIUM SERPL-MCNC: 2 MG/DL (ref 1.8–2.4)
MCH RBC QN AUTO: 25.2 PG (ref 25.7–33.7)
MCH RBC QN AUTO: 26.1 PG (ref 25.7–33.7)
MCHC RBC AUTO-ENTMCNC: 31.8 G/DL (ref 32–36)
MCHC RBC AUTO-ENTMCNC: 32.6 G/DL (ref 32–36)
MCV RBC: 79.3 FL (ref 80–96)
MCV RBC: 79.9 FL (ref 80–96)
MONOCYTES # BLD AUTO: 11.5 % (ref 3.8–10.2)
MONOCYTES # BLD AUTO: 11.6 % (ref 3.8–10.2)
MUCOUS THREADS URNS QL MICRO: (no result)
NEUTROPHILS # BLD: 70.1 % (ref 42.8–82.8)
NEUTROPHILS # BLD: 73.6 % (ref 42.8–82.8)
NITRITE UR QL STRIP: NEGATIVE
PH UR: 5 [PH] (ref 5–8)
PHOSPHATE SERPL-MCNC: 4.2 MG/DL (ref 2.5–4.9)
PLATELET # BLD AUTO: 248 K/MM3 (ref 134–434)
PLATELET # BLD AUTO: 264 K/MM3 (ref 134–434)
PMV BLD: 7.3 FL (ref 7.5–11.1)
PMV BLD: 7.4 FL (ref 7.5–11.1)
POTASSIUM SERPLBLD-SCNC: 4 MMOL/L (ref 3.5–5.1)
PROT UR QL STRIP: NEGATIVE
PROT UR QL STRIP: NEGATIVE
PT PNL PPP: 24.3 SEC (ref 9.98–11.88)
RBC # BLD AUTO: 3.15 M/MM3 (ref 3.6–5.2)
RBC # BLD AUTO: 3.75 M/MM3 (ref 3.6–5.2)
RBC # UR STRIP: NEGATIVE /UL
SERUM IRON SATURATION: 5 % (ref 15–55)
SODIUM SERPL-SCNC: 147 MMOL/L (ref 136–145)
SP GR UR: 1.01 (ref 1–1.03)
TIBC SERPL-MCNC: 322 UG/DL (ref 250–450)
UIBC SERPL-MCNC: 307 UG/DL (ref 118–369)
UROBILINOGEN UR STRIP-MCNC: NEGATIVE MG/DL (ref 0.2–1)
WBC # BLD AUTO: 5.7 K/MM3 (ref 4–10)
WBC # BLD AUTO: 6.3 K/MM3 (ref 4–10)

## 2018-01-28 RX ADMIN — CARVEDILOL SCH MG: 6.25 TABLET, FILM COATED ORAL at 10:08

## 2018-01-28 RX ADMIN — PANTOPRAZOLE SODIUM SCH MG: 40 TABLET, DELAYED RELEASE ORAL at 21:16

## 2018-01-28 RX ADMIN — RAMIPRIL SCH: 2.5 CAPSULE ORAL at 10:08

## 2018-01-28 RX ADMIN — GABAPENTIN SCH MG: 100 CAPSULE ORAL at 10:05

## 2018-01-28 RX ADMIN — VITAMIN D, TAB 1000IU (100/BT) SCH UNIT: 25 TAB at 10:06

## 2018-01-28 RX ADMIN — PANTOPRAZOLE SODIUM SCH MG: 40 TABLET, DELAYED RELEASE ORAL at 10:07

## 2018-01-28 RX ADMIN — ISOSORBIDE MONONITRATE SCH MG: 30 TABLET, EXTENDED RELEASE ORAL at 10:06

## 2018-01-28 RX ADMIN — GABAPENTIN SCH MG: 100 CAPSULE ORAL at 21:16

## 2018-01-28 RX ADMIN — ATORVASTATIN CALCIUM SCH MG: 10 TABLET, FILM COATED ORAL at 21:16

## 2018-01-28 RX ADMIN — CARVEDILOL SCH: 6.25 TABLET, FILM COATED ORAL at 21:50

## 2018-01-28 RX ADMIN — ACETAMINOPHEN PRN MG: 325 TABLET ORAL at 04:23

## 2018-01-28 RX ADMIN — ACETAMINOPHEN PRN MG: 325 TABLET ORAL at 12:35

## 2018-01-28 RX ADMIN — RANITIDINE SCH MG: 150 TABLET ORAL at 10:07

## 2018-01-28 NOTE — PN
GI Progress Note


Subjective: 





GI NOte: Hb has again dropped. Requiring more blood. No BM today. Retic is low 

but so are orin levels. Agree with parenteral iron. Will stop coumadin until 

bleeding subsides. Discussed stopping coumadin with the 3rd daughter which I 

now believe includes all of them. Mutually agree to avoid elective endoscopy as 

bleeding is felt to be from angiopdysplasias and any new gastritis or ulcer 

disease can be empirically treated. I have discussed the risk of recurrent CVA 

with coumadin cessation vs CVA due to Hb drops with persistent bleeding. Most 

of the new agents have no antidote so would favor aspirin understanding the 

drop off in effectiveness. Family wants to discuss this issue with Dr Rowan    





- Objective


Vital Signs: 


 Vital Signs











Temperature  97.4 F L  01/28/18 10:45


 


Pulse Rate  109 H  01/28/18 10:45


 


Respiratory Rate  18   01/28/18 10:45


 


Blood Pressure  107/63   01/28/18 10:45


 


O2 Sat by Pulse Oximetry (%)  97   01/28/18 09:00








 Laboratory Tests











  01/26/18 01/26/18 01/26/18





  12:04 12:15 12:15


 


Hgb   6.0 L* D 


 


Retic Count   


 


INR    3.12 H D


 


BUN  26 H  


 


Creatinine  1.6 H  














  01/26/18 01/27/18 01/27/18





  20:35 06:00 06:00


 


Hgb  7.2 L D  6.9 L* 


 


Retic Count   


 


INR    2.56 H


 


BUN   


 


Creatinine   














  01/27/18 01/27/18 01/28/18





  06:00 15:00 06:20


 


Hgb   8.4 L D  7.9 L


 


Retic Count   


 


INR   


 


BUN  20 H  


 


Creatinine  1.4 H  














  01/28/18





  06:20


 


Hgb 


 


Retic Count  1.33  D


 


INR 


 


BUN 


 


Creatinine 











Constitutional: No Distress


...Auscultate: Yes: Normoactive Bowel Sounds


...Palpate: Yes: Soft, Other (nontender)


Labs: 


 CBC, BMP





 01/28/18 06:20 





 01/28/18 06:20 





 INR, PTT











INR  2.15  (0.82-1.09)  H  01/28/18  06:20    














Problem List





- Problems


(1) Iron deficiency anemia due to chronic blood loss


Assessment/Plan: 


Continue to suspect anemia and occult bleeding from the cecal and probably 

small bowel vascular ectasias aggravated by coumadin. Agree with parenteral 

replacement. Would consider stopping warfarin in favor of aspirin. PPI should 

be continued to prevent recurrent Chauncey erosion bleeding. Daughters do not 

want elective endoscopy to be undertaken.  


Code(s): D50.0 - IRON DEFICIENCY ANEMIA SECONDARY TO BLOOD LOSS (CHRONIC)   





(2) Angiodysplasia of cecum


Code(s): K55.20 - ANGIODYSPLASIA OF COLON WITHOUT HEMORRHAGE   





(3) Diverticula of colon


Code(s): K57.30 - DVRTCLOS OF LG INT W/O PERFORATION OR ABSCESS W/O BLEEDING   





(4) Serrated adenoma of colon


Code(s): D12.6 - BENIGN NEOPLASM OF COLON, UNSPECIFIED   





(5) Chauncey lesion, chronic


Code(s): K25.7 - CHRONIC GASTRIC ULCER WITHOUT HEMORRHAGE OR PERFORATION   





(6) Hiatal hernia with GERD


Code(s): K21.9 - GASTRO-ESOPHAGEAL REFLUX DISEASE WITHOUT ESOPHAGITIS; K44.9 - 

DIAPHRAGMATIC HERNIA WITHOUT OBSTRUCTION OR GANGRENE   





(7) Constipation by delayed colonic transit


Code(s): K59.01 - SLOW TRANSIT CONSTIPATION   





(8) Stricture esophagus


Assessment/Plan: 


No dysphagia


Code(s): K22.2 - ESOPHAGEAL OBSTRUCTION

## 2018-01-28 NOTE — CONS
DATE OF CONSULTATION:

 

DATE OF DICTATION:  2018 

 

CARDIOLOGY CONSULTATION 

 

CONSULTATION REQUESTED BY:  Jarrett Miller M.D.

 

LOCATION:  [address] [place] [address]

 

CHIEF COMPLAINT:  Lethargy, weakness, shortness of breath.

 

HISTORY OF PRESENT ILLNESS:  The patient is an 86-year-old female with a

long-standing history of coronary artery disease, angina pectoris, status post

PCI/stenting, hypertension, hypertensive cardiovascular disease, history of

congestive heart failure, severe left ventricular systolic dysfunction, paroxysmal

atrial fibrillation, status post cerebrovascular accident following a cardiac

catheterization, treated with intracerebral thrombolysis, history of recurring GI

bleeding partly related to angiodysplasia of the cecum, history of hiatal hernia,

Chauncey ulcerations, ICD/permanent pacemaker. 

 

According to her daughter, she started to become dyspneic with minimal exertion.  She

was lethargic.  Denied any chest pain or discomfort.  They decided to bring her to

the hospital.  She was found to be severely anemic, requiring blood transfusions.  On

questioning, her daughter says there has been no reported chest pain or discomfort,

either at rest or with exertion.  There is no history of palpitations.  No history of

presyncope or syncope.  History of lightheadedness.  No history of cough or

expectoration.  No history of paroxysmal nocturnal dyspnea or orthopnea.  No history

of recent pedal edema. 

 

PAST MEDICAL HISTORY:  

1.  As mentioned in the history of present illness. 

2.  History of hypercholesterolemia. 

3.  History of vitamin D deficiency. 

4.  History of parkinsonism. 

5.  History of intestinal polyps. 

6.  History of diverticulosis. 

 

PAST SURGICAL HISTORY:  

1.  Status post right lobectomy for carcinoma. 

2.  Status post cholecystectomy.

3.  Status post hysterectomy. 

4.  History of hemorrhoidectomy. 

5.  Status post repair of nasal fracture. 

6.  Endoscopies. 

 

SOCIAL HISTORY:  The patient is a .  She has 3 daughters.  One of them has

Perkins syndrome and had dissection of the aortic arch and mild coarctation of the

aorta. 

 

FAMILY HISTORY:  According to her daughter, father apparently  of cerebrovascular

accident.  Cause of mother's death is not known.  She had 2 sisters.  One of them is

 of unknown cause. 

 

ALLERGIES:   

1.  SHELLFISH/IV CONTRAST.

2.  PENICILLIN. 

3.  OATS. 

 

MEDICATIONS:  Prior to admission, the patient was on:

1.  Diltiazem  mg p.o. daily.

2.  Simvastatin 20 mg p.o. daily. 

3.  Ramipril 2.5 mg p.o. daily. 

4.  Isosorbide mononitrate 30 mg p.o. daily.

5.  Gabapentin 100 mg p.o. b.i.d.

6.  Carvedilol 6.25 mg p.o. b.i.d.

7.  Seroquel 50 mg p.o. b.i.d.

8.  Iron supplement 25 mg p.o. daily.

9.  Vitamin D3 at 2000 IU p.o. daily. 

10.  Ranitidine 150 mg p.o. b.i.d.

11.  Coumadin. 

 

Current medications: 

1.  Warfarin 1 mg q. 2 days. 

2.  Ramipril 2.5 mg p.o. daily.

3.  Tylenol 650 mg q4 h. p.r.n. 

4.  Gabapentin 100 mg p.o. b.i.d.

5.  Seroquel 50 mg p.o. b.i.d.

6.  Hydroxyzine 10 mg p.o. daily.

7.  Carvedilol 6.25 mg p.o. b.i.d.

8.  Diltiazem 180 mg p.o. daily.

9.  Zantac 150 mg p.o. b.i.d.

10.  Lipitor 10 mg p.o. daily.

11.  Iron supplement 325 mg p.o. daily.

12.  Pantoprazole 40 mg p.o. b.i.d.

13.  Isosorbide mononitrate 30 mg p.o. daily.

14.  Vitamin D 2000 units p.o. daily. 

 

REVIEW OF SYSTEMS:  

Constitutional:  No history of chills, fever or night sweats.  No history of

unintentional weight loss. 

HEENT:  According to the daughter, no history of headaches, diplopia or blurred

vision.  No history of epistaxis, hoarseness, tinnitus or deafness reported.

Cardiovascular:  See history of present illness.

Respiratory:  No history of cough, expectoration or hemoptysis.

Gastrointestinal:  See history of present illness.  No history of nausea, vomiting,

melena or hematemesis.  No history of abdominal pain or discomfort.  History of

change in bowel habits, partly related to iron supplements.  

Central Nervous System:  History of previous cerebrovascular accident.  History of

dementia.  ? history of parkinsonism.  No history of recent seizures, syncope or

focal weakness. 

Endocrine:  No history of polyuria or polydipsia.  No history of intolerance to cold

or warm weather. 

Musculoskeletal:  No known history of arthralgias or myalgias reported. 

Hematological:  See history of present illness.  No history of excessive ecchymosis

or obvious bleeding.  

 

PHYSICAL EXAMINATION:  

General:  An 86-year-old female who is sitting in a damien, in no acute distress. 

There was pallor.  No cyanosis, clubbing or jaundice.

Vital Signs:  Blood pressure 90/50 mmHg.  Pulse was 120 beats per minute; it is down

to 100 beats per minute.  The patient was afebrile.  Respirations were 20 per minute.

 Oxygen saturation was 99% on 2 L of oxygen. 

Neck:  Supple.  No jugular venous distention.  Carotids were equal and upstrokes were

normal.  No bruits were heard.  No thyromegaly was present. 

Heart:  No heaves or thrills.  PMI was in the 5th intercostal space.  Heart sounds

were distant.  Unable to appreciate murmurs or gallops.  

Lungs:  Clear on auscultation. 

Abdomen:  Soft, nontender.  No hepatosplenomegaly or palpable masses were felt.  

Extremities:  No calf tenderness or dependent edema.  Femoral pulses were 2+. 

Dorsalis pedis pulses were 1+.  Posterior tibial pulses could not be palpated.  

 

ELECTROCARDIOGRAM:  Atrial fibrillation with moderate ventricular response. 

Permanent pacemaker was sensing and pacing appropriately.  Left anterior hemiblock

(non-paced beats).  Poor R-wave progression across the right precordial leads. 

Inferior wall myocardial infarction of indeterminate age cannot be excluded.  

 

RADIOLOGY:  X-ray of the chest:  Since the prior study of 2017, again

noted is a large heart, weak inspiration, sclerotic knob, multilead pacemaker and no

sign of acute process.  

 

LABORATORY DATA:  2018, CBC:  WBC count 4800, hemoglobin 6 g/dL,

hematocrit 19.9%.  There were microcytic cell indices.  Platelet count was 278,000. 

Monocytes were 12.9%.  Later CBC on 2018:  WBC count 5400, hemoglobin 8.9

g/dL, microcytic cell indices.  Platelet count was 273,000.  Monocytes were again

elevated at 12.3%.  

 

Chemistry on 2018:  Sodium 147, potassium 4.0, chloride 115, CO2 of 23,

BUN 20, creatinine 1.4 mg/dL.  

 

BNP on 2018, was 6971.03.  

 

IMPRESSION:

1.  Weakness, fatigue and sinus tachycardia related to severe anemia. 

2.  Coronary artery disease, status post percutaneous coronary intervention/stenting,

stable angina pectoris.

3.  Permanent atrial fibrillation, status post implantable

cardioverter-defibrillator/permanent pacemaker. 

4.  History of severe left ventricular systolic dysfunction.

5.  Hypertension, currently normotensive.  

6.  History of hypercholesterolemia. 

7.  Status post cerebrovascular accident.

8.  History of dementia.

9.  History of parkinsonism. 

10.  History of pruritus of undetermined etiology. 

11.  Chronic kidney disease. 

 

RECOMMENDATIONS:  

1.  In view of recurring GI bleeding causing severe anemia, one could consider

placing a Watchman device to _____ the left atrial appendage.  This needs to be

discussed with the family.

2.  Continue medications as outlined.

3.  Consider discontinuing aspirin. 

4.  Close follow-up of INR. 

5.  Maintain hematocrit around 30. 

6.  History of intestinal angiodysplasia.

 

PROGNOSIS:  Guarded.

 

Thank you for your referral.  

 

 

ARPIT MCKINLEY M.D.

 

[id number]

DD: 2018 17:10

DT: 2018 07:55

Job #:  [id number]

## 2018-01-28 NOTE — PN
Progress Note (short form)





- Note


Progress Note: 





Hematology/Oncology Follow-up Note 





S : Patient is much quieter when I visited her today. As per daughters at 

bedside, this is because she just received seroquel. She is receiving pRBC 

transfusions.





 Last Vital Signs











Temp Pulse Resp BP Pulse Ox


 


 97.4 F L  109 H  18   107/63   97 


 


 01/28/18 10:45  01/28/18 10:45  01/28/18 10:45  01/28/18 10:45  01/27/18 21:00














PE :





AOx2, Pleasant


CTA(BL)


Soft abdomen


no c/c/e








 CBC, BMP





 01/28/18 06:20 





 01/28/18 06:20 





iron studies :  serum iron : 15, % sat : 5, TIBC : 322, ferritin -cancelled








 Current Medications











Generic Name Dose Route Start Last Admin





  Trade Name Freq  PRN Reason Stop Dose Admin


 


Acetaminophen  650 mg  01/27/18 16:39  01/28/18 04:23





  Tylenol -  PO   650 mg





  Q4H PRN   Administration





  PAIN <5   


 


Atorvastatin Calcium  10 mg  01/26/18 22:00  01/27/18 22:03





  Lipitor -  PO   10 mg





  HS HENRY   Administration


 


Carvedilol  6.25 mg  01/27/18 10:00  01/28/18 10:08





  Coreg -  PO   6.25 mg





  BID HENRY   Administration


 


Cholecalciferol  2,000 unit  01/27/18 10:00  01/28/18 10:06





  Vitamin D3 -  PO   2,000 unit





  DAILY HENRY   Administration


 


Diltiazem HCl  180 mg  01/27/18 10:00  01/28/18 10:08





  Cardizem Cd -  PO   Not Given





  DAILY HENRY   


 


Ferrous Sulfate  325 mg  01/29/18 10:00  





  Feosol -  PO   





  MoWeFr@1000 HENRY   


 


Furosemide  10 mg  01/28/18 14:00  





  Lasix Injection -  IVPUSH  01/28/18 14:01  





  ONCE ONE   


 


Gabapentin  100 mg  01/26/18 22:00  01/28/18 10:05





  Neurontin -  PO   100 mg





  BID HENRY   Administration


 


Hydroxyzine HCl  10 mg  01/27/18 10:00  01/28/18 10:05





  Atarax -  PO   10 mg





  DAILY HENRY   Administration


 


Isosorbide Mononitrate  30 mg  01/27/18 10:00  01/28/18 10:06





  Imdur -  PO   30 mg





  DAILY HENRY   Administration


 


Pantoprazole Sodium  40 mg  01/27/18 10:00  01/28/18 10:07





  Protonix -  PO   40 mg





  BID HENRY   Administration


 


Quetiapine Fumarate  50 mg  01/26/18 22:00  01/28/18 10:07





  Seroquel -  PO   50 mg





  BID HENRY   Administration


 


Ramipril  2.5 mg  01/27/18 10:00  01/28/18 10:08





  Altace -  PO   Not Given





  DAILY HENRY   


 


Ranitidine HCl  150 mg  01/26/18 22:00  01/28/18 10:07





  Zantac -  PO   150 mg





  BID HENRY   Administration


 


Warfarin Sodium  1 mg  01/27/18 18:00  01/27/18 17:23





  Coumadin -  PO   1 mg





  Q2D@1800 HENRY   Administration


 


Warfarin Sodium  1 mg  01/28/18 18:00  





  Coumadin -  PO  01/28/18 18:01  





  ONCE@1800 ONE   








 





85 y/o female with multiple medical commodities admitted with SOB, weakness, 

Hgb at presentation was 6 with MCV of 77.





-patient receiving pRBC transfusions currently


-iron studies show the presence of iron deficiency anemia, recommend rechecking 

ferritin and check reticulocytes as well 


-patient is a candidate to receive IV iron therapy in the near future. 

Daughters would like to speak with  tomorrow to figure out the 

logistics of how they can arrange for her to have this as an outpatient. 


-daughters are also leaning towards conservative management of GI bleed. They 

would like to try just PPI first and are not in favor of endoscopy at this 

time. I encouraged them to discuss this further with GI attending.


-she is also on coumadin daily for previous stroke and high risk for 

cardiovascular embolic complications. The pros and cons of this need to be 

weighed and I am in favor of discontinuing coumadin for a while until her GI 

bleeding resolves. 


-BNP very elevated on presentation, likely superimposed CHF as well? check TTE


-agree with infectious work-up for dysuria symptoms


-daughters are on board with above discussed plan for IV iron. All their 

questions were answered. 








Problem List





- Problems


(1) Anemia


Code(s): D64.9 - ANEMIA, UNSPECIFIED   


Qualifiers: 


   Other causes of anemia: chronic disease, other

## 2018-01-28 NOTE — PN
Progress Note, Physician


Chief Complaint: 


Pt sitting in bed in no acute distress. Pt reports she feels better. Denies 

chest pain, sob, fevers, or n/v/d.





- Current Medication List


Current Medications: 


Active Medications





Acetaminophen (Tylenol -)  650 mg PO Q4H PRN


   PRN Reason: PAIN <5


   Last Admin: 01/28/18 04:23 Dose:  650 mg


Atorvastatin Calcium (Lipitor -)  10 mg PO HS [facility]


   Last Admin: 01/27/18 22:03 Dose:  10 mg


Carvedilol (Coreg -)  6.25 mg PO BID [facility]


   Last Admin: 01/27/18 22:03 Dose:  6.25 mg


Cholecalciferol (Vitamin D3 -)  2,000 unit PO DAILY [facility]


   Last Admin: 01/27/18 10:53 Dose:  2,000 unit


Diltiazem HCl (Cardizem Cd -)  180 mg PO DAILY [facility]


   Last Admin: 01/27/18 10:53 Dose:  180 mg


Ferrous Sulfate (Feosol -)  325 mg PO MoWeFr@1000 [facility]


Gabapentin (Neurontin -)  100 mg PO BID [facility]


   Last Admin: 01/27/18 22:03 Dose:  100 mg


Hydroxyzine HCl (Atarax -)  10 mg PO DAILY [facility]


   Last Admin: 01/27/18 11:00 Dose:  10 mg


Isosorbide Mononitrate (Imdur -)  30 mg PO DAILY [facility]


   Last Admin: 01/27/18 10:53 Dose:  30 mg


Pantoprazole Sodium (Protonix -)  40 mg PO BID [facility]


   Last Admin: 01/27/18 22:03 Dose:  40 mg


Quetiapine Fumarate (Seroquel -)  50 mg PO BID [facility]


   Last Admin: 01/27/18 22:03 Dose:  50 mg


Ramipril (Altace -)  2.5 mg PO DAILY [facility]


   Last Admin: 01/27/18 10:54 Dose:  2.5 mg


Ranitidine HCl (Zantac -)  150 mg PO BID [facility]


   Last Admin: 01/27/18 22:03 Dose:  150 mg


Warfarin Sodium (Coumadin -)  1 mg PO Q2D@1800 [facility]


   Last Admin: 01/27/18 17:23 Dose:  1 mg











- Objective


Vital Signs: 


 Vital Signs











Temperature  97.4 F L  01/28/18 06:00


 


Pulse Rate  98 H  01/28/18 06:00


 


Respiratory Rate  18   01/28/18 06:00


 


Blood Pressure  137/80   01/28/18 06:00


 


O2 Sat by Pulse Oximetry (%)  97   01/27/18 21:00











Constitutional: Yes: No Distress, Calm


Eyes: Yes: Conjunctiva Clear


Cardiovascular: Yes: Pulse Irregular, Murmur, S1, S2.  No: Bruit, Gallop


Respiratory: Yes: Regular, Diminished, On Nasal O2, Rales (bibasilar).  No: 

Rhonchi, SOB, Tachypnea, Wheezes


Gastrointestinal: Yes: WNL, Normal Bowel Sounds, Soft, Abdomen, Obese.  No: 

Distention, Tenderness


Edema: No


Neurological: Yes: WNL, Alert, Oriented


Psychiatric: Yes: WNL, Alert, Oriented


Labs: 


 CBC, BMP





 01/28/18 06:20 





 INR, PTT











INR  2.56  (0.82-1.09)  H  01/27/18  06:00    














Problem List





- Problems


(1) Anemia


Code(s): D64.9 - ANEMIA, UNSPECIFIED   


Qualifiers: 


   Other causes of anemia: chronic disease, other 





(2) Diarrhea


Code(s): R19.7 - DIARRHEA, UNSPECIFIED   


Qualifiers: 


   Diarrhea type: unspecified type   Qualified Code(s): R19.7 - Diarrhea, 

unspecified   





(3) Dehydration


Code(s): E86.0 - DEHYDRATION   





(4) Pacemaker


Code(s): Z95.0 - PRESENCE OF CARDIAC PACEMAKER   





(5) Atherosclerotic heart disease


Code(s): I25.10 - ATHSCL HEART DISEASE OF NATIVE CORONARY ARTERY W/O ANG PCTRS 

  


Qualifiers: 


   Coronary Disease-Associated Artery/Lesion type: native artery   Native vs. 

transplanted heart: native heart   Associated angina: without angina   

Qualified Code(s): I25.10 - Atherosclerotic heart disease of native coronary 

artery without angina pectoris   





(6) Atrial fibrillation


Code(s): I48.91 - UNSPECIFIED ATRIAL FIBRILLATION   


Qualifiers: 


   Atrial fibrillation type: paroxysmal   Qualified Code(s): I48.0 - Paroxysmal 

atrial fibrillation   





(7) Chronic kidney disease


Code(s): N18.9 - CHRONIC KIDNEY DISEASE, UNSPECIFIED   


Qualifiers: 


   Chronic kidney disease stage: stage 2 (mild)   Qualified Code(s): N18.2 - 

Chronic kidney disease, stage 2 (mild)   





(8) Hypertension


Code(s): I10 - ESSENTIAL (PRIMARY) HYPERTENSION   


Qualifiers: 


   Hypertension type: essential hypertension   Qualified Code(s): I10 - 

Essential (primary) hypertension   





(9) Weakness


Code(s): R53.1 - WEAKNESS   





(10) CHF (congestive heart failure)


Code(s): I50.9 - HEART FAILURE, UNSPECIFIED   


Qualifiers: 


   Congestive heart failure type: diastolic   Congestive heart failure 

chronicity: chronic   Qualified Code(s): I50.32 - Chronic diastolic (congestive

) heart failure   





(11) Dementia


Code(s): F03.90 - UNSPECIFIED DEMENTIA WITHOUT BEHAVIORAL DISTURBANCE   


Qualifiers: 


   Dementia type: Parkinson's disease   Dementia behavioral disturbance: 

without behavioral disturbance   Qualified Code(s): G20 - Parkinson's disease; 

F02.80 - Dementia in other diseases classified elsewhere without behavioral 

disturbance; F02.80 - Dementia in other diseases classified elsewhere without 

behavioral disturbance; F02.80 - Dementia in other diseases classified 

elsewhere without behavioral disturbance   





(12) Dysuria


Code(s): R30.0 - DYSURIA   





Assessment/Plan


(1) Anemia


Assessment/Plan: 


Acute on Chronic anemia, Iron deficient, h/h 6.9/21.8


pt received total 2 units prbcs


1 unit prbcs today


10mg lasix/CBC post transfusion


Monitor post transfusion volume status considering chf


Hematology following


GI following


Code(s): D64.9 - ANEMIA, UNSPECIFIED   


Qualifiers: 


   Other causes of anemia: chronic disease, other 





(2) Diarrhea


Assessment/Plan: 


most likely related to po iron intolerance 


no episodes today


cdiff test pending


will monitor for now


GI following


Code(s): R19.7 - DIARRHEA, UNSPECIFIED   


Qualifiers: 


   Diarrhea type: unspecified type   Qualified Code(s): R19.7 - Diarrhea, 

unspecified   





(3) Dehydration


Assessment/Plan: 


resolved


Code(s): E86.0 - DEHYDRATION   





(4) Pacemaker


Assessment/Plan: 


ventricular paced


Code(s): Z95.0 - PRESENCE OF CARDIAC PACEMAKER   





(5) Atherosclerotic heart disease


Assessment/Plan: 


s/p PPM


holding aspirin 


continue simvastatin


Code(s): I25.10 - ATHSCL HEART DISEASE OF NATIVE CORONARY ARTERY W/O ANG PCTRS 

  


Qualifiers: 


   Coronary Disease-Associated Artery/Lesion type: native artery   Native vs. 

transplanted heart: native heart   Associated angina: without angina   

Qualified Code(s): I25.10 - Atherosclerotic heart disease of native coronary 

artery without angina pectoris   





(6) Atrial fibrillation


Assessment/Plan: 


coagulated on coumadin, therapeutic


Daughter reports pt alternates takeing 1mg every 2days, and 2mg the other days


will give coumadin 1mg tonight


monitor inr closely


Code(s): I48.91 - UNSPECIFIED ATRIAL FIBRILLATION   


Qualifiers: 


   Atrial fibrillation type: paroxysmal   Qualified Code(s): I48.0 - Paroxysmal 

atrial fibrillation   





(7) Chronic kidney disease


Assessment/Plan: 


Improved


Code(s): N18.9 - CHRONIC KIDNEY DISEASE, UNSPECIFIED   


Qualifiers: 


   Chronic kidney disease stage: stage 2 (mild)   Qualified Code(s): N18.2 - 

Chronic kidney disease, stage 2 (mild)   





(8) Hypertension


Assessment/Plan: 


continue home meds


avoid aggressive bp control


Code(s): I10 - ESSENTIAL (PRIMARY) HYPERTENSION   


Qualifiers: 


   Hypertension type: essential hypertension   Qualified Code(s): I10 - 

Essential (primary) hypertension   





(9) Weakness


Assessment/Plan: 


secondary to acute anemia


improved


will treat underlying illness and monitor for improvement 


Code(s): R53.1 - WEAKNESS   





(10) CHF (congestive heart failure)


Assessment/Plan: 


chronic, stable, EF 17%


low Na diet


daily weights


will monitor 


cardiology following


Code(s): I50.9 - HEART FAILURE, UNSPECIFIED   


Qualifiers: 


   Congestive heart failure type: diastolic   Congestive heart failure 

chronicity: chronic   Qualified Code(s): I50.32 - Chronic diastolic (congestive

) heart failure   





(11) Dementia


Assessment/Plan: 


stable


continue home meds


Code(s): F03.90 - UNSPECIFIED DEMENTIA WITHOUT BEHAVIORAL DISTURBANCE   


Qualifiers: 


   Dementia type: Parkinson's disease   Dementia behavioral disturbance: 

without behavioral disturbance   Qualified Code(s): G20 - Parkinson's disease; 

F02.80 - Dementia in other diseases classified elsewhere without behavioral 

disturbance; F02.80 - Dementia in other diseases classified elsewhere without 

behavioral disturbance; F02.80 - Dementia in other diseases classified 

elsewhere without behavioral disturbance   





(12) Dysuria


Assessment/Plan: 


Pt reports dysuria, pt afebrile, wbc wnl


UA +leuk es, nitrite neg, 


repeat UA today and monitor


Code(s): R30.0 - DYSURIA

## 2018-01-28 NOTE — PN
Progress Note (short form)





- Note


Progress Note: 





86 year old femle dmitted with SOB, lightheadedness and found to be severely 

anemic, requiring multple trasfusion. Known case of CAD s/pPCI/ stenting, LV 

systolic dysfunction, paroxysmal atrial fib. Hypertension, angiodysplasia of 

the cecum and comeron ulceration. Ptient is again receiving blood transfusion.





Active Medications





Acetaminophen (Tylenol -)  650 mg PO Q4H PRN


   PRN Reason: PAIN <5


   Last Admin: 01/28/18 12:35 Dose:  650 mg


Atorvastatin Calcium (Lipitor -)  10 mg PO HS [facility]


   Last Admin: 01/27/18 22:03 Dose:  10 mg


Carvedilol (Coreg -)  6.25 mg PO BID [facility]


   Last Admin: 01/28/18 10:08 Dose:  6.25 mg


Cholecalciferol (Vitamin D3 -)  2,000 unit PO DAILY [facility]


   Last Admin: 01/28/18 10:06 Dose:  2,000 unit


Diltiazem HCl (Cardizem Cd -)  180 mg PO DAILY [facility]


   Last Admin: 01/28/18 10:08 Dose:  Not Given


Ferrous Sulfate (Feosol -)  325 mg PO MoWeFr@1000 [facility]


Gabapentin (Neurontin -)  100 mg PO BID [facility]


   Last Admin: 01/28/18 10:05 Dose:  100 mg


Hydroxyzine HCl (Atarax -)  10 mg PO DAILY [facility]


   Last Admin: 01/28/18 10:05 Dose:  10 mg


Isosorbide Mononitrate (Imdur -)  30 mg PO DAILY [facility]


   Last Admin: 01/28/18 10:06 Dose:  30 mg


Pantoprazole Sodium (Protonix -)  40 mg PO BID [facility]


   Last Admin: 01/28/18 10:07 Dose:  40 mg


Quetiapine Fumarate (Seroquel -)  50 mg PO BID [facility]


   Last Admin: 01/28/18 10:07 Dose:  50 mg


Ramipril (Altace -)  2.5 mg PO DAILY [facility]


   Last Admin: 01/28/18 10:08 Dose:  Not Given


Warfarin Sodium (Coumadin -)  1 mg PO Q2D@1800 [facility]


   Last Admin: 01/27/18 17:23 Dose:  1 mg


Warfarin Sodium (Coumadin -)  1 mg PO ONCE@1800 [facility]


   Stop: 01/28/18 18:01





86 sridevi old femle in no distress, pallor1+, no cyanosis or jaundic


 


                                                                               

                                        Last Vital Signs











Temp Pulse Resp BP Pulse Ox


 


 97.4 F L  87   18   105/65   97 


 


 01/28/18 14:25  01/28/18 14:25  01/28/18 14:25  01/28/18 14:25  01/28/18 09:00








NECK: Supple, no JVD, carotids equl.


HEART: PMI in the 5th ICS, no heaves or thrills, heart sounds are distant.


LUNGS: Clear.


ABDOMEN: Nontender, on organomegaly or maases felt.


EXTREMITIES: No calf tenderness or dependent edema.





 CBC, BMP





 01/28/18 06:20 





 01/28/18 06:20 





1. GI bleeding, source to be determined.


2. CAD, s/p PCI.


3. S/p CVA, post cardiac cath.


4. Paroxysmal atrila fib.


5. H/o severe Lv dysfunction.


6. hypersion.





RECOMMENDATIONS:


1. Ongoing loss of blood, may have to d/c coumadin, till the source of bleeding 

is localised. Need to discuss with the family.


2. Consider a Watchman device to occlude the left atrial appendage. Spoke to 

the daughter and see will speak to her other members of the family.

## 2018-01-29 LAB
ANION GAP SERPL CALC-SCNC: 7 MMOL/L (ref 8–16)
BASOPHILS # BLD: 0.6 % (ref 0–2)
BUN SERPL-MCNC: 19 MG/DL (ref 7–18)
CALCIUM SERPL-MCNC: 7.7 MG/DL (ref 8.5–10.1)
CHLORIDE SERPL-SCNC: 113 MMOL/L (ref 98–107)
CO2 SERPL-SCNC: 27 MMOL/L (ref 21–32)
CREAT SERPL-MCNC: 1.6 MG/DL (ref 0.55–1.02)
DEPRECATED RDW RBC AUTO: 16.7 % (ref 11.6–15.6)
EOSINOPHIL # BLD: 4.5 % (ref 0–4.5)
GLUCOSE SERPL-MCNC: 70 MG/DL (ref 74–106)
HCT VFR BLD CALC: 29.2 % (ref 32.4–45.2)
HGB BLD-MCNC: 9.3 GM/DL (ref 10.7–15.3)
INR BLD: 1.97 (ref 0.82–1.09)
LYMPHOCYTES # BLD: 14.4 % (ref 8–40)
MAGNESIUM SERPL-MCNC: 1.9 MG/DL (ref 1.8–2.4)
MCH RBC QN AUTO: 25.3 PG (ref 25.7–33.7)
MCHC RBC AUTO-ENTMCNC: 32 G/DL (ref 32–36)
MCV RBC: 79.2 FL (ref 80–96)
MONOCYTES # BLD AUTO: 12.9 % (ref 3.8–10.2)
NEUTROPHILS # BLD: 67.6 % (ref 42.8–82.8)
PHOSPHATE SERPL-MCNC: 4.1 MG/DL (ref 2.5–4.9)
PLATELET # BLD AUTO: 262 K/MM3 (ref 134–434)
PMV BLD: 7.3 FL (ref 7.5–11.1)
POTASSIUM SERPLBLD-SCNC: 3.7 MMOL/L (ref 3.5–5.1)
PT PNL PPP: 22.3 SEC (ref 9.98–11.88)
RBC # BLD AUTO: 3.68 M/MM3 (ref 3.6–5.2)
SODIUM SERPL-SCNC: 147 MMOL/L (ref 136–145)
WBC # BLD AUTO: 5.8 K/MM3 (ref 4–10)

## 2018-01-29 RX ADMIN — POTASSIUM CHLORIDE ONE MLS/HR: 149 INJECTION, SOLUTION, CONCENTRATE INTRAVENOUS at 12:19

## 2018-01-29 RX ADMIN — GABAPENTIN SCH MG: 100 CAPSULE ORAL at 10:23

## 2018-01-29 RX ADMIN — ATORVASTATIN CALCIUM SCH MG: 10 TABLET, FILM COATED ORAL at 22:57

## 2018-01-29 RX ADMIN — PANTOPRAZOLE SODIUM SCH MG: 40 TABLET, DELAYED RELEASE ORAL at 22:57

## 2018-01-29 RX ADMIN — VITAMIN D, TAB 1000IU (100/BT) SCH UNIT: 25 TAB at 10:23

## 2018-01-29 RX ADMIN — CARVEDILOL SCH MG: 6.25 TABLET, FILM COATED ORAL at 22:57

## 2018-01-29 RX ADMIN — PANTOPRAZOLE SODIUM SCH MG: 40 TABLET, DELAYED RELEASE ORAL at 10:24

## 2018-01-29 RX ADMIN — FERROUS SULFATE TAB EC 324 MG (65 MG FE EQUIVALENT) SCH MG: 324 (65 FE) TABLET DELAYED RESPONSE at 10:23

## 2018-01-29 RX ADMIN — POTASSIUM CHLORIDE ONE: 149 INJECTION, SOLUTION, CONCENTRATE INTRAVENOUS at 15:39

## 2018-01-29 RX ADMIN — CARVEDILOL SCH: 6.25 TABLET, FILM COATED ORAL at 12:18

## 2018-01-29 RX ADMIN — MAGNESIUM SULFATE IN DEXTROSE ONE MLS/HR: 10 INJECTION, SOLUTION INTRAVENOUS at 12:19

## 2018-01-29 RX ADMIN — ISOSORBIDE MONONITRATE SCH MG: 30 TABLET, EXTENDED RELEASE ORAL at 12:18

## 2018-01-29 RX ADMIN — RAMIPRIL SCH: 2.5 CAPSULE ORAL at 12:18

## 2018-01-29 RX ADMIN — MAGNESIUM SULFATE IN DEXTROSE ONE: 10 INJECTION, SOLUTION INTRAVENOUS at 15:39

## 2018-01-29 RX ADMIN — GABAPENTIN SCH MG: 100 CAPSULE ORAL at 22:57

## 2018-01-29 NOTE — PN
Progress Note (short form)





- Note


Progress Note: 





86 year old femle dmitted with SOB, lightheadedness and found to be severely 

anemic, requiring multple trasfusion. Known case of CAD s/pPCI/ stenting, LV 

systolic dysfunction, paroxysmal atrial fib. Hypertension, angiodysplasia of 

the cecum and comeron ulceration. Patient has on going bleeding, discussion 

with daughters regarding cessation of coumadin and understand the risks of 

systemic embolization. Also discussed the possibility of inserting a Watchman 

device for left atrial appendage closure, provide she is accepted by a tertiary 

care facility.





Active Medications











Generic Name Dose Route Start Last Admin





  Trade Name Freq  PRN Reason Stop Dose Admin


 


Acetaminophen  650 mg  01/27/18 16:39  01/28/18 12:35





  Tylenol -  PO   650 mg





  Q4H PRN   Administration





  PAIN <5   


 


Atorvastatin Calcium  10 mg  01/26/18 22:00  01/28/18 21:16





  Lipitor -  PO   10 mg





  [facility]   Administration


 


Carvedilol  6.25 mg  01/27/18 10:00  01/29/18 12:18





  Coreg -  PO   Not Given





  BID [facility]   


 


Cholecalciferol  2,000 unit  01/27/18 10:00  01/29/18 10:23





  Vitamin D3 -  PO   2,000 unit





  DAILY [facility]   Administration


 


Diltiazem HCl  180 mg  01/27/18 10:00  01/29/18 12:18





  Cardizem Cd -  PO   Not Given





  DAILY [facility]   


 


Ferrous Sulfate  325 mg  01/29/18 10:00  01/29/18 10:23





  Feosol -  PO   325 mg





  MoWeFr@1000 [facility]   Administration


 


Gabapentin  100 mg  01/26/18 22:00  01/29/18 10:23





  Neurontin -  PO   100 mg





  BID [facility]   Administration


 


Hydroxyzine HCl  10 mg  01/27/18 10:00  01/29/18 10:23





  Atarax -  PO   10 mg





  DAILY [facility]   Administration


 


Isosorbide Mononitrate  30 mg  01/27/18 10:00  01/29/18 12:18





  Imdur -  PO   30 mg





  DAILY [facility]   Administration


 


Pantoprazole Sodium  40 mg  01/27/18 10:00  01/29/18 10:24





  Protonix -  PO   40 mg





  BID [facility]   Administration


 


Quetiapine Fumarate  50 mg  01/26/18 22:00  01/29/18 10:23





  Seroquel -  PO   50 mg





  BID [facility]   Administration


 


Ramipril  2.5 mg  01/27/18 10:00  01/29/18 12:18





  Altace -  PO   Not Given





  DAILY [facility]   











86 sride[age] old femle in no distress, pallor1+, no cyanosis or jaundic


 


                                                                               

                                        


 Last Vital Signs











Temp Pulse Resp BP Pulse Ox


 


 98.1 F   104 H  22   118/61   96 


 


 01/29/18 13:10  01/29/18 13:10  01/29/18 13:10  01/29/18 13:10  01/29/18 10:00











NECK: Supple, no JVD, carotids equl.


HEART: PMI in the 5th ICS, no heaves or thrills, heart sounds are distant.


LUNGS: Clear.


ABDOMEN: Nontender, on organomegaly or maases felt.


EXTREMITIES: No calf tenderness or dependent edema.





 


 CBC, BMP





 01/29/18 07:45 





 01/29/18 06:19 








1. GI bleeding, source to be determined.


2. CAD, s/p PCI.


3. S/p CVA, post cardiac cath.


4. Paroxysmal atrila fib.


5. H/o severe Lv dysfunction.


6. Hypertension.





RECOMMENDATIONS:


1. Ongoing loss of blood, may have to d/c coumadin, till the source of bleeding 

is localised.


2. See above discussion, if family agrees will call [facility].

## 2018-01-29 NOTE — PN
Addendum entered and electronically signed by Jarrett Miller MD  01/29/18 14:48

: 





Patient seen with and examined with Maricel Humphrey NP. Agree with physical exam 

and assessment and plan. Long discussion with both daughters about goals of 

care.





Original Note:








Progress Note, Physician


Chief Complaint: 


Pt sitting in bed in no acute distress. Pt didnt talk much today. family said 

she tolerated the blood transfusions. 





- Current Medication List


Current Medications: 


Active Medications





Acetaminophen (Tylenol -)  650 mg PO Q4H PRN


   PRN Reason: PAIN <5


   Last Admin: 01/28/18 12:35 Dose:  650 mg


Atorvastatin Calcium (Lipitor -)  10 mg PO HS [facility]


   Last Admin: 01/28/18 21:16 Dose:  10 mg


Carvedilol (Coreg -)  6.25 mg PO BID [facility]


   Last Admin: 01/29/18 12:18 Dose:  Not Given


Cholecalciferol (Vitamin D3 -)  2,000 unit PO DAILY [facility]


   Last Admin: 01/29/18 10:23 Dose:  2,000 unit


Diltiazem HCl (Cardizem Cd -)  180 mg PO DAILY [facility]


   Last Admin: 01/29/18 12:18 Dose:  Not Given


Ferrous Sulfate (Feosol -)  325 mg PO MoWeFr@1000 [facility]


   Last Admin: 01/29/18 10:23 Dose:  325 mg


Gabapentin (Neurontin -)  100 mg PO BID [facility]


   Last Admin: 01/29/18 10:23 Dose:  100 mg


Hydroxyzine HCl (Atarax -)  10 mg PO DAILY [facility]


   Last Admin: 01/29/18 10:23 Dose:  10 mg


Isosorbide Mononitrate (Imdur -)  30 mg PO DAILY [facility]


   Last Admin: 01/29/18 12:18 Dose:  30 mg


Pantoprazole Sodium (Protonix -)  40 mg PO BID [facility]


   Last Admin: 01/29/18 10:24 Dose:  40 mg


Quetiapine Fumarate (Seroquel -)  50 mg PO BID [facility]


   Last Admin: 01/29/18 10:23 Dose:  50 mg


Ramipril (Altace -)  2.5 mg PO DAILY [facility]


   Last Admin: 01/29/18 12:18 Dose:  Not Given











- Objective


Vital Signs: 


 Vital Signs











Temperature  97.7 F   01/29/18 06:00


 


Pulse Rate  104 H  01/29/18 06:00


 


Respiratory Rate  16   01/29/18 06:00


 


Blood Pressure  115/70   01/29/18 06:00


 


O2 Sat by Pulse Oximetry (%)  96   01/28/18 20:31











Constitutional: Yes: Well Nourished, No Distress, Calm


Cardiovascular: Yes: Pulse Irregular, Murmur.  No: Bradycardia, Bruit, Gallop


Respiratory: Yes: Regular, Diminished, On Nasal O2.  No: Rales, Rhonchi, SOB, 

Tachypnea, Wheezes


Gastrointestinal: Yes: WNL, Normal Bowel Sounds, Soft, Abdomen, Obese.  No: 

Distention, Tenderness


Musculoskeletal: Yes: WNL


Edema: Yes


Edema: LLE: 1+, RLE: 1+


Integumentary: Yes: WNL


Neurological: Yes: WNL, Alert


Psychiatric: Yes: WNL, Alert


Labs: 


 CBC, BMP





 01/29/18 07:45 





 01/29/18 06:19 





 INR, PTT











INR  1.97  (0.82-1.09)  H  01/29/18  06:19    














Problem List





- Problems


(1) Anemia


Code(s): D64.9 - ANEMIA, UNSPECIFIED   


Qualifiers: 


   Other causes of anemia: chronic disease, other 





(2) Diarrhea


Code(s): R19.7 - DIARRHEA, UNSPECIFIED   


Qualifiers: 


   Diarrhea type: unspecified type   Qualified Code(s): R19.7 - Diarrhea, 

unspecified   





(3) Dehydration


Code(s): E86.0 - DEHYDRATION   





(4) Pacemaker


Code(s): Z95.0 - PRESENCE OF CARDIAC PACEMAKER   





(5) Atherosclerotic heart disease


Code(s): I25.10 - ATHSCL HEART DISEASE OF NATIVE CORONARY ARTERY W/O ANG PCTRS 

  


Qualifiers: 


   Coronary Disease-Associated Artery/Lesion type: native artery   Native vs. 

transplanted heart: native heart   Associated angina: without angina   

Qualified Code(s): I25.10 - Atherosclerotic heart disease of native coronary 

artery without angina pectoris   





(6) Atrial fibrillation


Code(s): I48.91 - UNSPECIFIED ATRIAL FIBRILLATION   


Qualifiers: 


   Atrial fibrillation type: paroxysmal   Qualified Code(s): I48.0 - Paroxysmal 

atrial fibrillation   





(7) Chronic kidney disease


Code(s): N18.9 - CHRONIC KIDNEY DISEASE, UNSPECIFIED   


Qualifiers: 


   Chronic kidney disease stage: stage 2 (mild)   Qualified Code(s): N18.2 - 

Chronic kidney disease, stage 2 (mild)   





(8) Hypertension


Code(s): I10 - ESSENTIAL (PRIMARY) HYPERTENSION   


Qualifiers: 


   Hypertension type: essential hypertension   Qualified Code(s): I10 - 

Essential (primary) hypertension   





(9) Weakness


Code(s): R53.1 - WEAKNESS   





(10) CHF (congestive heart failure)


Code(s): I50.9 - HEART FAILURE, UNSPECIFIED   


Qualifiers: 


   Congestive heart failure type: diastolic   Congestive heart failure 

chronicity: chronic   Qualified Code(s): I50.32 - Chronic diastolic (congestive

) heart failure   





(11) Dementia


Code(s): F03.90 - UNSPECIFIED DEMENTIA WITHOUT BEHAVIORAL DISTURBANCE   


Qualifiers: 


   Dementia type: Parkinson's disease   Dementia behavioral disturbance: 

without behavioral disturbance   Qualified Code(s): G20 - Parkinson's disease; 

F02.80 - Dementia in other diseases classified elsewhere without behavioral 

disturbance; F02.80 - Dementia in other diseases classified elsewhere without 

behavioral disturbance; F02.80 - Dementia in other diseases classified 

elsewhere without behavioral disturbance   





(12) Dysuria


Code(s): R30.0 - DYSURIA   





Assessment/Plan


(1) Anemia


Assessment/Plan: 


Acute on Chronic anemia, Iron deficient, h/h stable today


pt received total 3 units prbcs


holding coumadin and aspirin in the setting of bleed


Hematology following, family to decide on watchmans device 


GI following, conservative management per family 


Code(s): D64.9 - ANEMIA, UNSPECIFIED   


Qualifiers: 


   Other causes of anemia: chronic disease, other 





(2) Diarrhea


Assessment/Plan: 


resolved


Code(s): R19.7 - DIARRHEA, UNSPECIFIED   


Qualifiers: 


   Diarrhea type: unspecified type   Qualified Code(s): R19.7 - Diarrhea, 

unspecified   





(3) Dehydration


Assessment/Plan: 


resolved


Code(s): E86.0 - DEHYDRATION   





(4) Pacemaker


Assessment/Plan: 


ventricular paced


Code(s): Z95.0 - PRESENCE OF CARDIAC PACEMAKER   





(5) Atherosclerotic heart disease


Assessment/Plan: 


s/p PPM


holding aspirin 


continue simvastatin


Code(s): I25.10 - ATHSCL HEART DISEASE OF NATIVE CORONARY ARTERY W/O ANG PCTRS 

  


Qualifiers: 


   Coronary Disease-Associated Artery/Lesion type: native artery   Native vs. 

transplanted heart: native heart   Associated angina: without angina   

Qualified Code(s): I25.10 - Atherosclerotic heart disease of native coronary 

artery without angina pectoris   





(6) Atrial fibrillation


Assessment/Plan: 


coagulated on coumadin, sub therapeutic


hold coumadin in the setting of bleed


cardiology following, family considering watchmans device


Code(s): I48.91 - UNSPECIFIED ATRIAL FIBRILLATION   


Qualifiers: 


   Atrial fibrillation type: paroxysmal   Qualified Code(s): I48.0 - Paroxysmal 

atrial fibrillation   





(7) Chronic kidney disease


Assessment/Plan: 


Improved


Code(s): N18.9 - CHRONIC KIDNEY DISEASE, UNSPECIFIED   


Qualifiers: 


   Chronic kidney disease stage: stage 2 (mild)   Qualified Code(s): N18.2 - 

Chronic kidney disease, stage 2 (mild)   





(8) Hypertension


Assessment/Plan: 


continue home meds


avoid aggressive bp control


Code(s): I10 - ESSENTIAL (PRIMARY) HYPERTENSION   


Qualifiers: 


   Hypertension type: essential hypertension   Qualified Code(s): I10 - 

Essential (primary) hypertension   





(9) Weakness


Assessment/Plan: 


secondary to acute anemia


improved


will treat underlying illness and monitor for improvement 


Code(s): R53.1 - WEAKNESS   





(10) CHF (congestive heart failure)


Assessment/Plan: 


chronic, stable, EF 17%


low Na diet


daily weights


will monitor 


cardiology following


PT consult ordered 


Code(s): I50.9 - HEART FAILURE, UNSPECIFIED   


Qualifiers: 


   Congestive heart failure type: diastolic   Congestive heart failure 

chronicity: chronic   Qualified Code(s): I50.32 - Chronic diastolic (congestive

) heart failure   





(11) Dementia


Assessment/Plan: 


stable


continue home meds


Code(s): F03.90 - UNSPECIFIED DEMENTIA WITHOUT BEHAVIORAL DISTURBANCE   


Qualifiers: 


   Dementia type: Parkinson's disease   Dementia behavioral disturbance: 

without behavioral disturbance   Qualified Code(s): G20 - Parkinson's disease; 

F02.80 - Dementia in other diseases classified elsewhere without behavioral 

disturbance; F02.80 - Dementia in other diseases classified elsewhere without 

behavioral disturbance; F02.80 - Dementia in other diseases classified 

elsewhere without behavioral disturbance   





(12) Dysuria


Assessment/Plan: 


UA +leuk es, nitrite neg


will monitor for now


Code(s): R30.0 - DYSURIA   








Pt DNR/DNI per family wishes, discussed with family.

## 2018-01-30 LAB
ANION GAP SERPL CALC-SCNC: 6 MMOL/L (ref 8–16)
BASOPHILS # BLD: 0.3 % (ref 0–2)
BUN SERPL-MCNC: 19 MG/DL (ref 7–18)
CALCIUM SERPL-MCNC: 8 MG/DL (ref 8.5–10.1)
CHLORIDE SERPL-SCNC: 112 MMOL/L (ref 98–107)
CO2 SERPL-SCNC: 28 MMOL/L (ref 21–32)
CREAT SERPL-MCNC: 1.5 MG/DL (ref 0.55–1.02)
DEPRECATED RDW RBC AUTO: 17.2 % (ref 11.6–15.6)
EOSINOPHIL # BLD: 3.4 % (ref 0–4.5)
GLUCOSE SERPL-MCNC: 70 MG/DL (ref 74–106)
HCT VFR BLD CALC: 29.7 % (ref 32.4–45.2)
HGB BLD-MCNC: 9.4 GM/DL (ref 10.7–15.3)
INR BLD: 1.6 (ref 0.82–1.09)
LYMPHOCYTES # BLD: 16.5 % (ref 8–40)
MAGNESIUM SERPL-MCNC: 1.9 MG/DL (ref 1.8–2.4)
MCH RBC QN AUTO: 25.5 PG (ref 25.7–33.7)
MCHC RBC AUTO-ENTMCNC: 31.7 G/DL (ref 32–36)
MCV RBC: 80.2 FL (ref 80–96)
MONOCYTES # BLD AUTO: 12 % (ref 3.8–10.2)
NEUTROPHILS # BLD: 67.8 % (ref 42.8–82.8)
PHOSPHATE SERPL-MCNC: 2.9 MG/DL (ref 2.5–4.9)
PLATELET # BLD AUTO: 221 K/MM3 (ref 134–434)
PMV BLD: 7.7 FL (ref 7.5–11.1)
POTASSIUM SERPLBLD-SCNC: 4.1 MMOL/L (ref 3.5–5.1)
PT PNL PPP: 18.1 SEC (ref 9.98–11.88)
RBC # BLD AUTO: 3.71 M/MM3 (ref 3.6–5.2)
SODIUM SERPL-SCNC: 146 MMOL/L (ref 136–145)
WBC # BLD AUTO: 6.9 K/MM3 (ref 4–10)

## 2018-01-30 RX ADMIN — PANTOPRAZOLE SODIUM SCH: 40 TABLET, DELAYED RELEASE ORAL at 21:18

## 2018-01-30 RX ADMIN — ATORVASTATIN CALCIUM SCH MG: 10 TABLET, FILM COATED ORAL at 21:17

## 2018-01-30 RX ADMIN — GABAPENTIN SCH MG: 100 CAPSULE ORAL at 21:17

## 2018-01-30 RX ADMIN — RAMIPRIL SCH MG: 2.5 CAPSULE ORAL at 11:15

## 2018-01-30 RX ADMIN — ACETAMINOPHEN PRN MG: 325 TABLET ORAL at 11:27

## 2018-01-30 RX ADMIN — ISOSORBIDE MONONITRATE SCH MG: 30 TABLET, EXTENDED RELEASE ORAL at 11:15

## 2018-01-30 RX ADMIN — PANTOPRAZOLE SODIUM SCH MG: 40 TABLET, DELAYED RELEASE ORAL at 11:15

## 2018-01-30 RX ADMIN — GABAPENTIN SCH MG: 100 CAPSULE ORAL at 11:16

## 2018-01-30 RX ADMIN — CARVEDILOL SCH MG: 6.25 TABLET, FILM COATED ORAL at 11:15

## 2018-01-30 RX ADMIN — VITAMIN D, TAB 1000IU (100/BT) SCH UNIT: 25 TAB at 11:15

## 2018-01-30 RX ADMIN — CARVEDILOL SCH: 6.25 TABLET, FILM COATED ORAL at 21:17

## 2018-01-30 NOTE — PN
Progress Note (short form)





- Note


Progress Note: 





Patient  seen and examined 


Denies chest pains or SOB


S/P GI bleed -source not as yet defined 


?? of continuation of a/c  long term  being discussed  with cardiology 


Currently a/c  being resumed 


 Last Vital Signs











Temp Pulse Resp BP Pulse Ox


 


 97.6 F   101 H  20   103/70   96 


 


 01/30/18 18:01  01/30/18 18:01  01/30/18 18:01  01/30/18 18:01  01/30/18 09:40








No icterus 


Lungs - diminished breath sounds 


Cor -RSR


Abd- sof 


Ext- no significant edema 





 CBC, BMP





 01/30/18 06:30 





 01/30/18 06:30 





 Current Medications











Generic Name Dose Route Start Last Admin





  Trade Name Freq  PRN Reason Stop Dose Admin


 


Acetaminophen  650 mg  01/27/18 16:39  01/30/18 11:27





  Tylenol -  PO   650 mg





  Q4H PRN   Administration





  PAIN <5   


 


Atorvastatin Calcium  10 mg  01/26/18 22:00  01/29/18 22:57





  Lipitor -  PO   10 mg





  HS HENRY   Administration


 


Carvedilol  6.25 mg  01/27/18 10:00  01/30/18 11:15





  Coreg -  PO   6.25 mg





  BID HENRY   Administration


 


Cholecalciferol  2,000 unit  01/27/18 10:00  01/30/18 11:15





  Vitamin D3 -  PO   2,000 unit





  DAILY HENRY   Administration


 


Diltiazem HCl  180 mg  01/27/18 10:00  01/30/18 11:16





  Cardizem Cd -  PO   180 mg





  DAILY HENRY   Administration


 


Ferrous Sulfate  325 mg  01/29/18 10:00  01/29/18 10:23





  Feosol -  PO   325 mg





  MoWeFr@1000 HENRY   Administration


 


Gabapentin  100 mg  01/26/18 22:00  01/30/18 11:16





  Neurontin -  PO   100 mg





  BID HENRY   Administration


 


Hydroxyzine HCl  10 mg  01/27/18 10:00  01/30/18 11:16





  Atarax -  PO   10 mg





  DAILY HENRY   Administration


 


Isosorbide Mononitrate  30 mg  01/27/18 10:00  01/30/18 11:15





  Imdur -  PO   30 mg





  DAILY HENRY   Administration


 


Pantoprazole Sodium  40 mg  01/27/18 10:00  01/30/18 11:15





  Protonix -  PO   40 mg





  BID HENRY   Administration


 


Quetiapine Fumarate  50 mg  01/26/18 22:00  01/30/18 11:16





  Seroquel -  PO   50 mg





  BID HENRY   Administration


 


Ramipril  2.5 mg  01/27/18 10:00  01/30/18 11:15





  Altace -  PO   2.5 mg





  DAILY HENRY   Administration








Impression:





Fe++ deficiency


GI blood loss


A/C





Plan:





IV Venofer 


Continue a/c 


?? additional management per cardiology

## 2018-01-30 NOTE — PN
GI Progress Note


Subjective: 





GI NOte: Nara is sitting up with no complaints. She has been eating well. NO 

bloody BMs during this hospital stay with coumadin stopped. Helga and Nara, 

are at the bedside. They are reluctant to accept the Watchman procedure as 

brigidoi father had a bad experience with anesthesia. I told then that I had 

spoken with Maricel Matias NP earlier who asked that I consider repeating an EGD 

before the Watchman procedure as it would mandate a 3 month period of 

uninterrupted  anticoagulation. We discussed the risks of upper endoscopy 

including perforation, hemorrhage and aspiration and of the need for 

anesthesia. They are inclined to decline but will discuss it with Dr. Rowan 

before making a final decision. If nothing else is done then I again asked that 

they consider discontinuing warfarin given the severe anemia Nara has incured. 

We do mutually agree with giving parenteral iron with Dr Stern.  





- Objective


Vital Signs: 


 Vital Signs











Temperature  97.6 F   01/30/18 18:01


 


Pulse Rate  101 H  01/30/18 18:01


 


Respiratory Rate  20   01/30/18 18:01


 


Blood Pressure  103/70   01/30/18 18:01


 


O2 Sat by Pulse Oximetry (%)  96   01/30/18 09:40








 Laboratory Tests











  01/27/18 01/27/18 01/28/18





  06:00 15:00 06:20


 


Hgb   8.4 L D 


 


Retic Count    1.33  D


 


Iron  15 L  


 


TIBC  322  


 


Iron Saturation  5 L  


 


Ferritin   














  01/28/18 01/29/18 01/30/18





  16:05 06:19 06:30


 


Hgb  9.8 L D   9.4 L


 


Retic Count   


 


Iron   


 


TIBC   


 


Iron Saturation   


 


Ferritin   20.629 











Constitutional: No Distress


...Auscultate: Yes: Normoactive Bowel Sounds


...Palpate: Yes: Soft, Other (nontender)


Labs: 


 CBC, BMP





 01/30/18 06:30 





 01/30/18 06:30 





 INR, PTT











INR  1.60  (0.82-1.09)  H  01/30/18  06:30    














Problem List





- Problems


(1) Iron deficiency anemia due to chronic blood loss


Assessment/Plan: 


Anemia due to occult bleeding from the cecal and probably small bowel vascular 

ectasias aggravated by coumadin. Agree with parenteral iron replacement. Would 

consider stopping warfarin in favor of aspirin. PPI should be continued to 

prevent recurrent Chauncey erosion bleeding. Await daughters' decision regarding 

EGD before a Watchman procedure vs no further procedures. They are in a 

position to make an informed consent for the EGD. They do not want Nara 

subjected to a repeat colonoscopy.  They intend to discuss the situation with 

Dr Rowan and then make a decision. 


Code(s): D50.0 - IRON DEFICIENCY ANEMIA SECONDARY TO BLOOD LOSS (CHRONIC)   





(2) Angiodysplasia of cecum


Code(s): K55.20 - ANGIODYSPLASIA OF COLON WITHOUT HEMORRHAGE   





(3) Diverticula of colon


Code(s): K57.30 - DVRTCLOS OF LG INT W/O PERFORATION OR ABSCESS W/O BLEEDING   





(4) Serrated adenoma of colon


Code(s): D12.6 - BENIGN NEOPLASM OF COLON, UNSPECIFIED   





(5) Chauncey lesion, chronic


Code(s): K25.7 - CHRONIC GASTRIC ULCER WITHOUT HEMORRHAGE OR PERFORATION   





(6) Hiatal hernia with GERD


Code(s): K21.9 - GASTRO-ESOPHAGEAL REFLUX DISEASE WITHOUT ESOPHAGITIS; K44.9 - 

DIAPHRAGMATIC HERNIA WITHOUT OBSTRUCTION OR GANGRENE   





(7) Constipation by delayed colonic transit


Code(s): K59.01 - SLOW TRANSIT CONSTIPATION   





(8) Stricture esophagus


Code(s): K22.2 - ESOPHAGEAL OBSTRUCTION

## 2018-01-30 NOTE — PN
Progress Note, Physician


Chief Complaint: 


Pt sitting in chair in no acute distress. Denies any chest pain, sob, n/v/d or 

fever/chills. Daughters at bedside. 





- Current Medication List


Current Medications: 


Active Medications





Acetaminophen (Tylenol -)  650 mg PO Q4H PRN


   PRN Reason: PAIN <5


   Last Admin: 01/30/18 11:27 Dose:  650 mg


Atorvastatin Calcium (Lipitor -)  10 mg PO HS [facility]


   Last Admin: 01/29/18 22:57 Dose:  10 mg


Carvedilol (Coreg -)  6.25 mg PO BID [facility]


   Last Admin: 01/30/18 11:15 Dose:  6.25 mg


Cholecalciferol (Vitamin D3 -)  2,000 unit PO DAILY [facility]


   Last Admin: 01/30/18 11:15 Dose:  2,000 unit


Diltiazem HCl (Cardizem Cd -)  180 mg PO DAILY [facility]


   Last Admin: 01/30/18 11:16 Dose:  180 mg


Ferrous Sulfate (Feosol -)  325 mg PO MoWeFr@1000 [facility]


   Last Admin: 01/29/18 10:23 Dose:  325 mg


Gabapentin (Neurontin -)  100 mg PO BID [facility]


   Last Admin: 01/30/18 11:16 Dose:  100 mg


Hydroxyzine HCl (Atarax -)  10 mg PO DAILY [facility]


   Last Admin: 01/30/18 11:16 Dose:  10 mg


Isosorbide Mononitrate (Imdur -)  30 mg PO DAILY [facility]


   Last Admin: 01/30/18 11:15 Dose:  30 mg


Pantoprazole Sodium (Protonix -)  40 mg PO BID [facility]


   Last Admin: 01/30/18 11:15 Dose:  40 mg


Quetiapine Fumarate (Seroquel -)  50 mg PO BID [facility]


   Last Admin: 01/30/18 11:16 Dose:  50 mg


Ramipril (Altace -)  2.5 mg PO DAILY [facility]


   Last Admin: 01/30/18 11:15 Dose:  2.5 mg











- Objective


Vital Signs: 


 Vital Signs











Temperature  98.2 F   01/30/18 10:50


 


Pulse Rate  115 H  01/30/18 10:50


 


Respiratory Rate  18   01/30/18 10:50


 


Blood Pressure  159/86   01/30/18 10:50


 


O2 Sat by Pulse Oximetry (%)  96   01/29/18 10:00











Constitutional: Yes: Well Nourished, No Distress, Calm


Cardiovascular: Yes: Pulse Irregular, Murmur, S1, S2.  No: Bruit, JVD, Gallop


Respiratory: Yes: WNL, Regular, CTA Bilaterally, Rales (bibasilar).  No: 

Accessory Muscle Use, Cough, SOB, Tachypnea, Wheezes


Gastrointestinal: Yes: WNL, Normal Bowel Sounds, Soft, Abdomen, Obese.  No: 

Distention, Tenderness, Vomiting


Musculoskeletal: Yes: WNL


Extremities: Yes: WNL


Edema: Yes


Edema: LLE: Trace, RLE: Trace


Neurological: Yes: WNL, Alert, Oriented


Psychiatric: Yes: WNL, Alert


Labs: 


 CBC, BMP





 01/30/18 06:30 





 01/30/18 06:30 





 INR, PTT











INR  1.60  (0.82-1.09)  H  01/30/18  06:30    














Problem List





- Problems


(1) Anemia


Code(s): D64.9 - ANEMIA, UNSPECIFIED   


Qualifiers: 


   Other causes of anemia: chronic disease, other 





(2) Diarrhea


Code(s): R19.7 - DIARRHEA, UNSPECIFIED   


Qualifiers: 


   Diarrhea type: unspecified type   Qualified Code(s): R19.7 - Diarrhea, 

unspecified   





(3) Dehydration


Code(s): E86.0 - DEHYDRATION   





(4) Pacemaker


Code(s): Z95.0 - PRESENCE OF CARDIAC PACEMAKER   





(5) Atherosclerotic heart disease


Code(s): I25.10 - ATHSCL HEART DISEASE OF NATIVE CORONARY ARTERY W/O ANG PCTRS 

  


Qualifiers: 


   Coronary Disease-Associated Artery/Lesion type: native artery   Native vs. 

transplanted heart: native heart   Associated angina: without angina   

Qualified Code(s): I25.10 - Atherosclerotic heart disease of native coronary 

artery without angina pectoris   





(6) Atrial fibrillation


Code(s): I48.91 - UNSPECIFIED ATRIAL FIBRILLATION   


Qualifiers: 


   Atrial fibrillation type: paroxysmal   Qualified Code(s): I48.0 - Paroxysmal 

atrial fibrillation   





(7) Chronic kidney disease


Code(s): N18.9 - CHRONIC KIDNEY DISEASE, UNSPECIFIED   


Qualifiers: 


   Chronic kidney disease stage: stage 2 (mild)   Qualified Code(s): N18.2 - 

Chronic kidney disease, stage 2 (mild)   





(8) Hypertension


Code(s): I10 - ESSENTIAL (PRIMARY) HYPERTENSION   


Qualifiers: 


   Hypertension type: essential hypertension   Qualified Code(s): I10 - 

Essential (primary) hypertension   





(9) Weakness


Code(s): R53.1 - WEAKNESS   





(10) CHF (congestive heart failure)


Code(s): I50.9 - HEART FAILURE, UNSPECIFIED   


Qualifiers: 


   Congestive heart failure type: diastolic   Congestive heart failure 

chronicity: chronic   Qualified Code(s): I50.32 - Chronic diastolic (congestive

) heart failure   





(11) Dementia


Code(s): F03.90 - UNSPECIFIED DEMENTIA WITHOUT BEHAVIORAL DISTURBANCE   


Qualifiers: 


   Dementia type: Parkinson's disease   Dementia behavioral disturbance: 

without behavioral disturbance   Qualified Code(s): G20 - Parkinson's disease; 

F02.80 - Dementia in other diseases classified elsewhere without behavioral 

disturbance; F02.80 - Dementia in other diseases classified elsewhere without 

behavioral disturbance; F02.80 - Dementia in other diseases classified 

elsewhere without behavioral disturbance   





(12) Dysuria


Code(s): R30.0 - DYSURIA   





Assessment/Plan


(1) Anemia


Assessment/Plan: 


Acute on Chronic anemia, Iron deficient, h/h stable today


pt received total 3 units prbcs


restarting coumadin tonight


Hematology following, family to decide on watchmans device 


GI following, conservative management per family 


Code(s): D64.9 - ANEMIA, UNSPECIFIED   


Qualifiers: 


   Other causes of anemia: chronic disease, other 





(2) Diarrhea


Assessment/Plan: 


resolved


Code(s): R19.7 - DIARRHEA, UNSPECIFIED   


Qualifiers: 


   Diarrhea type: unspecified type   Qualified Code(s): R19.7 - Diarrhea, 

unspecified   





(3) Dehydration


Assessment/Plan: 


resolved


Code(s): E86.0 - DEHYDRATION   





(4) Pacemaker


Assessment/Plan: 


ventricular paced


Code(s): Z95.0 - PRESENCE OF CARDIAC PACEMAKER   





(5) Atherosclerotic heart disease


Assessment/Plan: 


s/p PPM


holding aspirin 


continue simvastatin


Code(s): I25.10 - ATHSCL HEART DISEASE OF NATIVE CORONARY ARTERY W/O ANG PCTRS 

  


Qualifiers: 


   Coronary Disease-Associated Artery/Lesion type: native artery   Native vs. 

transplanted heart: native heart   Associated angina: without angina   

Qualified Code(s): I25.10 - Atherosclerotic heart disease of native coronary 

artery without angina pectoris   





(6) Atrial fibrillation


Assessment/Plan: 


coagulated on coumadin, sub therapeutic


restart coumadin tonight, discussed with  


cardiology following, family considering watchmans device


Code(s): I48.91 - UNSPECIFIED ATRIAL FIBRILLATION   


Qualifiers: 


   Atrial fibrillation type: paroxysmal   Qualified Code(s): I48.0 - Paroxysmal 

atrial fibrillation   





(7) Chronic kidney disease


Assessment/Plan: 


Improved


Code(s): N18.9 - CHRONIC KIDNEY DISEASE, UNSPECIFIED   


Qualifiers: 


   Chronic kidney disease stage: stage 2 (mild)   Qualified Code(s): N18.2 - 

Chronic kidney disease, stage 2 (mild)   





(8) Hypertension


Assessment/Plan: 


continue home meds


avoid aggressive bp control


Code(s): I10 - ESSENTIAL (PRIMARY) HYPERTENSION   


Qualifiers: 


   Hypertension type: essential hypertension   Qualified Code(s): I10 - 

Essential (primary) hypertension   





(9) Weakness


Assessment/Plan: 


secondary to acute anemia


improved


Code(s): R53.1 - WEAKNESS   





(10) CHF (congestive heart failure)


Assessment/Plan: 


chronic, stable, EF 17%


low Na diet


daily weights


will monitor 


cardiology following


PT as tolerated


Code(s): I50.9 - HEART FAILURE, UNSPECIFIED   


Qualifiers: 


   Congestive heart failure type: diastolic   Congestive heart failure 

chronicity: chronic   Qualified Code(s): I50.32 - Chronic diastolic (congestive

) heart failure   





(11) Dementia


Assessment/Plan: 


stable


continue home meds


Code(s): F03.90 - UNSPECIFIED DEMENTIA WITHOUT BEHAVIORAL DISTURBANCE   


Qualifiers: 


   Dementia type: Parkinson's disease   Dementia behavioral disturbance: 

without behavioral disturbance   Qualified Code(s): G20 - Parkinson's disease; 

F02.80 - Dementia in other diseases classified elsewhere without behavioral 

disturbance; F02.80 - Dementia in other diseases classified elsewhere without 

behavioral disturbance; F02.80 - Dementia in other diseases classified 

elsewhere without behavioral disturbance   





(12) Dysuria


Assessment/Plan: 


UA +leuk es, nitrite neg


will monitor for now


Code(s): R30.0 - DYSURIA   








Pt DNR/DNI per family wishes. 





Plan of care discussed with , . Risks and benefits explained 

to family regarding stopping Coumadin and watchman device procedure. Family to 

discuss and inform us of their decision. Will proceed with watchmans device if 

this is what family wants. For now, will restart Coumadin and possibly outpt 

iron therapy.  informed and agrees with plan.

## 2018-01-30 NOTE — PN
Progress Note (short form)





- Note


Progress Note: 





86 year old female admitted with SOB, lightheadedness and found to be severely 

anemic, requiring multiple transfusion. Known case of CAD s/p PCI/stenting, LV 

systolic dysfunction, paroxysmal atrial fib. Hypertension, angiodysplasia of 

the cecum and comeron ulceration. 





Patient is OOB, no chest pain or discomfort, no SOB reported. Two of her 

daughter were with her and are undecided regarding further management. 

Reiterted the options discussed yesterday. Dr. Miller was present during the 

discussion.


 


Active Medications





Acetaminophen (Tylenol -)  650 mg PO Q4H PRN


   PRN Reason: PAIN <5


   Last Admin: 01/30/18 11:27 Dose:  650 mg


Atorvastatin Calcium (Lipitor -)  10 mg PO HS [facility]


   Last Admin: 01/30/18 21:17 Dose:  10 mg


Carvedilol (Coreg -)  6.25 mg PO BID [facility]


   Last Admin: 01/30/18 21:17 Dose:  Not Given


Cholecalciferol (Vitamin D3 -)  2,000 unit PO DAILY [facility]


   Last Admin: 01/30/18 11:15 Dose:  2,000 unit


Diltiazem HCl (Cardizem Cd -)  180 mg PO DAILY [facility]


   Last Admin: 01/30/18 11:16 Dose:  180 mg


Ferrous Sulfate (Feosol -)  325 mg PO MoWeFr@1000 [facility]


   Last Admin: 01/29/18 10:23 Dose:  325 mg


Gabapentin (Neurontin -)  100 mg PO BID [facility]


   Last Admin: 01/30/18 21:17 Dose:  100 mg


Hydroxyzine HCl (Atarax -)  10 mg PO DAILY [facility]


   Last Admin: 01/30/18 11:16 Dose:  10 mg


Iron Sucrose 100 mg/ Sodium (Chloride)  100 mls @ 100 mls/hr IVPB ONCE ONE


   Stop: 01/31/18 10:59


Isosorbide Mononitrate (Imdur -)  30 mg PO DAILY [facility]


   Last Admin: 01/30/18 11:15 Dose:  30 mg


Pantoprazole Sodium (Protonix -)  40 mg PO BID [facility]


   Last Admin: 01/30/18 21:18 Dose:  Not Given


Quetiapine Fumarate (Seroquel -)  50 mg PO BID [facility]


   Last Admin: 01/30/18 21:17 Dose:  50 mg


Ramipril (Altace -)  2.5 mg PO DAILY [facility]


   Last Admin: 01/30/18 11:15 Dose:  2.5 mg











86 year old female in no distress, pallor1+, no cyanosis or jaundice.


 


                                                                               

                                        


 


 Last Vital Signs











Temp Pulse Resp BP Pulse Ox


 


 97.6 F   101 H  20   103/70   96 


 


 01/30/18 18:01  01/30/18 18:01  01/30/18 18:01  01/30/18 18:01  01/30/18 09:40














NECK: Supple, no JVD, carotids equal.


HEART: PMI in the 5th ICS, no heaves or thrills, heart sounds are distant.


LUNGS: Clear on auscultation.


ABDOMEN: Nontender, on organomegaly or masses felt.


EXTREMITIES: No calf tenderness or dependent edema.





 


 


 CBC, BMP





 01/30/18 06:30 





 01/30/18 06:30 











1. GI bleeding, source to be determined.


2. CAD, stable angina pectoris.


3. S/p CVA, post cardiac cath.


4. Paroxysmal atial fib.


5. H/o severe LV dysfunction.


6. Hypertension.


7. Anemia.





RECOMMENDATIONS:


1. Family wants to discuss with Dr. Rowan before making any further decision.


2. Continue current cardiac therapy.


3. Close f/u of CBC.

## 2018-01-31 LAB
ANION GAP SERPL CALC-SCNC: 6 MMOL/L (ref 8–16)
BASOPHILS # BLD: 0.5 % (ref 0–2)
BUN SERPL-MCNC: 16 MG/DL (ref 7–18)
CALCIUM SERPL-MCNC: 8.1 MG/DL (ref 8.5–10.1)
CHLORIDE SERPL-SCNC: 112 MMOL/L (ref 98–107)
CO2 SERPL-SCNC: 29 MMOL/L (ref 21–32)
CREAT SERPL-MCNC: 1.5 MG/DL (ref 0.55–1.02)
DEPRECATED RDW RBC AUTO: 17.4 % (ref 11.6–15.6)
EOSINOPHIL # BLD: 3.8 % (ref 0–4.5)
GLUCOSE SERPL-MCNC: 81 MG/DL (ref 74–106)
HCT VFR BLD CALC: 30.9 % (ref 32.4–45.2)
HGB BLD-MCNC: 9.6 GM/DL (ref 10.7–15.3)
INR BLD: 1.46 (ref 0.82–1.09)
LYMPHOCYTES # BLD: 17.5 % (ref 8–40)
MAGNESIUM SERPL-MCNC: 2 MG/DL (ref 1.8–2.4)
MCH RBC QN AUTO: 25.1 PG (ref 25.7–33.7)
MCHC RBC AUTO-ENTMCNC: 31 G/DL (ref 32–36)
MCV RBC: 81.1 FL (ref 80–96)
MONOCYTES # BLD AUTO: 12.1 % (ref 3.8–10.2)
NEUTROPHILS # BLD: 66.1 % (ref 42.8–82.8)
PHOSPHATE SERPL-MCNC: 3.3 MG/DL (ref 2.5–4.9)
PLATELET # BLD AUTO: 213 K/MM3 (ref 134–434)
PMV BLD: 7.6 FL (ref 7.5–11.1)
POTASSIUM SERPLBLD-SCNC: 4 MMOL/L (ref 3.5–5.1)
PT PNL PPP: 16.5 SEC (ref 9.98–11.88)
RBC # BLD AUTO: 3.81 M/MM3 (ref 3.6–5.2)
SODIUM SERPL-SCNC: 147 MMOL/L (ref 136–145)
WBC # BLD AUTO: 6 K/MM3 (ref 4–10)

## 2018-01-31 RX ADMIN — PANTOPRAZOLE SODIUM SCH MG: 40 TABLET, DELAYED RELEASE ORAL at 21:02

## 2018-01-31 RX ADMIN — RAMIPRIL SCH MG: 2.5 CAPSULE ORAL at 10:26

## 2018-01-31 RX ADMIN — ATORVASTATIN CALCIUM SCH MG: 10 TABLET, FILM COATED ORAL at 21:02

## 2018-01-31 RX ADMIN — PANTOPRAZOLE SODIUM SCH MG: 40 TABLET, DELAYED RELEASE ORAL at 10:25

## 2018-01-31 RX ADMIN — CARVEDILOL SCH MG: 6.25 TABLET, FILM COATED ORAL at 10:25

## 2018-01-31 RX ADMIN — ASPIRIN SCH MG: 81 TABLET, COATED ORAL at 12:03

## 2018-01-31 RX ADMIN — GABAPENTIN SCH MG: 100 CAPSULE ORAL at 21:02

## 2018-01-31 RX ADMIN — ISOSORBIDE MONONITRATE SCH MG: 30 TABLET, EXTENDED RELEASE ORAL at 10:26

## 2018-01-31 RX ADMIN — GABAPENTIN SCH MG: 100 CAPSULE ORAL at 10:26

## 2018-01-31 RX ADMIN — FERROUS SULFATE TAB EC 324 MG (65 MG FE EQUIVALENT) SCH MG: 324 (65 FE) TABLET DELAYED RESPONSE at 10:26

## 2018-01-31 RX ADMIN — ACETAMINOPHEN PRN MG: 325 TABLET ORAL at 10:28

## 2018-01-31 RX ADMIN — CARVEDILOL SCH: 6.25 TABLET, FILM COATED ORAL at 21:02

## 2018-01-31 RX ADMIN — VITAMIN D, TAB 1000IU (100/BT) SCH UNIT: 25 TAB at 10:25

## 2018-01-31 NOTE — PN
Progress Note (short form)





- Note


Progress Note: 





86 year old female admitted with SOB, lightheadedness and found to be severely 

anemic, requiring multiple transfusion. Known case of CAD s/p PCI/stenting, LV 

systolic dysfunction, paroxysmal atrial fib. Hypertension, angiodysplasia of 

the cecum and comeron ulceration. 





Family does not want any intervention at this point and wants to continue 

medical therapy. She scheduled to receive IV infusion of iron. 


 


Active Medications











Generic Name Dose Route Start Last Admin





  Trade Name Freq  PRN Reason Stop Dose Admin


 


Acetaminophen  650 mg  01/27/18 16:39  01/31/18 10:28





  Tylenol -  PO   650 mg





  Q4H PRN   Administration





  PAIN <5   


 


Aspirin  81 mg  01/31/18 10:00  





  Ecotrin -  PO   





  DAILY HENRY   


 


Atorvastatin Calcium  10 mg  01/26/18 22:00  01/30/18 21:17





  Lipitor -  PO   10 mg





  HS HENRY   Administration


 


Carvedilol  6.25 mg  01/27/18 10:00  01/31/18 10:25





  Coreg -  PO   6.25 mg





  BID HENRY   Administration


 


Cholecalciferol  2,000 unit  01/27/18 10:00  01/31/18 10:25





  Vitamin D3 -  PO   2,000 unit





  DAILY HENRY   Administration


 


Diltiazem HCl  180 mg  01/27/18 10:00  01/31/18 10:26





  Cardizem Cd -  PO   180 mg





  DAILY HENRY   Administration


 


Ferrous Sulfate  325 mg  01/29/18 10:00  01/31/18 10:26





  Feosol -  PO   325 mg





  MoWeFr@1000 HENRY   Administration


 


Gabapentin  100 mg  01/26/18 22:00  01/31/18 10:26





  Neurontin -  PO   100 mg





  BID HENRY   Administration


 


Hydroxyzine HCl  10 mg  01/27/18 10:00  01/31/18 10:26





  Atarax -  PO   10 mg





  DAILY HENRY   Administration


 


Isosorbide Mononitrate  30 mg  01/27/18 10:00  01/31/18 10:26





  Imdur -  PO   30 mg





  DAILY HENRY   Administration


 


Pantoprazole Sodium  40 mg  01/27/18 10:00  01/31/18 10:25





  Protonix -  PO   40 mg





  BID HENRY   Administration


 


Quetiapine Fumarate  50 mg  01/26/18 22:00  01/31/18 10:25





  Seroquel -  PO   50 mg





  BID HENRY   Administration


 


Ramipril  2.5 mg  01/27/18 10:00  01/31/18 10:26





  Altace -  PO   2.5 mg





  DAILY HENRY   Administration

















86 year old female in no distress, pallor1+, no cyanosis or jaundice.


 


                                                                               

                                     


 Last Vital Signs











Temp Pulse Resp BP Pulse Ox


 


 98.3 F   100 H  20   137/81   92 L


 


 01/31/18 06:00  01/31/18 06:00  01/31/18 06:00  01/31/18 06:00  01/30/18 21:00

















NECK: Supple, no JVD, carotids equal.


HEART: PMI in the 5th ICS, no heaves or thrills, heart sounds are distant.


LUNGS: Clear on auscultation.


ABDOMEN: Nontender, on organomegaly or maases felt.


EXTREMITIES: No calf tenderness or dependent edema.


 CBC, BMP





 01/31/18 05:50 





 01/31/18 05:50 














1. GI bleeding, source to be determined, most likely related to angiodysplasia. 


2. CAD, stable angina pectoris.


3. S/p CVA, post cardiac cath.


4. Paroxysmal atrila fib.


5. H/o severe Lv dysfunction.


6. Hypertension.


7. Anemia.





RECOMMENDATIONS:


1. Continue medical therapy. 


2. Outpatient follow up.

## 2018-01-31 NOTE — PN
Progress Note, Physician


Chief Complaint: 


Pt sitting in chair in no acute distress. Denies any chest pain, sob, n/v/d or 

fever/chills. Daughter at bedside and in agreement with plan





- Current Medication List


Current Medications: 


Active Medications





Acetaminophen (Tylenol -)  650 mg PO Q4H PRN


   PRN Reason: PAIN <5


   Last Admin: 01/31/18 10:28 Dose:  650 mg


Aspirin (Ecotrin -)  81 mg PO DAILY [facility]


   Last Admin: 01/31/18 12:03 Dose:  81 mg


Atorvastatin Calcium (Lipitor -)  10 mg PO HS [facility]


   Last Admin: 01/30/18 21:17 Dose:  10 mg


Carvedilol (Coreg -)  6.25 mg PO BID [facility]


   Last Admin: 01/31/18 10:25 Dose:  6.25 mg


Cholecalciferol (Vitamin D3 -)  2,000 unit PO DAILY [facility]


   Last Admin: 01/31/18 10:25 Dose:  2,000 unit


Diltiazem HCl (Cardizem Cd -)  180 mg PO DAILY [facility]


   Last Admin: 01/31/18 10:26 Dose:  180 mg


Ferrous Sulfate (Feosol -)  325 mg PO MoWeFr@1000 [facility]


   Last Admin: 01/31/18 10:26 Dose:  325 mg


Gabapentin (Neurontin -)  100 mg PO BID [facility]


   Last Admin: 01/31/18 10:26 Dose:  100 mg


Hydroxyzine HCl (Atarax -)  10 mg PO DAILY [facility]


   Last Admin: 01/31/18 10:26 Dose:  10 mg


Isosorbide Mononitrate (Imdur -)  30 mg PO DAILY [facility]


   Last Admin: 01/31/18 10:26 Dose:  30 mg


Pantoprazole Sodium (Protonix -)  40 mg PO BID [facility]


   Last Admin: 01/31/18 10:25 Dose:  40 mg


Quetiapine Fumarate (Seroquel -)  50 mg PO BID [facility]


   Last Admin: 01/31/18 10:25 Dose:  50 mg


Ramipril (Altace -)  2.5 mg PO DAILY [facility]


   Last Admin: 01/31/18 10:26 Dose:  2.5 mg











- Objective


Vital Signs: 


 Vital Signs











Temperature  98.2 F   01/31/18 10:00


 


Pulse Rate  88   01/31/18 10:00


 


Respiratory Rate  20   01/31/18 10:00


 


Blood Pressure  132/101   01/31/18 10:00


 


O2 Sat by Pulse Oximetry (%)  92 L  01/30/18 21:00











Constitutional: Yes: Well Nourished, No Distress, Calm


Cardiovascular: Yes: Pulse Irregular, S1, S2.  No: Bruit, Gallop


Respiratory: Yes: Regular, CTA Bilaterally, Diminished, On Nasal O2.  No: Rales

, Rhonchi, Tachypnea, Wheezes


Gastrointestinal: Yes: WNL, Normal Bowel Sounds, Soft, Abdomen, Obese.  No: 

Distention


Extremities: Yes: WNL


Edema: Yes


Edema: LLE: Trace, RLE: Trace


Neurological: Yes: WNL, Alert, Oriented


Psychiatric: Yes: WNL, Alert, Oriented


Labs: 


 CBC, BMP





 01/31/18 05:50 





 01/31/18 05:50 





 INR, PTT











INR  1.46  (0.82-1.09)  H  01/31/18  05:50    














Problem List





- Problems


(1) Anemia


Code(s): D64.9 - ANEMIA, UNSPECIFIED   


Qualifiers: 


   Other causes of anemia: chronic disease, other 





(2) Diarrhea


Code(s): R19.7 - DIARRHEA, UNSPECIFIED   


Qualifiers: 


   Diarrhea type: unspecified type   Qualified Code(s): R19.7 - Diarrhea, 

unspecified   





(3) Dehydration


Code(s): E86.0 - DEHYDRATION   





(4) Pacemaker


Code(s): Z95.0 - PRESENCE OF CARDIAC PACEMAKER   





(5) Atherosclerotic heart disease


Code(s): I25.10 - ATHSCL HEART DISEASE OF NATIVE CORONARY ARTERY W/O ANG PCTRS 

  


Qualifiers: 


   Coronary Disease-Associated Artery/Lesion type: native artery   Native vs. 

transplanted heart: native heart   Associated angina: without angina   

Qualified Code(s): I25.10 - Atherosclerotic heart disease of native coronary 

artery without angina pectoris   





(6) Atrial fibrillation


Code(s): I48.91 - UNSPECIFIED ATRIAL FIBRILLATION   


Qualifiers: 


   Atrial fibrillation type: paroxysmal   Qualified Code(s): I48.0 - Paroxysmal 

atrial fibrillation   





(7) Chronic kidney disease


Code(s): N18.9 - CHRONIC KIDNEY DISEASE, UNSPECIFIED   


Qualifiers: 


   Chronic kidney disease stage: stage 2 (mild)   Qualified Code(s): N18.2 - 

Chronic kidney disease, stage 2 (mild)   





(8) Hypertension


Code(s): I10 - ESSENTIAL (PRIMARY) HYPERTENSION   


Qualifiers: 


   Hypertension type: essential hypertension   Qualified Code(s): I10 - 

Essential (primary) hypertension   





(9) Weakness


Code(s): R53.1 - WEAKNESS   





(10) CHF (congestive heart failure)


Code(s): I50.9 - HEART FAILURE, UNSPECIFIED   


Qualifiers: 


   Congestive heart failure type: diastolic   Congestive heart failure 

chronicity: chronic   Qualified Code(s): I50.32 - Chronic diastolic (congestive

) heart failure   





(11) Dementia


Code(s): F03.90 - UNSPECIFIED DEMENTIA WITHOUT BEHAVIORAL DISTURBANCE   


Qualifiers: 


   Dementia type: Parkinson's disease   Dementia behavioral disturbance: 

without behavioral disturbance   Qualified Code(s): G20 - Parkinson's disease; 

F02.80 - Dementia in other diseases classified elsewhere without behavioral 

disturbance; F02.80 - Dementia in other diseases classified elsewhere without 

behavioral disturbance; F02.80 - Dementia in other diseases classified 

elsewhere without behavioral disturbance   





(12) Dysuria


Code(s): R30.0 - DYSURIA   





Assessment/Plan


(1) Anemia


Assessment/Plan: 


Acute on Chronic anemia, Iron deficient, h/h stable today


pt received total 3 units prbcs


receiving parenteral iron today


Hematology following, plan for outpt long term iron therapy 


GI following, conservative management per family 


Code(s): D64.9 - ANEMIA, UNSPECIFIED   


Qualifiers: 


   Other causes of anemia: chronic disease, other 





(2) Diarrhea


Assessment/Plan: 


resolved


Code(s): R19.7 - DIARRHEA, UNSPECIFIED   


Qualifiers: 


   Diarrhea type: unspecified type   Qualified Code(s): R19.7 - Diarrhea, 

unspecified   





(3) Dehydration


Assessment/Plan: 


resolved


Code(s): E86.0 - DEHYDRATION   





(4) Pacemaker


Assessment/Plan: 


ventricular paced


Code(s): Z95.0 - PRESENCE OF CARDIAC PACEMAKER   





(5) Atherosclerotic heart disease


Assessment/Plan: 


s/p PPM


continue aspirin 81mg


continue simvastatin


Code(s): I25.10 - ATHSCL HEART DISEASE OF NATIVE CORONARY ARTERY W/O ANG PCTRS 

  


Qualifiers: 


   Coronary Disease-Associated Artery/Lesion type: native artery   Native vs. 

transplanted heart: native heart   Associated angina: without angina   

Qualified Code(s): I25.10 - Atherosclerotic heart disease of native coronary 

artery without angina pectoris   





(6) Atrial fibrillation


Assessment/Plan: 


pt and family prefers d/c coumadin, and be on aspirin 


discussed with  


cardiology following


Code(s): I48.91 - UNSPECIFIED ATRIAL FIBRILLATION   


Qualifiers: 


   Atrial fibrillation type: paroxysmal   Qualified Code(s): I48.0 - Paroxysmal 

atrial fibrillation   





(7) Chronic kidney disease


Assessment/Plan: 


Improved


Code(s): N18.9 - CHRONIC KIDNEY DISEASE, UNSPECIFIED   


Qualifiers: 


   Chronic kidney disease stage: stage 2 (mild)   Qualified Code(s): N18.2 - 

Chronic kidney disease, stage 2 (mild)   





(8) Hypertension


Assessment/Plan: 


continue home meds


avoid aggressive bp control


Code(s): I10 - ESSENTIAL (PRIMARY) HYPERTENSION   


Qualifiers: 


   Hypertension type: essential hypertension   Qualified Code(s): I10 - 

Essential (primary) hypertension   





(9) Weakness


Assessment/Plan: 


secondary to acute anemia


improved


Code(s): R53.1 - WEAKNESS   





(10) CHF (congestive heart failure)


Assessment/Plan: 


chronic, stable, EF 17%


low Na diet


daily weights


will monitor 


cardiology following


PT as tolerated


Code(s): I50.9 - HEART FAILURE, UNSPECIFIED   


Qualifiers: 


   Congestive heart failure type: diastolic   Congestive heart failure 

chronicity: chronic   Qualified Code(s): I50.32 - Chronic diastolic (congestive

) heart failure   





(11) Dementia


Assessment/Plan: 


stable


continue home meds


Code(s): F03.90 - UNSPECIFIED DEMENTIA WITHOUT BEHAVIORAL DISTURBANCE   


Qualifiers: 


   Dementia type: Parkinson's disease   Dementia behavioral disturbance: 

without behavioral disturbance   Qualified Code(s): G20 - Parkinson's disease; 

F02.80 - Dementia in other diseases classified elsewhere without behavioral 

disturbance; F02.80 - Dementia in other diseases classified elsewhere without 

behavioral disturbance; F02.80 - Dementia in other diseases classified 

elsewhere without behavioral disturbance   





(12) Dysuria


Assessment/Plan: 


UA +leuk es, nitrite neg


will monitor for now


Code(s): R30.0 - DYSURIA   








Pt DNR/DNI per family wishes. 





Plan of care discussed with , . Pt and family decided on 

discontinuing coumadin, starting aspirin and parenteral iron therapy output 

long term.   informed and agrees with plan.





Dispo: Home tomorrow

## 2018-02-01 VITALS — HEART RATE: 76 BPM | TEMPERATURE: 97.5 F | DIASTOLIC BLOOD PRESSURE: 72 MMHG | SYSTOLIC BLOOD PRESSURE: 140 MMHG

## 2018-02-01 LAB
ANION GAP SERPL CALC-SCNC: 8 MMOL/L (ref 8–16)
BASOPHILS # BLD: 0.8 % (ref 0–2)
BUN SERPL-MCNC: 15 MG/DL (ref 7–18)
CALCIUM SERPL-MCNC: 8.2 MG/DL (ref 8.5–10.1)
CHLORIDE SERPL-SCNC: 112 MMOL/L (ref 98–107)
CO2 SERPL-SCNC: 28 MMOL/L (ref 21–32)
CREAT SERPL-MCNC: 1.5 MG/DL (ref 0.55–1.02)
DEPRECATED RDW RBC AUTO: 17.9 % (ref 11.6–15.6)
EOSINOPHIL # BLD: 4.2 % (ref 0–4.5)
GLUCOSE SERPL-MCNC: 71 MG/DL (ref 74–106)
HCT VFR BLD CALC: 30.3 % (ref 32.4–45.2)
HGB BLD-MCNC: 9.5 GM/DL (ref 10.7–15.3)
INR BLD: 1.79 (ref 0.82–1.09)
LYMPHOCYTES # BLD: 17.3 % (ref 8–40)
MAGNESIUM SERPL-MCNC: 2 MG/DL (ref 1.8–2.4)
MCH RBC QN AUTO: 25.4 PG (ref 25.7–33.7)
MCHC RBC AUTO-ENTMCNC: 31.5 G/DL (ref 32–36)
MCV RBC: 80.6 FL (ref 80–96)
MONOCYTES # BLD AUTO: 13.3 % (ref 3.8–10.2)
NEUTROPHILS # BLD: 64.4 % (ref 42.8–82.8)
PHOSPHATE SERPL-MCNC: 3.6 MG/DL (ref 2.5–4.9)
PLATELET # BLD AUTO: 190 K/MM3 (ref 134–434)
PMV BLD: 7.6 FL (ref 7.5–11.1)
POTASSIUM SERPLBLD-SCNC: 3.7 MMOL/L (ref 3.5–5.1)
PT PNL PPP: 20.2 SEC (ref 9.98–11.88)
RBC # BLD AUTO: 3.76 M/MM3 (ref 3.6–5.2)
SODIUM SERPL-SCNC: 148 MMOL/L (ref 136–145)
WBC # BLD AUTO: 5.1 K/MM3 (ref 4–10)

## 2018-02-01 RX ADMIN — VITAMIN D, TAB 1000IU (100/BT) SCH UNIT: 25 TAB at 10:56

## 2018-02-01 RX ADMIN — CARVEDILOL SCH MG: 6.25 TABLET, FILM COATED ORAL at 10:56

## 2018-02-01 RX ADMIN — GABAPENTIN SCH MG: 100 CAPSULE ORAL at 10:56

## 2018-02-01 RX ADMIN — RAMIPRIL SCH MG: 2.5 CAPSULE ORAL at 10:55

## 2018-02-01 RX ADMIN — ISOSORBIDE MONONITRATE SCH MG: 30 TABLET, EXTENDED RELEASE ORAL at 10:56

## 2018-02-01 RX ADMIN — ASPIRIN SCH MG: 81 TABLET, COATED ORAL at 10:55

## 2018-02-01 RX ADMIN — PANTOPRAZOLE SODIUM SCH MG: 40 TABLET, DELAYED RELEASE ORAL at 10:56

## 2018-02-01 NOTE — DS
Physical Examination


Vital Signs: 


 Vital Signs











Temperature  36.4 C L  02/01/18 10:00


 


Pulse Rate  76   02/01/18 10:00


 


Respiratory Rate  20   02/01/18 10:00


 


Blood Pressure  140/72   02/01/18 10:00


 


O2 Sat by Pulse Oximetry (%)  95   01/31/18 21:00











Labs: 


 CBC, BMP





 02/01/18 06:50 





 02/01/18 06:50 











Discharge Summary


Reason For Visit: ANEMIA


Current Active Problems





Anemia (Chronic)


Angiodysplasia of cecum (Chronic)


CHF (congestive heart failure) (Chronic)


Chauncey lesion, chronic (Chronic)


Constipation by delayed colonic transit (Chronic)


Dementia (Chronic)


Diverticula of colon (Chronic)


Hiatal hernia with GERD (Chronic)


Iron deficiency anemia due to chronic blood loss (Chronic)


Serrated adenoma of colon (Chronic)


Stricture esophagus (Chronic)








Condition: Stable





- Instructions


Diet, Activity, Other Instructions: 


resume previous diet and activity. Drink at least 3 glasses of water with meals 

a day.


Referrals: 


Arun Rowan MD [Primary Care Provider] - 


Abdulaziz Stern MD [Staff Physician] - 


Lee Parrish MD [Staff Physician] - 


Disposition: HOME





- Home Medications


Comprehensive Discharge Medication List: 


Ambulatory Orders





Isosorbide Mononitrate [Monoket -] 30 mg PO DAILY #0 tab.sr.24h 02/23/12 


Ramipril [Altace] 2.5 mg PO 1200 #0 capsule 02/23/12 


Simvastatin [Zocor -] 20 mg PO HS 05/24/14 


Diltiazem Cd [Cardizem Cd -] 180 mg PO DAILY 01/08/16 


Gabapentin [Neurontin] 100 mg PO BID 01/08/16 


Cholecalciferol (Vitamin D3) [Vitamin D3] 2,000 unit PO DAILY 01/29/16 


Ferrous Sulfate [Feosol] 325 mg PO MOWEFR 01/29/16 


Quetiapine Fumarate [Seroquel -] 50 mg PO BID  tablet 02/02/16 


Carvedilol [Coreg -] 6.25 mg PO BID #60 tablet 02/14/16 


Aspirin [Adult Aspirin Regimen] 81 mg PO DAILY 12/26/17 


Pantoprazole Sodium [Protonix] 40 mg PO BID 01/26/18

## 2018-03-03 ENCOUNTER — HOSPITAL ENCOUNTER (INPATIENT)
Dept: HOSPITAL 74 - FER | Age: 83
LOS: 8 days | Discharge: HOME | DRG: 252 | End: 2018-03-11
Attending: NURSE PRACTITIONER | Admitting: INTERNAL MEDICINE
Payer: COMMERCIAL

## 2018-03-03 VITALS — BODY MASS INDEX: 30.6 KG/M2

## 2018-03-03 DIAGNOSIS — Z98.61: ICD-10-CM

## 2018-03-03 DIAGNOSIS — K55.20: ICD-10-CM

## 2018-03-03 DIAGNOSIS — N18.3: ICD-10-CM

## 2018-03-03 DIAGNOSIS — F02.80: ICD-10-CM

## 2018-03-03 DIAGNOSIS — I82.4Z1: Primary | ICD-10-CM

## 2018-03-03 DIAGNOSIS — Z86.73: ICD-10-CM

## 2018-03-03 DIAGNOSIS — N17.9: ICD-10-CM

## 2018-03-03 DIAGNOSIS — R19.7: ICD-10-CM

## 2018-03-03 DIAGNOSIS — G20: ICD-10-CM

## 2018-03-03 DIAGNOSIS — E87.0: ICD-10-CM

## 2018-03-03 DIAGNOSIS — I25.10: ICD-10-CM

## 2018-03-03 DIAGNOSIS — I48.2: ICD-10-CM

## 2018-03-03 DIAGNOSIS — I26.99: ICD-10-CM

## 2018-03-03 DIAGNOSIS — E78.00: ICD-10-CM

## 2018-03-03 DIAGNOSIS — I13.0: ICD-10-CM

## 2018-03-03 DIAGNOSIS — D64.9: ICD-10-CM

## 2018-03-03 DIAGNOSIS — I50.32: ICD-10-CM

## 2018-03-03 DIAGNOSIS — G30.9: ICD-10-CM

## 2018-03-03 LAB
ALBUMIN SERPL-MCNC: 3.1 G/DL (ref 3.5–5)
ALP SERPL-CCNC: 83 U/L (ref 32–92)
ALT SERPL-CCNC: 13 U/L (ref 10–40)
ANION GAP SERPL CALC-SCNC: 5 MMOL/L (ref 8–16)
AST SERPL-CCNC: 22 U/L (ref 10–42)
BASOPHILS # BLD: 0.8 % (ref 0–2)
BILIRUB SERPL-MCNC: 0.3 MG/DL (ref 0.2–1)
BUN SERPL-MCNC: 21 MG/DL (ref 7–18)
CALCIUM SERPL-MCNC: 8.5 MG/DL (ref 8.4–10.2)
CHLORIDE SERPL-SCNC: 107 MMOL/L (ref 98–107)
CO2 SERPL-SCNC: 28 MMOL/L (ref 22–28)
CREAT SERPL-MCNC: 1.4 MG/DL (ref 0.6–1.3)
DEPRECATED RDW RBC AUTO: 18.1 % (ref 11.6–15.6)
EOSINOPHIL # BLD: 3.5 % (ref 0–4.5)
GLUCOSE SERPL-MCNC: 110 MG/DL (ref 74–106)
HCT VFR BLD CALC: 32.8 % (ref 32.4–45.2)
HGB BLD-MCNC: 10.6 GM/DL (ref 10.7–15.3)
LYMPHOCYTES # BLD: 17.5 % (ref 8–40)
MCH RBC QN AUTO: 26.3 PG (ref 25.7–33.7)
MCHC RBC AUTO-ENTMCNC: 32.5 G/DL (ref 32–36)
MCV RBC: 80.9 FL (ref 80–96)
MONOCYTES # BLD AUTO: 8.6 % (ref 3.8–10.2)
NEUTROPHILS # BLD: 69.6 % (ref 42.8–82.8)
PLATELET # BLD AUTO: 226 K/MM3 (ref 134–434)
PMV BLD: 8.6 FL (ref 7.5–11.1)
POTASSIUM SERPLBLD-SCNC: 3.9 MMOL/L (ref 3.5–5.1)
PROT SERPL-MCNC: 6.4 G/DL (ref 6.4–8.3)
RBC # BLD AUTO: 4.06 M/MM3 (ref 3.6–5.2)
SODIUM SERPL-SCNC: 140 MMOL/L (ref 136–145)
WBC # BLD AUTO: 6.7 K/MM3 (ref 4–10.8)

## 2018-03-03 PROCEDURE — C1769 GUIDE WIRE: HCPCS

## 2018-03-03 PROCEDURE — C1880 VENA CAVA FILTER: HCPCS

## 2018-03-03 RX ADMIN — ENOXAPARIN SODIUM SCH MG: 80 INJECTION SUBCUTANEOUS at 21:15

## 2018-03-03 NOTE — PDOC
History of Present Illness





- General


History Source: Patient


Exam Limitations: No Limitations





<Juliocesar Jimenez - Last Filed: 03/03/18 21:13>





- General


History Source: Patient


Exam Limitations: No Limitations





<IndramauricioMelchorReneeedith SALDIVAR - Last Filed: 03/03/18 22:28>





- General


Chief Complaint: Shortness of Breath


Stated Complaint: SOB/NEAR SYNCOPE


Time Seen by Provider: 03/03/18 19:40





- History of Present Illness


Initial Comments: 





03/03/18 20:02


The patient is a 86 year old female, with a significant past medical history of 

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 

CAD s/p stenting pacemaker, paroxysmal atrial fibrillation, and remote PE, 

Parkinson's disease with dementia, and angiodysplasia of the cecum (c/w GI bleed

), who presents to the emergency department via EMS with, difficulty breathing 

and chest tightness. As per patients family, she has had the flu for 

approximately a month now.  They report that prior to the onset of her symptoms 

she felt like she needed to make a bowel movement, however, did not. They 

report moving her back to the couch where her symptoms continued. The family 

reports she has been complaining of a mild upset stomach. As per patient's 

family she was on Coumadin for her Afib, however, was taken off of it in 

January when she was admitted for a GI bleed. She was put only on Aspirin, and 

is not on a further blood thinner for her Afib.





She denies recent fevers, chills, headache or dizziness. She denies recent 

nausea, vomit, diarrhea or constipation. She denies recent  dysuria, frequency, 

urgency or hematuria. 











FAMILY HISTORY:  no pertinent history





SOCIAL HISTORY:  Pt lives with  family.





MEDICATIONS:  reviewed





ALLERGIES:  As per nursing notes











ROS:





General:  No fevers or chills, no weakness, no weight loss 


HEENT: No change in vision.  No sore throat,. No ear pain


CardioVascular:  +Chest tightness.


Respiratory: +Difficulty breathing.


Gastrointestinal:  no nausea, vomiting, diarrhea or constipation,  No rectal 

bleeding


Genitourinary:  No dysuria, hematuria, or frequency


Musculoskeletal:  No joint or muscle pain or swelling


Neurologic: No headache, vertigo, dizziness or loss of consciousness


Psychiatric: nor depression 


Skin: No rashes or easy bruising


Endocrine: no increased thirst or abnormal weight change


Allergic: no skin or latex allergy


All other systems reviewed and normal








Physical Exam:





General: Well-nourished well-developed individual, no acute distress


HEENT: +Dry mucous membranes. Throat: Tonsils normal, no erythema or exudate


Neck: Supple, no meningeal signs, no lymphadenopathy


Eyes::Pupils equal reactive and round, extraocular motion intact


Chest: Nontender to palpation 


Cardiac: S1-S2 normal, regular rate and rhythm, no murmurs rubs or gallops


Respiratory: +Decreased breath sounds bilateral. +Rales at the right base.


Abdomen: Soft, nondistended, normal bowel sounds, nontender to palpation 

diffusely


Extremities: +2+ edema of the right lower extremity without calf tenderness or 

palpable cord.  Left extremity normal. Warm, dry, no cyanosis, or clubbing.


Skin: No rashes


Neuro: Alert and oriented x3, nonfocal exam, grossly intact, normal gait


Psych: Normal mood and affect











Vent rate: 97 bpm


QRS duration: 98 ms


QT/QTc 314/398 ms


P-R-T axes *-51  145





Atrial fibrillation.


Left axis deviation.


Low voltage QRS


Inferior infarct, age undetermined 


Abnormal ECG. 








 (Juliocesar Jimenez)








A portion of this note was documented by scribe services under my direction. I 

have reviewed the details of the note, within reason, and agree with the 

documentation.  The case summary and management plan written by me. 





Medical decision making: This is an 86-year-old female who comes in with her 

family from home for evaluation of shortness of breath. Patient felt like she 

had to go to the bathroom so her family took her to the bathroom and well on 

the toilet she developed shortness of breath. He took her back to the couch 

where she had been sitting and she became more short of breath so they called 

911 and she was brought in for evaluation.





Patient has an extensive medical history Court including coronary artery disease

, congestive heart failure, atrial fibrillation.


Patient here in the emergency room was noted to be tachypnea with complaint of 

some chest tightness.





We'll obtain workup including CBC, comp, EKG, chest x-ray, ultrasound Doppler 

right lower extremity, cardiac profile.


We will reassess and follow up on workup





21:00





Reassessment patient remains clinically unchanged and is stable





Patient had a workup which included ultrasound Doppler of her right lower 

extremity because it was noted to be swollen. Ultrasound Doppler was positive 

for DVT.





EKG was noted to have atrial fibrillation which patient has a history of but 

not otherwise no acute changes





Chest x-ray was negative for any acute pathology as reviewed by me





Patient had a normal CBC and differential.





Patient does have some renal insufficiency so I was uncomfortable giving 

patient IV contrast for CT Rigoberto to rule out PE. However given patient's 

shortness of breath it is likely that she could very well have a PE.





Patient will be started on Lovenox for the DVT and admitted to a inpatient 

telemetry bed. For DVT, chest pain rule out PE, rule out MI. 








 (Juan F Miguel)





Past History





<Juliocesar Jimenez - Last Filed: 03/03/18 21:13>





- Past Medical History


Anemia: Yes


Asthma: No


Cancer: Yes (BREAST LUMP, LYMPH NODE REMOVAL, LUNG CA, PARTIAL LEFT LUNG REMOVED

,)


Cardiac Disorders: Yes (ASHD ppm cARDIAC CATH 2006 stroke 2006  ?MI 1980'S, 

STENT 1995,)


CVA: Yes


COPD: Yes


CHF: No


DVT: No


Dementia: Yes


Diabetes: No


GI Disorders: Yes (GI bleed, GERD, IRRITABLE BOWEL)


 Disorders: Yes (UTI, OVERACTIVE BLADDER)


HTN: Yes


Hypercholesterolemia: Yes


Liver Disease: No


Psychiatric Problems: Yes


Seizures: No


Thyroid Disease: No





- Surgical History


Abdominal Surgery: No


Appendectomy: No


Cardiac Surgery: No


Cholecystectomy: Yes


Lung Surgery: Yes (partial left lung removed 1995)


Neurologic Surgery: No


Orthopedic Surgery: No





- Immunization History


Immunization Up to Date: Yes





- Suicide/Smoking/Psychosocial Hx


Smoking Status: No


Smoking History: Unknown if ever smoked


Have you smoked in the past 12 months: No


Number of Cigarettes Smoked Daily: 0


If you are a former smoker, when did you quit?: 40 YEARS AGO


Information on smoking cessation initiated: No


'Breaking Loose' booklet given: 05/24/14


Hx Alcohol Use: No


Drug/Substance Use Hx: No


Substance Use Type: None


Hx Substance Use Treatment: No





<Juan F Miguel - Last Filed: 03/03/18 22:28>





- Past Medical History


Allergies/Adverse Reactions: 


 Allergies











Allergy/AdvReac Type Severity Reaction Status Date / Time


 


oats Allergy Intermediate  Verified 01/26/18 12:25


 


Penicillins Allergy Mild  Verified 01/26/18 12:25


 


Shellfish Allergy Mild  Verified 01/26/18 12:25











Home Medications: 


Ambulatory Orders





Isosorbide Mononitrate [Monoket -] 30 mg PO DAILY #0 tab.sr.24h 02/23/12 


Ramipril [Altace] 2.5 mg PO 1200 #0 capsule 02/23/12 


Simvastatin [Zocor -] 20 mg PO HS 05/24/14 


Diltiazem Cd [Cardizem Cd -] 180 mg PO DAILY 01/08/16 


Gabapentin [Neurontin] 100 mg PO BID 01/08/16 


Cholecalciferol (Vitamin D3) [Vitamin D3] 2,000 unit PO DAILY 01/29/16 


Quetiapine Fumarate [Seroquel -] 50 mg PO BID  tablet 02/02/16 


Carvedilol [Coreg -] 6.25 mg PO BID #60 tablet 02/14/16 


Aspirin [Adult Aspirin Regimen] 81 mg PO DAILY 12/26/17 


Pantoprazole Sodium [Protonix] 40 mg PO BID 01/26/18 











Cardiac Specific PMH





- Complaint Specific PMHX


Pacemaker: Yes





<Juan F Miguel - Last Filed: 03/03/18 22:28>





- Vital Signs


 Last Vital Signs











Temp Pulse Resp BP Pulse Ox


 


 97.4 F L  88   24   101/71   99 


 


 03/03/18 19:35  03/03/18 21:47  03/03/18 21:47  03/03/18 21:47  03/03/18 21:47














ED Treatment Course





- LABORATORY


CBC & Chemistry Diagram: 


 03/03/18 19:56





 03/03/18 19:56





<Juliocesar Jimenez - Last Filed: 03/03/18 21:13>





- LABORATORY


CBC & Chemistry Diagram: 


 03/03/18 19:56





 03/03/18 19:56





<Juan F Miguel - Last Filed: 03/03/18 22:28>





- ADDITIONAL ORDERS


Additional order review: 


 Laboratory  Results











  03/03/18 03/03/18 03/03/18





  19:56 19:50 19:50


 


Sodium  140  


 


Potassium  3.9  


 


Chloride  107  


 


Carbon Dioxide  28  D  


 


Anion Gap  5 L  


 


BUN  21 H  


 


Creatinine  1.4 H  


 


Creat Clearance w eGFR  35.65  


 


Random Glucose  110 H D  


 


Calcium  8.5  


 


Total Bilirubin  0.3  D  


 


AST  22  D  


 


ALT  13  D  


 


Alkaline Phosphatase  83  


 


Creatine Kinase   25 L 


 


Troponin I    0.04


 


Total Protein  6.4  


 


Albumin  3.1 L  








 











  03/03/18





  19:56


 


RBC  4.06


 


MCV  80.9


 


MCHC  32.5


 


RDW  18.1 H D


 


MPV  8.6


 


Neutrophils %  69.6


 


Lymphocytes %  17.5


 


Monocytes %  8.6


 


Eosinophils %  3.5


 


Basophils %  0.8














- RADIOLOGY


Radiology Studies Ordered: 














 Category Date Time Status


 


 CHEST X-RAY PORTABLE* [RAD] Stat Radiology  03/03/18 19:49 Taken


 


 DUPLEX VASCUL US-1 LEG [US] Stat Ultrasound  03/03/18 20:25 Taken














*DC/Admit/Observation/Transfer





<Juliocesar Jimenez - Last Filed: 03/03/18 21:13>





- Discharge Dispostion


Admit: Yes





<Juan F Miguel - Last Filed: 03/03/18 22:28>


Diagnosis at time of Disposition: 


DVT (deep venous thrombosis)


Qualifiers:


 DVT location: lower extremity Affected thrombotic vein of extremity: popliteal 

Chronicity: acute Laterality: right Qualified Code(s): I82.431 - Acute embolism 

and thrombosis of right popliteal vein





Chest pain


Qualifiers:


 Chest pain type: unspecified Qualified Code(s): R07.9 - Chest pain, unspecified








- Discharge Dispostion


Condition at time of disposition: Stable





- Referrals


Referrals: 


Arun Rowan MD [Primary Care Provider] - 





- Patient Instructions





- Post Discharge Activity





- Attestations


Scribe Attestion: 





03/03/18 20:07





Documentation prepared by Juliocesar Jimenez, acting as medical scribe for 

Juan F Miguel MD. (Juliocesar Jimenez) Normal results given to parent.

## 2018-03-04 LAB
ANION GAP SERPL CALC-SCNC: 10 MMOL/L (ref 8–16)
ANION GAP SERPL CALC-SCNC: 7 MMOL/L (ref 8–16)
BASOPHILS # BLD: 0.9 % (ref 0–2)
BUN SERPL-MCNC: 19 MG/DL (ref 7–18)
BUN SERPL-MCNC: 20 MG/DL (ref 7–18)
CALCIUM SERPL-MCNC: 7.8 MG/DL (ref 8.5–10.1)
CALCIUM SERPL-MCNC: 8.1 MG/DL (ref 8.5–10.1)
CHLORIDE SERPL-SCNC: 111 MMOL/L (ref 98–107)
CHLORIDE SERPL-SCNC: 112 MMOL/L (ref 98–107)
CHOLEST SERPL-MCNC: 126 MG/DL (ref 50–200)
CO2 SERPL-SCNC: 22 MMOL/L (ref 21–32)
CO2 SERPL-SCNC: 26 MMOL/L (ref 21–32)
CREAT SERPL-MCNC: 1.4 MG/DL (ref 0.55–1.02)
CREAT SERPL-MCNC: 1.4 MG/DL (ref 0.55–1.02)
DEPRECATED RDW RBC AUTO: 19.4 % (ref 11.6–15.6)
EOSINOPHIL # BLD: 3.6 % (ref 0–4.5)
GLUCOSE SERPL-MCNC: 150 MG/DL (ref 74–106)
GLUCOSE SERPL-MCNC: 85 MG/DL (ref 74–106)
HCT VFR BLD CALC: 29.6 % (ref 32.4–45.2)
HDLC SERPL-MCNC: 33 MG/DL (ref 40–60)
HGB BLD-MCNC: 9.6 GM/DL (ref 10.7–15.3)
INR BLD: 1.26 (ref 0.82–1.09)
LDLC SERPL CALC-MCNC: 79 MG/DL (ref 5–100)
LYMPHOCYTES # BLD: 22.3 % (ref 8–40)
MCH RBC QN AUTO: 26.4 PG (ref 25.7–33.7)
MCHC RBC AUTO-ENTMCNC: 32.5 G/DL (ref 32–36)
MCV RBC: 81 FL (ref 80–96)
MONOCYTES # BLD AUTO: 11.1 % (ref 3.8–10.2)
NEUTROPHILS # BLD: 62.1 % (ref 42.8–82.8)
PLATELET # BLD AUTO: 166 K/MM3 (ref 134–434)
PMV BLD: 8.1 FL (ref 7.5–11.1)
POTASSIUM SERPLBLD-SCNC: 3.8 MMOL/L (ref 3.5–5.1)
POTASSIUM SERPLBLD-SCNC: 4 MMOL/L (ref 3.5–5.1)
PT PNL PPP: 14.2 SEC (ref 9.98–11.88)
RBC # BLD AUTO: 3.65 M/MM3 (ref 3.6–5.2)
SODIUM SERPL-SCNC: 144 MMOL/L (ref 136–145)
SODIUM SERPL-SCNC: 144 MMOL/L (ref 136–145)
TRIGL SERPL-MCNC: 123 MG/DL (ref 35–160)
WBC # BLD AUTO: 5.9 K/MM3 (ref 4–10)

## 2018-03-04 RX ADMIN — PANTOPRAZOLE SODIUM SCH MG: 40 TABLET, DELAYED RELEASE ORAL at 09:51

## 2018-03-04 RX ADMIN — GABAPENTIN SCH MG: 100 CAPSULE ORAL at 21:37

## 2018-03-04 RX ADMIN — VITAMIN D, TAB 1000IU (100/BT) SCH UNIT: 25 TAB at 09:51

## 2018-03-04 RX ADMIN — PANTOPRAZOLE SODIUM SCH MG: 40 TABLET, DELAYED RELEASE ORAL at 21:38

## 2018-03-04 RX ADMIN — CARVEDILOL SCH MG: 6.25 TABLET, FILM COATED ORAL at 21:36

## 2018-03-04 RX ADMIN — GABAPENTIN SCH MG: 100 CAPSULE ORAL at 09:51

## 2018-03-04 RX ADMIN — ATORVASTATIN CALCIUM SCH MG: 10 TABLET, FILM COATED ORAL at 21:37

## 2018-03-04 RX ADMIN — QUETIAPINE FUMARATE SCH MG: 25 TABLET ORAL at 21:37

## 2018-03-04 RX ADMIN — HEPARIN SODIUM SCH MLS/HR: 5000 INJECTION, SOLUTION INTRAVENOUS at 21:39

## 2018-03-04 RX ADMIN — QUETIAPINE FUMARATE SCH MG: 25 TABLET ORAL at 09:53

## 2018-03-04 RX ADMIN — ASPIRIN SCH MG: 81 TABLET, COATED ORAL at 09:51

## 2018-03-04 RX ADMIN — ISOSORBIDE MONONITRATE SCH MG: 30 TABLET, EXTENDED RELEASE ORAL at 09:51

## 2018-03-04 RX ADMIN — CARVEDILOL SCH MG: 6.25 TABLET, FILM COATED ORAL at 09:51

## 2018-03-04 NOTE — CON.GI
Consult


Consult Specialty:: GI


Reason for Consultation:: Anemia, need for anticoagulation





- History of Present Illness


History of Present Illness: 





Covering for Dr. Kamara, who will continue GI care tomorrow





Chart reviewed. Events noted. Discussed with pt's daughter





An 86F with history of chronic microcytic, hypochromic anemia, PE, cardiac 

disease, parkinson dementia, Chauncey lesions, colorectal angiectasias, 

divertuculosis and colon polyps who was admitted 1 day go with dyspnea at rest. 

Suspected PE/RLE DVT. Coumadin was stopped one month go due to anemia and high 

risk of GI bleeding. ASA was continued at home. Iron po could not be tolerated (

constipation, dyspepsia). Hgb on discharge one month ago and on this admission 

is about the same ~ 9.6 g/dl. No melena, hematochezia, hematemesis reported at 

home or while hospitalized. Lovenox bid started, ASA continued.





- History Source


History Provided By: Patient, Family Member, Medical Record





- Past Medical History


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 

sided residual), Dementia, Parkinson's


Cardio/Vascular: Yes: Aneurysm (AAA), CAD (angioplasty 2005), Deep Vein 

Thrombosis, HTN, Other (PPM)


Pulmonary: Yes: Cancer (left sided lung cancer resected 1999 [facility]), Pulmonary 

Embolus


Gastrointestinal: Yes: Constipation, Diverticulosis, GI Bleed, Hemorrhoids (

banded 2008), Hiatal Hernia (with Chauncey erosions 2014), Other (angiodysplasia 

of cecum, serrated tx colon adenoma removed [id number])


...Pregnant: No


Psych: Yes: Anxiety





- Past Surgical History


Past Surgical History: Yes: Cholecystectomy, Colonoscopy, Hysterectomy, 

Permanent Pacemaker, Thoracotomy (left cancer thoracotomy 1999 [facility]), Upper 

Endoscopy





- Alcohol/Substance Use


Hx Alcohol Use: No


History of Substance Use: reports: None





- Smoking History


Smoking history: Former smoker


Have you smoked in the past 12 months: No


Aproximately how many cigarettes per day: 0


If you are a former smoker, when did you quit?: 40 YEARS AGO





- Social History


Usual Living Arrangement: With Child


ADL: Family Assistance


Occupation: housewife


History of Recent Travel: No





Home Medications





- Allergies


Allergies/Adverse Reactions: 


 Allergies











Allergy/AdvReac Type Severity Reaction Status Date / Time


 


oats Allergy Intermediate  Verified 01/26/18 12:25


 


Penicillins Allergy Mild  Verified 01/26/18 12:25


 


Shellfish Allergy Mild  Verified 01/26/18 12:25














- Home Medications


Home Medications: 


Ambulatory Orders





Isosorbide Mononitrate [Monoket -] 30 mg PO DAILY #0 tab.sr.24h 02/23/12 


Ramipril [Altace] 2.5 mg PO 1200 #0 capsule 02/23/12 


Simvastatin [Zocor -] 20 mg PO HS 05/24/14 


Diltiazem Cd [Cardizem Cd -] 180 mg PO DAILY 01/08/16 


Gabapentin [Neurontin] 100 mg PO BID 01/08/16 


Cholecalciferol (Vitamin D3) [Vitamin D3] 2,000 unit PO DAILY 01/29/16 


Quetiapine Fumarate [Seroquel -] 50 mg PO BID  tablet 02/02/16 


Carvedilol [Coreg -] 6.25 mg PO BID #60 tablet 02/14/16 


Aspirin [Adult Aspirin Regimen] 81 mg PO DAILY 12/26/17 


Pantoprazole Sodium [Protonix] 40 mg PO BID 01/26/18 











Family Disease History





- Family Disease History


Family Disease History: Heart Disease: Brother, Sister, CA: Mother (emphysema, 

"liver cancer"), Respiratory: Mother, Other: Father (cva), Daughter (aneurysm)





Physical Exam-GI


Vital Signs: 


 Vital Signs











Temperature  97.3 F L  03/04/18 13:50


 


Pulse Rate  82   03/04/18 13:50


 


Respiratory Rate  22   03/04/18 13:50


 


Blood Pressure  94/63   03/04/18 13:50


 


O2 Sat by Pulse Oximetry (%)  95   03/04/18 09:00











Constitutional: Yes: No Distress, Calm


...Rectal Exam: Yes: Deferred (will send stool to lab)


Neurological: Yes: Alert


Labs: 


 CBC, BMP





 03/04/18 08:05 





 03/04/18 12:22 





 INR, PTT











INR  1.26  (0.82-1.09)  H  03/04/18  08:05    








 Laboratory Tests











  03/04/18 03/04/18 03/04/18





  08:05 08:05 08:05


 


WBC  5.9  


 


RBC  3.65  


 


Hgb  9.6 L  


 


Hct  29.6 L  


 


MCV  81.0  


 


MCH  26.4  


 


MCHC  32.5  


 


RDW  19.4 H  


 


Plt Count  166  


 


MPV  8.1  


 


Neutrophils %  62.1  


 


Lymphocytes %  22.3  D  


 


Monocytes %  11.1 H  


 


Eosinophils %  3.6  


 


Basophils %  0.9  


 


PT with INR   


 


INR   


 


Sodium   144 


 


Potassium   3.8 


 


Chloride   111 H 


 


Carbon Dioxide   26 


 


Anion Gap   7 L 


 


BUN   20 H 


 


Creatinine   1.4 H 


 


Random Glucose   85 


 


Hemoglobin A1c %    5.1


 


Calcium   7.8 L 


 


Creatine Kinase   39 


 


Troponin I   < 0.02 


 


Triglycerides   123 


 


Cholesterol   126 


 


Total LDL Cholesterol   79 


 


HDL Cholesterol   33 L 














  03/04/18 03/04/18 03/04/18





  08:05 12:22 12:22


 


WBC   


 


RBC   


 


Hgb   


 


Hct   


 


MCV   


 


MCH   


 


MCHC   


 


RDW   


 


Plt Count   


 


MPV   


 


Neutrophils %   


 


Lymphocytes %   


 


Monocytes %   


 


Eosinophils %   


 


Basophils %   


 


PT with INR  14.20 H  


 


INR  1.26 H  


 


Sodium    144


 


Potassium    4.0


 


Chloride    112 H


 


Carbon Dioxide    22


 


Anion Gap    10


 


BUN    19 H


 


Creatinine    1.4 H


 


Random Glucose    150 H


 


Hemoglobin A1c %   


 


Calcium    8.1 L


 


Creatine Kinase   45 


 


Troponin I   < 0.02 


 


Triglycerides   


 


Cholesterol   


 


Total LDL Cholesterol   


 


HDL Cholesterol   














Problem List





- Problems


(1) DVT (deep venous thrombosis)


Code(s): I82.409 - ACUTE EMBOLISM AND THOMBOS UNSP DEEP VN UNSP LOWER EXTREMITY

   


Qualifiers: 


   DVT location: lower extremity   Affected thrombotic vein of extremity: 

popliteal   Chronicity: acute   Laterality: right   Qualified Code(s): I82.431 

- Acute embolism and thrombosis of right popliteal vein   





(2) Chronic anemia


Code(s): D64.9 - ANEMIA, UNSPECIFIED   





Assessment/Plan


No overt signs of GI blood loss at this time. Hgb has been stable. The patient 

has relative contraindication to anticoagulation (history of bleeding 

angietasias, chronic, ?ongoing GI blood loss. Iron deficiency/microcytic, 

hypochromic anemia). An EGD, colonoscopy, multiple stool hemoccults over few 

days, may help guide the decision re anticoagulation and/or IVC filter.





Close monitoring for overt signs of GI bleeding while on Lovenox/ASA


Iron profile


Stool for hemoccult testing


Discuss EGD/colonoscopy with pt's daughter

## 2018-03-04 NOTE — CONS
CARDIOLOGY CONSULTATION

 

DATE OF CONSULTATION:  

 

DATE OF DICTATION:  2018 

 

REQUESTED BY:  Dr. Juan F Roche

 

CHIEF COMPLAINT:  

1.  Swelling of the right lower extremity.

2.  History of shortness of breath.

 

HISTORY OF PRESENT ILLNESS:  The patient is an 86-year-old female.  History was

obtained from her daughter, who brought her initially to [facility] with the sudden onset of shortness of breath while she was

sitting on a toilet and symptoms persisted.  In the emergency room she was diagnosed

to have deep vein thrombosis with partial occlusive thrombosis in the right popliteal

and occlusive thrombosis in the right posterior tibial vein.  According to the ER

physician, CTA of the chest was not done because of underlying renal insufficiency.  

 

There is no clear-cut history of chest pain or discomfort.  No history of paroxysmal

nocturnal dyspnea or orthopnea.  No history of hemoptysis, cough or expectoration.  

The patient has a longstanding of coronary artery disease, status post PCI/stenting,

status post postcardiac catheterization cerebrovascular accident related to systemic

embolization and was treated with intraarterial thrombolysis.  There is a history of

permanent atrial fibrillation, history of carotid hypersensitivity requiring a

permanent pacemaker, history of hypertension, hypercholesterolemia, recurrent GI

bleeding related to angiodysplasia of the cecum.  She also has a history of hiatus

hernia and Chauncey ulcerations.  

The patient recently had influenza that was treated, according to her daughter. 

There is no history of lightheadedness, dizziness, presyncope or syncope.  No history

of palpitations.  No history of diabetes mellitus.  

PAST HISTORY:  As mentioned in the history of present illness.  

History of vitamin D deficiency.

History of forgetfulness which followed the cerebrovascular accident.

History of intestinal polyps.

 

SOCIAL HISTORY:  She is a , has 3 daughters.  One of them is known to have

Perkins syndrome, coarctation of the aorta and aortic arch dissection.  Other

daughters are healthy.

 

FAMILY HISTORY:  According to her daughter, her mother  of unknown causes. 

Father apparently  of cerebrovascular accident.  She had 2 sisters.  One of them

is decreased of unknown cause.

 

ALLERGIES:  

1.  PENICILLIN.

2.  SHELLFISH/IV CONTRAST.

3.  OATS.

CURRENT MEDICATIONS:

1.  Lovenox 70 mg subcutaneously b.i.d. 

2.  Neurontin 100 mg p.o. b.i.d. 

3.  Seroquel 50 mg p.o. t.i.d. and 25 mg on a p.r.n. basis.

4.  Carvedilol 6.25 mg p.o. b.i.d. 

5.  Diltiazem 180 mg p.o. daily.

6.  Atorvastatin 10 mg p.o. daily.

7.  Isosorbide mononitrate 50 mg p.o. daily.

8.  Aspirin 81 mg p.o. daily.

9.  Protonix 40 mg p.o. daily.

10.  Calciferol (vitamin D3) 2000 units p.o. daily.

 

REVIEW OF SYSTEMS:

Constitutional:  No history of chills, fever or night sweats reported.  No history of

unintentional weight loss.

HEENT:  No history of headaches, diplopia, blurred vision or epistaxis.  No history

of tinnitus or deafness reported.

Cardiovascular:  See history of present illness.  History of angina.  Currently

stable.

Respiratory:  See history of present illness.  No history of hemoptysis.

Gastrointestinal:  No history of nausea, vomiting, melena or hematemesis since her

last discharge.  See history of present illness.

Neurological:   See history of present illness.  No history of seizures, presyncope

or syncope.  No history of focal weakness.  

Endocrine:  No history of polyuria or polydipsia.  No history of intolerance to cold

or warm weather.  

Musculoskeletal:  No history of myalgias or arthralgias.

Genitourinary:  No history of dysuria, frequency, hematuria or nocturia reported.  

Hematological:  History of GI bleeding related to angiodysplasia.  No history of

recent bleeding.  No history of ecchymosis.  History of anemia.

 

EXAMINATION:General:  An 86-year-old female who was alert.  There was no pallor,

cyanosis, clubbing or jaundice.Vital Signs:  Blood pressure 129/67 mmHg.  Pulse 76

beats per minute and irregularly irregular.  Respirations were not recorded.  Weight

was not available.  Temperature was 97.4 degrees Fahrenheit.

Neck:  Supple, no jugular venous distention, hepatojugular reflux was negative,

carotids were 2+, upstrokes were normal.  No bruits were heard.   No thyromegaly was

present.

Heart:  PMI in the fifth intercostal space.  No heaves or thrills.  S1 was variable. 

S2 was normal.  A grade 1/6 apical systolic murmur.  No diastolic murmur or gallops

were heard.

Lungs:   Clear on auscultation.

Abdomen:  Soft, protuberant and nontender.  No hepatosplenomegaly or palpable masses

were felt.  Bowel sounds were present.  No bruits were heard.

Extremities:  No calf tenderness.  Homans sign negative.  No dependent edema.  Pulses

were equal.  Dorsalis pedis pulses:  Right was 1+, left was weak.  Posterior tibial

pulses were not palpable.

 

LABORATORY DATA:  ECG on March 3, 2018, at 1958:  Atrial fibrillation with moderate

ventricular response, left anterior hemiblock, poor R wave progression across the

precordial leads, may be related to left inferior hemiblock.  Possibility of anterior

wall myocardial infarction cannot be excluded.  Equivocal evidence for an inferior

wall myocardial infarction of indeterminate age.  Rare anomalous premature QRS

complexes suggesting ventricular ectopy.  Diffuse ST and T wave abnormalities.  No

previous ECG was available for comparison.

CBC 2018:  WBC 5900, hemoglobin 9.6 g/dL, platelet count 166,000. 

Differential was normal except monocytes were elevated at 11.1%.  

 

Chemistry:  Sodium 144, potassium 4.0, chloride 112, CO2 22 mmol/L.  BUN 19,

creatinine 1.4 mg/dL.  Hemoglobin A1C 5.1%.  CK 39 and 45.  Troponin less than 0.02. 

 

Total cholesterol 126, LDL cholesterol 76, HDL cholesterol 33, triglycerides 123

mmol/L.

X-ray chest is not available.  

 

IMPRESSION:

1.  Deep vein thrombosis involving right lower extremity.

2.  Sudden onset of shortness of breath.

a.  Pulmonary thromboembolism needs exclusion.

b.  Left ventricular failure.  

3.  Coronary artery disease status post percutaneous coronary intervention/stenting,

angina pectoris currently stable.  

4.  Permanent atrial fibrillation.

5.  Hypercholesterolemia.

6.  Status post cerebrovascular accident related to systemic embolization.

7.  History of recurring gastrointestinal bleeding related to angiodysplasia.

8.  Status post permanent pacemaker for carotid hypersensitivity.

9.  Chronic kidney disease.

10.  Vitamin D deficiency.

11.  Hiatus hernia.

12.  Status post Chauncey ulceration.

13.  History of diverticulosis.

 

RECOMMENDATIONS:

1.  V/Q scan.

2.  In view of history of recurring GI bleeding in the presence of DVT, need to

consider inferior vena cava filter.

3.  We need to consider Watchman device for left atrial appendage closure.

4.  Follow up CBC.

5.  BNP.

6.  X-ray chest.

 

PROGNOSIS:  Guarded.

Thank you for your referral.

 

Yours sincerely,

 

 

 

ARPIT MCKINLEY M.D.

 

ORIANA/[id number]

DD: 2018 14:01

DT: 2018 16:19

Job #:  [id number]

## 2018-03-04 NOTE — PN
Physical Exam: 


SUBJECTIVE: Patient seen and examined at bedside. Daughter present.








OBJECTIVE:





 Vital Signs











 Period  Temp  Pulse  Resp  BP Sys/Card  Pulse Ox


 


 Last 24 Hr  98 F-98.6 F  86-87  20-24  /64-67  93-95











GENERAL: The patient is awake, alert, and fully oriented, in no acute distress.


LUNGS: Bibasilar crackles  


HEART: Regular rate and rhythm, S1, S2, + murmur


ABDOMEN: Soft, nontender, nondistended, normoactive bowel sounds, no guarding, 

no rebound


EXTREMITIES: 2+ pulses, warm, well-perfused, no edema; right popliteal and 

posterior calf tenderness


NEUROLOGICAL: Cranial nerves II through XII grossly intact. Normal speech, gait 

not observed.











 Laboratory Results - last 24 hr











  03/04/18 03/04/18 03/04/18





  08:05 08:05 08:05


 


WBC  5.9  


 


RBC  3.65  


 


Hgb  9.6 L  


 


Hct  29.6 L  


 


MCV  81.0  


 


MCH  26.4  


 


MCHC  32.5  


 


RDW  19.4 H  


 


Plt Count  166  


 


MPV  8.1  


 


Neutrophils %  62.1  


 


Lymphocytes %  22.3  D  


 


Monocytes %  11.1 H  


 


Eosinophils %  3.6  


 


Basophils %  0.9  


 


PT with INR   


 


INR   


 


Sodium   144 


 


Potassium   3.8 


 


Chloride   111 H 


 


Carbon Dioxide   26 


 


Anion Gap   7 L 


 


BUN   20 H 


 


Creatinine   1.4 H 


 


Random Glucose   85 


 


Hemoglobin A1c %    5.1


 


Calcium   7.8 L 


 


Creatine Kinase   39 


 


Troponin I   < 0.02 


 


Triglycerides   123 


 


Cholesterol   126 


 


Total LDL Cholesterol   79 


 


HDL Cholesterol   33 L 














  03/04/18 03/04/18





  08:05 12:22


 


WBC  


 


RBC  


 


Hgb  


 


Hct  


 


MCV  


 


MCH  


 


MCHC  


 


RDW  


 


Plt Count  


 


MPV  


 


Neutrophils %  


 


Lymphocytes %  


 


Monocytes %  


 


Eosinophils %  


 


Basophils %  


 


PT with INR  14.20 H 


 


INR  1.26 H 


 


Sodium  


 


Potassium  


 


Chloride  


 


Carbon Dioxide  


 


Anion Gap  


 


BUN  


 


Creatinine  


 


Random Glucose  


 


Hemoglobin A1c %  


 


Calcium  


 


Creatine Kinase   45


 


Troponin I   < 0.02


 


Triglycerides  


 


Cholesterol  


 


Total LDL Cholesterol  


 


HDL Cholesterol  








                  Active Medications











Generic Name Dose Route Start Last Admin





  Trade Name Freq  PRN Reason Stop Dose Admin


 


Aspirin  81 mg  03/04/18 10:00  03/04/18 09:51





  Ecotrin -  PO   81 mg





  DAILY HENRY   Administration


 


Atorvastatin Calcium  10 mg  03/04/18 22:00  





  Lipitor -  PO   





  [facility]   


 


Carvedilol  6.25 mg  03/04/18 10:00  03/04/18 09:51





  Coreg -  PO   6.25 mg





  BID HENRY   Administration


 


Cholecalciferol  2,000 unit  03/04/18 10:00  03/04/18 09:51





  Vitamin D3 -  PO   2,000 unit





  DAILY HENRY   Administration


 


Diltiazem HCl  180 mg  03/04/18 10:00  03/04/18 09:51





  Cardizem Cd -  PO   180 mg





  DAILY HENRY   Administration


 


Enoxaparin Sodium  70 mg  03/04/18 10:00  03/04/18 09:52





  Lovenox -  1 mg/kg (70 mg)   70 mg





  SQ   Administration





  BID HENRY   


 


Gabapentin  100 mg  03/04/18 10:00  03/04/18 09:51





  Neurontin -  PO   100 mg





  BID HENRY   Administration


 


Isosorbide Mononitrate  30 mg  03/04/18 10:00  03/04/18 09:51





  Imdur -  PO   30 mg





  DAILY HENRY   Administration


 


Pantoprazole Sodium  40 mg  03/04/18 10:00  03/04/18 09:51





  Protonix -  PO   40 mg





  BID HENRY   Administration


 


Quetiapine Fumarate  50 mg  03/04/18 10:00  03/04/18 09:53





  Seroquel -  PO   50 mg





  BID HENRY   Administration


 


Quetiapine Fumarate  25 mg  03/04/18 06:09  





  Seroquel -  PO   





  DAILY PRN   





  AGITATION   











ASSESSMENT/PLAN:


85 year-old female with a PMH significant for HTN, HLD, CAD s/p stenting s/p PPM

, systolic heart failure, h/o CVA, paroxysmal atrial fibrillation, h/o PE,  

Parkinson's disease, dementia, and angiodysplasia of the cecum with chronic 

blood loss anemia.





Paroxysmal atrial fibrillation


   --patient and her health care providers agreed to discontinue coumadin in 

late January 2018 


   --has been on ASA only


   --in afib, rate controlled


   --introduced possibility of IVC filter to patient and daughter and they 

would like to discuss further





Shortness of breath


h/o PE


   --given Lovenox 70mg at 10:00am today in ED; will start heparin drip at 10:

00pm this evening


   --Cr 1.4, borderline for administration of nephrotoxic contrast; was given 

250cc bolus, Cr this afternoon still 1.4


   --will start NS@50mL/hr and recheck bmp in am


   --hemodynamically stable


   --echo pending


   


Hypertension


   --BP stable


   --continue carvedilol, diltiazem





Hyperlipidemia


   --continue Lipitor





Coronary artery disease


   --continue ASA, carvedilol, Lipitor, Imdur





Systolic heart failure


   --appears euvolemic


   --not on diuretics





Parkinson's disease


   --at baseline


   --continue Gabapentin





Dementia


   --continue Seroquel





Angiodysplasia of the cecum


Chronic blood loss anemia


   --off anticoagulation except ASA





FEN


   Fluids: NS @ 50mL/hr


   Electrolytes: replete as indicated


   Nutrition: low sodium





DVT prophylaxis: received one full dose lovenox; start heparin drip at 10:00pm





Physical therapy evaluation





Dispo: continues to require inpatient care. Full code.





























Visit type





- Emergency Visit


Emergency Visit: Yes


ED Registration Date: 03/04/18


Care time: The patient presented to the Emergency Department on the above date 

and was hospitalized for further evaluation of their emergent condition.





- New Patient


This patient is new to me today: Yes


Date on this admission: 03/04/18





- Critical Care


Critical Care patient: No

## 2018-03-04 NOTE — HP
Admitting History and Physical





- Primary Care Physician


PCP: Arun Rowan





- Admission


Chief Complaint: SOB, Chest Tightness


History of Present Illness: 





This is a 87 y/o woman from home.  Who presents to the ED with her daughter 

with SOB and chest tightness. Patient's daughter reports while the patient was 

having a BM she reported having SOB and chest tightness. Per the daughter the 

patient was taken off her Coumadin- hx GIB. The daughter reports noting 

increased swelling to the patient's right leg.


History Source: Family Member


Limitations to Obtaining History: Dementia





- Past Medical History


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 

sided residual), Dementia, Parkinson's


Cardiovascular: Yes: Aneurysm (AAA), CAD (angioplasty 2005), Deep Vein 

Thrombosis, HTN, Other (PPM)


Pulmonary: Yes: Cancer (left sided lung cancer resected 1999 [facility]), Pulmonary 

Embolus


Gastrointestinal: Yes: Constipation, Diverticulosis, GI Bleed, Hemorrhoids (

banded 2008), Hiatal Hernia (with Chauncey erosions 2014), Other (angiodysplasia 

of cecum, serrated tx colon adenoma removed [id number])


...Pregnant: No


Heme/Onc: Yes: Anemia, B12 Deficiency


Psych: Yes: Anxiety





- Past Surgical History


Past Surgical History: Yes: Cholecystectomy, Colonoscopy, Hysterectomy, 

Permanent Pacemaker, Thoracotomy (left cancer thoracotomy 1999 [facility]), Upper 

Endoscopy





- Advance Directives


Advance Directives: Yes: Health Care Proxy, DNR





- Smoking History


Smoking history: Former smoker


Have you smoked in the past 12 months: No


Aproximately how many cigarettes per day: 0


If you are a former smoker, when did you quit?: 40 YEARS AGO





- Alcohol/Substance Use


Hx Alcohol Use: No


History of Substance Use: reports: None





- Social History


Usual Living Arrangement: Yes: With Child


ADL: Family Assistance


Occupation: housewife


History of Recent Travel: No





Home Medications





- Allergies


Allergies/Adverse Reactions: 


 Allergies











Allergy/AdvReac Type Severity Reaction Status Date / Time


 


oats Allergy Intermediate  Verified 01/26/18 12:25


 


Penicillins Allergy Mild  Verified 01/26/18 12:25


 


Shellfish Allergy Mild  Verified 01/26/18 12:25














- Home Medications


Home Medications: 


Ambulatory Orders





Isosorbide Mononitrate [Monoket -] 30 mg PO DAILY #0 tab.sr.24h 02/23/12 


Ramipril [Altace] 2.5 mg PO 1200 #0 capsule 02/23/12 


Simvastatin [Zocor -] 20 mg PO HS 05/24/14 


Diltiazem Cd [Cardizem Cd -] 180 mg PO DAILY 01/08/16 


Gabapentin [Neurontin] 100 mg PO BID 01/08/16 


Cholecalciferol (Vitamin D3) [Vitamin D3] 2,000 unit PO DAILY 01/29/16 


Quetiapine Fumarate [Seroquel -] 50 mg PO BID  tablet 02/02/16 


Carvedilol [Coreg -] 6.25 mg PO BID #60 tablet 02/14/16 


Aspirin [Adult Aspirin Regimen] 81 mg PO DAILY 12/26/17 


Pantoprazole Sodium [Protonix] 40 mg PO BID 01/26/18 











Family Disease History





- Family Disease History


Family Disease History: Heart Disease: Brother, Sister, CA: Mother (emphysema, 

"liver cancer"), Respiratory: Mother, Other: Father (cva), Daughter (aneurysm)





Review of Systems


Unable to obtain ROS, reason: Dementia





Physical Examination


Vital Signs: 


 Vital Signs











Temperature  98.6 F   03/04/18 01:50


 


Pulse Rate  87   03/04/18 01:50


 


Respiratory Rate  24   03/04/18 01:50


 


Blood Pressure  99/64   03/04/18 01:50


 


O2 Sat by Pulse Oximetry (%)  93 L  03/04/18 00:50











Constitutional: Yes: No Distress, Calm, Obese


Eyes: Yes: Conjunctiva Clear, PERRL


HENT: Yes: WNL, Atraumatic, Normocephalic


Neck: Yes: WNL, Supple, Trachea Midline


Cardiovascular: Yes: Pulse Irregular, Other (PM to LCW)


Respiratory: Yes: Diminished, On Nasal O2


Gastrointestinal: Yes: Soft, Abdomen, Obese, Hyperactive Bowel Sounds


...Rectal Exam: Yes: Deferred


Renal/: Yes: Incontinence


Breast(s): Yes: WNL


Musculoskeletal: Yes: Joint Stiffness


Extremities: Yes: Calf Tenderness (right)


Peripheral Pulses WNL: Yes


Neurological: Yes: Alert, Confusion, Weakness (right deficits)


Psychiatric: Yes: Alert


Labs: 





 Laboratory Results - last 24 hr











  03/04/18





  08:05


 


WBC  5.9


 


RBC  3.65


 


Hgb  9.6 L


 


Hct  29.6 L


 


MCV  81.0


 


MCH  26.4


 


MCHC  32.5


 


RDW  19.4 H


 


Plt Count  166


 


MPV  8.1


 


Neutrophils %  62.1


 


Lymphocytes %  22.3  D


 


Monocytes %  11.1 H


 


Eosinophils %  3.6


 


Basophils %  0.9








 


 Intake & Output











 03/01/18 03/02/18 03/03/18 03/04/18





 23:59 23:59 23:59 23:59


 


Weight    71.033 kg








 Current Medications











Generic Name Dose Route Start Last Admin





  Trade Name Freq  PRN Reason Stop Dose Admin


 


Aspirin  81 mg  03/04/18 10:00  





  Ecotrin -  PO   





  DAILY [facility]   


 


Atorvastatin Calcium  10 mg  03/04/18 22:00  





  Lipitor -  PO   





  [facility]   


 


Carvedilol  6.25 mg  03/04/18 10:00  





  Coreg -  PO   





  BID [facility]   


 


Cholecalciferol  2,000 unit  03/04/18 10:00  





  Vitamin D3 -  PO   





  DAILY [facility]   


 


Diltiazem HCl  180 mg  03/04/18 10:00  





  Cardizem Cd -  PO   





  DAILY [facility]   


 


Enoxaparin Sodium  70 mg  03/04/18 10:00  





  Lovenox -  1 mg/kg (70 mg)   





  SQ   





  BID [facility]   


 


Gabapentin  100 mg  03/04/18 10:00  





  Neurontin -  PO   





  BID [facility]   


 


Sodium Chloride  250 mls @ 50 mls/hr  03/04/18 06:14  03/04/18 07:21





  Normal Saline -  IV  03/04/18 11:13  50 mls/hr





  ASDIR STA   Administration


 


Isosorbide Mononitrate  30 mg  03/04/18 10:00  





  Imdur -  PO   





  DAILY [facility]   


 


Pantoprazole Sodium  40 mg  03/04/18 10:00  





  Protonix -  PO   





  BID [facility]   


 


Quetiapine Fumarate  50 mg  03/04/18 10:00  





  Seroquel -  PO   





  BID [facility]   


 


Quetiapine Fumarate  25 mg  03/04/18 06:09  





  Seroquel -  PO   





  DAILY [facility]   





  [facility]   

















Imaging





- Results


Chest X-ray: Report Reviewed, Image Reviewed





Problem List





- Problems


(1) Hypertension


Code(s): I10 - ESSENTIAL (PRIMARY) HYPERTENSION   


Qualifiers: 


   Hypertension type: essential hypertension   Qualified Code(s): I10 - 

Essential (primary) hypertension   





(2) Renal insufficiency


Code(s): N28.9 - DISORDER OF KIDNEY AND URETER, UNSPECIFIED   





(3) Atrial fibrillation


Code(s): I48.91 - UNSPECIFIED ATRIAL FIBRILLATION   


Qualifiers: 


   Atrial fibrillation type: permanent   Qualified Code(s): I48.2 - Chronic 

atrial fibrillation   





(4) Dementia


Code(s): F03.90 - UNSPECIFIED DEMENTIA WITHOUT BEHAVIORAL DISTURBANCE   


Qualifiers: 


   Dementia type: Parkinson's disease   Dementia behavioral disturbance: 

without behavioral disturbance   Qualified Code(s): G20 - Parkinson's disease; 

F02.80 - Dementia in other diseases classified elsewhere without behavioral 

disturbance; F02.80 - Dementia in other diseases classified elsewhere without 

behavioral disturbance; F02.80 - Dementia in other diseases classified 

elsewhere without behavioral disturbance   





(5) Iron deficiency anemia due to chronic blood loss


Code(s): D50.0 - IRON DEFICIENCY ANEMIA SECONDARY TO BLOOD LOSS (CHRONIC)   





(6) Presence of permanent cardiac pacemaker


Code(s): Z95.0 - PRESENCE OF CARDIAC PACEMAKER   





(7) DVT (deep venous thrombosis)


Code(s): I82.409 - ACUTE EMBOLISM AND THOMBOS UNSP DEEP VN UNSP LOWER EXTREMITY

   


Qualifiers: 


   DVT location: lower extremity   Affected thrombotic vein of extremity: 

popliteal   Chronicity: acute   Laterality: right   Qualified Code(s): I82.431 

- Acute embolism and thrombosis of right popliteal vein   





(8) Chest pain


Code(s): R07.9 - CHEST PAIN, UNSPECIFIED   


Qualifiers: 


   Chest pain type: unspecified   Qualified Code(s): R07.9 - Chest pain, 

unspecified   





(9) SOB (shortness of breath)


Code(s): R06.02 - SHORTNESS OF BREATH   





(10) Hypercholesteremia


Code(s): E78.0 - PURE HYPERCHOLESTEROLEMIA * DO NOT USE *   





Assessment/Plan





This is a 87 y/o woman with a PMHx of Dementia, Afib (no AC), HTN, HLD, CVA (R- 

side weakness), GI Bleed (2014), Anxiety, OA. Admitted to Telemetry for Chest 

Pain r/o MI, DVT of R- Leg.








Plan:


1. Chest Pain r/o MI vs ACS vs PE HEART Score 6, continue cardiac monitoring, 

serial enzymes x3, Appreciate Cardiology consult, Wells Score 3, CTA not done 

in ED given ELENITA, will gently hydrate, repeat BMP then order VQ scan if ELENITA 

resolves. Asa, EKG reviewed, CBC, BMP, Lipid profile, HgbA1C in am.


2. SOB-  Likely secondary to chest pain vs PE, xray reviewed, continue O2, VQ 

Scan-pending


3. DVT- US R-Leg- partially occulsive thrombus popliteal vein, Thrombus 

demonstrated posterior tibial vein, Lovenox 1.5mg/kg given in ED, will continue 

at 1mg/kg BID. Neurovascular checks, monitor CBC, monitor vitals


4. Atrial fibrillation - no AC, [id number] 6, discussed risks and benefits 

with daughter, Appreciate Cardiology Consult, Appreciate GI consult for 

recommendations in light of GIB hx


5. Hypertension- stable, continue home meds


6. Hyperlipidemia- stable, continue home meds


7. CVA- with R- residual weakness, continue to monitor and treat with 

interventions accordingly


8. Dementia- Continue Seroquel, fall precautions


9. DVT ppx- on Lovenox SQ








Dispo: Requires Inpatient Care








Visit type





- Emergency Visit


Emergency Visit: Yes


ED Registration Date: 03/03/18


Care time: The patient presented to the Emergency Department on the above date 

and was hospitalized for further evaluation of their emergent condition.





- New Patient


This patient is new to me today: Yes


Date on this admission: 03/04/18





- Critical Care


Critical Care patient: No





Hospitalist Screening





- Colonoscopy Questionnaire


Colonoscopy Questionnaire: 





Colonoscopy Questionnaire








-   Patient:


50 - 75 years old and never had a screening colonoscopy: No


History of colon or rectal polyps, or CA: Unknown


History of IBD, Crohn's disease or UC: No


History of abdominal radiation therapy as a child: No





-   Relative:


1 with colon or rectal CA, or polyps at age 60 or younger: Unknown


Colon or rectal CA diagnosed at age 45 or younger: No


Multiple relatives with colon or rectal CA: Unknown





-   Outcome:


Screening Result: Negative Screen

## 2018-03-05 LAB
ALBUMIN SERPL-MCNC: 2.7 G/DL (ref 3.4–5)
ALP SERPL-CCNC: 91 U/L (ref 45–117)
ALT SERPL-CCNC: 10 U/L (ref 12–78)
ANION GAP SERPL CALC-SCNC: 8 MMOL/L (ref 8–16)
AST SERPL-CCNC: 14 U/L (ref 15–37)
BASOPHILS # BLD: 1 % (ref 0–2)
BILIRUB SERPL-MCNC: 0.4 MG/DL (ref 0.2–1)
BUN SERPL-MCNC: 16 MG/DL (ref 7–18)
CALCIUM SERPL-MCNC: 7.7 MG/DL (ref 8.5–10.1)
CHLORIDE SERPL-SCNC: 112 MMOL/L (ref 98–107)
CO2 SERPL-SCNC: 25 MMOL/L (ref 21–32)
CREAT SERPL-MCNC: 1.4 MG/DL (ref 0.55–1.02)
DEPRECATED RDW RBC AUTO: 19.3 % (ref 11.6–15.6)
EOSINOPHIL # BLD: 6.4 % (ref 0–4.5)
GLUCOSE SERPL-MCNC: 84 MG/DL (ref 74–106)
HCT VFR BLD CALC: 30.1 % (ref 32.4–45.2)
HGB BLD-MCNC: 9.6 GM/DL (ref 10.7–15.3)
LYMPHOCYTES # BLD: 21.6 % (ref 8–40)
MAGNESIUM SERPL-MCNC: 1.7 MG/DL (ref 1.8–2.4)
MCH RBC QN AUTO: 26.1 PG (ref 25.7–33.7)
MCHC RBC AUTO-ENTMCNC: 31.7 G/DL (ref 32–36)
MCV RBC: 82.3 FL (ref 80–96)
MONOCYTES # BLD AUTO: 9.6 % (ref 3.8–10.2)
NEUTROPHILS # BLD: 61.4 % (ref 42.8–82.8)
PLATELET # BLD AUTO: 185 K/MM3 (ref 134–434)
PMV BLD: 8.4 FL (ref 7.5–11.1)
POTASSIUM SERPLBLD-SCNC: 3.7 MMOL/L (ref 3.5–5.1)
PROT SERPL-MCNC: 5.9 G/DL (ref 6.4–8.2)
RBC # BLD AUTO: 3.66 M/MM3 (ref 3.6–5.2)
SODIUM SERPL-SCNC: 145 MMOL/L (ref 136–145)
WBC # BLD AUTO: 4.9 K/MM3 (ref 4–10)

## 2018-03-05 PROCEDURE — 06H03DZ INSERTION OF INTRALUMINAL DEVICE INTO INFERIOR VENA CAVA, PERCUTANEOUS APPROACH: ICD-10-PCS | Performed by: RADIOLOGY

## 2018-03-05 RX ADMIN — GABAPENTIN SCH MG: 100 CAPSULE ORAL at 09:45

## 2018-03-05 RX ADMIN — PANTOPRAZOLE SODIUM SCH MG: 40 TABLET, DELAYED RELEASE ORAL at 09:45

## 2018-03-05 RX ADMIN — PANTOPRAZOLE SODIUM SCH: 40 TABLET, DELAYED RELEASE ORAL at 21:51

## 2018-03-05 RX ADMIN — POLYETHYLENE GLYCOL 3350 SCH MG: 17 POWDER, FOR SOLUTION ORAL at 21:45

## 2018-03-05 RX ADMIN — PANTOPRAZOLE SODIUM SCH MG: 40 TABLET, DELAYED RELEASE ORAL at 21:45

## 2018-03-05 RX ADMIN — CARVEDILOL SCH MG: 6.25 TABLET, FILM COATED ORAL at 21:44

## 2018-03-05 RX ADMIN — QUETIAPINE FUMARATE SCH MG: 25 TABLET ORAL at 23:45

## 2018-03-05 RX ADMIN — POLYETHYLENE GLYCOL 3350 SCH: 17 POWDER, FOR SOLUTION ORAL at 21:49

## 2018-03-05 RX ADMIN — QUETIAPINE FUMARATE SCH: 25 TABLET ORAL at 21:46

## 2018-03-05 RX ADMIN — ATORVASTATIN CALCIUM SCH MG: 10 TABLET, FILM COATED ORAL at 21:44

## 2018-03-05 RX ADMIN — QUETIAPINE FUMARATE PRN MG: 25 TABLET ORAL at 19:09

## 2018-03-05 RX ADMIN — GABAPENTIN SCH: 100 CAPSULE ORAL at 21:46

## 2018-03-05 RX ADMIN — QUETIAPINE FUMARATE SCH MG: 25 TABLET ORAL at 09:45

## 2018-03-05 RX ADMIN — CARVEDILOL SCH: 6.25 TABLET, FILM COATED ORAL at 21:50

## 2018-03-05 RX ADMIN — ISOSORBIDE MONONITRATE SCH MG: 30 TABLET, EXTENDED RELEASE ORAL at 09:43

## 2018-03-05 RX ADMIN — VITAMIN D, TAB 1000IU (100/BT) SCH UNIT: 25 TAB at 09:43

## 2018-03-05 RX ADMIN — ASPIRIN SCH MG: 81 TABLET, COATED ORAL at 09:44

## 2018-03-05 RX ADMIN — CARVEDILOL SCH MG: 6.25 TABLET, FILM COATED ORAL at 09:45

## 2018-03-05 NOTE — EKG
Test Reason : 

Blood Pressure : ***/*** mmHG

Vent. Rate : 097 BPM     Atrial Rate : 153 BPM

   P-R Int : 000 ms          QRS Dur : 098 ms

    QT Int : 314 ms       P-R-T Axes : 000 -51 145 degrees

   QTc Int : 398 ms

 

ATRIAL FIBRILLATION

LEFT AXIS DEVIATION

LOW VOLTAGE QRS

POOR R WAVE PROGRESSION

INFERIOR INFARCT (CITED ON OR BEFORE 26-JAN-2018)

NONSPECIFIC ST AND T WAVE ABNORMALITY

ABNORMAL ECG

WHEN COMPARED WITH ECG OF 26-JAN-2018 13:46,

NO SIGNIFICANT CHANGE WAS FOUND

Confirmed by EUGENE RUANO MD ([id number]) on 3/5/2018 2:46:02 PM

 

Referred By: ARNOLD ROSSI           Confirmed By:EUGENE RUANO MD

## 2018-03-05 NOTE — PN
Progress Note (short form)





- Note


Progress Note: 





PULMONARY





CONSULTATION DICTATED 3/5/18





IMP DYSPNEA


     RLE DVT, LIKELY ACUTE PE


     ASHD S/P STENTS


     H/O ANEMIA


     H/O GI BLEEDS


     PERMANENT AFIB


     H/O CVA


     H/O LUNG CA S/P RESECTION


     ELENITA











PLAN AC 


       IVC FILTER


       IVF


       MONITOR H+H


       O2


       MONITOR LYTES,RENAL FUNCTION





DR OSEI


      


    


   





Problem List





- Problems


(1) Chronic anemia


Code(s): D64.9 - ANEMIA, UNSPECIFIED   





(2) SOB (shortness of breath)


Code(s): R06.02 - SHORTNESS OF BREATH   





(3) Carotid sinus hypersensitivity


Code(s): G90.01 - CAROTID SINUS SYNCOPE   





(4) DVT (deep venous thrombosis)


Code(s): I82.409 - ACUTE EMBOLISM AND THOMBOS UNSP DEEP VN UNSP LOWER EXTREMITY

   


Qualifiers: 


   DVT location: lower extremity   Affected thrombotic vein of extremity: 

popliteal   Chronicity: acute   Laterality: right   Qualified Code(s): I82.431 

- Acute embolism and thrombosis of right popliteal vein   





(5) Angiodysplasia of cecum


Code(s): K55.20 - ANGIODYSPLASIA OF COLON WITHOUT HEMORRHAGE   





(6) Atherosclerotic heart disease


Code(s): I25.10 - ATHSCL HEART DISEASE OF NATIVE CORONARY ARTERY W/O ANG PCTRS 

  


Qualifiers: 


   Coronary Disease-Associated Artery/Lesion type: native artery   Native vs. 

transplanted heart: native heart   Associated angina: without angina   

Qualified Code(s): I25.10 - Atherosclerotic heart disease of native coronary 

artery without angina pectoris   





(7) Atrial fibrillation


Code(s): I48.91 - UNSPECIFIED ATRIAL FIBRILLATION   


Qualifiers: 


   Atrial fibrillation type: paroxysmal   Qualified Code(s): I48.0 - Paroxysmal 

atrial fibrillation   





(8) CHF (congestive heart failure)


Code(s): I50.9 - HEART FAILURE, UNSPECIFIED   


Qualifiers: 


   Qualified Code(s): I50.32 - Chronic diastolic (congestive) heart failure   





(9) Chauncey lesion, chronic


Code(s): K25.7 - CHRONIC GASTRIC ULCER WITHOUT HEMORRHAGE OR PERFORATION   





(10) Dementia


Code(s): F03.90 - UNSPECIFIED DEMENTIA WITHOUT BEHAVIORAL DISTURBANCE   


Qualifiers: 


   Dementia type: Alzheimer's disease   Alzheimer's disease onset: unspecified 

onset   Dementia behavioral disturbance: without behavioral disturbance   

Qualified Code(s): G30.9 - Alzheimer's disease, unspecified; F02.80 - Dementia 

in other diseases classified elsewhere without behavioral disturbance; F02.80 - 

Dementia in other diseases classified elsewhere without behavioral disturbance; 

F02.80 - Dementia in other diseases classified elsewhere without behavioral 

disturbance   





(11) Hypertension


Code(s): I10 - ESSENTIAL (PRIMARY) HYPERTENSION   


Qualifiers: 


   Hypertension type: essential hypertension   Qualified Code(s): I10 - 

Essential (primary) hypertension   





(12) Iron deficiency anemia due to chronic blood loss


Code(s): D50.0 - IRON DEFICIENCY ANEMIA SECONDARY TO BLOOD LOSS (CHRONIC)   





(13) Presence of permanent cardiac pacemaker


Code(s): Z95.0 - PRESENCE OF CARDIAC PACEMAKER   





(14) Renal insufficiency


Code(s): N28.9 - DISORDER OF KIDNEY AND URETER, UNSPECIFIED

## 2018-03-05 NOTE — CONS
DATE OF CONSULTATION:  03/05/2018

 

REFERRING PHYSICIAN:  Meredith Penn NP

 

HISTORY:  The patient is an 85-year-old white female with a past medical history of

ASHD status post PCI stenting, history of post cardiac catheterization,

cerebrovascular disease secondary to systemic embolization, intraarterial

thrombolysis, history of permanent atrial fibrillation currently not on ACE, history

of GI bleed, anemia, carotid hypersensitivity, status post permanent pacemaker,

hypertension, hypercholesterolemia, history of lung cancer status post partial

resection left lung, angiodysplasia of the cecum, hiatal hernia, Chauncey ulcerations

admitted to [facility] secondary to sudden onset of shortness of

breath.  The patient apparently was on the toilet and developed sudden onset of

shortness of breath.  She denied any complaints of chest pain, nausea, vomiting, or

diaphoresis.  She went to [facility] and had a duplex of the extremities

performed, which revealed a right lower extremity DVT and possible occlusive thrombus

in her right popliteal and an occlusive thrombus in the right posterior tibial vein. 

The patient was transferred to [facility] for further management.  Of note, the

patient has been on anticoagulation until approximately a month ago at which time it

was discontinued secondary to recent hospitalizations secondary to severe anemia with

a hemoglobin of 6 g requiring transfusions.  At the time, it was felt the patient was

at high risk for ACE secondary to bleeding.  The patient on this hospitalization was

placed on heparin drip.  The patient denies any history of DVT or PE in the past. 

There is no history of occupational exposures to chemicals or fumes. 

 

PAST MEDICAL HISTORY:  Again includes ASHD status post stent, AAA, hypertension, CVA

embolic secondary to cardiac catheterization with right side residual, history of

lung cancer status post resection, cholecystectomy, anemia, permanent atrial

fibrillation, carotid hypersensitivity, anemia, colorectal angioectasias,

diverticulosis.

 

REVIEW OF SYSTEMS:  No chest pain, no palpitations.  Positive shortness of breath. 

No cough, no hemoptysis, no abdominal pain, no fever, no chills.

 

CURRENT MEDICATIONS:  Include heparin, Neurontin, Seroquel, Coreg, Cardizem, normal

saline, Lipitor, Imdur, Ecotrin, Protonix, and vitamin D3.

 

SOCIAL HISTORY:  History of tobacco.  Quit years ago.  No occupational exposures.

 

PHYSICAL EXAMINATION: 

General:  The patient is an elderly white female, thin, well developed, well

nourished awake and alert out of bed in chair in no acute distress.

Vital Signs:  She is currently afebrile.  Blood pressure 102/59, respiratory rate is

18, O2 saturation 99% on 2 L.

HEENT:  Normocephalic and atraumatic.

Neck:  Supple.

Heart:  Irregularly irregular S1, S2.

Chest:  Bilateral crackles.  

Abdomen:  Soft.  Bowel sounds positive.

Extremities:  Right lower extremity calf mildly tender and swollen.  

 

WBCs 4.9, hemoglobin 9.6, hematocrit 30.1 with a platelet count of 185,000. 

Chemistries:  BUN 16, creatinine 1.4, BNP 3995.  

 

Chest x-ray reveals prominent hilum.  No infiltrates and no effusions. 

Echocardiogram reveals no evidence of RV dysfunction.  _____ systolic pressure

normal.

 

IMPRESSION:  

1.  Dyspnea.

2.  Right lower extremity deep vein thrombosis likely pulmonary embolus.

3.  History of anemia, gastrointestinal bleed.

4.  Arteriosclerotic heart disease status post stents.

5.  History of cerebrovascular accident.

6.  Permanent atrial fibrillation.

7.  History of lung cancer status post resection.

 

PLAN:  Continue ACE as tolerated.  Monitor hemoglobin and hematocrit.  Obtain IVC

filter.  Supplemental O2. A CTA of the chest when renal function normalizes.

 

 

 

DARWIN OSEI M.D.

 

TATA/[id number]

DD: 03/05/2018 13:35

DT: 03/05/2018 16:10

Job #:  [id number]

## 2018-03-05 NOTE — PN
GI Progress Note


Subjective: 





GI NOte: Nara has developed DVT in within a month's time of stopping warfarin 

for GI bleeding leading to profound anemia. I have discussed the case with  Nara

's daughter and son who were in the room. They are considering having the 

Watchman device and IVC filters placed. They do not want her subjected to 

repeat endoscopies. Her bleeding is felt to reflect vascular ectasias, most of 

which are located in the small intestine. They tell me that Nara's bowel habits 

have been fluctuating between constipation interrupted by explosive diarrhea.  

I explained that this suggest fecal impaction with overflow paradoxical 

diarrhea. 





- Objective


Vital Signs: 


 Vital Signs











Temperature  97.2 F L  03/05/18 18:06


 


Pulse Rate  69   03/05/18 18:06


 


Respiratory Rate  20   03/05/18 18:06


 


Blood Pressure  103/56   03/05/18 18:06


 


O2 Sat by Pulse Oximetry (%)  100   03/05/18 15:32








 Laboratory Tests











  03/04/18 03/05/18





  08:05 06:25


 


Hgb  9.6 L  9.6 L











Constitutional: No Distress


...Auscultate: Yes: Normoactive Bowel Sounds


...Palpate: Yes: Soft, Other


Labs: 


 CBC, BMP





 03/05/18 06:25 





 03/05/18 06:25 





 INR, PTT











INR  1.26  (0.82-1.09)  H  03/04/18  08:05    














Problem List





- Problems


(1) GI bleed


Assessment/Plan: 


Chronic indolent GI bleeding likely due to vascular ectasias that becomes 

accelerated by anticoagulation. Support proceeding with IVC filter and Watchman.


Code(s): K92.2 - GASTROINTESTINAL HEMORRHAGE, UNSPECIFIED   


Qualifiers: 


   GI bleed type/associated pathology: angiodysplasia of stomach and duodenum   

Qualified Code(s): K31.811 - Angiodysplasia of stomach and duodenum with 

bleeding   





(2) Diarrhea


Assessment/Plan: 


Suspect paradoxical diarrhea due to constipation/fecal impaction given the 

alternating pattern. Will Miralax TID x 3 days to relieve this.


Code(s): R19.7 - DIARRHEA, UNSPECIFIED   


Qualifiers: 


   Diarrhea type: unspecified type   Qualified Code(s): R19.7 - Diarrhea, 

unspecified

## 2018-03-06 LAB
ANION GAP SERPL CALC-SCNC: 8 MMOL/L (ref 8–16)
BASOPHILS # BLD: 0.9 % (ref 0–2)
BUN SERPL-MCNC: 16 MG/DL (ref 7–18)
CALCIUM SERPL-MCNC: 7.9 MG/DL (ref 8.5–10.1)
CHLORIDE SERPL-SCNC: 112 MMOL/L (ref 98–107)
CO2 SERPL-SCNC: 24 MMOL/L (ref 21–32)
CREAT SERPL-MCNC: 1.4 MG/DL (ref 0.55–1.02)
DEPRECATED RDW RBC AUTO: 19.6 % (ref 11.6–15.6)
EOSINOPHIL # BLD: 6.7 % (ref 0–4.5)
GLUCOSE SERPL-MCNC: 90 MG/DL (ref 74–106)
HCT VFR BLD CALC: 28.1 % (ref 32.4–45.2)
HGB BLD-MCNC: 9.3 GM/DL (ref 10.7–15.3)
LYMPHOCYTES # BLD: 22.5 % (ref 8–40)
MCH RBC QN AUTO: 26.7 PG (ref 25.7–33.7)
MCHC RBC AUTO-ENTMCNC: 33 G/DL (ref 32–36)
MCV RBC: 80.9 FL (ref 80–96)
MONOCYTES # BLD AUTO: 9.5 % (ref 3.8–10.2)
NEUTROPHILS # BLD: 60.4 % (ref 42.8–82.8)
PLATELET # BLD AUTO: 173 K/MM3 (ref 134–434)
PMV BLD: 8.3 FL (ref 7.5–11.1)
POTASSIUM SERPLBLD-SCNC: 3.9 MMOL/L (ref 3.5–5.1)
RBC # BLD AUTO: 3.47 M/MM3 (ref 3.6–5.2)
SERUM IRON SATURATION: 19 % (ref 15–55)
SODIUM SERPL-SCNC: 144 MMOL/L (ref 136–145)
TIBC SERPL-MCNC: 193 UG/DL (ref 250–450)
UIBC SERPL-MCNC: 156 UG/DL (ref 118–369)
WBC # BLD AUTO: 5.7 K/MM3 (ref 4–10)

## 2018-03-06 RX ADMIN — PANTOPRAZOLE SODIUM SCH: 40 TABLET, DELAYED RELEASE ORAL at 23:49

## 2018-03-06 RX ADMIN — ATORVASTATIN CALCIUM SCH: 10 TABLET, FILM COATED ORAL at 23:49

## 2018-03-06 RX ADMIN — QUETIAPINE FUMARATE SCH: 100 TABLET ORAL at 23:50

## 2018-03-06 RX ADMIN — ISOSORBIDE MONONITRATE SCH MG: 30 TABLET, EXTENDED RELEASE ORAL at 10:54

## 2018-03-06 RX ADMIN — ENOXAPARIN SODIUM SCH: 80 INJECTION SUBCUTANEOUS at 21:08

## 2018-03-06 RX ADMIN — QUETIAPINE FUMARATE SCH MG: 25 TABLET ORAL at 10:55

## 2018-03-06 RX ADMIN — CARVEDILOL SCH MG: 6.25 TABLET, FILM COATED ORAL at 10:55

## 2018-03-06 RX ADMIN — ASPIRIN SCH MG: 81 TABLET, COATED ORAL at 10:55

## 2018-03-06 RX ADMIN — GABAPENTIN SCH: 100 CAPSULE ORAL at 23:49

## 2018-03-06 RX ADMIN — HEPARIN SODIUM SCH: 5000 INJECTION, SOLUTION INTRAVENOUS at 22:00

## 2018-03-06 RX ADMIN — POLYETHYLENE GLYCOL 3350 SCH: 17 POWDER, FOR SOLUTION ORAL at 23:49

## 2018-03-06 RX ADMIN — POLYETHYLENE GLYCOL 3350 SCH GM: 17 POWDER, FOR SOLUTION ORAL at 07:02

## 2018-03-06 RX ADMIN — CARVEDILOL SCH: 6.25 TABLET, FILM COATED ORAL at 23:49

## 2018-03-06 RX ADMIN — GABAPENTIN SCH MG: 100 CAPSULE ORAL at 10:55

## 2018-03-06 RX ADMIN — HEPARIN SODIUM SCH MLS/HR: 5000 INJECTION, SOLUTION INTRAVENOUS at 10:44

## 2018-03-06 RX ADMIN — HEPARIN SODIUM SCH MLS/HR: 5000 INJECTION, SOLUTION INTRAVENOUS at 00:03

## 2018-03-06 NOTE — PN
Progress Note (short form)





- Note


Progress Note: 





Patient admitted with DVT and SOB, underwent IVC filter and to have a V/Q scan 

tomorrow. Known case of CAD, s/p PCI/stentind, s/p CVA, H/o Gi bleedind related 

to angiodysplasia.





Patient became agitated and did not have a V/Q scan





Active Medications











Generic Name Dose Route Start Last Admin





  Trade Name Freq  PRN Reason Stop Dose Admin


 


Acetaminophen  650 mg  03/05/18 02:09  03/05/18 02:19





  Tylenol -  PO   650 mg





  Q6H PRN   Administration





  FEVER   


 


Aspirin  81 mg  03/04/18 10:00  03/06/18 10:55





  Ecotrin -  PO   81 mg





  DAILY [facility]   Administration


 


Atorvastatin Calcium  10 mg  03/04/18 22:00  03/05/18 21:44





  Lipitor -  PO   10 mg





  [facility]   Administration


 


Carvedilol  6.25 mg  03/04/18 10:00  03/06/18 10:55





  Coreg -  PO   6.25 mg





  BID [facility]   Administration


 


Cholecalciferol  2,000 unit  03/04/18 10:00  03/05/18 09:43





  Vitamin D3 -  PO   2,000 unit





  DAILY [facility]   Administration


 


Diltiazem HCl  180 mg  03/04/18 10:00  03/06/18 10:54





  Cardizem Cd -  PO   180 mg





  DAILY [facility]   Administration


 


Gabapentin  100 mg  03/04/18 10:00  03/06/18 10:55





  Neurontin -  PO   100 mg





  BID [facility]   Administration


 


Heparin Sodium (Porcine)  1,000 unit  03/04/18 19:33  03/06/18 10:54





  Heparin -  IVPUSH   1,000 unit





  PRN PRN   Administration





  Heparin   


 


Heparin Sodium (Porcine)  5,000 unit  03/04/18 19:33  





  Heparin -  IVPUSH   





  PRN PRN   





  Heparin   


 


HEPARIN SOD,PORK IN 0.45% NACL  25,000 units in 500 mls @ 22.6 mls/hr  03/04/18 

22:00  03/06/18 10:44





  Heparin-1/2ns 25,000 Units/500  IVPB   780 units/hr





  TITR [facility]   15.6 mls/hr





  Protocol   Administration





  1,130 UNITS/HR   


 


Isosorbide Mononitrate  30 mg  03/04/18 10:00  03/06/18 10:54





  Imdur -  PO   30 mg





  DAILY [facility]   Administration


 


Pantoprazole Sodium  40 mg  03/04/18 10:00  03/05/18 21:51





  Protonix -  PO   Not Given





  BID [facility]   


 


Polyethylene Glycol  17 gm  03/05/18 22:00  03/06/18 07:02





  Miralax (For Daily Use) -  PO   17 gm





  TID [facility]   Administration


 


Quetiapine Fumarate  25 mg  03/04/18 06:09  03/05/18 19:09





  Seroquel -  PO   25 mg





  DAILY PRN   Administration





  AGITATION   


 


Quetiapine Fumarate  100 mg  03/06/18 22:00  





  Seroquel -  PO   





  [facility]   


 


Quetiapine Fumarate  50 mg  03/07/18 10:00  





  Seroquel -  PO   





  DAILY [facility]   











86 year old female in no acute distress.





 


 Last Vital Signs











Temp Pulse Resp BP Pulse Ox


 


 97.7 F   139 H  18   112/57   94 L


 


 03/06/18 18:00  03/06/18 18:00  03/06/18 18:00  03/06/18 18:00  03/06/18 09:00











NECK: Supple, no JVD, carotids 2+.


HEART: PMI in the 5th ICS, S1 variable, S2 normal. Grade I/VI systolic murmur, 

no gallops.


LUNGS: clear on auscultation.


EXTREMITIES: 1+ right nkle edema, no calf tenderness.





 


 CBC, BMP





 03/06/18 06:55 





 03/06/18 07:00 








IMPRESSION:


1. DVT, RLL.


2. PTE need to be excluded.


3. CAD, s/p PCI/Stentng.


4. Atrial fib. 





RECOMMENDATIONS:


1. Continue current cardiac therapy.


2. Close F/u CBC.

## 2018-03-06 NOTE — PN
Physical Exam: 


SUBJECTIVE: Patient seen and examined.








OBJECTIVE:





 Vital Signs











 Period  Temp  Pulse  Resp  BP Sys/Card  Pulse Ox


 


 Last 24 Hr  97.2 F-97.8 F    16-20  /47-73  











GENERAL: The patient is awake, alert, and fully oriented, in no acute distress.


LUNGS: Bibasilar crackles  


HEART: Regular rate and rhythm, S1, S2, + murmur


ABDOMEN: Soft, nontender, nondistended, normoactive bowel sounds, no guarding, 

no rebound


EXTREMITIES: 2+ pulses, warm, well-perfused, no edema; right popliteal and 

posterior calf tenderness


NEUROLOGICAL: Cranial nerves II through XII grossly intact. Normal speech, gait 

not observed.








 Laboratory Results - last 24 hr











  03/05/18 03/05/18 03/05/18





  06:25 06:25 06:25


 


WBC   


 


RBC   


 


Hgb   


 


Hct   


 


MCV   


 


MCH   


 


MCHC   


 


RDW   


 


Plt Count   


 


MPV   


 


Neutrophils %   


 


Lymphocytes %   


 


Monocytes %   


 


Eosinophils %   


 


Basophils %   


 


PTT (Actin FS)   


 


Sodium    145


 


Potassium    3.7


 


Chloride    112 H


 


Carbon Dioxide    25


 


Anion Gap    8


 


BUN    16


 


Creatinine    1.4 H


 


Creat Clearance w eGFR    35.65


 


Random Glucose    84


 


Calcium    7.7 L


 


Magnesium    1.7 L


 


Iron   37 


 


TIBC   193 L 


 


Iron Saturation   19 


 


Ferritin    41.711


 


Total Bilirubin    0.4


 


AST    14 L


 


ALT    10 L


 


Alkaline Phosphatase    91


 


B-Natriuretic Peptide  Cancelled   3995.50 H


 


Total Protein    5.9 L


 


Albumin    2.7 L














  03/05/18 03/05/18 03/06/18





  12:35 21:40 06:55


 


WBC    5.7


 


RBC    3.47 L


 


Hgb    9.3 L


 


Hct    28.1 L


 


MCV    80.9


 


MCH    26.7


 


MCHC    33.0


 


RDW    19.6 H


 


Plt Count    173


 


MPV    8.3


 


Neutrophils %    60.4


 


Lymphocytes %    22.5


 


Monocytes %    9.5


 


Eosinophils %    6.7 H


 


Basophils %    0.9


 


PTT (Actin FS)  147.8 H  58.8 H D 


 


Sodium   


 


Potassium   


 


Chloride   


 


Carbon Dioxide   


 


Anion Gap   


 


BUN   


 


Creatinine   


 


Creat Clearance w eGFR   


 


Random Glucose   


 


Calcium   


 


Magnesium   


 


Iron   


 


TIBC   


 


Iron Saturation   


 


Ferritin   


 


Total Bilirubin   


 


AST   


 


ALT   


 


Alkaline Phosphatase   


 


B-Natriuretic Peptide   


 


Total Protein   


 


Albumin   














  03/06/18





  06:55


 


WBC 


 


RBC 


 


Hgb 


 


Hct 


 


MCV 


 


MCH 


 


MCHC 


 


RDW 


 


Plt Count 


 


MPV 


 


Neutrophils % 


 


Lymphocytes % 


 


Monocytes % 


 


Eosinophils % 


 


Basophils % 


 


PTT (Actin FS)  42.6 H


 


Sodium 


 


Potassium 


 


Chloride 


 


Carbon Dioxide 


 


Anion Gap 


 


BUN 


 


Creatinine 


 


Creat Clearance w eGFR 


 


Random Glucose 


 


Calcium 


 


Magnesium 


 


Iron 


 


TIBC 


 


Iron Saturation 


 


Ferritin 


 


Total Bilirubin 


 


AST 


 


ALT 


 


Alkaline Phosphatase 


 


B-Natriuretic Peptide 


 


Total Protein 


 


Albumin 








                  Active Medications











Generic Name Dose Route Start Last Admin





  Trade Name Freq  PRN Reason Stop Dose Admin


 


Acetaminophen  650 mg  03/05/18 02:09  03/05/18 02:19





  Tylenol -  PO   650 mg





  Q6H PRN   Administration





  FEVER   


 


Aspirin  81 mg  03/04/18 10:00  03/05/18 09:44





  Ecotrin -  PO   81 mg





  DAILY HENRY   Administration


 


Atorvastatin Calcium  10 mg  03/04/18 22:00  03/05/18 21:44





  Lipitor -  PO   10 mg





  HS HENRY   Administration


 


Carvedilol  6.25 mg  03/04/18 10:00  03/05/18 21:50





  Coreg -  PO   Not Given





  BID [facility]   


 


Cholecalciferol  2,000 unit  03/04/18 10:00  03/05/18 09:43





  Vitamin D3 -  PO   2,000 unit





  DAILY HENRY   Administration


 


Diltiazem HCl  180 mg  03/04/18 10:00  03/05/18 09:44





  Cardizem Cd -  PO   180 mg





  DAILY [facility]   Administration


 


Gabapentin  100 mg  03/04/18 10:00  03/05/18 21:46





  Neurontin -  PO   Not Given





  BID HENRY   


 


Heparin Sodium (Porcine)  1,000 unit  03/04/18 19:33  





  Heparin -  IVPUSH   





  PRN PRN   





  Heparin   


 


Heparin Sodium (Porcine)  5,000 unit  03/04/18 19:33  





  Heparin -  IVPUSH   





  PRN PRN   





  Heparin   


 


HEPARIN SOD,PORK IN 0.45% NACL  25,000 units in 500 mls @ 22.6 mls/hr  03/04/18 

22:00  03/06/18 00:03





  Heparin-1/2ns 25,000 Units/500  IVPB   680 units/hr





  TITR HENRY   13.6 mls/hr





  Protocol   Administration





  1,130 UNITS/HR   


 


Isosorbide Mononitrate  30 mg  03/04/18 10:00  03/05/18 09:43





  Imdur -  PO   30 mg





  DAILY HENRY   Administration


 


Pantoprazole Sodium  40 mg  03/04/18 10:00  03/05/18 21:51





  Protonix -  PO   Not Given





  BID HENRY   


 


Polyethylene Glycol  17 gm  03/05/18 22:00  03/06/18 07:02





  Miralax (For Daily Use) -  PO   17 gm





  TID HENRY   Administration


 


Quetiapine Fumarate  50 mg  03/04/18 10:00  03/05/18 23:45





  Seroquel -  PO   50 mg





  BID HENRY   Administration


 


Quetiapine Fumarate  25 mg  03/04/18 06:09  03/05/18 19:09





  Seroquel -  PO   25 mg





  DAILY PRN   Administration





  AGITATION   











ASSESSMENT/PLAN


85 year-old female with a PMH significant for HTN, HLD, CAD s/p stenting s/p PPM

, systolic heart failure, h/o CVA, paroxysmal atrial fibrillation, h/o PE,  

Parkinson's disease, dementia, and angiodysplasia of the cecum with chronic 

blood loss anemia.





Paroxysmal atrial fibrillation


   --patient and her health care providers agreed to discontinue coumadin in 

late January 2018 


   --has been on ASA only


   --in afib, rate controlled


   --IVC filter placed today


   --resume heparin drip


   --patient considering Watchman's procedure





Shortness of breath


h/o PE


r/o PE


   --CTA tomorrow


   --PTT goal 50-70


   


Hypertension


   --BP stable


   --continue carvedilol, diltiazem





Hyperlipidemia


   --continue Lipitor





Coronary artery disease


   --continue ASA, carvedilol, Lipitor, Imdur





Systolic heart dysfunction


   --3/6 Echo: moderate concentric LVH, LV function low normal; RV normal; mild 

MR


   --appears euvolemic


   --not on diuretics





Parkinson's disease


   --at baseline


   --continue Gabapentin





Dementia


   --continue Seroquel





Angiodysplasia of the cecum


Chronic blood loss anemia


   --stable





FEN


   Fluids: NS @ 50mL/hr


   Electrolytes: replete as indicated


   Nutrition: low sodium





DVT prophylaxis: on heparin drip





Physical therapy evaluation





Dispo: continues to require inpatient care. Full code.











Visit type





- Emergency Visit


Emergency Visit: Yes


ED Registration Date: 03/04/18


Care time: The patient presented to the Emergency Department on the above date 

and was hospitalized for further evaluation of their emergent condition.





- New Patient


This patient is new to me today: No





- Critical Care


Critical Care patient: No

## 2018-03-06 NOTE — PN
Progress Note, Physician


Chief Complaint: 


Pt lying in bed in no acute distress, confused, slightly agitated. daughter at 

bedside. 





- Current Medication List


Current Medications: 


Active Medications





Acetaminophen (Tylenol -)  650 mg PO Q6H PRN


   PRN Reason: FEVER


   Last Admin: 03/05/18 02:19 Dose:  650 mg


Aspirin (Ecotrin -)  81 mg PO DAILY [facility]


   Last Admin: 03/05/18 09:44 Dose:  81 mg


Atorvastatin Calcium (Lipitor -)  10 mg PO HS [facility]


   Last Admin: 03/05/18 21:44 Dose:  10 mg


Carvedilol (Coreg -)  6.25 mg PO BID [facility]


   Last Admin: 03/05/18 21:50 Dose:  Not Given


Cholecalciferol (Vitamin D3 -)  2,000 unit PO DAILY [facility]


   Last Admin: 03/05/18 09:43 Dose:  2,000 unit


Diltiazem HCl (Cardizem Cd -)  180 mg PO DAILY [facility]


   Last Admin: 03/05/18 09:44 Dose:  180 mg


Gabapentin (Neurontin -)  100 mg PO BID [facility]


   Last Admin: 03/05/18 21:46 Dose:  Not Given


Heparin Sodium (Porcine) (Heparin -)  1,000 unit IVPUSH PRN PRN


   PRN Reason: Heparin


Heparin Sodium (Porcine) (Heparin -)  5,000 unit IVPUSH PRN PRN


   PRN Reason: Heparin


HEPARIN SOD,PORK IN 0.45% NACL (Heparin-1/2ns 25,000 Units/500)  25,000 units 

in 500 mls @ 22.6 mls/hr IVPB TITR HENRY; 1,130 UNITS/HR


   PRN Reason: Protocol


   Last Admin: 03/06/18 00:03 Dose:  680 units/hr, 13.6 mls/hr


Isosorbide Mononitrate (Imdur -)  30 mg PO DAILY [facility]


   Last Admin: 03/05/18 09:43 Dose:  30 mg


Pantoprazole Sodium (Protonix -)  40 mg PO BID [facility]


   Last Admin: 03/05/18 21:51 Dose:  Not Given


Polyethylene Glycol (Miralax (For Daily Use) -)  17 gm PO TID [facility]


   Last Admin: 03/06/18 07:02 Dose:  17 gm


Quetiapine Fumarate (Seroquel -)  50 mg PO BID [facility]


   Last Admin: 03/05/18 23:45 Dose:  50 mg


Quetiapine Fumarate (Seroquel -)  25 mg PO DAILY PRN


   PRN Reason: AGITATION


   Last Admin: 03/05/18 19:09 Dose:  25 mg











- Objective


Vital Signs: 


 Vital Signs











Temperature  97.5 F L  03/06/18 06:00


 


Pulse Rate  108 H  03/06/18 06:00


 


Respiratory Rate  18   03/06/18 06:00


 


Blood Pressure  109/73   03/06/18 06:00


 


O2 Sat by Pulse Oximetry (%)  94 L  03/05/18 21:00











Constitutional: Yes: Well Nourished, No Distress


Cardiovascular: Yes: Pulse Irregular, Murmur.  No: Bruit, Gallop, Rub


Gastrointestinal: Yes: Normal Bowel Sounds, Soft, Abdomen, Obese.  No: 

Distention, Tenderness


Extremities: Yes: WNL


Edema: No


Neurological: Yes: Alert, Confusion


Psychiatric: Yes: Alert


Labs: 


 CBC, BMP





 03/06/18 06:55 





 03/06/18 07:00 





 INR, PTT











INR  1.26  (0.82-1.09)  H  03/04/18  08:05    














- ....Imaging


Chest X-ray: Report Reviewed





Problem List





- Problems


(1) SOB (shortness of breath)


Code(s): R06.02 - SHORTNESS OF BREATH   





(2) Chronic anemia


Code(s): D64.9 - ANEMIA, UNSPECIFIED   





(3) DVT (deep venous thrombosis)


Code(s): I82.409 - ACUTE EMBOLISM AND THOMBOS UNSP DEEP VN UNSP LOWER EXTREMITY

   


Qualifiers: 


   DVT location: lower extremity   Affected thrombotic vein of extremity: 

popliteal   Chronicity: acute   Laterality: right   Qualified Code(s): I82.431 

- Acute embolism and thrombosis of right popliteal vein   





(4) Angiodysplasia of cecum


Code(s): K55.20 - ANGIODYSPLASIA OF COLON WITHOUT HEMORRHAGE   





(5) Atherosclerotic heart disease


Code(s): I25.10 - ATHSCL HEART DISEASE OF NATIVE CORONARY ARTERY W/O ANG PCTRS 

  


Qualifiers: 


   Coronary Disease-Associated Artery/Lesion type: native artery   Native vs. 

transplanted heart: native heart   Associated angina: without angina   

Qualified Code(s): I25.10 - Atherosclerotic heart disease of native coronary 

artery without angina pectoris   





(6) Atrial fibrillation


Code(s): I48.91 - UNSPECIFIED ATRIAL FIBRILLATION   


Qualifiers: 


   Atrial fibrillation type: permanent   Qualified Code(s): I48.2 - Chronic 

atrial fibrillation   





(7) Hypercholesteremia


Code(s): E78.0 - PURE HYPERCHOLESTEROLEMIA * DO NOT USE *   





(8) Hypertension


Code(s): I10 - ESSENTIAL (PRIMARY) HYPERTENSION   


Qualifiers: 


   Hypertension type: essential hypertension   Qualified Code(s): I10 - 

Essential (primary) hypertension   





(9) Presence of permanent cardiac pacemaker


Code(s): Z95.0 - PRESENCE OF CARDIAC PACEMAKER   





(10) CHF (congestive heart failure)


Code(s): I50.9 - HEART FAILURE, UNSPECIFIED   





(11) Chronic kidney disease


Code(s): N18.9 - CHRONIC KIDNEY DISEASE, UNSPECIFIED   


Qualifiers: 


   Chronic kidney disease stage: stage 2 (mild)   Qualified Code(s): N18.2 - 

Chronic kidney disease, stage 2 (mild)   





(12) Dementia


Code(s): F03.90 - UNSPECIFIED DEMENTIA WITHOUT BEHAVIORAL DISTURBANCE   


Qualifiers: 


   Dementia type: Parkinson's disease   Dementia behavioral disturbance: 

without behavioral disturbance   Qualified Code(s): G20 - Parkinson's disease; 

F02.80 - Dementia in other diseases classified elsewhere without behavioral 

disturbance; F02.80 - Dementia in other diseases classified elsewhere without 

behavioral disturbance; F02.80 - Dementia in other diseases classified 

elsewhere without behavioral disturbance   





(13) CAD (coronary artery disease)


Code(s): I25.10 - ATHSCL HEART DISEASE OF NATIVE CORONARY ARTERY W/O ANG PCTRS 

  


Qualifiers: 


   Coronary Disease-Associated Artery/Lesion type: native artery   Native vs. 

transplanted heart: native heart   Associated angina: without angina   

Qualified Code(s): I25.10 - Atherosclerotic heart disease of native coronary 

artery without angina pectoris   





(14) Pleural effusion


Code(s): J90 - PLEURAL EFFUSION, NOT ELSEWHERE CLASSIFIED   





Assessment/Plan


(1) SOB (shortness of breath)


Assessment/Plan: 


secondary to DVT 


s/p IVC filter , considering watchman's device


developing left pleural effusion


v/q scan unsuccessful due to pt's mental state(uncooperative)


heparin drip


Code(s): R06.02 - SHORTNESS OF BREATH   





(2) Diarrhea


Assessment/Plan: 


possible impaction/constipation


Miralax TID 


GI following 


Code(s): R19.7 - DIARRHEA, UNSPECIFIED   


Qualifiers: 


   Diarrhea type: unspecified type   Qualified Code(s): R19.7 - Diarrhea, 

unspecified   





(3) Chronic anemia


Assessment/Plan: 


Chronic anemia secondary to vascular ectasias, Iron deficient, h/h stable today


GI following, conservative management per family 


Code(s): D64.9 - ANEMIA, UNSPECIFIED   





(4) DVT (deep venous thrombosis)


Assessment/Plan: 


RLL


s/p IVC filter


heparin drip 


Code(s): I82.409 - ACUTE EMBOLISM AND THOMBOS UNSP DEEP VN UNSP LOWER EXTREMITY

   


Qualifiers: 


   DVT location: lower extremity   Affected thrombotic vein of extremity: 

popliteal   Chronicity: acute   Laterality: right   Qualified Code(s): I82.431 

- Acute embolism and thrombosis of right popliteal vein   





(5) Angiodysplasia of cecum


Assessment/Plan: 


as above 


Code(s): K55.20 - ANGIODYSPLASIA OF COLON WITHOUT HEMORRHAGE   





(6) Atherosclerotic heart disease


Assessment/Plan: 


s/p PPM, PCI/stenting


continue aspirin 81mg


continue simvastatin


Code(s): I25.10 - ATHSCL HEART DISEASE OF NATIVE CORONARY ARTERY W/O ANG PCTRS 

  


Qualifiers: 


   Coronary Disease-Associated Artery/Lesion type: native artery   Native vs. 

transplanted heart: native heart   Associated angina: without angina   

Qualified Code(s): I25.10 - Atherosclerotic heart disease of native coronary 

artery without angina pectoris   





(7) Atrial fibrillation


Assessment/Plan: 


rate controlled, on aspirin at home, AC d/c'd last admission due to gib


on heparin drip 


Code(s): I48.91 - UNSPECIFIED ATRIAL FIBRILLATION   


Qualifiers: 


   Atrial fibrillation type: permanent   Qualified Code(s): I48.2 - Chronic 

atrial fibrillation





(8) Hypercholesteremia


Assessment/Plan: 


stable


continue statin 


Code(s): E78.0 - PURE HYPERCHOLESTEROLEMIA * DO NOT USE *   





(9) Hypertension


Assessment/Plan: 


continue home meds


avoid aggressive bp control


Code(s): I10 - ESSENTIAL (PRIMARY) HYPERTENSION   


Qualifiers: 


   Hypertension type: essential hypertension   Qualified Code(s): I10 - 

Essential (primary) hypertension   





(10) Presence of permanent cardiac pacemaker


Assessment/Plan: 


ventricular paced


Code(s): Z95.0 - PRESENCE OF CARDIAC PACEMAKER   





(11) CHF (congestive heart failure)


Assessment/Plan: 


chronic,lv dysfunction


echo shows pEF


low Na diet


daily weights


will monitor 


cardiology following


PT as tolerated


Code(s): I50.9 - HEART FAILURE, UNSPECIFIED   


Qualifiers: 


   Qualified Code(s): I50.32 - Chronic diastolic (congestive) heart failure   





(12) Chronic kidney disease


Assessment/Plan: 


stable


will monitor 


Code(s): N18.9 - CHRONIC KIDNEY DISEASE, UNSPECIFIED   


Qualifiers: 


   Chronic kidney disease stage: stage 2 (mild)   Qualified Code(s): N18.2 - 

Chronic kidney disease, stage 2 (mild)   





(13) Dementia


Assessment/Plan: 


pt irritated, and slightly uncooperative today 


psych consulted and seroquel adjusted


will monitor 


Code(s): F03.90 - UNSPECIFIED DEMENTIA WITHOUT BEHAVIORAL DISTURBANCE   


Qualifiers: 


   Dementia type: Parkinson's disease   Dementia behavioral disturbance: 

without behavioral disturbance   Qualified Code(s): G20 - Parkinson's disease; 

F02.80 - Dementia in other diseases classified elsewhere without behavioral 

disturbance; F02.80 - Dementia in other diseases classified elsewhere without 

behavioral disturbance; F02.80 - Dementia in other diseases classified 

elsewhere without behavioral disturbance  








(14) Pleural effusion


Assessment/Plan: 


developing left pleural effusion on chest xray


possible thoracentesis 


will monitor 


Code(s): J90 - PLEURAL EFFUSION, NOT ELSEWHERE CLASSIFIED   








Pt was made DNR/DNI during last admission by pt and family.

## 2018-03-06 NOTE — CON.PSY
Psychiatry Consult


Chief Complaint: 86 yr old female known to me from 15yrs ago, History of Stroke

, Dementia, Parkinsons disease. Patient sdeen and case discussed with 

gianna>Daughter rteports acute onset of Paranoia and agitation> accusing 

daiughter of trying to kill her. Patient had been on Seroquel 50mg pop bid for 

a lonf time.


Symptoms: reports: Memory Impairment, Paranoia





- Previous Psychiatric Treatment


Outpatient: None


Inpatient: None





- Previous Substance Abuse Treatment


Outpatient: None


Inpatient: None





- Current Medications


Current Medications: 


Active Medications





Acetaminophen (Tylenol -)  650 mg PO Q6H PRN


   PRN Reason: FEVER


   Last Admin: 18 02:19 Dose:  650 mg


Aspirin (Ecotrin -)  81 mg PO DAILY [facility]


   Last Admin: 18 10:55 Dose:  81 mg


Atorvastatin Calcium (Lipitor -)  10 mg PO HS [facility]


   Last Admin: 18 21:44 Dose:  10 mg


Carvedilol (Coreg -)  6.25 mg PO BID [facility]


   Last Admin: 18 10:55 Dose:  6.25 mg


Cholecalciferol (Vitamin D3 -)  2,000 unit PO DAILY [facility]


   Last Admin: 18 09:43 Dose:  2,000 unit


Diltiazem HCl (Cardizem Cd -)  180 mg PO DAILY [facility]


   Last Admin: 18 10:54 Dose:  180 mg


Gabapentin (Neurontin -)  100 mg PO BID [facility]


   Last Admin: 18 10:55 Dose:  100 mg


Heparin Sodium (Porcine) (Heparin -)  1,000 unit IVPUSH PRN PRN


   PRN Reason: Heparin


   Last Admin: 18 10:54 Dose:  1,000 unit


Heparin Sodium (Porcine) (Heparin -)  5,000 unit IVPUSH PRN PRN


   PRN Reason: Heparin


HEPARIN SOD,PORK IN 0.45% NACL (Heparin-1/2ns 25,000 Units/500)  25,000 units 

in 500 mls @ 22.6 mls/hr IVPB TITR [facility]; 1,130 UNITS/HR


   PRN Reason: Protocol


   Last Admin: 18 10:44 Dose:  780 units/hr, 15.6 mls/hr


Isosorbide Mononitrate (Imdur -)  30 mg PO DAILY [facility]


   Last Admin: 18 10:54 Dose:  30 mg


Pantoprazole Sodium (Protonix -)  40 mg PO BID [facility]


   Last Admin: 18 21:51 Dose:  Not Given


Polyethylene Glycol (Miralax (For Daily Use) -)  17 gm PO TID [facility]


   Last Admin: 18 07:02 Dose:  17 gm


Quetiapine Fumarate (Seroquel -)  50 mg PO BID [facility]


   Last Admin: 18 10:55 Dose:  50 mg


Quetiapine Fumarate (Seroquel -)  25 mg PO DAILY [facility]


   PRN Reason: AGITATION


   Last Admin: 18 19:09 Dose:  25 mg











- Allergies


Allergies: 


 Allergies











Allergy/AdvReac Type Severity Reaction Status Date / Time


 


oats Allergy Intermediate  Verified 18 12:25


 


Penicillins Allergy Mild  Verified 18 12:25


 


Shellfish Allergy Mild  Verified 18 12:25














- Current Mental Status Evaluation


Appearance: Disheveled


Attitude: Guarded





- Affect


Affect: Constrictive


Appropriateness: Not Appropriate





- Mood


Mood: Irritable





- Speech/Language


Expressive: Coherent





- Psychomotor Activity


Psychomotor Activity: Slowed





- Thought Process


Thought Process: Circumstantial





- Thought Content


Hallucinations: Absent


Delusions: Present


Type: Persectory, Being Controlled





- Self Perception


Self Perception: No Impairment





- Cognition


Attention: Alert


Orientation: Time


Memory, Immediate Recall: Impaired


Memory, Short Term: 1/3


Memory, Remote with Promptin/3





- Concentration


Serial Sevens Intact: No


Simple Calculations Intact: No





- Abstraction


Proverb Interpretation: Impaired


Judgement: Moderately Impaired





- Insight


Insight: Impaired





- Impulse Control


Impulse Control: Moderately Impaired





- Suicidal Ideation


Suicidal Ideation: No





- Homicidal Ideation


Homicidal Ideation: No





Assessment/Plan





1) will increase seoquerl to 100mg po hs. continue with seroquel 50mg pom am.

## 2018-03-07 LAB
ANION GAP SERPL CALC-SCNC: 9 MMOL/L (ref 8–16)
APPEARANCE UR: CLEAR
BASOPHILS # BLD: 0.6 % (ref 0–2)
BILIRUB UR STRIP.AUTO-MCNC: NEGATIVE MG/DL
BUN SERPL-MCNC: 14 MG/DL (ref 7–18)
CALCIUM SERPL-MCNC: 8.4 MG/DL (ref 8.5–10.1)
CHLORIDE SERPL-SCNC: 110 MMOL/L (ref 98–107)
CO2 SERPL-SCNC: 24 MMOL/L (ref 21–32)
COLOR UR: COLORLESS
CREAT SERPL-MCNC: 1.3 MG/DL (ref 0.55–1.02)
DEPRECATED RDW RBC AUTO: 19.9 % (ref 11.6–15.6)
EOSINOPHIL # BLD: 7.9 % (ref 0–4.5)
EPITH CASTS URNS QL MICRO: (no result) /HPF
GLUCOSE SERPL-MCNC: 79 MG/DL (ref 74–106)
HCT VFR BLD CALC: 28.8 % (ref 32.4–45.2)
HGB BLD-MCNC: 9.3 GM/DL (ref 10.7–15.3)
HYALINE CASTS URNS QL MICRO: 1 /LPF
KETONES UR QL STRIP: NEGATIVE
LEUKOCYTE ESTERASE UR QL STRIP.AUTO: (no result)
LYMPHOCYTES # BLD: 21.2 % (ref 8–40)
MAGNESIUM SERPL-MCNC: 2.3 MG/DL (ref 1.8–2.4)
MCH RBC QN AUTO: 26.5 PG (ref 25.7–33.7)
MCHC RBC AUTO-ENTMCNC: 32.4 G/DL (ref 32–36)
MCV RBC: 81.9 FL (ref 80–96)
MONOCYTES # BLD AUTO: 9.9 % (ref 3.8–10.2)
MUCOUS THREADS URNS QL MICRO: (no result)
NEUTROPHILS # BLD: 60.4 % (ref 42.8–82.8)
NITRITE UR QL STRIP: NEGATIVE
PH UR: 5 [PH] (ref 5–8)
PLATELET # BLD AUTO: 175 K/MM3 (ref 134–434)
PMV BLD: 8.3 FL (ref 7.5–11.1)
POTASSIUM SERPLBLD-SCNC: 4 MMOL/L (ref 3.5–5.1)
PROT UR QL STRIP: NEGATIVE
PROT UR QL STRIP: NEGATIVE
RBC # BLD AUTO: 3.51 M/MM3 (ref 3.6–5.2)
RBC # UR STRIP: NEGATIVE /UL
SODIUM SERPL-SCNC: 143 MMOL/L (ref 136–145)
SP GR UR: 1 (ref 1–1.03)
UROBILINOGEN UR STRIP-MCNC: NEGATIVE MG/DL (ref 0.2–1)
WBC # BLD AUTO: 5.4 K/MM3 (ref 4–10)

## 2018-03-07 RX ADMIN — GABAPENTIN SCH MG: 100 CAPSULE ORAL at 10:10

## 2018-03-07 RX ADMIN — POLYETHYLENE GLYCOL 3350 SCH GM: 17 POWDER, FOR SOLUTION ORAL at 15:00

## 2018-03-07 RX ADMIN — ENOXAPARIN SODIUM SCH: 80 INJECTION SUBCUTANEOUS at 21:08

## 2018-03-07 RX ADMIN — HEPARIN SODIUM SCH MLS/HR: 5000 INJECTION, SOLUTION INTRAVENOUS at 14:00

## 2018-03-07 RX ADMIN — ISOSORBIDE MONONITRATE SCH MG: 30 TABLET, EXTENDED RELEASE ORAL at 10:10

## 2018-03-07 RX ADMIN — POLYETHYLENE GLYCOL 3350 SCH: 17 POWDER, FOR SOLUTION ORAL at 06:00

## 2018-03-07 RX ADMIN — CARVEDILOL SCH MG: 6.25 TABLET, FILM COATED ORAL at 10:10

## 2018-03-07 RX ADMIN — PANTOPRAZOLE SODIUM SCH MG: 40 TABLET, DELAYED RELEASE ORAL at 10:10

## 2018-03-07 RX ADMIN — QUETIAPINE FUMARATE SCH MG: 100 TABLET ORAL at 21:05

## 2018-03-07 RX ADMIN — ATORVASTATIN CALCIUM SCH: 10 TABLET, FILM COATED ORAL at 21:06

## 2018-03-07 RX ADMIN — VITAMIN D, TAB 1000IU (100/BT) SCH UNIT: 25 TAB at 10:11

## 2018-03-07 RX ADMIN — CARVEDILOL SCH MG: 6.25 TABLET, FILM COATED ORAL at 21:05

## 2018-03-07 RX ADMIN — ASPIRIN SCH MG: 81 TABLET, COATED ORAL at 10:10

## 2018-03-07 RX ADMIN — POLYETHYLENE GLYCOL 3350 SCH GM: 17 POWDER, FOR SOLUTION ORAL at 21:04

## 2018-03-07 RX ADMIN — GABAPENTIN SCH: 100 CAPSULE ORAL at 21:06

## 2018-03-07 RX ADMIN — POLYETHYLENE GLYCOL 3350 SCH GM: 17 POWDER, FOR SOLUTION ORAL at 10:10

## 2018-03-07 RX ADMIN — PANTOPRAZOLE SODIUM SCH: 40 TABLET, DELAYED RELEASE ORAL at 21:05

## 2018-03-07 NOTE — PN
Progress Note, Physician


Chief Complaint: 


Pt sitting in chair in no acute distress, appears calm and cooperative. 

daughter at bedside. Denies any chest discomfort, sob, n/v/d or fever/chills





- Current Medication List


Current Medications: 


Active Medications





Acetaminophen (Tylenol -)  650 mg PO Q6H PRN


   PRN Reason: FEVER


   Last Admin: 03/05/18 02:19 Dose:  650 mg


Aspirin (Ecotrin -)  81 mg PO DAILY [facility]


   Last Admin: 03/07/18 10:10 Dose:  81 mg


Atorvastatin Calcium (Lipitor -)  10 mg PO HS [facility]


   Last Admin: 03/06/18 23:49 Dose:  Not Given


Carvedilol (Coreg -)  6.25 mg PO BID [facility]


   Last Admin: 03/07/18 10:10 Dose:  6.25 mg


Cholecalciferol (Vitamin D3 -)  2,000 unit PO DAILY [facility]


   Last Admin: 03/07/18 10:11 Dose:  2,000 unit


Diltiazem HCl (Cardizem Cd -)  180 mg PO DAILY [facility]


   Last Admin: 03/07/18 10:10 Dose:  180 mg


Furosemide (Lasix Injection -)  40 mg IVPUSH ONCE ONE


   Stop: 03/07/18 10:19


Gabapentin (Neurontin -)  100 mg PO BID [facility]


   Last Admin: 03/07/18 10:10 Dose:  100 mg


Heparin Sodium (Porcine) (Heparin -)  1,000 unit IVPUSH PRN PRN


   PRN Reason: Heparin


   Last Admin: 03/06/18 10:54 Dose:  1,000 unit


Heparin Sodium (Porcine) (Heparin -)  5,000 unit IVPUSH PRN PRN


   PRN Reason: Heparin


HEPARIN SOD,PORK IN 0.45% NACL (Heparin-1/2ns 25,000 Units/500)  25,000 units 

in 500 mls @ 22.6 mls/hr IVPB TITR [facility]; 1,130 UNITS/HR


   PRN Reason: Protocol


   Last Admin: 03/06/18 22:00 Dose:  Not Given


Isosorbide Mononitrate (Imdur -)  30 mg PO DAILY [facility]


   Last Admin: 03/07/18 10:10 Dose:  30 mg


Nystatin (Nystop Powder -)  1 applic TP DAILY [facility]


Pantoprazole Sodium (Protonix -)  40 mg PO BID [facility]


   Last Admin: 03/07/18 10:10 Dose:  40 mg


Polyethylene Glycol (Miralax (For Daily Use) -)  17 gm PO TID [facility]


   Last Admin: 03/07/18 10:10 Dose:  17 gm


Quetiapine Fumarate (Seroquel -)  25 mg PO DAILY [facility]


   PRN Reason: AGITATION


   Last Admin: 03/05/18 19:09 Dose:  25 mg


Quetiapine Fumarate (Seroquel -)  100 mg PO HS [facility]


   Last Admin: 03/06/18 23:50 Dose:  Not Given


Quetiapine Fumarate (Seroquel -)  50 mg PO DAILY [facility]


   Last Admin: 03/07/18 10:10 Dose:  50 mg











- Objective


Vital Signs: 


 Vital Signs











Temperature  97.7 F   03/06/18 18:00


 


Pulse Rate  139 H  03/06/18 18:00


 


Respiratory Rate  18   03/06/18 18:00


 


Blood Pressure  112/57   03/06/18 18:00


 


O2 Sat by Pulse Oximetry (%)  94 L  03/06/18 09:00











Constitutional: Yes: Well Nourished, No Distress, Calm


Cardiovascular: Yes: Pulse Irregular.  No: Bruit, Gallop, Murmur


Respiratory: Yes: Regular, Diminished.  No: SOB, Tachypnea, Wheezes


Gastrointestinal: Yes: WNL, Normal Bowel Sounds, Soft, Abdomen, Obese.  No: 

Distention, Tenderness


Genitourinary: Yes: WNL


Extremities: Yes: WNL


Edema: Yes


Edema: LLE: Trace, RLE: Trace


Neurological: Yes: WNL, Alert, Oriented


Psychiatric: Yes: WNL, Alert, Oriented


Labs: 


 CBC, BMP





 03/07/18 06:30 





 03/07/18 06:30 





 INR, PTT











INR  1.26  (0.82-1.09)  H  03/04/18  08:05    














- ....Imaging


Chest X-ray: Report Reviewed





Problem List





- Problems


(1) Chronic kidney disease


Code(s): N18.9 - CHRONIC KIDNEY DISEASE, UNSPECIFIED   


Qualifiers: 


   Chronic kidney disease stage: stage 2 (mild)   Qualified Code(s): N18.2 - 

Chronic kidney disease, stage 2 (mild)   





(2) Hypertension


Code(s): I10 - ESSENTIAL (PRIMARY) HYPERTENSION   


Qualifiers: 


   Hypertension type: essential hypertension   Qualified Code(s): I10 - 

Essential (primary) hypertension   





(3) Atrial fibrillation


Code(s): I48.91 - UNSPECIFIED ATRIAL FIBRILLATION   


Qualifiers: 


   Atrial fibrillation type: permanent   Qualified Code(s): I48.2 - Chronic 

atrial fibrillation   





(4) Dementia


Code(s): F03.90 - UNSPECIFIED DEMENTIA WITHOUT BEHAVIORAL DISTURBANCE   


Qualifiers: 


   Dementia type: Parkinson's disease   Dementia behavioral disturbance: 

without behavioral disturbance   Qualified Code(s): G20 - Parkinson's disease; 

F02.80 - Dementia in other diseases classified elsewhere without behavioral 

disturbance; F02.80 - Dementia in other diseases classified elsewhere without 

behavioral disturbance; F02.80 - Dementia in other diseases classified 

elsewhere without behavioral disturbance   





(5) CHF (congestive heart failure)


Code(s): I50.9 - HEART FAILURE, UNSPECIFIED   





(6) Angiodysplasia of cecum


Code(s): K55.20 - ANGIODYSPLASIA OF COLON WITHOUT HEMORRHAGE   





(7) Presence of permanent cardiac pacemaker


Code(s): Z95.0 - PRESENCE OF CARDIAC PACEMAKER   





(8) DVT (deep venous thrombosis)


Code(s): I82.409 - ACUTE EMBOLISM AND THOMBOS UNSP DEEP VN UNSP LOWER EXTREMITY

   


Qualifiers: 


   DVT location: lower extremity   Affected thrombotic vein of extremity: 

popliteal   Chronicity: acute   Laterality: right   Qualified Code(s): I82.431 

- Acute embolism and thrombosis of right popliteal vein   





(9) SOB (shortness of breath)


Code(s): R06.02 - SHORTNESS OF BREATH   





(10) Chronic anemia


Code(s): D64.9 - ANEMIA, UNSPECIFIED   





(11) CAD (coronary artery disease)


Code(s): I25.10 - ATHSCL HEART DISEASE OF NATIVE CORONARY ARTERY W/O ANG PCTRS 

  


Qualifiers: 


   Coronary Disease-Associated Artery/Lesion type: native artery   Native vs. 

transplanted heart: native heart   Associated angina: without angina   

Qualified Code(s): I25.10 - Atherosclerotic heart disease of native coronary 

artery without angina pectoris   





(12) Pleural effusion


Code(s): J90 - PLEURAL EFFUSION, NOT ELSEWHERE CLASSIFIED   





(13) Atherosclerotic heart disease


Code(s): I25.10 - ATHSCL HEART DISEASE OF NATIVE CORONARY ARTERY W/O ANG PCTRS 

  


Qualifiers: 


   Coronary Disease-Associated Artery/Lesion type: native artery   Native vs. 

transplanted heart: native heart   Associated angina: without angina   

Qualified Code(s): I25.10 - Atherosclerotic heart disease of native coronary 

artery without angina pectoris   





(14) Hypercholesteremia


Code(s): E78.0 - PURE HYPERCHOLESTEROLEMIA * DO NOT USE *   





Assessment/Plan


(1) SOB (shortness of breath)


Assessment/Plan: 


secondary to DVT 


s/p IVC filter , fam considering watchman's device


developing left pleural effusion, lasix 40mg iv today


v/q scan unsuccessful due to pt's mental state(uncooperative) yesterday


heparin drip


Code(s): R06.02 - SHORTNESS OF BREATH   





(2) Diarrhea


Assessment/Plan: 


possible impaction/constipation


Miralax TID 


GI following 


Code(s): R19.7 - DIARRHEA, UNSPECIFIED   


Qualifiers: 


   Diarrhea type: unspecified type   Qualified Code(s): R19.7 - Diarrhea, 

unspecified   





(3) Chronic anemia


Assessment/Plan: 


Chronic anemia secondary to vascular ectasias, Iron deficient, h/h stable today


GI following, conservative management per family 


Code(s): D64.9 - ANEMIA, UNSPECIFIED   





(4) DVT (deep venous thrombosis)


Assessment/Plan: 


RLL


s/p IVC filter


heparin drip 


Code(s): I82.409 - ACUTE EMBOLISM AND THOMBOS UNSP DEEP VN UNSP LOWER EXTREMITY

   


Qualifiers: 


   DVT location: lower extremity   Affected thrombotic vein of extremity: 

popliteal   Chronicity: acute   Laterality: right   Qualified Code(s): I82.431 

- Acute embolism and thrombosis of right popliteal vein   





(5) Angiodysplasia of cecum


Assessment/Plan: 


as above 


Code(s): K55.20 - ANGIODYSPLASIA OF COLON WITHOUT HEMORRHAGE   





(6) Atherosclerotic heart disease


Assessment/Plan: 


s/p PPM, PCI/stenting


continue aspirin 81mg


continue simvastatin


Code(s): I25.10 - ATHSCL HEART DISEASE OF NATIVE CORONARY ARTERY W/O ANG PCTRS 

  


Qualifiers: 


   Coronary Disease-Associated Artery/Lesion type: native artery   Native vs. 

transplanted heart: native heart   Associated angina: without angina   

Qualified Code(s): I25.10 - Atherosclerotic heart disease of native coronary 

artery without angina pectoris   





(7) Atrial fibrillation


Assessment/Plan: 


rate controlled, on aspirin at home, AC d/c'd last admission due to gib


on heparin drip 


cardiology following


Code(s): I48.91 - UNSPECIFIED ATRIAL FIBRILLATION   


Qualifiers: 


   Atrial fibrillation type: permanent   Qualified Code(s): I48.2 - Chronic 

atrial fibrillation





(8) Hypercholesteremia


Assessment/Plan: 


stable


continue statin 


Code(s): E78.0 - PURE HYPERCHOLESTEROLEMIA * DO NOT USE *   





(9) Hypertension


Assessment/Plan: 


continue home meds


avoid aggressive bp control


Code(s): I10 - ESSENTIAL (PRIMARY) HYPERTENSION   


Qualifiers: 


   Hypertension type: essential hypertension   Qualified Code(s): I10 - 

Essential (primary) hypertension   





(10) Presence of permanent cardiac pacemaker


Assessment/Plan: 


ventricular paced


Code(s): Z95.0 - PRESENCE OF CARDIAC PACEMAKER   





(11) CHF (congestive heart failure)


Assessment/Plan: 


chronic,lv dysfunction


echo shows pEF


low Na diet


daily weights


will monitor 


cardiology following


PT as tolerated


Code(s): I50.9 - HEART FAILURE, UNSPECIFIED   


Qualifiers: 


   Qualified Code(s): I50.32 - Chronic diastolic (congestive) heart failure   





(12) Chronic kidney disease


Assessment/Plan: 


stable


will monitor 


Code(s): N18.9 - CHRONIC KIDNEY DISEASE, UNSPECIFIED   


Qualifiers: 


   Chronic kidney disease stage: stage 2 (mild)   Qualified Code(s): N18.2 - 

Chronic kidney disease, stage 2 (mild)   





(13) Dementia


Assessment/Plan: 


calm and cooperative today


seroquel adjusted by psych


psych consult appreciated


will monitor 


Code(s): F03.90 - UNSPECIFIED DEMENTIA WITHOUT BEHAVIORAL DISTURBANCE   


Qualifiers: 


   Dementia type: Parkinson's disease   Dementia behavioral disturbance: 

without behavioral disturbance   Qualified Code(s): G20 - Parkinson's disease; 

F02.80 - Dementia in other diseases classified elsewhere without behavioral 

disturbance; F02.80 - Dementia in other diseases classified elsewhere without 

behavioral disturbance; F02.80 - Dementia in other diseases classified 

elsewhere without behavioral disturbance  





(14) Pleural effusion


Assessment/Plan: 


developing left pleural effusion on chest xray


trial with lasix 40mg today 


discussed with cardiology


will monitor 


Code(s): J90 - PLEURAL EFFUSION, NOT ELSEWHERE CLASSIFIED   








Pt was made DNR/DNI during last admission by pt and family. Awaiting family 

decision on watchman's device

## 2018-03-07 NOTE — PN
GI Progress Note


Subjective: 





GI Note: Having BMs with Miralax but no bleeding. Had IVC device. Daughter 

tells me they are still undecided about Watchman procedure and await to hear 

what anesthesia it requires. Nara became very confused after her procedure 

yesterday. I explained that the same could be expected. 





- Objective


Vital Signs: 


 Vital Signs











Temperature  97.6 F   03/07/18 12:42


 


Pulse Rate  86   03/07/18 12:42


 


Respiratory Rate  18   03/07/18 10:00


 


Blood Pressure  96/59   03/07/18 12:42


 


O2 Sat by Pulse Oximetry (%)  96   03/07/18 09:00








 Laboratory Tests











  03/03/18 03/05/18 03/07/18





  19:56 06:25 06:30


 


Hgb  10.6 L D  9.6 L  9.3 L











Labs: 


 CBC, BMP





 03/07/18 06:30 





 03/07/18 06:30 





 INR, PTT











INR  1.26  (0.82-1.09)  H  03/04/18  08:05    














Assessment/Plan





Continue Maralax


Await decision re: Watchman vs Warfarin

## 2018-03-07 NOTE — PN
Progress Note (short form)





- Note


Progress Note: 





PULMONARY





Denies shortness of breath or chest pain. Mental status improved.





 Last Vital Signs











Temp Pulse Resp BP Pulse Ox


 


 97.7 F   139 H  18   112/57   94 L


 


 03/06/18 18:00  03/06/18 18:00  03/06/18 18:00  03/06/18 18:00  03/06/18 09:00








Gen:  NAD in chair


Heart: RRR


Lung: decreased breath sounds at the bases


Abd: soft, nontender


Ext: no edema





 CBC, BMP





 03/07/18 06:30 





 03/07/18 06:30 





Active Medications





Acetaminophen (Tylenol -)  650 mg PO Q6H PRN


   PRN Reason: FEVER


   Last Admin: 03/05/18 02:19 Dose:  650 mg


Aspirin (Ecotrin -)  81 mg PO DAILY [facility]


   Last Admin: 03/07/18 10:10 Dose:  81 mg


Atorvastatin Calcium (Lipitor -)  10 mg PO HS [facility]


   Last Admin: 03/06/18 23:49 Dose:  Not Given


Carvedilol (Coreg -)  6.25 mg PO BID [facility]


   Last Admin: 03/07/18 10:10 Dose:  6.25 mg


Cholecalciferol (Vitamin D3 -)  2,000 unit PO DAILY [facility]


   Last Admin: 03/07/18 10:11 Dose:  2,000 unit


Diltiazem HCl (Cardizem Cd -)  180 mg PO DAILY [facility]


   Last Admin: 03/07/18 10:10 Dose:  180 mg


Gabapentin (Neurontin -)  100 mg PO BID [facility]


   Last Admin: 03/07/18 10:10 Dose:  100 mg


Heparin Sodium (Porcine) (Heparin -)  1,000 unit IVPUSH PRN PRN


   PRN Reason: Heparin


   Last Admin: 03/06/18 10:54 Dose:  1,000 unit


Heparin Sodium (Porcine) (Heparin -)  5,000 unit IVPUSH PRN PRN


   PRN Reason: Heparin


HEPARIN SOD,PORK IN 0.45% NACL (Heparin-1/2ns 25,000 Units/500)  25,000 units 

in 500 mls @ 22.6 mls/hr IVPB TITR [facility]; 1,130 UNITS/HR


   PRN Reason: Protocol


   Last Admin: 03/06/18 22:00 Dose:  Not Given


Isosorbide Mononitrate (Imdur -)  30 mg PO DAILY [facility]


   Last Admin: 03/07/18 10:10 Dose:  30 mg


Nystatin (Nystop Powder -)  1 applic TP DAILY [facility]


Pantoprazole Sodium (Protonix -)  40 mg PO BID [facility]


   Last Admin: 03/07/18 10:10 Dose:  40 mg


Polyethylene Glycol (Miralax (For Daily Use) -)  17 gm PO TID [facility]


   Last Admin: 03/07/18 10:10 Dose:  17 gm


Quetiapine Fumarate (Seroquel -)  25 mg PO DAILY [facility]


   PRN Reason: AGITATION


   Last Admin: 03/05/18 19:09 Dose:  25 mg


Quetiapine Fumarate (Seroquel -)  100 mg PO HS [facility]


   Last Admin: 03/06/18 23:50 Dose:  Not Given


Quetiapine Fumarate (Seroquel -)  50 mg PO DAILY [facility]


   Last Admin: 03/07/18 10:10 Dose:  50 mg





A/P


RLE DVT s/p IVC Filter


Presumed Acute Pulmonary Emboli


CAD


Atrial Fibrillation


h/o GI Bleed


h/o Lung Ca


h/o CVA


Acute Kidney Injury





-  continue anticoagulation


-  can start oral anticoagulation if no procedure planned


-  O2 as needed


-  lasix


-  monitor urine output, creatinine


-  monitor H/H

## 2018-03-07 NOTE — PN
Progress Note (short form)





- Note


Progress Note: 





Patient admitted with DVT and SOB, underwent IVC filter and to have a V/Q scan 

tomorrow. Known case of CAD, s/p PCI/stenting, s/p CVA, H/o GI bleeding related 

to angiodysplasia.





 Patient is alert and no symtoms reported.No further agitation.





Active Medications





Acetaminophen (Tylenol -)  650 mg PO Q6H PRN


   PRN Reason: FEVER


   Last Admin: 03/05/18 02:19 Dose:  650 mg


Aspirin (Ecotrin -)  81 mg PO DAILY [facility]


   Last Admin: 03/07/18 10:10 Dose:  81 mg


Atorvastatin Calcium (Lipitor -)  10 mg PO HS [facility]


   Last Admin: 03/06/18 23:49 Dose:  Not Given


Carvedilol (Coreg -)  6.25 mg PO BID [facility]


   Last Admin: 03/07/18 10:10 Dose:  6.25 mg


Cholecalciferol (Vitamin D3 -)  2,000 unit PO DAILY [facility]


   Last Admin: 03/07/18 10:11 Dose:  2,000 unit


Diltiazem HCl (Cardizem Cd -)  180 mg PO DAILY [facility]


   Last Admin: 03/07/18 10:10 Dose:  180 mg


Gabapentin (Neurontin -)  100 mg PO BID [facility]


   Last Admin: 03/07/18 10:10 Dose:  100 mg


Heparin Sodium (Porcine) (Heparin -)  1,000 unit IVPUSH PRN PRN


   PRN Reason: Heparin


   Last Admin: 03/06/18 10:54 Dose:  1,000 unit


Heparin Sodium (Porcine) (Heparin -)  5,000 unit IVPUSH PRN PRN


   PRN Reason: Heparin


HEPARIN SOD,PORK IN 0.45% NACL (Heparin-1/2ns 25,000 Units/500)  25,000 units 

in 500 mls @ 22.6 mls/hr IVPB TITR [facility]; 1,130 UNITS/HR


   PRN Reason: Protocol


   Last Admin: 03/06/18 22:00 Dose:  Not Given


Isosorbide Mononitrate (Imdur -)  30 mg PO DAILY [facility]


   Last Admin: 03/07/18 10:10 Dose:  30 mg


Nystatin (Nystop Powder -)  1 applic TP DAILY [facility]


Pantoprazole Sodium (Protonix -)  40 mg PO BID [facility]


   Last Admin: 03/07/18 10:10 Dose:  40 mg


Polyethylene Glycol (Miralax (For Daily Use) -)  17 gm PO TID [facility]


   Last Admin: 03/07/18 10:10 Dose:  17 gm


Quetiapine Fumarate (Seroquel -)  25 mg PO DAILY [facility]


   PRN Reason: AGITATION


   Last Admin: 03/05/18 19:09 Dose:  25 mg


Quetiapine Fumarate (Seroquel -)  100 mg PO HS [facility]


   Last Admin: 03/06/18 23:50 Dose:  Not Given


Quetiapine Fumarate (Seroquel -)  50 mg PO DAILY [facility]


   Last Admin: 03/07/18 10:10 Dose:  50 mg








86 year old female in no acute distress, mild pallor, no cyanosis, clubbing or 

jaundice.





 


 


NECK: Supple, no JVD, carotids 2+.


HEART: PMI in te 5th ICS, S1 variable, S2 normal. Grade I/VI systolic murmur, 

no gallops.


LUNGS: clear on auscultation.


EXTREMITIES: 1+ right ankle edema, no calf tenderness.





 


 CBC, BMP





 03/07/18 06:30 





 03/07/18 06:30 








IMPRESSION:


1. DVT, RLL.


2. PTE need to be excluded.


3. CAD, s/p PCI/Stentng.


4. Atrial fib. 


5. S/p post procedural CVA (embolic).


6. Recurring GI bleed due to angiodysplasia.


7. S/p IVC filter.


8. Chronic anemia.





RECOMMENDATIONS:


1. Continue current cardiac therapy.


2. Close F/u CBC.


3. Watchman  device has been discussed with the daughters, no decision has been 

made.

## 2018-03-08 LAB
ANION GAP SERPL CALC-SCNC: 9 MMOL/L (ref 8–16)
BASOPHILS # BLD: 0.7 % (ref 0–2)
BUN SERPL-MCNC: 14 MG/DL (ref 7–18)
CALCIUM SERPL-MCNC: 7.9 MG/DL (ref 8.5–10.1)
CHLORIDE SERPL-SCNC: 108 MMOL/L (ref 98–107)
CO2 SERPL-SCNC: 26 MMOL/L (ref 21–32)
CREAT SERPL-MCNC: 1.6 MG/DL (ref 0.55–1.02)
DEPRECATED RDW RBC AUTO: 20.3 % (ref 11.6–15.6)
EOSINOPHIL # BLD: 10.5 % (ref 0–4.5)
GLUCOSE SERPL-MCNC: 132 MG/DL (ref 74–106)
HCT VFR BLD CALC: 30.3 % (ref 32.4–45.2)
HGB BLD-MCNC: 9.7 GM/DL (ref 10.7–15.3)
LYMPHOCYTES # BLD: 21.7 % (ref 8–40)
MAGNESIUM SERPL-MCNC: 1.9 MG/DL (ref 1.8–2.4)
MCH RBC QN AUTO: 26.3 PG (ref 25.7–33.7)
MCHC RBC AUTO-ENTMCNC: 32 G/DL (ref 32–36)
MCV RBC: 82.2 FL (ref 80–96)
MONOCYTES # BLD AUTO: 9.1 % (ref 3.8–10.2)
NEUTROPHILS # BLD: 58 % (ref 42.8–82.8)
PLATELET # BLD AUTO: 187 K/MM3 (ref 134–434)
PMV BLD: 8 FL (ref 7.5–11.1)
POTASSIUM SERPLBLD-SCNC: 3.6 MMOL/L (ref 3.5–5.1)
RBC # BLD AUTO: 3.68 M/MM3 (ref 3.6–5.2)
SODIUM SERPL-SCNC: 143 MMOL/L (ref 136–145)
WBC # BLD AUTO: 5 K/MM3 (ref 4–10)

## 2018-03-08 RX ADMIN — NYSTATIN SCH APPLIC: 100000 POWDER TOPICAL at 10:50

## 2018-03-08 RX ADMIN — QUETIAPINE FUMARATE PRN MG: 25 TABLET ORAL at 17:11

## 2018-03-08 RX ADMIN — HEPARIN SODIUM SCH: 5000 INJECTION, SOLUTION INTRAVENOUS at 05:57

## 2018-03-08 RX ADMIN — HEPARIN SODIUM SCH MLS/HR: 5000 INJECTION, SOLUTION INTRAVENOUS at 22:49

## 2018-03-08 RX ADMIN — PANTOPRAZOLE SODIUM SCH MG: 40 TABLET, DELAYED RELEASE ORAL at 21:17

## 2018-03-08 RX ADMIN — CARVEDILOL SCH MG: 6.25 TABLET, FILM COATED ORAL at 10:45

## 2018-03-08 RX ADMIN — GABAPENTIN SCH MG: 100 CAPSULE ORAL at 21:17

## 2018-03-08 RX ADMIN — POLYETHYLENE GLYCOL 3350 SCH GM: 17 POWDER, FOR SOLUTION ORAL at 21:20

## 2018-03-08 RX ADMIN — QUETIAPINE FUMARATE SCH MG: 100 TABLET ORAL at 21:17

## 2018-03-08 RX ADMIN — POLYETHYLENE GLYCOL 3350 SCH: 17 POWDER, FOR SOLUTION ORAL at 05:57

## 2018-03-08 RX ADMIN — ASPIRIN SCH MG: 81 TABLET, COATED ORAL at 10:47

## 2018-03-08 RX ADMIN — GABAPENTIN SCH MG: 100 CAPSULE ORAL at 10:46

## 2018-03-08 RX ADMIN — VITAMIN D, TAB 1000IU (100/BT) SCH UNIT: 25 TAB at 10:47

## 2018-03-08 RX ADMIN — ATORVASTATIN CALCIUM SCH MG: 10 TABLET, FILM COATED ORAL at 21:17

## 2018-03-08 RX ADMIN — VITAMIN D, TAB 1000IU (100/BT) SCH UNIT: 25 TAB at 10:48

## 2018-03-08 RX ADMIN — POLYETHYLENE GLYCOL 3350 SCH GM: 17 POWDER, FOR SOLUTION ORAL at 14:00

## 2018-03-08 RX ADMIN — ISOSORBIDE MONONITRATE SCH MG: 30 TABLET, EXTENDED RELEASE ORAL at 10:47

## 2018-03-08 RX ADMIN — CARVEDILOL SCH MG: 6.25 TABLET, FILM COATED ORAL at 21:18

## 2018-03-08 RX ADMIN — POLYETHYLENE GLYCOL 3350 SCH: 17 POWDER, FOR SOLUTION ORAL at 14:45

## 2018-03-08 RX ADMIN — POLYETHYLENE GLYCOL 3350 SCH GM: 17 POWDER, FOR SOLUTION ORAL at 14:44

## 2018-03-08 RX ADMIN — PANTOPRAZOLE SODIUM SCH MG: 40 TABLET, DELAYED RELEASE ORAL at 10:47

## 2018-03-08 NOTE — PN
Progress Note, Physician


Chief Complaint: 


Pt sitting in chair in no acute distress, appears calm and cooperative. 

daughter at bedside. Denies any chest discomfort, sob, n/v/d or fever/chills





AC options and watchmans device discussed with daughter. Daughter prefers mom 

to be on AC despite GIB risk but would like to know more regarding anesthesia 

involved with watchmans device. 





- Current Medication List


Current Medications: 


Active Medications





Acetaminophen (Tylenol -)  650 mg PO Q6H PRN


   PRN Reason: FEVER


   Last Admin: 03/05/18 02:19 Dose:  650 mg


Aspirin (Ecotrin -)  81 mg PO DAILY [facility]


   Last Admin: 03/07/18 10:10 Dose:  81 mg


Atorvastatin Calcium (Lipitor -)  10 mg PO HS [facility]


   Last Admin: 03/07/18 21:06 Dose:  Not Given


Carvedilol (Coreg -)  6.25 mg PO BID [facility]


   Last Admin: 03/07/18 21:05 Dose:  6.25 mg


Cholecalciferol (Vitamin D3 -)  2,000 unit PO DAILY [facility]


   Last Admin: 03/07/18 10:11 Dose:  2,000 unit


Diltiazem HCl (Cardizem Cd -)  180 mg PO DAILY [facility]


   Last Admin: 03/07/18 10:10 Dose:  180 mg


Gabapentin (Neurontin -)  100 mg PO BID [facility]


   Last Admin: 03/07/18 21:06 Dose:  Not Given


Heparin Sodium (Porcine) (Heparin -)  1,000 unit IVPUSH PRN PRN


   PRN Reason: Heparin


   Last Admin: 03/06/18 10:54 Dose:  1,000 unit


Heparin Sodium (Porcine) (Heparin -)  5,000 unit IVPUSH PRN PRN


   PRN Reason: Heparin


HEPARIN SOD,PORK IN 0.45% NACL (Heparin-1/2ns 25,000 Units/500)  25,000 units 

in 500 mls @ 22.6 mls/hr IVPB TITR [facility]ENRY; 1,130 UNITS/HR


   PRN Reason: Protocol


   Last Admin: 03/08/18 05:57 Dose:  Not Given


Isosorbide Mononitrate (Imdur -)  30 mg PO DAILY [facility]


   Last Admin: 03/07/18 10:10 Dose:  30 mg


Nystatin (Nystop Powder -)  1 applic TP DAILY [facility]


Pantoprazole Sodium (Protonix -)  40 mg PO BID [facility]


   Last Admin: 03/07/18 21:05 Dose:  Not Given


Polyethylene Glycol (Miralax (For Daily Use) -)  17 gm PO TID [facility]


   Last Admin: 03/08/18 05:57 Dose:  Not Given


Quetiapine Fumarate (Seroquel -)  25 mg PO DAILY PRN


   PRN Reason: AGITATION


   Last Admin: 03/05/18 19:09 Dose:  25 mg


Quetiapine Fumarate (Seroquel -)  100 mg PO HS [facility]


   Last Admin: 03/07/18 21:05 Dose:  100 mg


Quetiapine Fumarate (Seroquel -)  50 mg PO DAILY [facility]


   Last Admin: 03/07/18 10:10 Dose:  50 mg











- Objective


Vital Signs: 


 Vital Signs











Temperature  97.9 F   03/07/18 21:00


 


Pulse Rate  66   03/07/18 21:00


 


Respiratory Rate  16   03/07/18 21:00


 


Blood Pressure  141/76   03/07/18 21:00


 


O2 Sat by Pulse Oximetry (%)  96   03/08/18 09:00











Constitutional: Yes: Well Nourished, No Distress, Calm


Cardiovascular: Yes: Pulse Irregular, Murmur.  No: Bruit, Rub


Respiratory: Yes: Regular, Diminished.  No: SOB, Tachypnea, Wheezes


Gastrointestinal: Yes: WNL, Normal Bowel Sounds, Soft, Abdomen, Obese.  No: 

Distention, Tenderness


...Rectal Exam: Yes: Deferred


Genitourinary: Yes: WNL


Edema: No


Neurological: Yes: Alert, Oriented


Psychiatric: Yes: Alert, Oriented


Labs: 


 CBC, BMP





 03/08/18 08:20 





 03/08/18 08:20 





 INR, PTT











INR  1.26  (0.82-1.09)  H  03/04/18  08:05    














Problem List





- Problems


(1) Chronic GI bleeding


Code(s): K92.2 - GASTROINTESTINAL HEMORRHAGE, UNSPECIFIED   





(2) Chronic kidney disease


Code(s): N18.9 - CHRONIC KIDNEY DISEASE, UNSPECIFIED   


Qualifiers: 


   Chronic kidney disease stage: stage 2 (mild)   Qualified Code(s): N18.2 - 

Chronic kidney disease, stage 2 (mild)   





(3) Hypertension


Code(s): I10 - ESSENTIAL (PRIMARY) HYPERTENSION   


Qualifiers: 


   Hypertension type: essential hypertension   Qualified Code(s): I10 - 

Essential (primary) hypertension   





(4) Atrial fibrillation


Code(s): I48.91 - UNSPECIFIED ATRIAL FIBRILLATION   


Qualifiers: 


   Atrial fibrillation type: permanent   Qualified Code(s): I48.2 - Chronic 

atrial fibrillation   





(5) Dementia


Code(s): F03.90 - UNSPECIFIED DEMENTIA WITHOUT BEHAVIORAL DISTURBANCE   


Qualifiers: 


   Dementia type: Parkinson's disease   Dementia behavioral disturbance: 

without behavioral disturbance   Qualified Code(s): G20 - Parkinson's disease; 

F02.80 - Dementia in other diseases classified elsewhere without behavioral 

disturbance; F02.80 - Dementia in other diseases classified elsewhere without 

behavioral disturbance; F02.80 - Dementia in other diseases classified 

elsewhere without behavioral disturbance   





(6) CHF (congestive heart failure)


Code(s): I50.9 - HEART FAILURE, UNSPECIFIED   





(7) Angiodysplasia of cecum


Code(s): K55.20 - ANGIODYSPLASIA OF COLON WITHOUT HEMORRHAGE   





(8) Presence of permanent cardiac pacemaker


Code(s): Z95.0 - PRESENCE OF CARDIAC PACEMAKER   





(9) DVT (deep venous thrombosis)


Code(s): I82.409 - ACUTE EMBOLISM AND THOMBOS UNSP DEEP VN UNSP LOWER EXTREMITY

   


Qualifiers: 


   DVT location: lower extremity   Affected thrombotic vein of extremity: 

popliteal   Chronicity: acute   Laterality: right   Qualified Code(s): I82.431 

- Acute embolism and thrombosis of right popliteal vein   





(10) SOB (shortness of breath)


Code(s): R06.02 - SHORTNESS OF BREATH   





(11) Chronic anemia


Code(s): D64.9 - ANEMIA, UNSPECIFIED   





(12) CAD (coronary artery disease)


Code(s): I25.10 - ATHSCL HEART DISEASE OF NATIVE CORONARY ARTERY W/O ANG PCTRS 

  


Qualifiers: 


   Coronary Disease-Associated Artery/Lesion type: native artery   Native vs. 

transplanted heart: native heart   Associated angina: without angina   

Qualified Code(s): I25.10 - Atherosclerotic heart disease of native coronary 

artery without angina pectoris   





(13) Pleural effusion


Code(s): J90 - PLEURAL EFFUSION, NOT ELSEWHERE CLASSIFIED   





(14) Atherosclerotic heart disease


Code(s): I25.10 - ATHSCL HEART DISEASE OF NATIVE CORONARY ARTERY W/O ANG PCTRS 

  


Qualifiers: 


   Coronary Disease-Associated Artery/Lesion type: native artery   Native vs. 

transplanted heart: native heart   Associated angina: without angina   

Qualified Code(s): I25.10 - Atherosclerotic heart disease of native coronary 

artery without angina pectoris   





(15) Hypercholesteremia


Code(s): E78.0 - PURE HYPERCHOLESTEROLEMIA * DO NOT USE *   





Assessment/Plan


(1) SOB (shortness of breath)


Assessment/Plan: 


improved, secondary to DVT


s/p IVC filter 


v/q scan unsuccessful due to pt's mental state(uncooperative) 


Code(s): R06.02 - SHORTNESS OF BREATH   





(2) Diarrhea


Assessment/Plan: 


possible impaction/constipation


Miralax TID 


GI following 


Code(s): R19.7 - DIARRHEA, UNSPECIFIED   


Qualifiers: 


   Diarrhea type: unspecified type   Qualified Code(s): R19.7 - Diarrhea, 

unspecified   





(3) Chronic anemia


Assessment/Plan: 


Chronic anemia secondary to vascular ectasias, Iron deficient, h/h stable today


GI following, conservative management per family 


Code(s): D64.9 - ANEMIA, UNSPECIFIED   





(4) Chronic GI bleeding


Assessment/Plan: 


chronic slow bleed secondary to vasc ectasias


heme-occult positive


Code(s): K92.2 - GASTROINTESTINAL HEMORRHAGE, UNSPECIFIED   





(4) DVT (deep venous thrombosis)


Assessment/Plan: 


RLL


s/p IVC filter


family prefers to restart coumadin 


heparin to coumadin bridge 


monitor INR 


Code(s): I82.409 - ACUTE EMBOLISM AND THOMBOS UNSP DEEP VN UNSP LOWER EXTREMITY

   


Qualifiers: 


   DVT location: lower extremity   Affected thrombotic vein of extremity: 

popliteal   Chronicity: acute   Laterality: right   Qualified Code(s): I82.431 

- Acute embolism and thrombosis of right popliteal vein   





(5) Angiodysplasia of cecum


Assessment/Plan: 


as above 


Code(s): K55.20 - ANGIODYSPLASIA OF COLON WITHOUT HEMORRHAGE   





(6) Atherosclerotic heart disease


Assessment/Plan: 


s/p PPM, PCI/stenting


continue aspirin 81mg


continue simvastatin


Code(s): I25.10 - ATHSCL HEART DISEASE OF NATIVE CORONARY ARTERY W/O ANG PCTRS 

  


Qualifiers: 


   Coronary Disease-Associated Artery/Lesion type: native artery   Native vs. 

transplanted heart: native heart   Associated angina: without angina   

Qualified Code(s): I25.10 - Atherosclerotic heart disease of native coronary 

artery without angina pectoris   





(7) Atrial fibrillation


Assessment/Plan: 


rate controlled, on aspirin at home, coumadin d/c'd last admission due to gib


restart coumadin today per family 


cardiology following


Code(s): I48.91 - UNSPECIFIED ATRIAL FIBRILLATION   


Qualifiers: 


   Atrial fibrillation type: permanent   Qualified Code(s): I48.2 - Chronic 

atrial fibrillation





(8) Hypercholesteremia


Assessment/Plan: 


stable


continue statin 


Code(s): E78.0 - PURE HYPERCHOLESTEROLEMIA * DO NOT USE *   





(9) Hypertension


Assessment/Plan: 


continue home meds


avoid aggressive bp control


Code(s): I10 - ESSENTIAL (PRIMARY) HYPERTENSION   


Qualifiers: 


   Hypertension type: essential hypertension   Qualified Code(s): I10 - 

Essential (primary) hypertension   





(10) Presence of permanent cardiac pacemaker


Assessment/Plan: 


ventricular paced


Code(s): Z95.0 - PRESENCE OF CARDIAC PACEMAKER   





(11) CHF (congestive heart failure)


Assessment/Plan: 


chronic,lv dysfunction


echo shows pEF


low Na diet


daily weights


will monitor 


cardiology following


PT as tolerated


Code(s): I50.9 - HEART FAILURE, UNSPECIFIED   


Qualifiers: 


   Qualified Code(s): I50.32 - Chronic diastolic (congestive) heart failure   





(12) Chronic kidney disease


Assessment/Plan: 


slightly worsened post diuresis 


will monitor 


Code(s): N18.9 - CHRONIC KIDNEY DISEASE, UNSPECIFIED   


Qualifiers: 


   Chronic kidney disease stage: stage 2 (mild)   Qualified Code(s): N18.2 - 

Chronic kidney disease, stage 2 (mild)   





(13) Dementia


Assessment/Plan: 


calm and cooperative today


seroquel adjusted by psych


psych consult appreciated


will monitor 


Code(s): F03.90 - UNSPECIFIED DEMENTIA WITHOUT BEHAVIORAL DISTURBANCE   


Qualifiers: 


   Dementia type: Parkinson's disease   Dementia behavioral disturbance: 

without behavioral disturbance   Qualified Code(s): G20 - Parkinson's disease; 

F02.80 - Dementia in other diseases classified elsewhere without behavioral 

disturbance; F02.80 - Dementia in other diseases classified elsewhere without 

behavioral disturbance; F02.80 - Dementia in other diseases classified 

elsewhere without behavioral disturbance  





(14) Pleural effusion


Assessment/Plan: 


developing left pleural effusion on chest xray


pt asymptomatic


received lasix yesterday


will monitor


Code(s): J90 - PLEURAL EFFUSION, NOT ELSEWHERE CLASSIFIED   








Pt was made DNR/DNI during last admission by pt and family. 


Pt was discharged during last admission on Aspirin. Coumadin was stopped due to 

GIB/Anemia after discussion family, PCP, cardiology, and GI.  Family expressed 

they prefer conservative management for the GIB and colonoscopy/endoscopy was 

deferred. Pt returned to the hospital this admission with DVT.  Pt and family 

seen by GI, Cardiology, PCP. Option for watchman's device vs AC discussed 

again. Family would like pt back on Coumadin. Risks and benefits discussed with 

family. Watchman's device option can be further explored outpt at this stage.

## 2018-03-08 NOTE — PN
Progress Note, Physician


History of Present Illness: 





PULMONARY








alert,nad,-cp,-sob





- Current Medication List


Current Medications: 


Active Medications





Acetaminophen (Tylenol -)  650 mg PO Q6H PRN


   PRN Reason: FEVER


   Last Admin: 03/05/18 02:19 Dose:  650 mg


Aspirin (Ecotrin -)  81 mg PO DAILY [facility]


   Last Admin: 03/08/18 10:47 Dose:  81 mg


Atorvastatin Calcium (Lipitor -)  10 mg PO HS [facility]


   Last Admin: 03/07/18 21:06 Dose:  Not Given


Carvedilol (Coreg -)  6.25 mg PO BID [facility]


   Last Admin: 03/08/18 10:45 Dose:  6.25 mg


Cholecalciferol (Vitamin D3 -)  2,000 unit PO DAILY [facility]


   Last Admin: 03/08/18 10:48 Dose:  2,000 unit


Diltiazem HCl (Cardizem Cd -)  180 mg PO DAILY [facility]


   Last Admin: 03/08/18 10:45 Dose:  180 mg


Gabapentin (Neurontin -)  100 mg PO BID [facility]


   Last Admin: 03/08/18 10:46 Dose:  100 mg


Heparin Sodium (Porcine) (Heparin -)  1,000 unit IVPUSH PRN PRN


   PRN Reason: Heparin


   Last Admin: 03/06/18 10:54 Dose:  1,000 unit


Heparin Sodium (Porcine) (Heparin -)  5,000 unit IVPUSH PRN PRN


   PRN Reason: Heparin


HEPARIN SOD,PORK IN 0.45% NACL (Heparin-1/2ns 25,000 Units/500)  25,000 units 

in 500 mls @ 22.6 mls/hr IVPB TITR [facility]; 1,130 UNITS/HR


   PRN Reason: Protocol


   Last Admin: 03/08/18 05:57 Dose:  Not Given


Isosorbide Mononitrate (Imdur -)  30 mg PO DAILY [facility]


   Last Admin: 03/08/18 10:47 Dose:  30 mg


Nystatin (Nystop Powder -)  1 applic TP DAILY [facility]


   Last Admin: 03/08/18 10:50 Dose:  1 applic


Pantoprazole Sodium (Protonix -)  40 mg PO BID [facility]


   Last Admin: 03/08/18 10:47 Dose:  40 mg


Polyethylene Glycol (Miralax (For Daily Use) -)  17 gm PO TID [facility]


   Last Admin: 03/08/18 05:57 Dose:  Not Given


Quetiapine Fumarate (Seroquel -)  25 mg PO DAILY [facility]


   PRN Reason: AGITATION


   Last Admin: 03/05/18 19:09 Dose:  25 mg


Quetiapine Fumarate (Seroquel -)  100 mg PO [facility]


   Last Admin: 03/07/18 21:05 Dose:  100 mg


Quetiapine Fumarate (Seroquel -)  50 mg PO DAILY [facility]


   Last Admin: 03/08/18 10:48 Dose:  50 mg


Warfarin Sodium (Coumadin -)  5 mg PO DAILY@1800 [facility]











- Objective


Vital Signs: 


 Vital Signs











Temperature  97.9 F   03/07/18 21:00


 


Pulse Rate  66   03/07/18 21:00


 


Respiratory Rate  16   03/07/18 21:00


 


Blood Pressure  141/76   03/07/18 21:00


 


O2 Sat by Pulse Oximetry (%)  96   03/08/18 09:00











Constitutional: Yes: Well Nourished, Calm


Eyes: Yes: WNL


HENT: Yes: WNL


Neck: Yes: WNL


Cardiovascular: Yes: Pulse Irregular, S1, S2


Respiratory: Yes: CTA Bilaterally


Gastrointestinal: Yes: Normal Bowel Sounds, Soft


Extremities: Yes: WNL


Edema: Yes (min swelling rle)


Labs: 


 CBC, BMP





 03/08/18 08:20 





 03/08/18 08:20 





 INR, PTT











INR  1.26  (0.82-1.09)  H  03/04/18  08:05    














Assessment/Plan





IMP DYSPNEA improved


     RLE DVT, LIKELY ACUTE PE S/P MIVC FILTER


     ASHD S/P STENTS


     H/O ANEMIA


     H/O GI BLEEDS


     PERMANENT AFIB


     H/O CVA


     H/O LUNG CA S/P RESECTION


     ELENITA











PLAN AC 


       IVF


       MONITOR H+H


       O2


       MONITOR LYTES,RENAL FUNCTION





DR OSEI


      


    


   





 Problem List 





- Problems


(1) Hypertension


Code(s): I10 - ESSENTIAL (PRIMARY) HYPERTENSION   


  Qualifiers:    Qualifiers: essential hypertension   Qualifiers: [id number]   

Qualifiers: [id number] 





(2) Renal insufficiency


Code(s): N28.9 - DISORDER OF KIDNEY AND URETER, UNSPECIFIED   





(3) Atrial fibrillation


Code(s): I48.91 - UNSPECIFIED ATRIAL FIBRILLATION   


  Qualifiers:    Qualifiers: paroxysmal   Qualifiers: [id number]   Qualifiers: 

[id number] 





(4) Dementia


Code(s): F03.90 - UNSPECIFIED DEMENTIA WITHOUT BEHAVIORAL DISTURBANCE   


  Qualifiers:    Qualifiers: Alzheimer's disease   Qualifiers: unspecified 

onset   Qualifiers: without behavioral disturbance   Qualifiers: [id number]   

Qualifiers: [id number] 





(5) Carotid sinus hypersensitivity


Code(s): G90.01 - CAROTID SINUS SYNCOPE   





(6) CHF (congestive heart failure)


Code(s): I50.9 - HEART FAILURE, UNSPECIFIED   


  Qualifiers:    Qualifiers: [id number]   Qualifiers: [id number] 





(7) Iron deficiency anemia due to chronic blood loss


Code(s): D50.0 - IRON DEFICIENCY ANEMIA SECONDARY TO BLOOD LOSS (CHRONIC)   





(8) Angiodysplasia of cecum


Code(s): K55.20 - ANGIODYSPLASIA OF COLON WITHOUT HEMORRHAGE   





(9) Presence of permanent cardiac pacemaker


Code(s): Z95.0 - PRESENCE OF CARDIAC PACEMAKER   





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


Code(s): K25.7 - CHRONIC GASTRIC ULCER WITHOUT HEMORRHAGE OR PERFORATION   





(11) DVT (deep venous thrombosis)


Code(s): I82.409 - ACUTE EMBOLISM AND THOMBOS UNSP DEEP VN UNSP LOWER EXTREMITY

   


  Qualifiers:    Qualifiers: lower extremity   Qualifiers: popliteal   

Qualifiers: acute   Qualifiers: right   Qualifiers: [id number]   Qualifiers: 

[id number] 





(12) SOB (shortness of breath)


Code(s): R06.02 - SHORTNESS OF BREATH   





(13) Chronic anemia


Code(s): D64.9 - ANEMIA, UNSPECIFIED   





(14) Atherosclerotic heart disease


Code(s): I25.10 - ATHSCL HEART DISEASE OF NATIVE CORONARY ARTERY W/O ANG PCTRS 

  


  Qualifiers:    Qualifiers: native artery   Qualifiers: native heart   

Qualifiers: without angina   Qualifiers: [id number]   Qualifiers: [id number]

## 2018-03-09 LAB
ANION GAP SERPL CALC-SCNC: 8 MMOL/L (ref 8–16)
BASOPHILS # BLD: 0.7 % (ref 0–2)
BUN SERPL-MCNC: 15 MG/DL (ref 7–18)
CALCIUM SERPL-MCNC: 8.7 MG/DL (ref 8.5–10.1)
CHLORIDE SERPL-SCNC: 109 MMOL/L (ref 98–107)
CO2 SERPL-SCNC: 27 MMOL/L (ref 21–32)
CREAT SERPL-MCNC: 1.7 MG/DL (ref 0.55–1.02)
DEPRECATED RDW RBC AUTO: 20 % (ref 11.6–15.6)
EOSINOPHIL # BLD: 9.4 % (ref 0–4.5)
GLUCOSE SERPL-MCNC: 100 MG/DL (ref 74–106)
HCT VFR BLD CALC: 29.6 % (ref 32.4–45.2)
HGB BLD-MCNC: 9.8 GM/DL (ref 10.7–15.3)
INR BLD: 1.24 (ref 0.82–1.09)
LYMPHOCYTES # BLD: 19.2 % (ref 8–40)
MAGNESIUM SERPL-MCNC: 2 MG/DL (ref 1.8–2.4)
MCH RBC QN AUTO: 27 PG (ref 25.7–33.7)
MCHC RBC AUTO-ENTMCNC: 33 G/DL (ref 32–36)
MCV RBC: 81.7 FL (ref 80–96)
MONOCYTES # BLD AUTO: 9.4 % (ref 3.8–10.2)
NEUTROPHILS # BLD: 61.3 % (ref 42.8–82.8)
PLATELET # BLD AUTO: 193 K/MM3 (ref 134–434)
PMV BLD: 8.4 FL (ref 7.5–11.1)
POTASSIUM SERPLBLD-SCNC: 4.3 MMOL/L (ref 3.5–5.1)
PT PNL PPP: 14 SEC (ref 9.98–11.88)
RBC # BLD AUTO: 3.62 M/MM3 (ref 3.6–5.2)
SODIUM SERPL-SCNC: 144 MMOL/L (ref 136–145)
WBC # BLD AUTO: 5.2 K/MM3 (ref 4–10)

## 2018-03-09 RX ADMIN — POLYETHYLENE GLYCOL 3350 SCH GM: 17 POWDER, FOR SOLUTION ORAL at 14:43

## 2018-03-09 RX ADMIN — CARVEDILOL SCH MG: 6.25 TABLET, FILM COATED ORAL at 10:52

## 2018-03-09 RX ADMIN — POLYETHYLENE GLYCOL 3350 SCH: 17 POWDER, FOR SOLUTION ORAL at 05:15

## 2018-03-09 RX ADMIN — GABAPENTIN SCH MG: 100 CAPSULE ORAL at 10:52

## 2018-03-09 RX ADMIN — VITAMIN D, TAB 1000IU (100/BT) SCH UNIT: 25 TAB at 10:52

## 2018-03-09 RX ADMIN — PANTOPRAZOLE SODIUM SCH MG: 40 TABLET, DELAYED RELEASE ORAL at 10:53

## 2018-03-09 RX ADMIN — QUETIAPINE FUMARATE SCH MG: 100 TABLET ORAL at 21:12

## 2018-03-09 RX ADMIN — NYSTATIN SCH APPLIC: 100000 POWDER TOPICAL at 10:53

## 2018-03-09 RX ADMIN — ISOSORBIDE MONONITRATE SCH MG: 30 TABLET, EXTENDED RELEASE ORAL at 10:52

## 2018-03-09 RX ADMIN — ASPIRIN SCH MG: 81 TABLET, COATED ORAL at 10:52

## 2018-03-09 RX ADMIN — HEPARIN SODIUM SCH: 5000 INJECTION, SOLUTION INTRAVENOUS at 21:02

## 2018-03-09 RX ADMIN — GABAPENTIN SCH: 100 CAPSULE ORAL at 21:14

## 2018-03-09 RX ADMIN — CARVEDILOL SCH: 6.25 TABLET, FILM COATED ORAL at 21:02

## 2018-03-09 RX ADMIN — PANTOPRAZOLE SODIUM SCH: 40 TABLET, DELAYED RELEASE ORAL at 21:14

## 2018-03-09 RX ADMIN — POLYETHYLENE GLYCOL 3350 SCH: 17 POWDER, FOR SOLUTION ORAL at 21:08

## 2018-03-09 RX ADMIN — ATORVASTATIN CALCIUM SCH: 10 TABLET, FILM COATED ORAL at 21:14

## 2018-03-09 NOTE — PN
Progress Note, Physician


History of Present Illness: 





PULMONARY





ALERT,OOB-CHAIR, COMFORTABLE,OCC COUGH,-CP,-SOB





- Current Medication List


Current Medications: 


Active Medications





Acetaminophen (Tylenol -)  650 mg PO Q6H PRN


   PRN Reason: FEVER


   Last Admin: 03/05/18 02:19 Dose:  650 mg


Aspirin (Ecotrin -)  81 mg PO DAILY [facility]


   Last Admin: 03/09/18 10:52 Dose:  81 mg


Atorvastatin Calcium (Lipitor -)  10 mg PO HS [facility]


   Last Admin: 03/08/18 21:17 Dose:  10 mg


Carvedilol (Coreg -)  6.25 mg PO BID [facility]


   Last Admin: 03/09/18 10:52 Dose:  6.25 mg


Cholecalciferol (Vitamin D3 -)  2,000 unit PO DAILY [facility]


   Last Admin: 03/09/18 10:52 Dose:  2,000 unit


Diltiazem HCl (Cardizem Cd -)  180 mg PO DAILY [facility]


   Last Admin: 03/09/18 10:52 Dose:  180 mg


Gabapentin (Neurontin -)  100 mg PO BID [facility]


   Last Admin: 03/09/18 10:52 Dose:  100 mg


Heparin Sodium (Porcine) (Heparin -)  1,000 unit IVPUSH PRN PRN


   PRN Reason: Heparin


   Last Admin: 03/06/18 10:54 Dose:  1,000 unit


Heparin Sodium (Porcine) (Heparin -)  5,000 unit IVPUSH PRN PRN


   PRN Reason: Heparin


HEPARIN SOD,PORK IN 0.45% NACL (Heparin-1/2ns 25,000 Units/500)  25,000 units 

in 500 mls @ 22.6 mls/hr IVPB TITR [facility]; 1,130 UNITS/HR


   PRN Reason: Protocol


   Last Titration: 03/09/18 10:54 Dose:  730 units/hr, 14.6 mls/hr


Isosorbide Mononitrate (Imdur -)  30 mg PO DAILY [facility]


   Last Admin: 03/09/18 10:52 Dose:  30 mg


Nystatin (Nystop Powder -)  1 applic TP DAILY [facility]


   Last Admin: 03/09/18 10:53 Dose:  1 applic


Pantoprazole Sodium (Protonix -)  40 mg PO BID [facility]


   Last Admin: 03/09/18 10:53 Dose:  40 mg


Polyethylene Glycol (Miralax (For Daily Use) -)  17 gm PO TID [facility]


   Last Admin: 03/09/18 05:15 Dose:  Not Given


Quetiapine Fumarate (Seroquel -)  25 mg PO DAILY [facility]


   PRN Reason: AGITATION


   Last Admin: 03/08/18 17:11 Dose:  25 mg


Quetiapine Fumarate (Seroquel -)  100 mg PO HS [facility]


   Last Admin: 03/08/18 21:17 Dose:  100 mg


Quetiapine Fumarate (Seroquel -)  50 mg PO DAILY [facility]


   Last Admin: 03/09/18 10:53 Dose:  50 mg


Warfarin Sodium (Coumadin -)  5 mg PO DAILY@1800 [facility]


Warfarin Sodium (Coumadin -)  5 mg PO ONCE ONE


   Stop: 03/09/18 18:01











- Objective


Vital Signs: 


 Vital Signs











Temperature  97.3 F L  03/09/18 10:00


 


Pulse Rate  97 H  03/09/18 10:00


 


Respiratory Rate  20   03/09/18 10:00


 


Blood Pressure  135/62   03/09/18 10:00


 


O2 Sat by Pulse Oximetry (%)  95   03/08/18 21:00











Constitutional: Yes: Well Nourished, Calm


Eyes: Yes: WNL


HENT: Yes: WNL


Neck: Yes: WNL


Cardiovascular: Yes: Pulse Irregular, S1, S2


Respiratory: Yes: Diminished


Gastrointestinal: Yes: Normal Bowel Sounds, Soft


Extremities: Yes: WNL


Edema: Yes (RLE)


Labs: 


 CBC, BMP





 03/09/18 06:30 





 03/09/18 06:30 





 INR, PTT











INR  1.24  (0.82-1.09)  H  03/09/18  06:30    














Assessment/Plan





IMP DYSPNEA improved


     RLE DVT, LIKELY ACUTE PE S/P IVC FILTER


     ASHD S/P STENTS


     H/O ANEMIA


     H/O GI BLEEDS


     PERMANENT AFIB


     H/O CVA


     H/O LUNG CA S/P RESECTION


     ELENITA WORSENING











PLAN AC 


       IVF


       MONITOR H+H


       O2


       MONITOR LYTES,RENAL FUNCTION





DR OSEI


      


    


   





 Problem List 





- Problems


(1) Hypertension


Code(s): I10 - ESSENTIAL (PRIMARY) HYPERTENSION   


  Qualifiers:    Qualifiers: essential hypertension   Qualifiers: [id number]   

Qualifiers: [id number] 





(2) Renal insufficiency


Code(s): N28.9 - DISORDER OF KIDNEY AND URETER, UNSPECIFIED   





(3) Atrial fibrillation


Code(s): I48.91 - UNSPECIFIED ATRIAL FIBRILLATION   


  Qualifiers:    Qualifiers: paroxysmal   Qualifiers: [id number]   Qualifiers: 

[id number] 





(4) Dementia


Code(s): F03.90 - UNSPECIFIED DEMENTIA WITHOUT BEHAVIORAL DISTURBANCE   


  Qualifiers:    Qualifiers: Alzheimer's disease   Qualifiers: unspecified 

onset   Qualifiers: without behavioral disturbance   Qualifiers: [id number]   

Qualifiers: [id number] 





(5) Carotid sinus hypersensitivity


Code(s): G90.01 - CAROTID SINUS SYNCOPE   





(6) CHF (congestive heart failure)


Code(s): I50.9 - HEART FAILURE, UNSPECIFIED   


  Qualifiers:    Qualifiers: [id number]   Qualifiers: [id number] 





(7) Iron deficiency anemia due to chronic blood loss


Code(s): D50.0 - IRON DEFICIENCY ANEMIA SECONDARY TO BLOOD LOSS (CHRONIC)   





(8) Angiodysplasia of cecum


Code(s): K55.20 - ANGIODYSPLASIA OF COLON WITHOUT HEMORRHAGE   





(9) Presence of permanent cardiac pacemaker


Code(s): Z95.0 - PRESENCE OF CARDIAC PACEMAKER   





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


Code(s): K25.7 - CHRONIC GASTRIC ULCER WITHOUT HEMORRHAGE OR PERFORATION   





(11) DVT (deep venous thrombosis)


Code(s): I82.409 - ACUTE EMBOLISM AND THOMBOS UNSP DEEP VN UNSP LOWER EXTREMITY

   


  Qualifiers:    Qualifiers: lower extremity   Qualifiers: popliteal   

Qualifiers: acute   Qualifiers: right   Qualifiers: [id number]   Qualifiers: 

[id number] 





(12) SOB (shortness of breath)


Code(s): R06.02 - SHORTNESS OF BREATH   





(13) Chronic anemia


Code(s): D64.9 - ANEMIA, UNSPECIFIED   





(14) Atherosclerotic heart disease


Code(s): I25.10 - ATHSCL HEART DISEASE OF NATIVE CORONARY ARTERY W/O ANG PCTRS 

  


  Qualifiers:    Qualifiers: native artery   Qualifiers: native heart   

Qualifiers: without angina   Qualifiers: [id number]   Qualifiers: [id number]

## 2018-03-09 NOTE — PN
Progress Note, Physician


Chief Complaint: 


Pt sitting in chair in no acute distress, appears calm and cooperative. 

daughter at bedside. Denies any chest discomfort, sob, n/v/d or fever/chills 





- Current Medication List


Current Medications: 


Active Medications





Acetaminophen (Tylenol -)  650 mg PO Q6H PRN


   PRN Reason: FEVER


   Last Admin: 03/05/18 02:19 Dose:  650 mg


Aspirin (Ecotrin -)  81 mg PO DAILY [facility]


   Last Admin: 03/09/18 10:52 Dose:  81 mg


Atorvastatin Calcium (Lipitor -)  10 mg PO HS [facility]


   Last Admin: 03/08/18 21:17 Dose:  10 mg


Carvedilol (Coreg -)  6.25 mg PO BID [facility]


   Last Admin: 03/09/18 10:52 Dose:  6.25 mg


Cholecalciferol (Vitamin D3 -)  2,000 unit PO DAILY [facility]


   Last Admin: 03/09/18 10:52 Dose:  2,000 unit


Diltiazem HCl (Cardizem Cd -)  180 mg PO DAILY [facility]


   Last Admin: 03/09/18 10:52 Dose:  180 mg


Gabapentin (Neurontin -)  100 mg PO BID [facility]


   Last Admin: 03/09/18 10:52 Dose:  100 mg


Heparin Sodium (Porcine) (Heparin -)  1,000 unit IVPUSH PRN PRN


   PRN Reason: Heparin


   Last Admin: 03/06/18 10:54 Dose:  1,000 unit


Heparin Sodium (Porcine) (Heparin -)  5,000 unit IVPUSH PRN PRN


   PRN Reason: Heparin


HEPARIN SOD,PORK IN 0.45% NACL (Heparin-1/2ns 25,000 Units/500)  25,000 units 

in 500 mls @ 22.6 mls/hr IVPB TITR HENRY; 1,130 UNITS/HR


   PRN Reason: Protocol


   Last Titration: 03/09/18 10:54 Dose:  730 units/hr, 14.6 mls/hr


Isosorbide Mononitrate (Imdur -)  30 mg PO DAILY [facility]


   Last Admin: 03/09/18 10:52 Dose:  30 mg


Nystatin (Nystop Powder -)  1 applic TP DAILY [facility]


   Last Admin: 03/09/18 10:53 Dose:  1 applic


Pantoprazole Sodium (Protonix -)  40 mg PO BID [facility]


   Last Admin: 03/09/18 10:53 Dose:  40 mg


Polyethylene Glycol (Miralax (For Daily Use) -)  17 gm PO TID [facility]


   Last Admin: 03/09/18 05:15 Dose:  Not Given


Quetiapine Fumarate (Seroquel -)  25 mg PO DAILY PRN


   PRN Reason: AGITATION


   Last Admin: 03/08/18 17:11 Dose:  25 mg


Quetiapine Fumarate (Seroquel -)  100 mg PO HS [facility]


   Last Admin: 03/08/18 21:17 Dose:  100 mg


Quetiapine Fumarate (Seroquel -)  50 mg PO DAILY [facility]


   Last Admin: 03/09/18 10:53 Dose:  50 mg


Warfarin Sodium (Coumadin -)  5 mg PO DAILY@1800 [facility]


Warfarin Sodium (Coumadin -)  5 mg PO ONCE ONE


   Stop: 03/09/18 18:01











- Objective


Vital Signs: 


 Vital Signs











Temperature  97.3 F L  03/09/18 10:00


 


Pulse Rate  97 H  03/09/18 10:00


 


Respiratory Rate  20   03/09/18 10:00


 


Blood Pressure  135/62   03/09/18 10:00


 


O2 Sat by Pulse Oximetry (%)  95   03/08/18 21:00











Constitutional: Yes: Well Nourished, No Distress


Cardiovascular: Yes: Pulse Irregular, Murmur.  No: Bruit, Gallop


Respiratory: Yes: Regular, CTA Bilaterally, Diminished.  No: Rhonchi, SOB, 

Tachypnea, Wheezes


Gastrointestinal: Yes: WNL, Normal Bowel Sounds, Soft, Abdomen, Obese.  No: 

Distention, Tenderness


...Rectal Exam: Yes: Deferred


Genitourinary: Yes: WNL


Edema: Yes


Edema: LLE: Trace, RLE: 1+


Neurological: Yes: WNL, Alert, Oriented


Psychiatric: Yes: WNL, Alert, Oriented


Labs: 


 CBC, BMP





 03/09/18 06:30 





 03/09/18 06:30 





 INR, PTT











INR  1.24  (0.82-1.09)  H  03/09/18  06:30    














Problem List





- Problems


(1) Chronic GI bleeding


Code(s): K92.2 - GASTROINTESTINAL HEMORRHAGE, UNSPECIFIED   





(2) Chronic kidney disease


Code(s): N18.9 - CHRONIC KIDNEY DISEASE, UNSPECIFIED   


Qualifiers: 


   Chronic kidney disease stage: stage 2 (mild)   Qualified Code(s): N18.2 - 

Chronic kidney disease, stage 2 (mild)   





(3) Hypertension


Code(s): I10 - ESSENTIAL (PRIMARY) HYPERTENSION   


Qualifiers: 


   Hypertension type: essential hypertension   Qualified Code(s): I10 - 

Essential (primary) hypertension   





(4) Atrial fibrillation


Code(s): I48.91 - UNSPECIFIED ATRIAL FIBRILLATION   


Qualifiers: 


   Atrial fibrillation type: permanent   Qualified Code(s): I48.2 - Chronic 

atrial fibrillation   





(5) Dementia


Code(s): F03.90 - UNSPECIFIED DEMENTIA WITHOUT BEHAVIORAL DISTURBANCE   


Qualifiers: 


   Dementia type: Parkinson's disease   Dementia behavioral disturbance: 

without behavioral disturbance   Qualified Code(s): G20 - Parkinson's disease; 

F02.80 - Dementia in other diseases classified elsewhere without behavioral 

disturbance; F02.80 - Dementia in other diseases classified elsewhere without 

behavioral disturbance; F02.80 - Dementia in other diseases classified 

elsewhere without behavioral disturbance   





(6) CHF (congestive heart failure)


Code(s): I50.9 - HEART FAILURE, UNSPECIFIED   





(7) Angiodysplasia of cecum


Code(s): K55.20 - ANGIODYSPLASIA OF COLON WITHOUT HEMORRHAGE   





(8) Presence of permanent cardiac pacemaker


Code(s): Z95.0 - PRESENCE OF CARDIAC PACEMAKER   





(9) DVT (deep venous thrombosis)


Code(s): I82.409 - ACUTE EMBOLISM AND THOMBOS UNSP DEEP VN UNSP LOWER EXTREMITY

   


Qualifiers: 


   DVT location: lower extremity   Affected thrombotic vein of extremity: 

popliteal   Chronicity: acute   Laterality: right   Qualified Code(s): I82.431 

- Acute embolism and thrombosis of right popliteal vein   





(10) SOB (shortness of breath)


Code(s): R06.02 - SHORTNESS OF BREATH   





(11) Chronic anemia


Code(s): D64.9 - ANEMIA, UNSPECIFIED   





(12) CAD (coronary artery disease)


Code(s): I25.10 - ATHSCL HEART DISEASE OF NATIVE CORONARY ARTERY W/O ANG PCTRS 

  


Qualifiers: 


   Coronary Disease-Associated Artery/Lesion type: native artery   Native vs. 

transplanted heart: native heart   Associated angina: without angina   

Qualified Code(s): I25.10 - Atherosclerotic heart disease of native coronary 

artery without angina pectoris   





(13) Pleural effusion


Code(s): J90 - PLEURAL EFFUSION, NOT ELSEWHERE CLASSIFIED   





(14) Atherosclerotic heart disease


Code(s): I25.10 - ATHSCL HEART DISEASE OF NATIVE CORONARY ARTERY W/O ANG PCTRS 

  


Qualifiers: 


   Coronary Disease-Associated Artery/Lesion type: native artery   Native vs. 

transplanted heart: native heart   Associated angina: without angina   

Qualified Code(s): I25.10 - Atherosclerotic heart disease of native coronary 

artery without angina pectoris   





(15) Hypercholesteremia


Code(s): E78.0 - PURE HYPERCHOLESTEROLEMIA * DO NOT USE *   





Assessment/Plan


(1) SOB (shortness of breath)


Assessment/Plan: 


improved, secondary to DVT


s/p IVC filter 


v/q scan unsuccessful due to pt's mental state(uncooperative) 


Code(s): R06.02 - SHORTNESS OF BREATH   





(2) Diarrhea


Assessment/Plan: 


improved


Code(s): R19.7 - DIARRHEA, UNSPECIFIED   


Qualifiers: 


   Diarrhea type: unspecified type   Qualified Code(s): R19.7 - Diarrhea, 

unspecified   





(3) Chronic anemia


Assessment/Plan: 


Chronic anemia secondary to vascular ectasias, Iron deficient, h/h stable today


GI following, conservative management per family 


Code(s): D64.9 - ANEMIA, UNSPECIFIED   





(4) Chronic GI bleeding


Assessment/Plan: 


chronic slow bleed secondary to vasc ectasias


heme-occult positive


monitor h/h


Code(s): K92.2 - GASTROINTESTINAL HEMORRHAGE, UNSPECIFIED   





(4) DVT (deep venous thrombosis)


Assessment/Plan: 


RLL


s/p IVC filter


heparin to coumadin bridge 


INR subtherapeutic 


Code(s): I82.409 - ACUTE EMBOLISM AND THOMBOS UNSP DEEP VN UNSP LOWER EXTREMITY

   


Qualifiers: 


   DVT location: lower extremity   Affected thrombotic vein of extremity: 

popliteal   Chronicity: acute   Laterality: right   Qualified Code(s): I82.431 

- Acute embolism and thrombosis of right popliteal vein   





(5) Angiodysplasia of cecum


Assessment/Plan: 


as above 


Code(s): K55.20 - ANGIODYSPLASIA OF COLON WITHOUT HEMORRHAGE   





(6) Atherosclerotic heart disease


Assessment/Plan: 


s/p PPM, PCI/stenting


continue aspirin 81mg


continue simvastatin


Code(s): I25.10 - ATHSCL HEART DISEASE OF NATIVE CORONARY ARTERY W/O ANG PCTRS 

  


Qualifiers: 


   Coronary Disease-Associated Artery/Lesion type: native artery   Native vs. 

transplanted heart: native heart   Associated angina: without angina   

Qualified Code(s): I25.10 - Atherosclerotic heart disease of native coronary 

artery without angina pectoris   





(7) Atrial fibrillation


Assessment/Plan: 


rate controlled, on aspirin at home, coumadin d/c'd last admission due to gib


coumadin tonight, inr subther


cardiology following


Code(s): I48.91 - UNSPECIFIED ATRIAL FIBRILLATION   


Qualifiers: 


   Atrial fibrillation type: permanent   Qualified Code(s): I48.2 - Chronic 

atrial fibrillation





(8) Hypercholesteremia


Assessment/Plan: 


stable


continue statin 


Code(s): E78.0 - PURE HYPERCHOLESTEROLEMIA * DO NOT USE *   





(9) Hypertension


Assessment/Plan: 


continue home meds


avoid aggressive bp control


Code(s): I10 - ESSENTIAL (PRIMARY) HYPERTENSION   


Qualifiers: 


   Hypertension type: essential hypertension   Qualified Code(s): I10 - 

Essential (primary) hypertension   





(10) Presence of permanent cardiac pacemaker


Assessment/Plan: 


ventricular paced


Code(s): Z95.0 - PRESENCE OF CARDIAC PACEMAKER   





(11) CHF (congestive heart failure)


Assessment/Plan: 


chronic,lv dysfunction


echo shows pEF


low Na diet


daily weights


will monitor 


cardiology following


PT as tolerated


Code(s): I50.9 - HEART FAILURE, UNSPECIFIED   


Qualifiers: 


   Qualified Code(s): I50.32 - Chronic diastolic (congestive) heart failure   





(12) Chronic kidney disease


Assessment/Plan: 


slightly worsened post diuresis 


will monitor 


Code(s): N18.9 - CHRONIC KIDNEY DISEASE, UNSPECIFIED   


Qualifiers: 


   Chronic kidney disease stage: stage 2 (mild)   Qualified Code(s): N18.2 - 

Chronic kidney disease, stage 2 (mild)   





(13) Dementia


Assessment/Plan: 


calm and cooperative today


seroquel adjusted by psych


psych consult appreciated


will monitor 


Code(s): F03.90 - UNSPECIFIED DEMENTIA WITHOUT BEHAVIORAL DISTURBANCE   


Qualifiers: 


   Dementia type: Parkinson's disease   Dementia behavioral disturbance: 

without behavioral disturbance   Qualified Code(s): G20 - Parkinson's disease; 

F02.80 - Dementia in other diseases classified elsewhere without behavioral 

disturbance; F02.80 - Dementia in other diseases classified elsewhere without 

behavioral disturbance; F02.80 - Dementia in other diseases classified 

elsewhere without behavioral disturbance  





(14) Pleural effusion


Assessment/Plan: 


developing left pleural effusion on chest xray


pt asymptomatic


s/p lasix 


will monitor


Code(s): J90 - PLEURAL EFFUSION, NOT ELSEWHERE CLASSIFIED   








Pt was made DNR/DNI during last admission by pt and family. 


Pt was discharged during last admission on Aspirin. Coumadin was stopped due to 

GIB/Anemia after discussion family, PCP, cardiology, and GI.  Family expressed 

they prefer conservative management for the GIB and colonoscopy/endoscopy was 

deferred. Pt returned to the hospital this admission with DVT.  Pt and family 

seen by GI, Cardiology, PCP. Option for watchman's device vs AC discussed 

again. Family would like pt back on Coumadin. Risks and benefits discussed with 

family. Watchman's device option can be further explored outpt at this stage.





Dispo: Home once INR therapeutic

## 2018-03-10 LAB
ANION GAP SERPL CALC-SCNC: 10 MMOL/L (ref 8–16)
BASOPHILS # BLD: 0.7 % (ref 0–2)
BUN SERPL-MCNC: 16 MG/DL (ref 7–18)
CALCIUM SERPL-MCNC: 8.3 MG/DL (ref 8.5–10.1)
CHLORIDE SERPL-SCNC: 111 MMOL/L (ref 98–107)
CO2 SERPL-SCNC: 25 MMOL/L (ref 21–32)
CREAT SERPL-MCNC: 1.7 MG/DL (ref 0.55–1.02)
DEPRECATED RDW RBC AUTO: 20.5 % (ref 11.6–15.6)
EOSINOPHIL # BLD: 9.3 % (ref 0–4.5)
GLUCOSE SERPL-MCNC: 85 MG/DL (ref 74–106)
HCT VFR BLD CALC: 27.1 % (ref 32.4–45.2)
HGB BLD-MCNC: 9 GM/DL (ref 10.7–15.3)
INR BLD: 1.96 (ref 0.82–1.09)
LYMPHOCYTES # BLD: 21.4 % (ref 8–40)
MAGNESIUM SERPL-MCNC: 2.3 MG/DL (ref 1.8–2.4)
MCH RBC QN AUTO: 27 PG (ref 25.7–33.7)
MCHC RBC AUTO-ENTMCNC: 33.1 G/DL (ref 32–36)
MCV RBC: 81.7 FL (ref 80–96)
MONOCYTES # BLD AUTO: 10 % (ref 3.8–10.2)
NEUTROPHILS # BLD: 58.6 % (ref 42.8–82.8)
PLATELET # BLD AUTO: 191 K/MM3 (ref 134–434)
PMV BLD: 8.5 FL (ref 7.5–11.1)
POTASSIUM SERPLBLD-SCNC: 4.3 MMOL/L (ref 3.5–5.1)
PT PNL PPP: 22.1 SEC (ref 9.98–11.88)
RBC # BLD AUTO: 3.32 M/MM3 (ref 3.6–5.2)
SODIUM SERPL-SCNC: 146 MMOL/L (ref 136–145)
WBC # BLD AUTO: 5.3 K/MM3 (ref 4–10)

## 2018-03-10 RX ADMIN — QUETIAPINE FUMARATE SCH MG: 100 TABLET ORAL at 21:39

## 2018-03-10 RX ADMIN — PANTOPRAZOLE SODIUM SCH MG: 40 TABLET, DELAYED RELEASE ORAL at 09:48

## 2018-03-10 RX ADMIN — NYSTATIN SCH APPLIC: 100000 POWDER TOPICAL at 09:48

## 2018-03-10 RX ADMIN — VITAMIN D, TAB 1000IU (100/BT) SCH UNIT: 25 TAB at 09:48

## 2018-03-10 RX ADMIN — GABAPENTIN SCH MG: 100 CAPSULE ORAL at 09:48

## 2018-03-10 RX ADMIN — POLYETHYLENE GLYCOL 3350 SCH: 17 POWDER, FOR SOLUTION ORAL at 05:47

## 2018-03-10 RX ADMIN — POLYETHYLENE GLYCOL 3350 SCH GM: 17 POWDER, FOR SOLUTION ORAL at 15:06

## 2018-03-10 RX ADMIN — POLYETHYLENE GLYCOL 3350 SCH: 17 POWDER, FOR SOLUTION ORAL at 21:43

## 2018-03-10 RX ADMIN — GABAPENTIN SCH MG: 100 CAPSULE ORAL at 21:39

## 2018-03-10 RX ADMIN — ASPIRIN SCH MG: 81 TABLET, COATED ORAL at 09:48

## 2018-03-10 RX ADMIN — CARVEDILOL SCH: 6.25 TABLET, FILM COATED ORAL at 21:40

## 2018-03-10 RX ADMIN — ATORVASTATIN CALCIUM SCH MG: 10 TABLET, FILM COATED ORAL at 21:39

## 2018-03-10 RX ADMIN — PANTOPRAZOLE SODIUM SCH MG: 40 TABLET, DELAYED RELEASE ORAL at 21:39

## 2018-03-10 RX ADMIN — CARVEDILOL SCH MG: 6.25 TABLET, FILM COATED ORAL at 09:48

## 2018-03-10 RX ADMIN — ISOSORBIDE MONONITRATE SCH MG: 30 TABLET, EXTENDED RELEASE ORAL at 09:48

## 2018-03-10 NOTE — PN
Progress Note, Physician


History of Present Illness: 





PULMONARY





ALERT,NAD,OOB-CHAIR,-LOWER EXT PAIN,+ COUGH





- Current Medication List


Current Medications: 


Active Medications





Acetaminophen (Tylenol -)  650 mg PO Q6H PRN


   PRN Reason: FEVER


   Last Admin: 03/05/18 02:19 Dose:  650 mg


Aspirin (Ecotrin -)  81 mg PO DAILY [facility]


   Last Admin: 03/10/18 09:48 Dose:  81 mg


Atorvastatin Calcium (Lipitor -)  10 mg PO HS [facility]


   Last Admin: 03/09/18 21:14 Dose:  Not Given


Carvedilol (Coreg -)  6.25 mg PO BID [facility]


   Last Admin: 03/10/18 09:48 Dose:  6.25 mg


Cholecalciferol (Vitamin D3 -)  2,000 unit PO DAILY [facility]


   Last Admin: 03/10/18 09:48 Dose:  2,000 unit


Diltiazem HCl (Cardizem Cd -)  180 mg PO DAILY [facility]


   Last Admin: 03/10/18 09:48 Dose:  180 mg


Gabapentin (Neurontin -)  100 mg PO BID [facility]


   Last Admin: 03/10/18 09:48 Dose:  100 mg


Heparin Sodium (Porcine) (Heparin -)  1,000 unit IVPUSH PRN PRN


   PRN Reason: Heparin


   Last Admin: 03/06/18 10:54 Dose:  1,000 unit


Heparin Sodium (Porcine) (Heparin -)  5,000 unit IVPUSH PRN PRN


   PRN Reason: Heparin


HEPARIN SOD,PORK IN 0.45% NACL (Heparin-1/2ns 25,000 Units/500)  25,000 units 

in 500 mls @ 22.6 mls/hr IVPB TITR [facility]ENRY; 1,130 UNITS/HR


   PRN Reason: Protocol


   Last Admin: 03/09/18 21:02 Dose:  Not Given


Isosorbide Mononitrate (Imdur -)  30 mg PO DAILY [facility]


   Last Admin: 03/10/18 09:48 Dose:  30 mg


Nystatin (Nystop Powder -)  1 applic TP DAILY [facility]


   Last Admin: 03/10/18 09:48 Dose:  1 applic


Pantoprazole Sodium (Protonix -)  40 mg PO BID [facility]


   Last Admin: 03/10/18 09:48 Dose:  40 mg


Polyethylene Glycol (Miralax (For Daily Use) -)  17 gm PO TID [facility]


   Last Admin: 03/10/18 05:47 Dose:  Not Given


Quetiapine Fumarate (Seroquel -)  25 mg PO DAILY PRN


   PRN Reason: AGITATION


   Last Admin: 03/08/18 17:11 Dose:  25 mg


Quetiapine Fumarate (Seroquel -)  100 mg PO [facility]


   Last Admin: 03/09/18 21:12 Dose:  100 mg


Quetiapine Fumarate (Seroquel -)  50 mg PO DAILY [facility]


   Last Admin: 03/10/18 09:49 Dose:  50 mg


Warfarin Sodium (Coumadin -)  5 mg PO DAILY@1800 [facility]











- Objective


Vital Signs: 


 Vital Signs











Temperature  97.9 F   03/09/18 22:00


 


Pulse Rate  80   03/09/18 22:00


 


Respiratory Rate  18   03/09/18 22:00


 


Blood Pressure  90/50   03/09/18 22:00


 


O2 Sat by Pulse Oximetry (%)  95   03/09/18 21:00











Constitutional: Yes: Well Nourished, Calm


Eyes: Yes: WNL


HENT: Yes: WNL


Neck: Yes: WNL


Cardiovascular: Yes: Pulse Irregular, S1, S2


Respiratory: Yes: Diminished


Gastrointestinal: Yes: Normal Bowel Sounds, Soft


Extremities: Yes: WNL


Edema: Yes (RLE)


Labs: 


 CBC, BMP





 03/10/18 06:50 





 03/10/18 06:50 





 INR, PTT











INR  1.96  (0.82-1.09)  H D 03/10/18  06:50    














Assessment/Plan





IMP DYSPNEA improved


     RLE DVT, LIKELY ACUTE PE S/P IVC FILTER


     ASHD S/P STENTS


     H/O ANEMIA


     H/O GI BLEEDS


     PERMANENT AFIB


     H/O CVA


     H/O LUNG CA S/P RESECTION


     ELENITA WORSENING











PLAN AC 


       MONITOR H+H


       O2


       MONITOR LYTES,RENAL FUNCTION





DR OSEI


      


    


   





 Problem List 





- Problems


(1) Hypertension


Code(s): I10 - ESSENTIAL (PRIMARY) HYPERTENSION   


  Qualifiers:    Qualifiers: essential hypertension   Qualifiers: [id number]   

Qualifiers: [id number] 





(2) Renal insufficiency


Code(s): N28.9 - DISORDER OF KIDNEY AND URETER, UNSPECIFIED   





(3) Atrial fibrillation


Code(s): I48.91 - UNSPECIFIED ATRIAL FIBRILLATION   


  Qualifiers:    Qualifiers: paroxysmal   Qualifiers: [id number]   Qualifiers: 

[id number] 





(4) Dementia


Code(s): F03.90 - UNSPECIFIED DEMENTIA WITHOUT BEHAVIORAL DISTURBANCE   


  Qualifiers:    Qualifiers: Alzheimer's disease   Qualifiers: unspecified 

onset   Qualifiers: without behavioral disturbance   Qualifiers: [id number]   

Qualifiers: [id number] 





(5) Carotid sinus hypersensitivity


Code(s): G90.01 - CAROTID SINUS SYNCOPE   





(6) CHF (congestive heart failure)


Code(s): I50.9 - HEART FAILURE, UNSPECIFIED   


  Qualifiers:    Qualifiers: [id number]   Qualifiers: [id number] 





(7) Iron deficiency anemia due to chronic blood loss


Code(s): D50.0 - IRON DEFICIENCY ANEMIA SECONDARY TO BLOOD LOSS (CHRONIC)   





(8) Angiodysplasia of cecum


Code(s): K55.20 - ANGIODYSPLASIA OF COLON WITHOUT HEMORRHAGE   





(9) Presence of permanent cardiac pacemaker


Code(s): Z95.0 - PRESENCE OF CARDIAC PACEMAKER   





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


Code(s): K25.7 - CHRONIC GASTRIC ULCER WITHOUT HEMORRHAGE OR PERFORATION   





(11) DVT (deep venous thrombosis)


Code(s): I82.409 - ACUTE EMBOLISM AND THOMBOS UNSP DEEP VN UNSP LOWER EXTREMITY

   


  Qualifiers:    Qualifiers: lower extremity   Qualifiers: popliteal   

Qualifiers: acute   Qualifiers: right   Qualifiers: [id number]   Qualifiers: 

[id number] 





(12) SOB (shortness of breath)


Code(s): R06.02 - SHORTNESS OF BREATH   





(13) Chronic anemia


Code(s): D64.9 - ANEMIA, UNSPECIFIED   





(14) Atherosclerotic heart disease


Code(s): I25.10 - ATHSCL HEART DISEASE OF NATIVE CORONARY ARTERY W/O ANG PCTRS 

  


  Qualifiers:    Qualifiers: native artery   Qualifiers: native heart   

Qualifiers: without angina   Qualifiers: [id number]   Qualifiers: [id number]

## 2018-03-10 NOTE — PN
Progress Note, Physician


Chief Complaint: 





Patient feels comfortable denies any fever, nausea, vomiting or CVA Pain


History of Present Illness: 


87 y/o woman from home, multiple Co-morbidities present with c/o chest pain and 

SOB , patient has H/O DVT and Afib in the past currently off AC due to GI Bleed

  most likely due to angiodysplesia,  this admission, new Rt LE DVT and PE 

underwent IVC Filter and family opted for Coumadin  today INR 1.98, patient 

previous coumadin dose was 2 mg and 1 mg alternate day. patient also has H/O 

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

),  Parkinson's,  CAD (angioplasty 2005), Deep Vein Thrombosis, HTN, left sided 

lung cancer resected 1999 [facility]), Pulmonary Embolus, Diverticulosis, GI Bleed, 

Hemorrhoids  Hiatal Hernia (with Chauncey arevalos 2014), 





- Current Medication List


Current Medications: 


Active Medications





Acetaminophen (Tylenol -)  650 mg PO Q6H PRN


   PRN Reason: FEVER


   Last Admin: 03/05/18 02:19 Dose:  650 mg


Aspirin (Ecotrin -)  81 mg PO DAILY [facility]


   Last Admin: 03/10/18 09:48 Dose:  81 mg


Atorvastatin Calcium (Lipitor -)  10 mg PO HS [facility]


   Last Admin: 03/09/18 21:14 Dose:  Not Given


Carvedilol (Coreg -)  6.25 mg PO BID [facility]


   Last Admin: 03/10/18 09:48 Dose:  6.25 mg


Cholecalciferol (Vitamin D3 -)  2,000 unit PO DAILY [facility]


   Last Admin: 03/10/18 09:48 Dose:  2,000 unit


Diltiazem HCl (Cardizem Cd -)  180 mg PO DAILY [facility]


   Last Admin: 03/10/18 09:48 Dose:  180 mg


Gabapentin (Neurontin -)  100 mg PO BID [facility]


   Last Admin: 03/10/18 09:48 Dose:  100 mg


Isosorbide Mononitrate (Imdur -)  30 mg PO DAILY [facility]


   Last Admin: 03/10/18 09:48 Dose:  30 mg


Nystatin (Nystop Powder -)  1 applic TP DAILY [facility]


   Last Admin: 03/10/18 09:48 Dose:  1 applic


Pantoprazole Sodium (Protonix -)  40 mg PO BID [facility]


   Last Admin: 03/10/18 09:48 Dose:  40 mg


Polyethylene Glycol (Miralax (For Daily Use) -)  17 gm PO TID [facility]


   Last Admin: 03/10/18 15:06 Dose:  17 gm


Quetiapine Fumarate (Seroquel -)  25 mg PO DAILY PRN


   PRN Reason: AGITATION


   Last Admin: 03/08/18 17:11 Dose:  25 mg


Quetiapine Fumarate (Seroquel -)  100 mg PO HS [facility]


   Last Admin: 03/09/18 21:12 Dose:  100 mg


Quetiapine Fumarate (Seroquel -)  50 mg PO DAILY [facility]


   Last Admin: 03/10/18 09:49 Dose:  50 mg


Warfarin Sodium (Coumadin -)  2 mg PO ONCE ONE


   Stop: 03/10/18 18:01











- Objective


Vital Signs: 


 Vital Signs











Temperature  97.9 F   03/10/18 10:00


 


Pulse Rate  88   03/10/18 10:00


 


Respiratory Rate  18   03/10/18 10:00


 


Blood Pressure  109/56   03/10/18 10:00


 


O2 Sat by Pulse Oximetry (%)  95   03/10/18 10:00














Elderly F comfortable denies chest pain SOB or palpittaion





HEENT: Mm dry , Anemia, PERRLA EOMI





NECK: No JVd No Bruit





CHEST: Kyphosis minimal basal crepes





CVS: S1S2  SM +





ABD: Non tender,no distention





EXT: Trace edema feet, no calf tenderness





CNS: alert, no interval changes.











Labs: 


 CBC, BMP





 03/10/18 06:50 





 03/10/18 06:50 





 INR, PTT











INR  1.96  (0.82-1.09)  H D 03/10/18  06:50    














Problem List





- Problems


(1) VTE (venous thromboembolism)


Assessment/Plan: 


H/O DVT and PE  admitted with SOB and chest pain Rt LE DVT, s/p IVC filter 

family opted for AC despite H/O GI Bleeding, INR therapeutic will hold  Heparin 

drip,  patient previous  Coumadin dose 2 mg will start coumadin 2 mg daily F/U 

INR in am


Code(s): I82.90 - ACUTE EMBOLISM AND THROMBOSIS OF UNSPECIFIED VEIN   





(2) Afib


Assessment/Plan: 


S/P Pacemaker patient was off coumadin admitted with PE and DVT family opted 

for AC despite H/O GI Bleed,  previous coumadin dose 2 and 1 alternate INR 1.96 

after 2 doses of  coumadin 5 mg will Dc Heparin infusion   Coumadin 2 mg bed 

time F/U INr rishi m. 


Code(s): I48.91 - UNSPECIFIED ATRIAL FIBRILLATION   


Qualifiers: 


   Atrial fibrillation type: chronic   Qualified Code(s): I48.2 - Chronic 

atrial fibrillation   





(3) Chronic kidney disease (CKD), stage III (moderate)


Assessment/Plan: 


Renal functions are stable,


Code(s): N18.3 - CHRONIC KIDNEY DISEASE, STAGE 3 (MODERATE)   





(4) Dementia


Assessment/Plan: 


Chronic cont all home meds


Code(s): F03.90 - UNSPECIFIED DEMENTIA WITHOUT BEHAVIORAL DISTURBANCE   


Qualifiers: 


   Dementia type: Parkinson's disease   Dementia behavioral disturbance: 

without behavioral disturbance   Qualified Code(s): G20 - Parkinson's disease; 

F02.80 - Dementia in other diseases classified elsewhere without behavioral 

disturbance; F02.80 - Dementia in other diseases classified elsewhere without 

behavioral disturbance; F02.80 - Dementia in other diseases classified 

elsewhere without behavioral disturbance   





(5) CAD (coronary artery disease)


Assessment/Plan: 


At present no active issue cont all home meds


Code(s): I25.10 - ATHSCL HEART DISEASE OF NATIVE CORONARY ARTERY W/O ANG PCTRS 

  


Qualifiers: 


   Coronary Disease-Associated Artery/Lesion type: native artery   Native vs. 

transplanted heart: native heart   Associated angina: without angina   

Qualified Code(s): I25.10 - Atherosclerotic heart disease of native coronary 

artery without angina pectoris   





(6) Chronic anemia


Assessment/Plan: 


Due to chronic GI Blood loss H/H stable 


Code(s): D64.9 - ANEMIA, UNSPECIFIED   





(7) H/O: CVA (cerebrovascular accident)


Assessment/Plan: 


Rt sided residual weakness cont ASA, Statin, and optimize BP meds


Code(s): Z86.73 - PRSNL HX OF TIA (TIA), AND CEREB INFRC W/O RESID DEFICITS   





(8) Hypernatremia


Assessment/Plan: 


Mild encourage Po Hydration F/U BMP Sr Na 147


Code(s): E87.0 - HYPEROSMOLALITY AND HYPERNATREMIA   





(9) Bacteriuria


Assessment/Plan: 


Afebrile, TWBC normal, no dysuria, U Grew  Group B Strepto Cocci , U WBC 10, 

will defer abx Rpt UA if rising WBC or fever or symptoms  will consider abx.


 


Code(s): R82.71 - BACTERIURIA

## 2018-03-10 NOTE — PN
Progress Note (short form)





- Note


Progress Note: 








CC:  sob





Patient admitted with DVT and SOB, underwent IVC filter. Known case of CAD, s/p 

PCI/stenting, s/p CVA, H/o GI bleeding related to angiodysplasia.





Tele d/c yesterday.  Inr therapeutic today and heparin drip stopped.  Eating 

well according to family.  


no cp sob palps dizzy





Active Medications


 





 Current Medications





Acetaminophen (Tylenol -)  650 mg PO Q6H PRN


   PRN Reason: FEVER


   Last Admin: 03/05/18 02:19 Dose:  650 mg


Aspirin (Ecotrin -)  81 mg PO DAILY [facility]


   Last Admin: 03/10/18 09:48 Dose:  81 mg


Atorvastatin Calcium (Lipitor -)  10 mg PO HS [facility]


   Last Admin: 03/09/18 21:14 Dose:  Not Given


Carvedilol (Coreg -)  6.25 mg PO BID [facility]


   Last Admin: 03/10/18 09:48 Dose:  6.25 mg


Cholecalciferol (Vitamin D3 -)  2,000 unit PO DAILY [facility]


   Last Admin: 03/10/18 09:48 Dose:  2,000 unit


Diltiazem HCl (Cardizem Cd -)  180 mg PO DAILY [facility]


   Last Admin: 03/10/18 09:48 Dose:  180 mg


Gabapentin (Neurontin -)  100 mg PO BID [facility]


   Last Admin: 03/10/18 09:48 Dose:  100 mg


Isosorbide Mononitrate (Imdur -)  30 mg PO DAILY [facility]


   Last Admin: 03/10/18 09:48 Dose:  30 mg


Nystatin (Nystop Powder -)  1 applic TP DAILY [facility]


   Last Admin: 03/10/18 09:48 Dose:  1 applic


Pantoprazole Sodium (Protonix -)  40 mg PO BID [facility]


   Last Admin: 03/10/18 09:48 Dose:  40 mg


Polyethylene Glycol (Miralax (For Daily Use) -)  17 gm PO TID [facility]


   Last Admin: 03/10/18 15:06 Dose:  17 gm


Quetiapine Fumarate (Seroquel -)  25 mg PO DAILY PRN


   PRN Reason: AGITATION


   Last Admin: 03/08/18 17:11 Dose:  25 mg


Quetiapine Fumarate (Seroquel -)  100 mg PO HS [facility]


   Last Admin: 03/09/18 21:12 Dose:  100 mg


Quetiapine Fumarate (Seroquel -)  50 mg PO DAILY [facility]


   Last Admin: 03/10/18 09:49 Dose:  50 mg


Warfarin Sodium (Coumadin -)  2 mg PO ONCE ONE


   Stop: 03/10/18 18:01











 Vital Signs - 24 hr











  03/09/18 03/09/18 03/10/18





  21:00 22:00 10:00


 


Temperature  97.9 F 97.9 F


 


Pulse Rate  80 88


 


Respiratory  18 18





Rate   


 


Blood Pressure  90/50 109/56


 


O2 Sat by Pulse 95  95





Oximetry (%)   














 Intake & Output











 03/08/18 03/09/18 03/10/18 03/11/18





 07:59 07:59 07:59 08:59


 


Intake Total 205.1 535.2 368 444


 


Balance 205.1 535.2 368 444


 


Weight 156 lb 9.6 oz   























nad no jvd


irreg, s1s2 no mrg


cta bl nl eff


awake, alert, appropriate


abd nt nd pos bs


no jaundice diaphoresis


no le e/c/c


 


 


 CBC, BMP





 03/10/18 06:50 





 03/10/18 06:50 





Microbiology





03/07/18 19:55   Urine - Urine Clean Catch   Urine Culture - Final


                              Strep Agalactiae Group B





 Laboratory Tests











  01/26/18 03/05/18 03/10/18





  12:04 06:25 06:50


 


INR    1.96 H D


 


B-Natriuretic Peptide  6971.03 H  3995.50 H 














prior tele:  rate controlled afib, occ vpaced








IMPRESSION:


1. DVT, RLL.


2. ppm


3. CAD, s/p PCI/Stentng.


4. Atrial fib. 


5. S/p post procedural CVA (embolic).


6. Recurring GI bleed due to angiodysplasia.


7. S/p IVC filter.


8. Chronic anemia.





RECOMMENDATIONS:





dvt:


-s/p ivc filter on this admission





ppm:


-normal fcn on tele





cad/pci:


-stable, no angina, no signs acs





afib:


-rate controlled on bb and dilt.  bp on low end of normal, but similar to sbp's 

on past admissioins


-was previously on coumadin but held due to chronic anemia.  Family had been 

deciding between resuming coumadin vs watchman device.  Prior cardiologist 

seeing patient had discussion with family members and explained risks/benefits 

of coumadin, watchman device, or third option of not using AC.  Family decided 

they would like to resume coumadin for now and see how her anemia responds.   

In the past anemia was mild and did not require frequent transfusions so if 

this continues to be the case family would prefer AC instead of the invasive 

procedure of watchman device.  Cont coumadin.  heparin bridge no longer needed, 

inr therapeutic 





hypernatremia


- new today, Cr slightly worse since lasix on 3/7.  encourage po intake.  ok 

for gentle fluids at discretion of pmd.  orthostatic vitals ordered.

## 2018-03-11 VITALS — HEART RATE: 97 BPM | SYSTOLIC BLOOD PRESSURE: 102 MMHG | TEMPERATURE: 97.9 F | DIASTOLIC BLOOD PRESSURE: 56 MMHG

## 2018-03-11 LAB
ANION GAP SERPL CALC-SCNC: 6 MMOL/L (ref 8–16)
APPEARANCE UR: CLEAR
BACTERIA #/AREA URNS HPF: (no result) /HPF
BASOPHILS # BLD: 0.7 % (ref 0–2)
BILIRUB UR STRIP.AUTO-MCNC: NEGATIVE MG/DL
BUN SERPL-MCNC: 19 MG/DL (ref 7–18)
CALCIUM SERPL-MCNC: 8.1 MG/DL (ref 8.5–10.1)
CHLORIDE SERPL-SCNC: 110 MMOL/L (ref 98–107)
CO2 SERPL-SCNC: 27 MMOL/L (ref 21–32)
COLOR UR: (no result)
CREAT SERPL-MCNC: 1.7 MG/DL (ref 0.55–1.02)
DEPRECATED RDW RBC AUTO: 20 % (ref 11.6–15.6)
EOSINOPHIL # BLD: 9.1 % (ref 0–4.5)
EPITH CASTS URNS QL MICRO: (no result) /HPF
GLUCOSE SERPL-MCNC: 91 MG/DL (ref 74–106)
HCT VFR BLD CALC: 28 % (ref 32.4–45.2)
HGB BLD-MCNC: 9.1 GM/DL (ref 10.7–15.3)
INR BLD: 3.35 (ref 0.82–1.09)
KETONES UR QL STRIP: NEGATIVE
LEUKOCYTE ESTERASE UR QL STRIP.AUTO: (no result)
LYMPHOCYTES # BLD: 23.6 % (ref 8–40)
MCH RBC QN AUTO: 26.7 PG (ref 25.7–33.7)
MCHC RBC AUTO-ENTMCNC: 32.3 G/DL (ref 32–36)
MCV RBC: 82.5 FL (ref 80–96)
MONOCYTES # BLD AUTO: 9.7 % (ref 3.8–10.2)
MUCOUS THREADS URNS QL MICRO: (no result)
NEUTROPHILS # BLD: 56.9 % (ref 42.8–82.8)
NITRITE UR QL STRIP: NEGATIVE
PH UR: 5 [PH] (ref 5–8)
PLATELET # BLD AUTO: 194 K/MM3 (ref 134–434)
PMV BLD: 8.4 FL (ref 7.5–11.1)
POTASSIUM SERPLBLD-SCNC: 4.2 MMOL/L (ref 3.5–5.1)
PROT UR QL STRIP: NEGATIVE
PROT UR QL STRIP: NEGATIVE
PT PNL PPP: 37.9 SEC (ref 9.98–11.88)
RBC # BLD AUTO: 3.39 M/MM3 (ref 3.6–5.2)
RBC # UR STRIP: NEGATIVE /UL
SODIUM SERPL-SCNC: 143 MMOL/L (ref 136–145)
SP GR UR: 1.01 (ref 1–1.03)
UROBILINOGEN UR STRIP-MCNC: NEGATIVE MG/DL (ref 0.2–1)
WBC # BLD AUTO: 4.9 K/MM3 (ref 4–10)

## 2018-03-11 RX ADMIN — CARVEDILOL SCH MG: 6.25 TABLET, FILM COATED ORAL at 10:38

## 2018-03-11 RX ADMIN — VITAMIN D, TAB 1000IU (100/BT) SCH UNIT: 25 TAB at 10:39

## 2018-03-11 RX ADMIN — ISOSORBIDE MONONITRATE SCH MG: 30 TABLET, EXTENDED RELEASE ORAL at 10:39

## 2018-03-11 RX ADMIN — NYSTATIN SCH APPLIC: 100000 POWDER TOPICAL at 10:39

## 2018-03-11 RX ADMIN — GABAPENTIN SCH MG: 100 CAPSULE ORAL at 10:38

## 2018-03-11 RX ADMIN — PANTOPRAZOLE SODIUM SCH MG: 40 TABLET, DELAYED RELEASE ORAL at 10:39

## 2018-03-11 RX ADMIN — POLYETHYLENE GLYCOL 3350 SCH: 17 POWDER, FOR SOLUTION ORAL at 06:08

## 2018-03-11 RX ADMIN — ASPIRIN SCH MG: 81 TABLET, COATED ORAL at 10:39

## 2018-03-11 NOTE — DS
Physical Examination


Vital Signs: 


 Vital Signs











Temperature  96.7 F L  03/11/18 06:00


 


Pulse Rate  107 H  03/11/18 06:00


 


Respiratory Rate  20   03/11/18 06:00


 


Blood Pressure  94/50   03/11/18 06:00


 


O2 Sat by Pulse Oximetry (%)  96   03/10/18 21:00























Patient feels comfortable hemodynamically stable





HEENT: Mm moist, mild anemia, PERRLA, EOMI





NECK: No JVD No Bruit





CHEST: CTA B/L





CVS: S1S2 R SM





CHEST: Minimal basal crepts





EXTL Trace daxa afeet





CNS: AOX3 non focal











Labs: 


 CBC, BMP





 03/11/18 06:30 





 03/11/18 06:30 





 Laboratory Results - last 24 hr











  03/11/18 03/11/18 03/11/18





  04:48 06:30 06:30


 


WBC   4.9 


 


RBC   3.39 L 


 


Hgb   9.1 L 


 


Hct   28.0 L 


 


MCV   82.5 


 


MCH   26.7 


 


MCHC   32.3 


 


RDW   20.0 H 


 


Plt Count   194 


 


MPV   8.4 


 


Neutrophils %   56.9 


 


Lymphocytes %   23.6 


 


Monocytes %   9.7 


 


Eosinophils %   9.1 H 


 


Basophils %   0.7 


 


PT with INR    37.90 H


 


INR    3.35 H D


 


Sodium   


 


Potassium   


 


Chloride   


 


Carbon Dioxide   


 


Anion Gap   


 


BUN   


 


Creatinine   


 


Random Glucose   


 


Calcium   


 


Urine Color  Ltyellow  


 


Urine Appearance  Clear  


 


Urine pH  5.0  


 


Ur Specific Gravity  1.013  


 


Urine Protein  Negative  


 


Urine Glucose (UA)  Negative  


 


Urine Ketones  Negative  


 


Urine Blood  Negative  


 


Urine Nitrite  Negative  


 


Urine Bilirubin  Negative  


 


Urine Urobilinogen  Negative  


 


Ur Leukocyte Esterase  3+ H D  


 


Urine WBC (Auto)  38  


 


Urine RBC (Auto)  3  


 


Ur Epithelial Cells  Rare  


 


Urine Bacteria  Rare  


 


Urine Mucus  Rare  














  03/11/18





  06:30


 


WBC 


 


RBC 


 


Hgb 


 


Hct 


 


MCV 


 


MCH 


 


MCHC 


 


RDW 


 


Plt Count 


 


MPV 


 


Neutrophils % 


 


Lymphocytes % 


 


Monocytes % 


 


Eosinophils % 


 


Basophils % 


 


PT with INR 


 


INR 


 


Sodium  143


 


Potassium  4.2


 


Chloride  110 H


 


Carbon Dioxide  27


 


Anion Gap  6 L


 


BUN  19 H


 


Creatinine  1.7 H


 


Random Glucose  91


 


Calcium  8.1 L


 


Urine Color 


 


Urine Appearance 


 


Urine pH 


 


Ur Specific Gravity 


 


Urine Protein 


 


Urine Glucose (UA) 


 


Urine Ketones 


 


Urine Blood 


 


Urine Nitrite 


 


Urine Bilirubin 


 


Urine Urobilinogen 


 


Ur Leukocyte Esterase 


 


Urine WBC (Auto) 


 


Urine RBC (Auto) 


 


Ur Epithelial Cells 


 


Urine Bacteria 


 


Urine Mucus 








 Microbiology





03/07/18 19:55   Urine - Urine Clean Catch   Urine Culture - Final


                            Strep Agalactiae Group B











Discharge Summary


Reason For Visit: DVT, CHEST PAIN


Current Active Problems





Afib (Acute)


Bacteriuria (Acute)


Chronic GI bleeding (Acute)


Chronic kidney disease (CKD), stage III (moderate) (Acute)


H/O: CVA (cerebrovascular accident) (Acute)


Hypernatremia (Acute)


VTE (venous thromboembolism) (Acute)








Procedures: Principal: LE Doppler IVC Filter


Hospital Course: 


87 y/o woman from home, multiple Co-morbidities present with c/o chest pain and 

SOB , patient has H/O DVT and Afib in the past currently off AC due to GI Bleed

  most likely due to angiodysplesia,  this admission, new Rt LE DVT and PE 

underwent IVC Filter and family opted for Coumadin  today INR 1.98, patient 

previous coumadin dose was 2 mg and 1 mg alternate day. patient also has H/O 

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

),  Parkinson's,  CAD (angioplasty 2005), Deep Vein Thrombosis, HTN, left sided 

lung cancer resected [facility]), Pulmonary Embolus, Diverticulosis, GI Bleed, 

Hemorrhoids  Hiatal Hernia (with Chauncey erosions 2014),  W/U shows mild ELENITA, 

RT LE DVT and PE underwent IVC Filter, patient was off AC for GI Bleed , 

watchman device was offered patient a nd family opted to resume AC that she 

istolerating H/H stabl,  U Grew Strept  Group B will order Levofloxacin 250 mg 

for 7 days;


Patient need to contact PMD tomorrow to check INR 


Advised to Stop ASA


Ramipril on Hold for ELENITA





Condition: Stable





- Instructions


Diet, Activity, Other Instructions: 


As advised





Check INR On Monday


Take  coumadin 1 mg bed time tonight


Referrals: 


Arun Rowan MD [Primary Care Provider] - 1 Week





- Home Medications


Comprehensive Discharge Medication List: 


Ambulatory Orders





Isosorbide Mononitrate [Monoket -] 30 mg PO DAILY #0 tab.sr.24h 02/23/12 


Simvastatin [Zocor -] 20 mg PO HS 05/24/14 


Diltiazem Cd [Cardizem Cd -] 180 mg PO DAILY 01/08/16 


Gabapentin [Neurontin] 100 mg PO BID 01/08/16 


Cholecalciferol (Vitamin D3) [Vitamin D3] 2,000 unit PO DAILY 01/29/16 


Quetiapine Fumarate [Seroquel -] 50 mg PO BID  tablet 02/02/16 


Carvedilol [Coreg -] 6.25 mg PO BID #60 tablet 02/14/16 


Aspirin [Adult Aspirin Regimen] 81 mg PO DAILY 12/26/17 


Pantoprazole Sodium [Protonix] 40 mg PO BID 01/26/18 


levoFLOXacin [Levaquin -] 250 mg PO DAILY 7 Days  tablet MDD 1 03/11/18

## 2018-03-18 NOTE — PN
Progress Note (short form)





- Note


Progress Note: 





Patient admitted with DVT and SOB, underwent IVC filter and to have a V/Q scan 

tomorrow. Known case of CAD, s/p PCI/stentind, s/p CVA, H/o Gi bleedind related 

to angiodysplasia.





According to the daughter no chest pain or SOB reported.





Active Medications











Generic Name Dose Route Start Last Admin





  Trade Name Freq  PRN Reason Stop Dose Admin


 


Acetaminophen  650 mg  03/05/18 02:09  03/05/18 02:19





  Tylenol -  PO   650 mg





  Q6H PRN   Administration





  FEVER   


 


Aspirin  81 mg  03/04/18 10:00  03/05/18 09:44





  Ecotrin -  PO   81 mg





  DAILY HENRY   Administration


 


Atorvastatin Calcium  10 mg  03/04/18 22:00  03/04/18 21:37





  Lipitor -  PO   10 mg





  HS HENRY   Administration


 


Carvedilol  6.25 mg  03/04/18 10:00  03/05/18 09:45





  Coreg -  PO   6.25 mg





  BID HENRY   Administration


 


Cholecalciferol  2,000 unit  03/04/18 10:00  03/05/18 09:43





  Vitamin D3 -  PO   2,000 unit





  DAILY HENRY   Administration


 


Diltiazem HCl  180 mg  03/04/18 10:00  03/05/18 09:44





  Cardizem Cd -  PO   180 mg





  DAILY HENRY   Administration


 


Gabapentin  100 mg  03/04/18 10:00  03/05/18 09:45





  Neurontin -  PO   100 mg





  BID HENRY   Administration


 


Heparin Sodium (Porcine)  1,000 unit  03/04/18 19:33  





  Heparin -  IVPUSH   





  PRN PRN   





  Heparin   


 


Heparin Sodium (Porcine)  5,000 unit  03/04/18 19:33  





  Heparin -  IVPUSH   





  PRN PRN   





  Heparin   


 


HEPARIN SOD,PORK IN 0.45% NACL  25,000 units in 500 mls @ 22.6 mls/hr  03/04/18 

22:00  03/05/18 17:56





  Heparin-1/2ns 25,000 Units/500  IVPB   680 units/hr





  TITR HENRY   13.6 mls/hr





  Protocol   Titration





  1,130 UNITS/HR   


 


Isosorbide Mononitrate  30 mg  03/04/18 10:00  03/05/18 09:43





  Imdur -  PO   30 mg





  DAILY HENRY   Administration


 


Pantoprazole Sodium  40 mg  03/04/18 10:00  03/05/18 09:45





  Protonix -  PO   40 mg





  BID HENRY   Administration


 


Polyethylene Glycol  17 gm  03/05/18 22:00  





  Miralax (For Daily Use) -  PO   





  TID HENRY   


 


Quetiapine Fumarate  50 mg  03/04/18 10:00  03/05/18 09:45





  Seroquel -  PO   50 mg





  BID HENRY   Administration


 


Quetiapine Fumarate  25 mg  03/04/18 06:09  03/05/18 19:09





  Seroquel -  PO   25 mg





  DAILY PRN   Administration





  AGITATION   








86 year old female in no acute distress.





 Last Vital Signs











Temp Pulse Resp BP Pulse Ox


 


 97.2 F L  69   20   103/56   100 


 


 03/05/18 18:06  03/05/18 18:06  03/05/18 18:06  03/05/18 18:06  03/05/18 15:32








NECK: Supple, no JVD, carotids 2+.


HEART: PMI in te 5th ICS, S1 variable, S2 normal. Grade I/VI systolic murmur, 

no gallops.


LUNGS: clear on auscultation.


EXTREMITIES: 1+ right ankle edema, no calf tenderness.





 CBC, BMP





 03/05/18 06:25 





 03/05/18 06:25 





IMPRESSION:


1. DVT, RLL.


2. PTE need to be excluded.


3. CAD, s/p PCI/Stentng.


4. Atrial fib. 





RECOMMENDATIONS:


1. Awaiting V/Q scan.


2. Continue current cardiac therapy.


3. Close F/u CBC. Spouse

## 2018-05-22 ENCOUNTER — HOSPITAL ENCOUNTER (INPATIENT)
Dept: HOSPITAL 74 - FER | Age: 83
LOS: 2 days | Discharge: HOME HEALTH | DRG: 308 | End: 2018-05-24
Attending: NURSE PRACTITIONER
Payer: COMMERCIAL

## 2018-05-22 VITALS — BODY MASS INDEX: 29.7 KG/M2

## 2018-05-22 DIAGNOSIS — Z98.61: ICD-10-CM

## 2018-05-22 DIAGNOSIS — I25.10: ICD-10-CM

## 2018-05-22 DIAGNOSIS — I48.91: Primary | ICD-10-CM

## 2018-05-22 DIAGNOSIS — K21.9: ICD-10-CM

## 2018-05-22 DIAGNOSIS — F03.90: ICD-10-CM

## 2018-05-22 DIAGNOSIS — I50.23: ICD-10-CM

## 2018-05-22 DIAGNOSIS — I69.351: ICD-10-CM

## 2018-05-22 LAB
ALBUMIN SERPL-MCNC: 3.2 G/DL (ref 3.5–5)
ALP SERPL-CCNC: 76 U/L (ref 32–92)
ALT SERPL-CCNC: 12 U/L (ref 10–40)
ANION GAP SERPL CALC-SCNC: 8 MMOL/L (ref 8–16)
APPEARANCE UR: (no result)
APTT BLD: 32.1 SECONDS (ref 24–38.9)
AST SERPL-CCNC: 18 U/L (ref 10–42)
BACTERIA #/AREA URNS HPF: (no result) /HPF
BASOPHILS # BLD: 0.6 % (ref 0–2)
BILIRUB SERPL-MCNC: < 0.5 MG/DL (ref 0.2–1)
BUN SERPL-MCNC: 18 MG/DL (ref 7–18)
CALCIUM SERPL-MCNC: 8.4 MG/DL (ref 8.4–10.2)
CHLORIDE SERPL-SCNC: 107 MMOL/L (ref 98–107)
CO2 SERPL-SCNC: 25 MMOL/L (ref 22–28)
COLOR UR: (no result)
CREAT SERPL-MCNC: 1.4 MG/DL (ref 0.6–1.3)
DEPRECATED RDW RBC AUTO: 17.2 % (ref 11.6–15.6)
EOSINOPHIL # BLD: 1.2 % (ref 0–4.5)
EPITH CASTS URNS QL MICRO: (no result) /HPF
GLUCOSE SERPL-MCNC: 97 MG/DL (ref 74–106)
HCT VFR BLD CALC: 32.1 % (ref 32.4–45.2)
HGB BLD-MCNC: 10.3 GM/DL (ref 10.7–15.3)
INR BLD: 2.36 (ref 0.82–1.09)
LEUKOCYTE ESTERASE UR QL STRIP.AUTO: (no result)
LYMPHOCYTES # BLD: 18.9 % (ref 8–40)
MCH RBC QN AUTO: 24.8 PG (ref 25.7–33.7)
MCHC RBC AUTO-ENTMCNC: 32.2 G/DL (ref 32–36)
MCV RBC: 77.1 FL (ref 80–96)
MONOCYTES # BLD AUTO: 7.9 % (ref 3.8–10.2)
NEUTROPHILS # BLD: 71.4 % (ref 42.8–82.8)
PH UR: 6 [PH] (ref 4.5–8)
PLATELET # BLD AUTO: 255 K/MM3 (ref 134–434)
PMV BLD: 8.1 FL (ref 7.5–11.1)
POTASSIUM SERPLBLD-SCNC: 3.7 MMOL/L (ref 3.5–5.1)
PROT SERPL-MCNC: 6.7 G/DL (ref 6.4–8.3)
PT PNL PPP: 26 SEC (ref 10.2–13)
RBC # BLD AUTO: (no result) /HPF (ref 0–3)
RBC # BLD AUTO: 4.17 M/MM3 (ref 3.6–5.2)
SODIUM SERPL-SCNC: 140 MMOL/L (ref 136–145)
SP GR UR: 1.01 (ref 1–1.02)
UROBILINOGEN UR STRIP-MCNC: 0.2 MG/DL (ref 0.2–1)
WBC # BLD AUTO: 6.4 K/MM3 (ref 4–10.8)
WBC # UR AUTO: (no result) /UL (ref 0–5)

## 2018-05-22 RX ADMIN — SODIUM CHLORIDE STA: 9 INJECTION, SOLUTION INTRAVENOUS at 10:36

## 2018-05-22 RX ADMIN — SODIUM CHLORIDE STA MLS/HR: 9 INJECTION, SOLUTION INTRAVENOUS at 10:27

## 2018-05-22 NOTE — PDOC
History of Present Illness





- General


Chief Complaint: Lethargy


Stated Complaint: lethargy


Time Seen by Provider: 05/22/18 10:01





- History of Present Illness


Initial Comments: 





05/22/18 10:26


The patient is an 87 year old female with a history of HTN, CHF (EF 17%), CVA (

2006 with residual right sided sensitivity), chronic anemia, CAD s/p stenting 

pacemaker, paroxysmal atrial fibrillation, and remote PE, Parkinson's disease 

with dementia, and angiodysplasia of the cecum (c/w GI bleed) who presents for 

evaluation of lethargy.  The patient is accompanied by her daughter who assists 

in providing the history.  They report increased lethargy and generalized 

weakness over the past 1 week and the daughter notes that the patient has "not 

seemed herself".  The patient was recently taken off a few of her medications 

and started on lasix and the daughter notes that the patient has been urinating 

more frequently.  A home health care nurse evaluated the patient 1 day ago and 

noted her to be dehydrated and gave the patient a 500 NS bolus which the patient

's daughter notes improved her symptoms momentarily.  They report continued 

symptoms today prompting their presentation to the ED for further evaluation.  

The patient otherwise denies fevers, chills, SOB, chest pain, nausea, vomiting, 

or abdominal pain.  They do note some occasional diarrhea.





Past History





- Past Medical History


Allergies/Adverse Reactions: 


 Allergies











Allergy/AdvReac Type Severity Reaction Status Date / Time


 


oats Allergy Intermediate  Verified 05/22/18 10:02


 


Penicillins Allergy Mild  Verified 05/22/18 10:02


 


Shellfish Allergy Mild  Verified 05/22/18 10:02











Home Medications: 


Ambulatory Orders





Simvastatin [Zocor -] 20 mg PO HS 05/24/14 


Diltiazem Cd [Cardizem Cd -] 180 mg PO DAILY 01/08/16 


Gabapentin [Neurontin] 100 mg PO BID 01/08/16 


Cholecalciferol (Vitamin D3) [Vitamin D3] 2,000 unit PO DAILY 01/29/16 


Quetiapine Fumarate [Seroquel -] 50 mg PO BID  tablet 02/02/16 


Carvedilol [Coreg -] 6.25 mg PO BID #60 tablet 02/14/16 


Furosemide [Lasix] 20 mg PO ASDIR 05/22/18 


Isosorbide Mononitrate [Imdur -] 30 mg PO DAILY 05/22/18 


Ranitidine [Zantac -] 150 mg PO DAILY 05/22/18 


Warfarin Na [Coumadin] 1 mg PO HS 05/22/18 








Anemia: Yes


Asthma: No


Cancer: Yes (BREAST LUMP, LYMPH NODE REMOVAL, LUNG CA, PARTIAL LEFT LUNG REMOVED

,)


Cardiac Disorders: Yes (ASHD ppm cARDIAC CATH 2006 stroke 2006  ?MI 1980'S, 

STENT 1995,)


CVA: Yes


COPD: Yes


CHF: No


DVT: No


Dementia: Yes


Diabetes: No


GI Disorders: Yes (GI bleed, GERD, IRRITABLE BOWEL)


 Disorders: Yes (UTI, OVERACTIVE BLADDER)


HTN: Yes


Hypercholesterolemia: Yes


Liver Disease: No


Psychiatric Problems: Yes


Seizures: No


Thyroid Disease: No





- Surgical History


Abdominal Surgery: No


Appendectomy: No


Cardiac Surgery: Yes (ppm)


Cholecystectomy: Yes


Lung Surgery: Yes (partial left lung removed 1995)


Neurologic Surgery: No


Orthopedic Surgery: No





- Immunization History


Immunization Up to Date: Yes





- Suicide/Smoking/Psychosocial Hx


Smoking Status: No


Smoking History: Former smoker


Have you smoked in the past 12 months: No


Number of Cigarettes Smoked Daily: 0


If you are a former smoker, when did you quit?: 40 YEARS AGO


'Breaking Loose' booklet given: 05/24/14


Hx Alcohol Use: No


Drug/Substance Use Hx: No


Substance Use Type: None


Hx Substance Use Treatment: No





**Review of Systems





- Review of Systems


Comments:: 





05/22/18 10:30


Constitutional: Fatigue.  No fevers, chills, malaise


HEENT: No Rhinorrhea, nasal congestion, visual changes


Cardiovascular: No chest pain, syncope, palpitations, lightheadedness


Respiratory: No Cough, SOB, Hemoptysis,


Gastrointestinal: Diarrhea.  No Abdominal pain, Nausea, Vomiting, Constipation, 

Melena


Genitourinary: Increased Frequency.  No Dysuria, Urgency, Hesitancy, Hematuria, 

Flank pain


Musculoskeletal: No Myalgia, arthralgia


Skin: No rashes, itching, bruising, pallor


Neurologic: Headache.  No Dizziness, Numbness, Weakness, or Tingling


Psychiatric: No Hallucinations. No SI or HI














*Physical Exam





- Physical Exam


Comments: 





05/22/18 10:31


General Appearance: Nourished. No Apparent Distress


HEENT: EOMI, KAY. Dry Mucus Membranes.  No Pharyngeal Erythema, Tonsillar 

Exudate, Tonsillar Erythema


Neck: No Cervical Lymphadenopathy


Respiratory/Chest: Normal Breath Sounds. Bibasilar rales noted on auscultation.

  No Rhonchi, Wheezing


Cardiovascular: Tachycardic and Irregularly Irregular. No Murmur, Gallops, Rubs


Gastrointestinal/Abdominal: Normal Bowel Sounds, Soft. No Guarding, Rebound, 

Tenderness


Musculoskeletal: No CVA Tenderness


Extremity: Normal Capillary Refill


Integumentary: Normal Color, Dry, Warm


Neurologic: Fully Oriented, Alert, Normal Mood/Affect, Normal Response, 











**Heart Score/ECG Review


  ** #1


ECG reviewed & interpreted by me at: 10:33 (Afibt with RVR (Rate 127)  LBBB.  

Widened QRS to 136ms)





ED Treatment Course





- LABORATORY


CBC & Chemistry Diagram: 


 05/22/18 09:55





 05/22/18 09:55





Medical Decision Making





- Medical Decision Making





05/22/18 10:34


The patient is an 87 year old female with a history of HTN, CHF (EF 17%), CVA (

2006 with residual right sided sensitivity), chronic anemia, CAD s/p stenting 

pacemaker, paroxysmal atrial fibrillation, and remote PE, Parkinson's disease 

with dementia, and angiodysplasia of the cecum (c/w GI bleed) who presents for 

evaluation of lethargy.  Differential includes but is not limited to: 

Dehydration, Afib, ELENITA, Infectious, Metabolic Derangement.  EKG performed in 

triage notes afib with RVR.  Given the patient's symptoms, it is possible her 

increased lethargy is due to her afib with RVR.  However, we will obtain a cbc, 

cmp, coags, cardiac enzymes, chest plain film to evaluate further for possible 

etiologies.  We will treat in the meantime with 5mg of Diltiazem and 500ml of 

NS and continue to monitor and reassess.





05/22/18 12:46


CBC, cmp, troponin are unremarkable.  BNP is elevated to 9900.  Chest plain 

film demonstrates pulmonary edema as preliminarily read by our radiologist. 

Given the patient's clinical picture, it is likely her symptoms are due to afib 

with RVR and CHF exacerbation.  We will treat with 20mg of iv lasix here in the 

ED.  The patient will require admission for further management of her symptoms.

  We discussed the case with Dr. Miller who accepted the patient for admission.





*DC/Admit/Observation/Transfer


Diagnosis at time of Disposition: 


 Atrial fibrillation with RVR





CHF exacerbation


Qualifiers:


 Heart failure type: unspecified Qualified Code(s): I50.9 - Heart failure, 

unspecified








- Discharge Dispostion


Condition at time of disposition: Stable


Decision to Admit order: Yes





- Referrals


Referrals: 


Arun Rowan MD [Primary Care Provider] - 





- Patient Instructions





- Post Discharge Activity

## 2018-05-22 NOTE — PDOC
Attending Attestation





- Resident


Resident Name: JoaquinaArtemio





- ED Attending Attestation


I have performed the following: I have examined & evaluated the patient, The 

case was reviewed & discussed with the resident, I agree w/resident's findings 

& plan





- HPI


HPI: 





05/22/18 11:01


88y/o F multiple medical problems including afib, h/o dvt/PE on coumadin, h/o 

CRI presents BIBA from home with her daughter who notes 2-3 days of increasing 

lethargy/generalized weakness. Home visit noted relatively low blood pressures 

so her BP meds were stopped. Yesterday, she was given a bolus of IV fluids 2/2 

apparent dehydration. 


She presents now with persistent weakness, restlessness at night consistent 

with orthopnea. 


no f/c/cp. no cough/vomiting/diarrhea. 








- Physicial Exam


PE: 





05/22/18 11:10


tachypnea, tachycardia, BP wnl, O2 95% on room air -> 100% on 2L


alert, following commands, oriented


no jvd


bibasilar crackles, otherwise clear with good air entry


irregular tachycardia


abd soft


1+ edema b/l, no calf ttp.








- Critical Care Time


Total Critical Care Time: 37


Critical Care Statement: The care of this patient involved high complexity 

decision making to prevent further life threatening deterioration of the patient

's condition and/or to evaluate & treat vital organ system(s) failure or risk 

of failure.





- Medical Decision Making





05/22/18 11:11


 88y/o F p/w increasing generalized weakness/malaise for a few days. BP meds 

stopped 2/2 borderline BP at home. Now presents with Afib with RVR and mild 

respiratory distress with elevated BP, consistent with moderate acute pulmonary 

edema and volume overload. r/o infectious etiology, seems more likely primary 

cardiac. 


labs, ua


ekg, cxr


supplemental O2


rate controlled with diltiazem IV then PO, small lasix dose as Cr at better 

side of her baseline


Hgb stable, no evidence of acute blood loss


will speak with Dr. Rowan/, likely admission





05/22/18 11:27


on my prelim review, cxr consistent with PVC, cardiomegaly. 


INR therapeutic, trop 0.05 and negative. 





**Heart Score/ECG Review


  ** #1


Compared to previous ECG there are: Changes noted (c/w 3/3/18, QRS slightly 

widened though previous nonspecific intraventricular delay)





05/22/18 11:13


afib with rvr at 132, widened QRS compared to baseline, no acute ST changes, 

old inferior Q waves

## 2018-05-22 NOTE — EKG
Test Reason : 

Blood Pressure : ***/*** mmHG

Vent. Rate : 132 BPM     Atrial Rate : 120 BPM

   P-R Int : 000 ms          QRS Dur : 132 ms

    QT Int : 362 ms       P-R-T Axes : 000 -82 087 degrees

   QTc Int : 536 ms

 

ATRIAL FIBRILLATION WITH RAPID VENTRICULAR RESPONSE

LEFT AXIS DEVIATION

NON-SPECIFIC INTRA-VENTRICULAR CONDUCTION BLOCK

ABNORMAL ECG

WHEN COMPARED WITH ECG OF 03-MAR-2018 19:58,

QRS DURATION HAS INCREASED

CRITERIA FOR INFERIOR INFARCT ARE NO LONGER PRESENT

NONSPECIFIC T WAVE ABNORMALITY NO LONGER EVIDENT IN ANTEROLATERAL

LEADS

Confirmed by MD Spike, Artemio ([id number]) on 5/22/2018 4:20:48 PM

 

Referred By: JOHANNA SYED           Confirmed By:Artemio Lala MD

## 2018-05-23 VITALS — TEMPERATURE: 97.5 F

## 2018-05-23 LAB
ANION GAP SERPL CALC-SCNC: 6 MMOL/L (ref 8–16)
BASOPHILS # BLD: 0.7 % (ref 0–2)
BNP SERPL-MCNC: 8566.92 PG/ML (ref 5–450)
BUN SERPL-MCNC: 21 MG/DL (ref 7–18)
CALCIUM SERPL-MCNC: 8.8 MG/DL (ref 8.4–10.2)
CHLORIDE SERPL-SCNC: 105 MMOL/L (ref 98–107)
CO2 SERPL-SCNC: 28 MMOL/L (ref 22–28)
CREAT SERPL-MCNC: 1.6 MG/DL (ref 0.6–1.3)
DEPRECATED RDW RBC AUTO: 17.4 % (ref 11.6–15.6)
EOSINOPHIL # BLD: 1.5 % (ref 0–4.5)
GLUCOSE SERPL-MCNC: 119 MG/DL (ref 74–106)
HCT VFR BLD CALC: 31.3 % (ref 32.4–45.2)
HGB BLD-MCNC: 10.1 GM/DL (ref 10.7–15.3)
LYMPHOCYTES # BLD: 16.3 % (ref 8–40)
MAGNESIUM SERPL-MCNC: 1.9 MG/DL (ref 1.8–2.4)
MCH RBC QN AUTO: 24.9 PG (ref 25.7–33.7)
MCHC RBC AUTO-ENTMCNC: 32.3 G/DL (ref 32–36)
MCV RBC: 77 FL (ref 80–96)
MONOCYTES # BLD AUTO: 7.9 % (ref 3.8–10.2)
NEUTROPHILS # BLD: 73.6 % (ref 42.8–82.8)
PHOSPHATE SERPL-MCNC: 3.9 MG/DL (ref 2.5–4.6)
PLATELET # BLD AUTO: 268 K/MM3 (ref 134–434)
PMV BLD: 8 FL (ref 7.5–11.1)
POTASSIUM SERPLBLD-SCNC: 3.6 MMOL/L (ref 3.5–5.1)
RBC # BLD AUTO: 4.06 M/MM3 (ref 3.6–5.2)
SODIUM SERPL-SCNC: 139 MMOL/L (ref 136–145)
WBC # BLD AUTO: 7.1 K/MM3 (ref 4–10.8)

## 2018-05-23 RX ADMIN — GABAPENTIN SCH MG: 100 CAPSULE ORAL at 10:26

## 2018-05-23 RX ADMIN — DILTIAZEM HYDROCHLORIDE ONE MG: 30 TABLET, FILM COATED ORAL at 03:47

## 2018-05-23 RX ADMIN — CARVEDILOL SCH MG: 6.25 TABLET, FILM COATED ORAL at 21:20

## 2018-05-23 RX ADMIN — QUETIAPINE FUMARATE SCH MG: 25 TABLET ORAL at 21:21

## 2018-05-23 RX ADMIN — QUETIAPINE FUMARATE SCH MG: 25 TABLET ORAL at 10:27

## 2018-05-23 RX ADMIN — CARVEDILOL SCH MG: 6.25 TABLET, FILM COATED ORAL at 10:26

## 2018-05-23 RX ADMIN — GABAPENTIN SCH MG: 100 CAPSULE ORAL at 21:21

## 2018-05-23 RX ADMIN — RANITIDINE SCH MG: 150 TABLET ORAL at 10:27

## 2018-05-23 RX ADMIN — VITAMIN D, TAB 1000IU (100/BT) SCH UNIT: 25 TAB at 10:26

## 2018-05-23 RX ADMIN — DILTIAZEM HYDROCHLORIDE ONE: 30 TABLET, FILM COATED ORAL at 10:25

## 2018-05-23 RX ADMIN — ISOSORBIDE MONONITRATE SCH MG: 30 TABLET, EXTENDED RELEASE ORAL at 10:26

## 2018-05-23 NOTE — CONS
DATE OF CONSULTATION:  2018

 

CARDIOLOGY CONSULTATION 

 

REQUESTING PHYSICIAN:  Radha Zaman N.P. 

 

CHIEF COMPLAINT:

1.  Lethargy.  

2.  Fluctuating blood pressure.  

 

HISTORY OF PRESENT ILLNESS:  The patient is an 87-year-old female with known case of

coronary artery disease, status post PCI, status post postprocedure embolic CVA with

right hemiparesis, history of dementia, history of atrial fibrillation, renal

insufficiency, DVT with partial occlusive thrombosis of the right popliteal vein and

also occlusion involving the right posterior tibial vein, status post inferior vena

cava umbrella, congestive heart failure, hypertension, hypercholesterolemia,

recurrent GI bleeding related to angiodysplasia of the cecum, history of Chauncey

ulceration, hiatus hernia, history of carotid hypersensitivity status post permanent

pacemaker.  History of hypercholesterolemia.  

 

According to her daughter, patient was noted to have lethargy which at times was

severe.  A visiting nurse came to the house and felt that she was dehydrated and gave

her 500 mL of normal saline.  Apparently patient became more alert.  According to the

daughter, she also was experiencing low blood pressure prior to the saline infusion. 

 

 

The symptoms recurred, and she was brought to the hospital.  

 

PAST HISTORY:  As mentioned in the history of present illness.  

 

SOCIAL HISTORY:  , has 3 daughters, 1 of them has Perkins syndrome with

coarctation and also dissection of the aortic arch.  Other daughters are healthy.

 

FAMILY HISTORY:  Mother  of unknown causes.  Father apparently  of a

cerebrovascular accident.  She had 2 sisters, 1 of them is  of unknown

causes.  

 

ALLERGIES: 

1.  PENICILLIN.

2.  INTRAVENOUS CONTRAST.

3.  SHELLFISH.

4.  OATS.  

 

CURRENT MEDICATIONS:

1.  Coumadin 1 mg p.o. daily.

2.  Neurontin 100 mg p.o. b.i.d.

3.  Seroquel 50 mg p.o. b.i.d.

4.  Coreg 6.25 p.o. b.i.d.

5.  Cardizem  mg p.o. daily.

6.  Zantac 150 mg p.o. daily.

7.  Atorvastatin 30 mg p.o. daily.

8.  Vitamin D3 2000 international units p.o. daily.

 

REVIEW OF SYSTEMS:  

Constitutional:  No history of chills, fever, or night sweats.  No history of

unintentional weight loss.  

HEENT:  No history of headaches, diplopia, or blurred vision reported.  No history of

epistaxis.  Denies having tinnitus, or deafness according to her daughter.  

Cardiovascular:  See history of present illness.

Respiratory:  No history of cough, expectoration or hemoptysis.  

Gastrointestinal:  No history of recent nausea, vomiting, melena or hematemesis.  No

history of abdominal pain or discomfort.  No known change in bowel habits.  

Neurological:  See history of present illness.  No history of recent seizures,

syncope.  

Endocrine:  No history of polyuria, polydipsia.  No history of intolerance to cold or

warm weather.  

Musculoskeletal:  No history of myalgias or arthralgias reported.  

Genitourinary:  No history of hematuria, frequency, or dysuria.  

 

PHYSICAL EXAMINATION:

General:  An 87-year-old female at the time of examination was alert, there was no

pallor, cyanosis, clubbing.  

Vital signs:  Blood pressure 79/48 mmHg.  Pulse 88 beats per minute and irregular. 

Temperature 97.2 degrees Fahrenheit.  Weight is not available.   

Neck:  Supple.  No jugulovenous distention, carotids were equal, upstrokes were

normal, no bruits were heard, and no thyromegaly was present.

Heart:  No heaves or thrills.  S1 and S2 were normal.  Grade 1/6 decrescendo systolic

murmur was heard along the left sternal border.  No diastolic murmur or gallops were

heard.

Lungs:  Clear on auscultation.  

Abdomen:  Soft, protuberant, nontender, no hepatosplenomegaly or palpable masses were

felt.  

Extremities:  No calf tenderness.  Right calf was larger than left.  The right lower

extremity had varicosities.  Left had mild varicosities.  No pitting edema.  Femoral

pulses were 1 to 2+.  Dorsalis pedis pulses were weak.  

 

LABORATORY DATA:  On May 23, 2018, WBC count 7100, hemoglobin 10.1 g/dL microcytic

cell indices.  Platelet count 268,000.  Normal differential.  INR on May 22 was 2.36.

 Chemistries on May 23:  sodium 139, potassium 3.6, chloride 105, CO2 of 28.  

 

BUN 21, creatinine 1.6 mg/dL.  Glucose 119 mg/dL.  Magnesium 1.9 mg/dL.  BNP was

9929.66.  Followup BNP was 8566.92.  CK was 37, 36, and 37.  Troponins were less than

0.02 and 0.05.  

 

X-ray chest May 22, 2018.  Impression:  Slightly worse when compared to 2018.  There was progressive left pleural effusion with infiltrate.  There is a

prominent mediastinum with clips, sclerotic knob, and double lead pacemaker.  Right

lung was reported to be clear.  

 

ECG:  Atrial fibrillation with rapid ventricular response.  Left axis deviation. 

Nonspecific _____.  

 

IMPRESSION:

1.  Lethargy, etiology to be determined.  

a.  Related to volume depletion and hypertension.  

2.  Congestive heart failure, possibly worsened by normal saline, volume _____.

3.  Coronary artery disease, status post percutaneous coronary intervention/stenting.

 

4.  Permanent atrial fibrillation.  

5.  History of carotid hypersensitivity.

6.  Status post cerebrovascular accident with right hemiparesis.

7.  History of dementia.

8.  Renal insufficiency.  

9.  Cecal angiodysplasia status post gastrointestinal bleeding.

10.  Hypertension.  Hypertensive cardiovascular disease.  

11.  Hypercholesterolemia.  

12.  Status post inferior vena cava filter.  

13.  Left pleural effusion.  

 

RECOMMENDATION:  

1.  Low dose Lasix, aspiration has elevated BNP and a left effusion.

2.  May need to decrease the dose of diltiazem from 180 to 120 mg and lower if

necessary.  

3.  If left pleural effusion is persistent, consider ultrasound guided thoracentesis

(previous history of carcinoma of the lung).  

4.  Daily weights.

5.  Check blood pressure supine and standing.

 

Prognosis guarded.  

 

Thank you for your referral.  

 

Yours sincerely,

 

 

ARPIT MCKINLEY M.D.

 

[id number]

DD: 2018 19:36

DT: 2018 20:23

Job #:  [id number]

## 2018-05-23 NOTE — HP
( 5/22/18 patient was initially admitted from the emergency department to Dr. Hicks (internal medicine) pending transfer to [facility] cardiac telemetry

, patient was downgraded by emergency department physician at 0400 on 5/23/18 

and transferred to [place] telemetry unit). 





CHIEF COMPLAINT:  lethargy





PCP:  Dr Rowan 





HISTORY OF PRESENT ILLNESS:  patient is a 87-year-old female, with a past 

medical history of paroxysmal A. fib, PPM, DVT, lung cancer, coronary artery 

disease, CVA ( residual right-sided weakness), dementia, vitamin D deficiency 

and GERD. Both daughters at bedside and are primary care givers for the patient

, in addition secondary to patient's dementia she is a poor historian. 

Daughters report patient had ongoing lethargy and generalized weakness for the 

past 5 days. She was evaluated by a nurse practitioner at her home on Monday05/ 21/2018, she was noted to have a labile blood pressure upon assessment and and 

was given 500 mL of normal saline IV.  Daughters report that patient became 

more alert however within 24 hours, lethargy  returned with labile blood 

pressure and she was brought to the emergency department for further evaluation.





ER course was notable for:


(1)chest x-ray progressive left pleural effusion with infiltrate.


(2) bnp 71887


(3) ekg afib with rvr, ventriuclar  rate 132





Recent Travel:  none





PAST MEDICAL HISTORY: see history of present illness





PAST SURGICAL HISTORY:left lobectomy





Social History:resides at home with 2 daughters primary caregiver


Smoking:none


Alcohol: non


Drugs: none





Family History:daughter Perkins syndrome


Allergies





oats Allergy (Intermediate, Verified 05/22/18 10:02)


 


Penicillins Allergy (Mild, Verified 05/22/18 10:02)


 


Shellfish Allergy (Mild, Verified 05/22/18 10:02)


 








HOME MEDICATIONS:


 Home Medications











 Medication  Instructions  Recorded


 


Simvastatin [Zocor -] 20 mg PO HS 05/24/14


 


Diltiazem Cd [Cardizem Cd -] 180 mg PO DAILY 01/08/16


 


Gabapentin [Neurontin] 100 mg PO BID 01/08/16


 


Cholecalciferol (Vitamin D3) 2,000 unit PO DAILY 01/29/16





[Vitamin D3]  


 


Quetiapine Fumarate [Seroquel -] 50 mg PO BID  tablet 02/02/16


 


Carvedilol [Coreg -] 6.25 mg PO BID #60 tablet 02/14/16


 


Furosemide [Lasix] 20 mg PO ASDIR 05/22/18


 


Isosorbide Mononitrate [Imdur -] 30 mg PO DAILY 05/22/18


 


Ranitidine [Zantac -] 150 mg PO DAILY 05/22/18


 


Warfarin Na [Coumadin] 1 mg PO HS 05/22/18








REVIEW OF SYSTEMS


CONSTITUTIONAL: 


present:  generalized weakness, malaise


Absent:  fever, chills, diaphoresis,, loss of appetite, weight change


HEENT: 


Absent:  rhinorrhea, nasal congestion, throat pain, throat swelling, difficulty 

swallowing, mouth swelling, ear pain, eye pain, visual changes


CARDIOVASCULAR: 


Absent: chest pain, syncope, palpitations, irregular heart rate, lightheadedness

, peripheral edema


RESPIRATORY: 


Absent: cough, shortness of breath, dyspnea with exertion, orthopnea, wheezing, 

stridor, hemoptysis


GASTROINTESTINAL:


Absent: abdominal pain, abdominal distension, nausea, vomiting, diarrhea, 

constipation, melena, hematochezia


GENITOURINARY: 


Absent: dysuria, frequency, urgency, hesitancy, hematuria, flank pain, genital 

pain


MUSCULOSKELETAL: 


Absent: myalgia, arthralgia, joint swelling, back pain, neck pain


SKIN: 


Absent: rash, itching, pallor


HEMATOLOGIC/IMMUNOLOGIC: 


Absent: easy bleeding, easy bruising, lymphadenopathy, frequent infections


ENDOCRINE:


Absent: unexplained weight gain, unexplained weight loss, heat intolerance, 

cold intolerance


NEUROLOGIC: 


Absent: headache, focal weakness or paresthesias, dizziness, unsteady gait, 

seizure, mental status changes, bladder or bowel incontinence


PSYCHIATRIC: 


Absent: anxiety, depression, suicidal or homicidal ideation, hallucinations.








PHYSICAL EXAMINATION


 Vital Signs - 24 hr











  05/22/18 05/22/18 05/22/18





  10:03 10:28 10:42


 


Temperature 97.5 F L 99.3 F 


 


Pulse Rate 107 H  


 


Pulse Rate [  128 H 104 H





Apical]   


 


Respiratory 24 26 H 27 H





Rate   


 


Blood Pressure 130/89  


 


Blood Pressure  110/85 113/83





[Left Arm]   


 


O2 Sat by Pulse 95 100 100





Oximetry (%)   














  05/22/18 05/22/18 05/22/18





  11:06 12:43 13:10


 


Temperature   


 


Pulse Rate   


 


Pulse Rate [ 112 H 116 H 94 H





Apical]   


 


Respiratory 27 H 25 H 





Rate   


 


Blood Pressure   


 


Blood Pressure 117/93 103/74 105/85





[Left Arm]   


 


O2 Sat by Pulse  99 





Oximetry (%)   














  05/22/18 05/22/18 05/22/18





  16:13 17:29 18:23


 


Temperature   


 


Pulse Rate   


 


Pulse Rate [ 100 H 105 H 122 H





Apical]   


 


Respiratory 24  





Rate   


 


Blood Pressure   


 


Blood Pressure 115/72 105/81 101/71





[Left Arm]   


 


O2 Sat by Pulse 100  100





Oximetry (%)   














  05/22/18 05/22/18 05/22/18





  19:46 20:56 22:08


 


Temperature   


 


Pulse Rate   


 


Pulse Rate [ 113 H 117 H 86





Apical]   


 


Respiratory 25 H 24 24





Rate   


 


Blood Pressure   


 


Blood Pressure 111/70 110/88 107/81





[Left Arm]   


 


O2 Sat by Pulse 98 100 100





Oximetry (%)   














  05/23/18 05/23/18 05/23/18





  00:32 04:00 07:19


 


Temperature   


 


Pulse Rate  130 H 104 H


 


Pulse Rate [ 88  





Apical]   


 


Respiratory 18 18 





Rate   


 


Blood Pressure  92/68 96/54


 


Blood Pressure 112/76  





[Left Arm]   


 


O2 Sat by Pulse 99 99 





Oximetry (%)   











GENERAL: Awake, alert, and fully oriented, in no acute distress.


HEAD: Normal with no signs of trauma.


EYES: Pupils equal, round and reactive to light, extraocular movements intact, 

sclera anicteric, conjunctiva clear. No lid lag.


EARS, NOSE, THROAT: Ears normal, nares patent, oropharynx clear without 

exudates. dry mucous membranes.


NECK: Normal range of motion, supple without lymphadenopathy, JVD, or masses.


LUNGS: Breath sounds equal, diminished the right lower lobe and crackles to 

left base, RR 22, clear to auscultation bilaterally to apexes, . No wheezes. No 

accessory muscle use.


HEART: irregular rate and rhythm, normal S1 and S2 without murmur, rub or 

gallop.


ABDOMEN: Soft, nontender, not distended, normoactive bowel sounds, no guarding, 

no rebound, no masses.  No hepatomegaly or  splenomegaly. 


MUSCULOSKELETAL: Normal range of motion at all joints. No bony deformities or 

tenderness. No CVA tenderness.


UPPER EXTREMITIES: 2+ pulses, warm, well-perfused. No cyanosis. No clubbing. No 

peripheral edema.


LOWER EXTREMITIES: 2+ pulses, warm, right lower extremity +1 (chronic) left 

lower extremity trace edema,  well-perfused. No calf tenderness.


NEUROLOGICAL:  Cranial nerves II-XII intact. Normal speech. Normal gait.


PSYCHIATRIC: Cooperative. Good eye contact. Appropriate mood and affect.


SKIN: Warm, dry, normal turgor, no rashes or lesions noted, normal capillary 

refill. 


 Laboratory Results - last 24 hr











  05/22/18 05/22/18 05/22/18





  09:55 09:55 09:55


 


WBC  6.4  


 


RBC  4.17  


 


Hgb  10.3 L  


 


Hct  32.1 L  


 


MCV  77.1 L  


 


MCH  24.8 L  


 


MCHC  32.2  


 


RDW  17.2 H  


 


Plt Count  255  


 


MPV  8.1  


 


Neutrophils %  71.4  


 


Lymphocytes %  18.9  


 


Monocytes %  7.9  


 


Eosinophils %  1.2  


 


Basophils %  0.6  


 


PT with INR   26.0 H 


 


INR   2.36 H 


 


PTT (Actin FS)   32.1 


 


Sodium    140


 


Potassium    3.7


 


Chloride    107


 


Carbon Dioxide    25


 


Anion Gap    8


 


BUN    18


 


Creatinine    1.4 H


 


Creat Clearance w eGFR    35.57


 


Random Glucose    97


 


Calcium    8.4


 


Total Bilirubin    < 0.5  D


 


AST    18


 


ALT    12


 


Alkaline Phosphatase    76


 


Creatine Kinase   


 


Troponin I   


 


B-Natriuretic Peptide   


 


Total Protein    6.7


 


Albumin    3.2 L


 


Urine Color   


 


Urine Appearance   


 


Urine pH   


 


Ur Specific Gravity   


 


Urine Protein   


 


Urine Glucose (UA)   


 


Urine Ketones   


 


Urine Blood   


 


Urine Nitrite   


 


Urine Bilirubin   


 


Urine Urobilinogen   


 


Ur Leukocyte Esterase   


 


Urine RBC   


 


Urine WBC   


 


Ur Epithelial Cells   


 


Urine Bacteria   














  05/22/18 05/22/18 05/22/18





  10:23 10:23 11:00


 


WBC   


 


RBC   


 


Hgb   


 


Hct   


 


MCV   


 


MCH   


 


MCHC   


 


RDW   


 


Plt Count   


 


MPV   


 


Neutrophils %   


 


Lymphocytes %   


 


Monocytes %   


 


Eosinophils %   


 


Basophils %   


 


PT with INR   


 


INR   


 


PTT (Actin FS)   


 


Sodium   


 


Potassium   


 


Chloride   


 


Carbon Dioxide   


 


Anion Gap   


 


BUN   


 


Creatinine   


 


Creat Clearance w eGFR   


 


Random Glucose   


 


Calcium   


 


Total Bilirubin   


 


AST   


 


ALT   


 


Alkaline Phosphatase   


 


Creatine Kinase   37 


 


Troponin I  0.05  


 


B-Natriuretic Peptide    9929.66 H


 


Total Protein   


 


Albumin   


 


Urine Color   


 


Urine Appearance   


 


Urine pH   


 


Ur Specific Gravity   


 


Urine Protein   


 


Urine Glucose (UA)   


 


Urine Ketones   


 


Urine Blood   


 


Urine Nitrite   


 


Urine Bilirubin   


 


Urine Urobilinogen   


 


Ur Leukocyte Esterase   


 


Urine RBC   


 


Urine WBC   


 


Ur Epithelial Cells   


 


Urine Bacteria   














  05/22/18 05/23/18 05/23/18





  11:44 00:40 08:17


 


WBC   


 


RBC   


 


Hgb   


 


Hct   


 


MCV   


 


MCH   


 


MCHC   


 


RDW   


 


Plt Count   


 


MPV   


 


Neutrophils %   


 


Lymphocytes %   


 


Monocytes %   


 


Eosinophils %   


 


Basophils %   


 


PT with INR   


 


INR   


 


PTT (Actin FS)   


 


Sodium   


 


Potassium   


 


Chloride   


 


Carbon Dioxide   


 


Anion Gap   


 


BUN   


 


Creatinine   


 


Creat Clearance w eGFR   


 


Random Glucose   


 


Calcium   


 


Total Bilirubin   


 


AST   


 


ALT   


 


Alkaline Phosphatase   


 


Creatine Kinase   36 


 


Troponin I   < 0.02  0.05


 


B-Natriuretic Peptide   8566.92 H 


 


Total Protein   


 


Albumin   


 


Urine Color  Chastity  


 


Urine Appearance  Sl cloudy  


 


Urine pH  6.0  


 


Ur Specific Gravity  1.010  


 


Urine Protein  Negative  


 


Urine Glucose (UA)  Negative  


 


Urine Ketones  Negative  


 


Urine Blood  Negative  


 


Urine Nitrite  Negative  


 


Urine Bilirubin  Negative  


 


Urine Urobilinogen  0.2  


 


Ur Leukocyte Esterase  2+ H  


 


Urine RBC  0-3  


 


Urine WBC  30-50  


 


Ur Epithelial Cells  Moderate  


 


Urine Bacteria  Rare  














  05/23/18





  08:17


 


WBC  7.1


 


RBC  4.06


 


Hgb  10.1 L


 


Hct  31.3 L


 


MCV  77.0 L


 


MCH  24.9 L


 


MCHC  32.3


 


RDW  17.4 H


 


Plt Count  268


 


MPV  8.0


 


Neutrophils %  73.6


 


Lymphocytes %  16.3


 


Monocytes %  7.9


 


Eosinophils %  1.5


 


Basophils %  0.7


 


PT with INR 


 


INR 


 


PTT (Actin FS) 


 


Sodium 


 


Potassium 


 


Chloride 


 


Carbon Dioxide 


 


Anion Gap 


 


BUN 


 


Creatinine 


 


Creat Clearance w eGFR 


 


Random Glucose 


 


Calcium 


 


Total Bilirubin 


 


AST 


 


ALT 


 


Alkaline Phosphatase 


 


Creatine Kinase 


 


Troponin I 


 


B-Natriuretic Peptide 


 


Total Protein 


 


Albumin 


 


Urine Color 


 


Urine Appearance 


 


Urine pH 


 


Ur Specific Gravity 


 


Urine Protein 


 


Urine Glucose (UA) 


 


Urine Ketones 


 


Urine Blood 


 


Urine Nitrite 


 


Urine Bilirubin 


 


Urine Urobilinogen 


 


Ur Leukocyte Esterase 


 


Urine RBC 


 


Urine WBC 


 


Ur Epithelial Cells 


 


Urine Bacteria 











ASSESSMENT/PLAN:








1) cardiovascular


atrial fibrillation with RVR


- continuous telemetry monitoring, ventricular rate 110s on monitoring, restart 

home medications Coreg and Cardizem


- Continue Coumadin home dose


- appreciate input of patient's private cardiologist Dr. Parrish  


Coronary artery disease


- Continue imdur and Zocor


chronic systolic congestive heart failure


-Lasix 20 mg IV given in the emergency department, patient diuresed 600 mL, 

patient appears euvolemic on exam, will hold repeat IV Lasix, chest x-ray PA 

and lateral ordered





2) Pulmonary 


pleural effusion


- chronic left pleural effusion however worsened upon review of ED x-ray, 

pending repeat chest x-ray PA and lateral today





3) nephrology


chronic kidney disease


- creatinine 1.3 close to baseline


- Strict monitoring and nephrotoxic agents caution with diuretics





F/E/N


- Low-sodium diet


- replete electrolytes PRN





PPX 


- coumadin


-  physical therapy evaluation





dispo: patient requires inpatient telemetry monitoring.





Visit type





- Emergency Visit


Emergency Visit: Yes


ED Registration Date: 05/22/18


Care time: The patient presented to the Emergency Department on the above date 

and was hospitalized for further evaluation of their emergent condition.





- New Patient


This patient is new to me today: Yes


Date on this admission: 05/24/18





- Critical Care


Critical Care patient: No





Hospitalist Screening





- Colonoscopy Questionnaire


Colonoscopy Questionnaire: 





Colonoscopy Questionnaire








-   Patient:


50 - 75 years old and never had a screening colonoscopy: Yes


History of colon or rectal polyps, or CA: No


History of IBD, Crohn's disease or UC: No


History of abdominal radiation therapy as a child: No





-   Relative:


1 with colon or rectal CA, or polyps at age 60 or younger: No


Colon or rectal CA diagnosed at age 45 or younger: No


Multiple relatives with colon or rectal CA: No





-   Outcome:


Screening Result: Positive Screen

## 2018-05-24 VITALS — SYSTOLIC BLOOD PRESSURE: 107 MMHG | HEART RATE: 96 BPM | DIASTOLIC BLOOD PRESSURE: 70 MMHG

## 2018-05-24 LAB
ANION GAP SERPL CALC-SCNC: 5 MMOL/L (ref 8–16)
BASOPHILS # BLD: 0.7 % (ref 0–2)
BUN SERPL-MCNC: 24 MG/DL (ref 7–18)
CALCIUM SERPL-MCNC: 8.6 MG/DL (ref 8.4–10.2)
CHLORIDE SERPL-SCNC: 107 MMOL/L (ref 98–107)
CO2 SERPL-SCNC: 28 MMOL/L (ref 22–28)
CREAT SERPL-MCNC: 1.6 MG/DL (ref 0.6–1.3)
DEPRECATED RDW RBC AUTO: 17.2 % (ref 11.6–15.6)
EOSINOPHIL # BLD: 5.3 % (ref 0–4.5)
GLUCOSE SERPL-MCNC: 76 MG/DL (ref 74–106)
HCT VFR BLD CALC: 31.3 % (ref 32.4–45.2)
HGB BLD-MCNC: 9.5 GM/DL (ref 10.7–15.3)
INR BLD: 1.9 (ref 0.82–1.09)
LYMPHOCYTES # BLD: 22.2 % (ref 8–40)
MAGNESIUM SERPL-MCNC: 1.9 MG/DL (ref 1.8–2.4)
MCH RBC QN AUTO: 23.4 PG (ref 25.7–33.7)
MCHC RBC AUTO-ENTMCNC: 30.3 G/DL (ref 32–36)
MCV RBC: 77.3 FL (ref 80–96)
MONOCYTES # BLD AUTO: 10.7 % (ref 3.8–10.2)
NEUTROPHILS # BLD: 61.1 % (ref 42.8–82.8)
PHOSPHATE SERPL-MCNC: 4.1 MG/DL (ref 2.5–4.6)
PLATELET # BLD AUTO: 239 K/MM3 (ref 134–434)
PMV BLD: 8 FL (ref 7.5–11.1)
POTASSIUM SERPLBLD-SCNC: 3.8 MMOL/L (ref 3.5–5.1)
PT PNL PPP: 21 SEC (ref 10.2–13)
RBC # BLD AUTO: 4.05 M/MM3 (ref 3.6–5.2)
SODIUM SERPL-SCNC: 140 MMOL/L (ref 136–145)
WBC # BLD AUTO: 4.7 K/MM3 (ref 4–10.8)

## 2018-05-24 RX ADMIN — RANITIDINE SCH MG: 150 TABLET ORAL at 10:20

## 2018-05-24 RX ADMIN — CARVEDILOL SCH MG: 6.25 TABLET, FILM COATED ORAL at 10:20

## 2018-05-24 RX ADMIN — ISOSORBIDE MONONITRATE SCH MG: 30 TABLET, EXTENDED RELEASE ORAL at 10:20

## 2018-05-24 RX ADMIN — VITAMIN D, TAB 1000IU (100/BT) SCH UNIT: 25 TAB at 10:20

## 2018-05-24 RX ADMIN — QUETIAPINE FUMARATE SCH MG: 25 TABLET ORAL at 10:20

## 2018-05-24 RX ADMIN — GABAPENTIN SCH MG: 100 CAPSULE ORAL at 10:20

## 2018-05-24 NOTE — DS
Physical Exam: 


SUBJECTIVE: Patient seen and examined, sitting comfortably in bed voices no 

complaints wants to go  home. daughter at bedside. 





OBJECTIVE: patient is a 87-year-old female, with a past medical history of 

paroxysmal A. fib, PPM, DVT, lung cancer, coronary artery disease, CVA ( 

residual right-sided weakness), dementia, vitamin D deficiency and GERD. Both 

daughters at bedside and are primary care givers for the patient, in addition 

secondary to patient's dementia she is a poor historian. Daughters report 

patient had ongoing lethargy and generalized weakness for the past 5 days. She 

was evaluated by a nurse practitioner at her home on Monday05/21/2018, she was 

noted to have a labile blood pressure upon assessment and and was given 500 mL 

of normal saline IV.  Daughters report that patient became more alert however 

within 24 hours, lethargy  returned with labile blood pressure and she was 

brought to the emergency department for further evaluation.





ER course was notable for:


(1)chest x-ray progressive left pleural effusion with infiltrate.


(2) bnp [id number]


(3) ekg afib with rvr, ventriuclar  rate 132





 Vital Signs











 Period  Temp  Pulse  Resp  BP Sys/Card  Pulse Ox


 


 Last 24 Hr  97.5 F  70-96  18-18  104-107/58-70  94-96








PHYSICAL EXAM





GENERAL: Awake, alert, and fully oriented, in no acute distress.


HEAD: Normal with no signs of trauma.


EYES: Pupils equal, round and reactive to light, extraocular movements intact, 

sclera anicteric, conjunctiva clear. No lid lag.


EARS, NOSE, THROAT: Ears normal, nares patent, oropharynx clear without 

exudates. dry mucous membranes.


NECK: Normal range of motion, supple without lymphadenopathy, JVD, or masses.


LUNGS: Breath sounds equal, diminished the right lower lobe and crackles to 

left base, RR 22, clear to auscultation bilaterally to apexes, . No wheezes. No 

accessory muscle use.


HEART: irregular rate and rhythm, normal S1 and S2 without murmur, rub or 

gallop.


ABDOMEN: Soft, nontender, not distended, normoactive bowel sounds, no guarding, 

no rebound, no masses.  No hepatomegaly or  splenomegaly. 


MUSCULOSKELETAL: Normal range of motion at all joints. No bony deformities or 

tenderness. No CVA tenderness.


UPPER EXTREMITIES: 2+ pulses, warm, well-perfused. No cyanosis. No clubbing. No 

peripheral edema.


LOWER EXTREMITIES: 2+ pulses, warm, right lower extremity +1 (chronic) left 

lower extremity trace edema,  well-perfused. No calf tenderness.


NEUROLOGICAL:  Cranial nerves II-XII intact. Normal speech. Normal gait.


PSYCHIATRIC: Cooperative. Good eye contact. Appropriate mood and affect.


SKIN: Warm, dry, normal turgor, no rashes or lesions noted, normal capillary 

refill. 





LABS


 Laboratory Results - last 24 hr











  05/24/18 05/24/18 05/24/18





  08:24 08:27 08:27


 


WBC  4.7  D  


 


RBC  4.05  


 


Hgb  9.5 L  


 


Hct  31.3 L  


 


MCV  77.3 L  


 


MCH  23.4 L  


 


MCHC  30.3 L  


 


RDW  17.2 H  


 


Plt Count  239  


 


MPV  8.0  


 


Neutrophils %  61.1  


 


Lymphocytes %  22.2  


 


Monocytes %  10.7 H  


 


Eosinophils %  5.3 H  


 


Basophils %  0.7  


 


PT with INR   21.0 H 


 


INR   1.90 H 


 


Sodium    140


 


Potassium    3.8


 


Chloride    107


 


Carbon Dioxide    28


 


Anion Gap    5 L


 


BUN    24 H


 


Creatinine    1.6 H


 


Random Glucose    76  D


 


Calcium    8.6


 


Phosphorus    4.1


 


Magnesium    1.9








 Microbiology





05/22/18 11:44   Urine - Urine Clean Catch   Urine Culture - Preliminary


                            Beta Hemolytic Strep


                            Lactose Fermenting Neg Bacilli


IMAGING


chest xray: diminished left base, better aeration, no infilitrates no effusions

  





HOSPITAL COURSE:


1) atrial fibrillation with RVR


- continuous telemetry monitoring, ventricular rate 80's continue home 

medications  Coreg and Cardizem


- Continue Coumadin home dose


-consulted patient's private cardiologist Dr. Parrish  


Coronary artery disease


- Continue imdur and Zocor


chronic systolic congestive heart failure


-Lasix 20 mg IV given in the emergency department, patient diuresed 600 mL, 

patient appears euvolemic on exam, home dose





2) Pulmonary 


pleural effusion


- improved on repeat chest xray





3) nephrology


chronic kidney disease


- creatinine 1.12 close to baseline











Date of Admission:05/22/18





Date of Discharge: 05/24/18











Minutes to complete discharge: 45





Discharge Summary


Reason For Visit: CHF,ATRIAL FIB WITH RAPID VENTRICULAR RESPONSE


Current Active Problems





Atrial fibrillation with RVR (Acute)


CHF exacerbation (Acute)








Condition: Stable





- Instructions


Referrals: 


Arun Rowan MD [Primary Care Provider] - 





- Home Medications


Comprehensive Discharge Medication List: 


Ambulatory Orders





RX: Simvastatin [Zocor -] 20 mg PO HS 05/24/14 


RX: Diltiazem Cd [Cardizem Cd -] 180 mg PO DAILY 01/08/16 


RX: Gabapentin [Neurontin] 100 mg PO BID 01/08/16 


RX: Cholecalciferol (Vitamin D3) [Vitamin D3] 2,000 unit PO DAILY 01/29/16 


RX: Quetiapine Fumarate [Seroquel -] 50 mg PO BID  tablet 02/02/16 


RX: Carvedilol [Coreg -] 6.25 mg PO BID #60 tablet 02/14/16 


Furosemide [Lasix] 20 mg PO ASDIR 05/22/18 


Isosorbide Mononitrate [Imdur -] 30 mg PO DAILY 05/22/18 


Ranitidine [Zantac -] 150 mg PO DAILY 05/22/18 


Warfarin Na [Coumadin] 1 mg PO HS 05/22/18

## 2020-02-07 ENCOUNTER — HOSPITAL ENCOUNTER (INPATIENT)
Dept: HOSPITAL 74 - JER | Age: 85
LOS: 13 days | Discharge: HOME | DRG: 291 | End: 2020-02-20
Attending: INTERNAL MEDICINE | Admitting: INTERNAL MEDICINE
Payer: COMMERCIAL

## 2020-02-07 VITALS — BODY MASS INDEX: 24.7 KG/M2

## 2020-02-07 DIAGNOSIS — I25.10: ICD-10-CM

## 2020-02-07 DIAGNOSIS — G30.9: ICD-10-CM

## 2020-02-07 DIAGNOSIS — Z95.0: ICD-10-CM

## 2020-02-07 DIAGNOSIS — E86.0: ICD-10-CM

## 2020-02-07 DIAGNOSIS — R41.0: ICD-10-CM

## 2020-02-07 DIAGNOSIS — J69.0: ICD-10-CM

## 2020-02-07 DIAGNOSIS — N17.9: ICD-10-CM

## 2020-02-07 DIAGNOSIS — Z86.718: ICD-10-CM

## 2020-02-07 DIAGNOSIS — N13.30: ICD-10-CM

## 2020-02-07 DIAGNOSIS — I13.0: Primary | ICD-10-CM

## 2020-02-07 DIAGNOSIS — N28.1: ICD-10-CM

## 2020-02-07 DIAGNOSIS — E87.0: ICD-10-CM

## 2020-02-07 DIAGNOSIS — D50.0: ICD-10-CM

## 2020-02-07 DIAGNOSIS — R13.10: ICD-10-CM

## 2020-02-07 DIAGNOSIS — K44.9: ICD-10-CM

## 2020-02-07 DIAGNOSIS — K22.2: ICD-10-CM

## 2020-02-07 DIAGNOSIS — J96.01: ICD-10-CM

## 2020-02-07 DIAGNOSIS — Z66: ICD-10-CM

## 2020-02-07 DIAGNOSIS — K76.7: ICD-10-CM

## 2020-02-07 DIAGNOSIS — F02.81: ICD-10-CM

## 2020-02-07 DIAGNOSIS — E46: ICD-10-CM

## 2020-02-07 DIAGNOSIS — E87.6: ICD-10-CM

## 2020-02-07 DIAGNOSIS — R62.7: ICD-10-CM

## 2020-02-07 DIAGNOSIS — N39.0: ICD-10-CM

## 2020-02-07 DIAGNOSIS — Z95.5: ICD-10-CM

## 2020-02-07 DIAGNOSIS — E11.22: ICD-10-CM

## 2020-02-07 DIAGNOSIS — J90: ICD-10-CM

## 2020-02-07 DIAGNOSIS — R59.0: ICD-10-CM

## 2020-02-07 DIAGNOSIS — Z79.01: ICD-10-CM

## 2020-02-07 DIAGNOSIS — I50.23: ICD-10-CM

## 2020-02-07 DIAGNOSIS — F01.50: ICD-10-CM

## 2020-02-07 DIAGNOSIS — N20.0: ICD-10-CM

## 2020-02-07 DIAGNOSIS — E88.09: ICD-10-CM

## 2020-02-07 DIAGNOSIS — J98.11: ICD-10-CM

## 2020-02-07 DIAGNOSIS — G20: ICD-10-CM

## 2020-02-07 DIAGNOSIS — I69.351: ICD-10-CM

## 2020-02-07 DIAGNOSIS — I50.9: ICD-10-CM

## 2020-02-07 DIAGNOSIS — I48.0: ICD-10-CM

## 2020-02-07 DIAGNOSIS — Z88.0: ICD-10-CM

## 2020-02-07 DIAGNOSIS — G93.41: ICD-10-CM

## 2020-02-07 DIAGNOSIS — Z86.711: ICD-10-CM

## 2020-02-07 DIAGNOSIS — I27.20: ICD-10-CM

## 2020-02-07 DIAGNOSIS — R64: ICD-10-CM

## 2020-02-07 DIAGNOSIS — K57.90: ICD-10-CM

## 2020-02-07 DIAGNOSIS — N18.3: ICD-10-CM

## 2020-02-07 DIAGNOSIS — F02.80: ICD-10-CM

## 2020-02-07 LAB
ALBUMIN SERPL-MCNC: 2.7 G/DL (ref 3.4–5)
ALP SERPL-CCNC: 96 U/L (ref 45–117)
ALT SERPL-CCNC: 14 U/L (ref 13–61)
ANION GAP SERPL CALC-SCNC: 10 MMOL/L (ref 8–16)
APPEARANCE UR: CLEAR
AST SERPL-CCNC: 16 U/L (ref 15–37)
BACTERIA # UR AUTO: 2.6 /HPF
BASOPHILS # BLD: 0.7 % (ref 0–2)
BILIRUB SERPL-MCNC: 0.3 MG/DL (ref 0.2–1)
BILIRUB UR STRIP.AUTO-MCNC: NEGATIVE MG/DL
BUN SERPL-MCNC: 51.9 MG/DL (ref 7–18)
CALCIUM SERPL-MCNC: 8.5 MG/DL (ref 8.5–10.1)
CASTS URNS QL MICRO: 6 /LPF (ref 0–8)
CHLORIDE SERPL-SCNC: 110 MMOL/L (ref 98–107)
CO2 SERPL-SCNC: 25 MMOL/L (ref 21–32)
COLOR UR: YELLOW
CREAT SERPL-MCNC: 2.5 MG/DL (ref 0.55–1.3)
DEPRECATED RDW RBC AUTO: 18.7 % (ref 11.6–15.6)
EOSINOPHIL # BLD: 8.2 % (ref 0–4.5)
EPITH CASTS URNS QL MICRO: 0.9 /HPF
GLUCOSE SERPL-MCNC: 89 MG/DL (ref 74–106)
HCT VFR BLD CALC: 27 % (ref 32.4–45.2)
HGB BLD-MCNC: 8.2 GM/DL (ref 10.7–15.3)
INR BLD: > 15 (ref 0.83–1.09)
KETONES UR QL STRIP: NEGATIVE
LEUKOCYTE ESTERASE UR QL STRIP.AUTO: NEGATIVE
LYMPHOCYTES # BLD: 11.5 % (ref 8–40)
MCH RBC QN AUTO: 23.4 PG (ref 25.7–33.7)
MCHC RBC AUTO-ENTMCNC: 30.3 G/DL (ref 32–36)
MCV RBC: 77 FL (ref 80–96)
MONOCYTES # BLD AUTO: 13 % (ref 3.8–10.2)
NEUTROPHILS # BLD: 66.6 % (ref 42.8–82.8)
NITRITE UR QL STRIP: NEGATIVE
PH UR: 5 [PH] (ref 5–8)
PLATELET # BLD AUTO: 341 K/MM3 (ref 134–434)
PMV BLD: 7.6 FL (ref 7.5–11.1)
POTASSIUM SERPLBLD-SCNC: 4.5 MMOL/L (ref 3.5–5.1)
PROT SERPL-MCNC: 6.3 G/DL (ref 6.4–8.2)
PROT UR QL STRIP: (no result)
PROT UR QL STRIP: NEGATIVE
PT PNL PPP: > 230 SEC (ref 9.7–13)
RBC # BLD AUTO: 3.51 M/MM3 (ref 3.6–5.2)
RBC # BLD AUTO: 9 /HPF (ref 0–4)
SODIUM SERPL-SCNC: 144 MMOL/L (ref 136–145)
SP GR UR: 1.02 (ref 1.01–1.03)
UROBILINOGEN UR STRIP-MCNC: 0.2 MG/DL (ref 0.2–1)
WBC # BLD AUTO: 6.3 K/MM3 (ref 4–10)
WBC # UR AUTO: 3 /HPF (ref 0–5)

## 2020-02-07 PROCEDURE — P9038 RBC IRRADIATED: HCPCS

## 2020-02-07 PROCEDURE — P9058 RBC, L/R, CMV-NEG, IRRAD: HCPCS

## 2020-02-07 PROCEDURE — A9538 TC99M PYROPHOSPHATE: HCPCS

## 2020-02-07 NOTE — PDOC
Documentation entered by Jennifer Whittaker SCRIBE, acting as scribe for Abdulaziz Link MD.








Abdulaziz Link MD:  This documentation has been prepared by the Panfilo tello Brenda, SCRIBE, under my direction and personally reviewed by me in its 

entirety.  I confirm that the documentation accurately reflects all work, 

treatment, procedures, and medical decision making performed by me.  





Attending Attestation





- Resident


Resident Name: Jeff Parsons





- ED Attending Attestation


I have performed the following: I have examined & evaluated the patient, The 

case was reviewed & discussed with the resident, I agree w/resident's findings 

& plan, Exceptions are as noted





- HPI


HPI: 





02/07/20 17:24


The patient is an 88 year old female with a significant past medical history of 

HTN, CHF (EF 17%), CVA (2006 with residual right sided sensitivity), chronic 

anemia, CAD s/p stenting pacemaker, paroxysmal atrial fibrillation, and remote 

PE, Parkinson's disease with dementia, and angiodysplasia of the cecum (c/w GI 

bleed) who presents to the ED for evaluation of weakness. Per family the 

patient has been generally weak for last several weeks has been having 

evaluations by his PMD as well as by visiting doctors.  She was also treated 

for recent mild UTI. The patient has had persistent general weakness so Dr. Rowan referred the patient to the ER for further evaluation. 





The patient denies chest pain, shortness of breath, headache and dizziness.


Denies fever, chills, nausea, vomiting, diarrhea and constipation.


Denies dysuria, frequency, urgency and hematuria.





Allergies: Oats, Penicillins, Shellfish 


Past surgical history: Stent, pacemaker 


Social history: No reported hx of tobacco use, alcohol use or illicit drug use. 


PCP:  [facility]











- Physicial Exam


PE: 





02/07/20 18:48


GENERAL: The patient is awake, alert


HEAD: Normocephalic, atraumatic.


EYES: extraocular movements intact, sclera anicteric, conjunctiva clear.


ENT: Normal voice,  Moist mucous membranes.


NECK: Normal range of motion, supple


LUNGS: Diminished breath sounds at the right base


HEART: Regular rate and rhythm, normal S1 and S2 without murmur, rub or gallop.


ABDOMEN: Soft, nontender, No guarding, no rebound.  No CVA tenderness








- Medical Decision Making





02/07/20 18:30


88y F hx of afib, dvt/pe on coumadin, CVA with residual R sided weakness 

presents with worsening weakness/appetite, was recently treated for a uti, 

without significantly improved of her generalized weakness and appetite. Pt was 

sent to the hospital by Dr. Rowan for evaluation. 





The patient is at her baseline per the daughter and seems clinically improved 

prior to arrival


will ro occult infection, metabolic derangement


If lab work, UA unremarkable and patient is at her baseline anticipate 

discharge with outpatient follow-up.  


if labs abnormal, will dispo per results

## 2020-02-07 NOTE — PDOC
*Physical Exam





- Vital Signs


 Last Vital Signs











Temp Pulse Resp BP Pulse Ox


 


 98.6 F   84   18   161/117 H  98 


 


 02/07/20 16:10  02/07/20 16:10  02/07/20 16:10  02/07/20 16:10  02/07/20 16:10














ED Treatment Course





- LABORATORY


CBC & Chemistry Diagram: 


 02/07/20 18:00





 02/07/20 18:00





- ADDITIONAL ORDERS


Additional order review: 


 Laboratory  Results











  02/07/20 02/07/20





  18:00 18:00


 


Sodium   144


 


Potassium   4.5


 


Chloride   110 H


 


Carbon Dioxide   25


 


Anion Gap   10


 


BUN   51.9 H


 


Creatinine   2.5 H


 


Est GFR (CKD-EPI)AfAm   19.24


 


Est GFR (CKD-EPI)NonAf   16.60


 


Random Glucose   89


 


Calcium   8.5


 


Total Bilirubin   0.3


 


AST   16


 


ALT   14


 


Alkaline Phosphatase   96


 


Total Protein   6.3 L


 


Albumin   2.7 L


 


Urine Color  Yellow 


 


Urine Appearance  Clear 


 


Urine pH  5.0 


 


Ur Specific Gravity  1.017 


 


Urine Protein  Trace 


 


Urine Glucose (UA)  Negative 


 


Urine Ketones  Negative 


 


Urine Blood  Trace 


 


Urine Nitrite  Negative 


 


Urine Bilirubin  Negative 


 


Urine Urobilinogen  0.2 


 


Ur Leukocyte Esterase  Negative 


 


Urine WBC (Auto)  3 


 


Urine RBC (Auto)  9 


 


Urine Casts (Auto)  6 


 


U Epithel Cells (Auto)  0.9 


 


Urine Bacteria (Auto)  2.6 








 











  02/07/20





  18:00


 


RBC  3.51 L


 


MCV  77.0 L


 


MCHC  30.3 L


 


RDW  18.7 H


 


MPV  7.6


 


Neutrophils %  66.6


 


Lymphocytes %  11.5  D


 


Monocytes %  13.0 H


 


Eosinophils %  8.2 H


 


Basophils %  0.7














Medical Decision Making





- Medical Decision Making





02/07/20 19:44


Sign out from day team





INR >15, ELENITA 1.9 to 2.5. Hgb 8.2 from 9.0. No UTI


CXR - large L pleural effusion w infiltrate/atelectasis, small R pleural 

effusion


Hgb 8.2 from 9.0, Cr 2.5 from 2.0 baseline


EKg afib w RVR, , QTc 428, unchanged





---





89 yo female pmh afib, h/o dvt/PE on coumadin, CVA with residual right sided 

deficits presents to the ED for 1mo progressive weakness and 1wk poor appetite 

that can be attributed to respiratory failure and large L pleural effusion w 

infiltrate. Desat to high 80s on RA, placed on 2L NC. Also has new ELENITA 2.5 from 

2.0 baseline. No evidence of UTI





Placed complimentary call out to Dr Rowan


Admitted m/s Dr Lerma, for L pleural effusion, acute respiratory failure 

requiring supplemental O2, ELENITA








Discharge





- Discharge Information


Problems reviewed: Yes


Clinical Impression/Diagnosis: 


 Decreased appetite, Pleural effusion





Acute respiratory failure


Qualifiers:


 Respiratory failure complication: hypoxia Qualified Code(s): J96.01 - Acute 

respiratory failure with hypoxia





Condition: Improved





- Follow up/Referral


Referrals: 


Arun Rowan MD [Primary Care Provider] - 





- Patient Discharge Instructions





- Post Discharge Activity

## 2020-02-07 NOTE — PDOC
*Physical Exam





- Vital Signs


 Last Vital Signs











Temp Pulse Resp BP Pulse Ox


 


 98.6 F   84   18   161/117 H  97 


 


 02/07/20 16:10  02/07/20 16:10  02/07/20 16:10  02/07/20 16:10  02/07/20 20:28














**Heart Score/ECG Review





- ECG Intrepretation


Rhythm: Irregularly Irregular





- Axis


Axis: Normal





- P and OR


Prominent R with upright T in V1 (true posterior MI): No


Delta Wave(s) Present: No


WPW: No





- QRS


Widened: IVCD (nonspecific)


Poor R Wave Progression: No


Q Wave Present: No





- ST and T


Early Repolarization: No


Non Specific ST-T Wave changes: No





- ECG Impressions


Normal ECG: No


Non-specific ST Elevation: No


Bradycardia: No


Torsades akash Pointes: No


WPW: No





ED Treatment Course





- LABORATORY


CBC & Chemistry Diagram: 


 02/09/20 09:42





 02/09/20 13:30





- ADDITIONAL ORDERS


Additional order review: 


 Laboratory  Results











  02/07/20 02/07/20 02/07/20





  18:00 18:00 18:00


 


PT with INR   > 230.00 H 


 


INR   > 15.00 H* 


 


Sodium    144


 


Potassium    4.5


 


Chloride    110 H


 


Carbon Dioxide    25


 


Anion Gap    10


 


BUN    51.9 H


 


Creatinine    2.5 H


 


Est GFR (CKD-EPI)AfAm    19.24


 


Est GFR (CKD-EPI)NonAf    16.60


 


Random Glucose    89


 


Calcium    8.5


 


Total Bilirubin    0.3


 


AST    16


 


ALT    14


 


Alkaline Phosphatase    96


 


Total Protein    6.3 L


 


Albumin    2.7 L


 


Urine Color  Yellow  


 


Urine Appearance  Clear  


 


Urine pH  5.0  


 


Ur Specific Gravity  1.017  


 


Urine Protein  Trace  


 


Urine Glucose (UA)  Negative  


 


Urine Ketones  Negative  


 


Urine Blood  Trace  


 


Urine Nitrite  Negative  


 


Urine Bilirubin  Negative  


 


Urine Urobilinogen  0.2  


 


Ur Leukocyte Esterase  Negative  


 


Urine WBC (Auto)  3  


 


Urine RBC (Auto)  9  


 


Urine Casts (Auto)  6  


 


U Epithel Cells (Auto)  0.9  


 


Urine Bacteria (Auto)  2.6  








 











  02/07/20





  18:00


 


RBC  3.51 L


 


MCV  77.0 L


 


MCHC  30.3 L


 


RDW  18.7 H


 


MPV  7.6


 


Neutrophils %  66.6


 


Lymphocytes %  11.5  D


 


Monocytes %  13.0 H


 


Eosinophils %  8.2 H


 


Basophils %  0.7














Medical Decision Making





- Medical Decision Making





02/07/20 21:01


Pt signed out to me: 89 yo female pmh afib, h/o dvt/PE on coumadin, CVA with 

residual right sided deficits presents to the ED for 1mo progressive weakness 

and 1wk poor appetite; she has worsening health due to need for home oxygen, 

and lack of home oxygen. SHe has respiratory failure and large L pleural 

effusion w infiltrate. She has CHF exacerbation. Desat to high 80s on RA, 

placed on 2L NC. Also has new ELENITA 2.5 from 2.0 baseline. No evidence of UTI


Pt will be presented to the admitting team.  She appears well in the ER on O2.








Pt still at the ER after night shift.  She will be signed out to the next ER 

day team








INR >15, ELENITA 1.9 to 2.5. Hgb 8.2 from 9.0. No UTI


CXR - large L pleural effusion w infiltrate/atelectasis, small R pleural 

effusion


Hgb 8.2 from 9.0, Cr 2.5 from 2.0 baseline


EKg afib w RVR, , QTc 428, unchanged





---





Admitted m/s Dr Lerma, for L pleural effusion, acute respiratory failure 

requiring supplemental O2, ELENITA





Discharge





- Discharge Information


Problems reviewed: Yes


Clinical Impression/Diagnosis: 


 Decreased appetite, Pleural effusion





Acute respiratory failure


Qualifiers:


 Respiratory failure complication: hypoxia Qualified Code(s): J96.01 - Acute 

respiratory failure with hypoxia





Condition: Improved





- Follow up/Referral





- Patient Discharge Instructions





- Post Discharge Activity

## 2020-02-07 NOTE — HP
CHIEF COMPLAINT:


Failure to thrive, altered mental status 


PCP:


Dr. Rowan 


HISTORY OF PRESENT ILLNESS:


87 y/o F, w/ pmh of multiple Co-morbidities including H/O DVT and Afib, GI 

bleed 2/2 to angiodysplesia, Rt LE DVT, PE underwent IVC Filter and Coumadin 1 

mg weekdays and off weekends, H/O Alzheimer's, CVA (embolic hemorrhagic CVA 

during cardiac cath. R- sided residual),  Parkinson's,  CAD (angioplasty 2005), 

Deep Vein Thrombosis, HTN, left sided lung cancer resected [facility]), 

Diverticulosis, Hemorrhoids, Hiatal Hernia (with Chauncey erosions 2014), CKD, 

CHF, presented to the ED c/o of 1 week hx of changes in mentation, altered 

behavior and decreased PO intake. Family at bedside reports that pt was seen by 

her visit physician a week ago for UTI and was treated with Keflex for 3 days, 

after which all her symptoms began. At the time, Dr. Rowan had also reviewed 

the UA results and did not find the UTI significant to be treated. Pt's 

symptoms waxed and waned throughout the week. Her baseline mentation is of 

waxing and waning nature due to her Alziehmers. But family agrees that her 

confusion was a bit more bizzare this time. Denies any f/c/n/v/d/sob, cp, blood 

in stool, urinary symptoms, recent travels or sick contact. 





ER course was notable for:


(1) CXR: showed left lung infiltrates vs atelectasis, left pleural effusion


(2) EKG: A-fib w/ RVR, right superior axis deviation


(3)





Recent Travel:


denies


PAST MEDICAL HISTORY:


H/O DVT and Afib, GI bleed 2/2 to angiodysplesia, Rt LE DVT, PE underwent IVC 

Filter and Coumadin 1 mg weekdays and off weekends, H/O Alzheimer's, CVA (

embolic hemorrhagic CVA during cardiac cath. R- sided residual),  Parkinson's,  

CAD (angioplasty 2005), Deep Vein Thrombosis, HTN, left sided lung cancer 

resected 1999 [facility]), Diverticulosis, Hemorrhoids, Hiatal Hernia (with Chauncey 

erosions 2014), CKD, CHF,


PAST SURGICAL HISTORY:


Angioplasty 2005,left lung tumor resection 


Social History:


Smoking: denies


Alcohol: denies


Drugs: denies





Allergies





oats Allergy (Intermediate, Verified 02/07/20 17:11)


 


Penicillins Allergy (Mild, Verified 02/07/20 17:11)


 


Shellfish Allergy (Mild, Verified 02/07/20 17:11)


 








HOME MEDICATIONS:


 Home Medications











 Medication  Instructions  Recorded


 


Simvastatin [Zocor -] 20 mg PO HS 05/24/14


 


Diltiazem Cd [Cardizem Cd -] 180 mg PO DAILY 01/08/16


 


Gabapentin [Neurontin] 100 mg PO BID 01/08/16


 


Cholecalciferol (Vitamin D3) 2,000 unit PO DAILY 01/29/16





[Vitamin D3]  


 


Quetiapine Fumarate [Seroquel -] 50 mg PO BID  tablet 02/02/16


 


Carvedilol [Coreg -] 6.25 mg PO BID #60 tablet 02/14/16


 


Furosemide [Lasix] 20 mg PO ASDIR 05/22/18


 


Isosorbide Mononitrate [Imdur -] 30 mg PO DAILY 05/22/18


 


Ranitidine [Zantac -] 150 mg PO DAILY 05/22/18


 


Warfarin Na [Coumadin -] 1 mg PO HS 05/22/18








REVIEW OF SYSTEMS


CONSTITUTIONAL: 


Absent:  fever, chills, diaphoresis


HEENT: 


Absent:  rhinorrhea, nasal congestion, throat pain, ear pain, eye pain, visual 

changes


CARDIOVASCULAR: 


Absent: chest pain, syncope, palpitations, lightheadedness, peripheral edema


RESPIRATORY: 


Admits: dyspnea with exertion,


Absent: cough, shortness of breath, orthopnea, wheezing, 


GASTROINTESTINAL:


Absent: abdominal pain, abdominal distension, nausea, vomiting, diarrhea, melena

, hematochezia


GENITOURINARY: 


Absent: dysuria, frequency, urgency, flank pain, genital pain


MUSCULOSKELETAL: 


Absent: myalgia, arthralgia, joint swelling, 


PSYCHIATRIC: 


Absent: anxiety, depression, suicidal or homicidal ideation, hallucinations.








PHYSICAL EXAMINATION


 Vital Signs - 24 hr











  02/07/20 02/07/20





  16:10 20:28


 


Temperature 98.6 F 


 


Pulse Rate 84 


 


Respiratory 18 





Rate  


 


Blood Pressure 161/117 H 


 


O2 Sat by Pulse 98 97





Oximetry (%)  











GENERAL: Awake, alert, and fully oriented, in no acute distress. Cachetic, 

appears dry, AOx1


EYES: Pupils equal, round and reactive to light, No lid lag.


EARS, NOSE, THROAT: Ears normal, nares patent, oropharynx clear without 

exudates. dry mucous membranes.


NECK: Normal range of motion, supple without lymphadenopathy, JVD, or masses.


LUNGS: Breath sounds equal, clear to auscultation bilaterally. No wheezes, and 

Left Lower lobe crackles. No accessory muscle use.


HEART: Regular rate and rhythm, normal S1 and S2 without murmur, rub or gallop.


ABDOMEN: Soft, nontender, not distended, normoactive bowel sounds, no guarding, 

no rebound, no masses.  


MUSCULOSKELETAL: Normal range of motion at all joints. No bony deformities or 

tenderness. No CVA tenderness.


UPPER EXTREMITIES: 2+ pulses, warm,  No clubbing. No peripheral edema.


LOWER EXTREMITIES: 2+ pulses, warm,  No peripheral edema. 


NEUROLOGICAL:  Cranial nerves II-XII intact. 


SKIN: Warm, dry, normal turgor, 


 Laboratory Results - last 24 hr


CBC,CMP











WBC  6.3 K/mm3 (4.0-10.0)   02/07/20  18:00    


 


RBC  3.51 M/mm3 (3.60-5.2)  L  02/07/20  18:00    


 


Hgb  8.2 GM/dL (10.7-15.3)  L  02/07/20  18:00    


 


Hct  27.0 % (32.4-45.2)  L  02/07/20  18:00    


 


MCV  77.0 fl (80-96)  L  02/07/20  18:00    


 


MCH  23.4 pg (25.7-33.7)  L D 02/07/20  18:00    


 


MCHC  30.3 g/dl (32.0-36.0)  L  02/07/20  18:00    


 


RDW  18.7 % (11.6-15.6)  H  02/07/20  18:00    


 


Plt Count  341 K/MM3 (134-434)  D 02/07/20  18:00    


 


MPV  7.6 fl (7.5-11.1)   02/07/20  18:00    


 


Absolute Neuts (auto)  4.2 K/mm3 (1.5-8.0)   02/07/20  18:00    


 


Neutrophils %  66.6 % (42.8-82.8)   02/07/20  18:00    


 


Lymphocytes %  11.5 % (8-40)  D 02/07/20  18:00    


 


Monocytes %  13.0 % (3.8-10.2)  H  02/07/20  18:00    


 


Eosinophils %  8.2 % (0-4.5)  H  02/07/20  18:00    


 


Basophils %  0.7 % (0-2.0)   02/07/20  18:00    


 


Nucleated RBC %  0 % (0-0)   02/07/20  18:00    


 


Sodium  144 mmol/L (136-145)   02/07/20  18:00    


 


Potassium  4.5 mmol/L (3.5-5.1)   02/07/20  18:00    


 


Chloride  110 mmol/L ()  H  02/07/20  18:00    


 


Carbon Dioxide  25 mmol/L (21-32)   02/07/20  18:00    


 


Anion Gap  10 MMOL/L (8-16)   02/07/20  18:00    


 


BUN  51.9 mg/dL (7-18)  H  02/07/20  18:00    


 


Creatinine  2.5 mg/dL (0.55-1.3)  H  02/07/20  18:00    


 


Est GFR (CKD-EPI)AfAm  19.24   02/07/20  18:00    


 


Est GFR (CKD-EPI)NonAf  16.60   02/07/20  18:00    


 


Random Glucose  89 mg/dL ()   02/07/20  18:00    


 


Calcium  8.5 mg/dL (8.5-10.1)   02/07/20  18:00    


 


Total Bilirubin  0.3 mg/dL (0.2-1)   02/07/20  18:00    


 


AST  16 U/L (15-37)   02/07/20  18:00    


 


ALT  14 U/L (13-61)   02/07/20  18:00    


 


Alkaline Phosphatase  96 U/L ()   02/07/20  18:00    


 


Total Protein  6.3 g/dl (6.4-8.2)  L  02/07/20  18:00    


 


Albumin  2.7 g/dl (3.4-5.0)  L  02/07/20  18:00    

















ASSESSMENT/PLAN:


87 y/o F, w/ pmh of multiple Co-morbidities including H/O DVT and Afib, GI 

bleed 2/2 to angiodysplesia, Rt LE DVT, PE and Coumadin 1 mg weekdays and off 

weekends, H/O Alzheimer's, CVA,  Parkinson's,  CAD (angioplasty 2005),  CKD, CHF

, presented to the ED c/o of 1 week hx of changes in mentation, altered 

behavior and decreased PO intake. 





#Altered mental status


likely 2/2 to dehydration/ decreased PO intake vs worsening Dementia


Head CT ordered- Will need to r/o acute bleed in the setting of elevated INR >

15 and elevated BP at 161/117


Electrolytes wnl


CBC, CMP, 


r/p Trops


BNP


UA neg


UCx- f/u


BCx-f/u





#Left  Pleural effusion 


likely chronic


CXR showed left lung infiltrates vs atelectasis, left pleural effusion





#Anemia


8.3 Hb


As per family, she runs in high 8 Hb


Will monitor CBC q12


SOBT to r/o GI bleeds





#ELENITA on CKD


Cre and BUN elevated from baseline


baseline cre at 


IVF NS at 5


Renal US


Will hold Lasix/diuretics for now





#A-Fib w/ RVR


Currently rate controlled


monitor on tele


Cont Diltiazem





#DVT ppx


Pt off coumdin


INR elevated no need for ppx


SCDs





#FEN


Monitor lytes


IVF NS at 50 


Clear liquids for now





Dispo: f/u CT head, f/u Renal US, monitor overnight, f/u Hb








Visit type





- Emergency Visit


Emergency Visit: Yes


ED Registration Date: 02/07/20


Care time: The patient presented to the Emergency Department on the above date 

and was hospitalized for further evaluation of their emergent condition.





- New Patient


This patient is new to me today: Yes


Date on this admission: 02/09/20





- Critical Care


Critical Care patient: No





ATTENDING PHYSICIAN STATEMENT





I saw and evaluated the patient.


I reviewed the resident's note and discussed the case with the resident.


I agree with the resident's findings and plan as documented.








SUBJECTIVE:








OBJECTIVE:








ASSESSMENT AND PLAN:

## 2020-02-08 LAB
ALBUMIN SERPL-MCNC: 2.9 G/DL (ref 3.4–5)
ALP SERPL-CCNC: 101 U/L (ref 45–117)
ALT SERPL-CCNC: 14 U/L (ref 13–61)
ANION GAP SERPL CALC-SCNC: 10 MMOL/L (ref 8–16)
APTT BLD: 47.6 SECONDS (ref 25.2–36.5)
AST SERPL-CCNC: 12 U/L (ref 15–37)
BASOPHILS # BLD: 0.7 % (ref 0–2)
BILIRUB SERPL-MCNC: 0.4 MG/DL (ref 0.2–1)
BNP SERPL-MCNC: (no result) PG/ML (ref 5–450)
BUN SERPL-MCNC: 47 MG/DL (ref 7–18)
CALCIUM SERPL-MCNC: 8.6 MG/DL (ref 8.5–10.1)
CHLORIDE SERPL-SCNC: 112 MMOL/L (ref 98–107)
CO2 SERPL-SCNC: 23 MMOL/L (ref 21–32)
CREAT SERPL-MCNC: 2.2 MG/DL (ref 0.55–1.3)
DEPRECATED RDW RBC AUTO: 18.7 % (ref 11.6–15.6)
EOSINOPHIL # BLD: 2.5 % (ref 0–4.5)
GLUCOSE SERPL-MCNC: 111 MG/DL (ref 74–106)
HCT VFR BLD CALC: 27.2 % (ref 32.4–45.2)
HGB BLD-MCNC: 8.2 GM/DL (ref 10.7–15.3)
INR BLD: 4.32 (ref 0.83–1.09)
IRON SERPL-MCNC: 21 UG/DL (ref 50–175)
LYMPHOCYTES # BLD: 10.1 % (ref 8–40)
MAGNESIUM SERPL-MCNC: 2.4 MG/DL (ref 1.8–2.4)
MCH RBC QN AUTO: 23.5 PG (ref 25.7–33.7)
MCHC RBC AUTO-ENTMCNC: 30.2 G/DL (ref 32–36)
MCV RBC: 77.8 FL (ref 80–96)
MONOCYTES # BLD AUTO: 11.3 % (ref 3.8–10.2)
NEUTROPHILS # BLD: 75.4 % (ref 42.8–82.8)
PHOSPHATE SERPL-MCNC: 4.4 MG/DL (ref 2.5–4.9)
PLATELET # BLD AUTO: 339 K/MM3 (ref 134–434)
PMV BLD: 7.3 FL (ref 7.5–11.1)
POTASSIUM SERPLBLD-SCNC: 4.7 MMOL/L (ref 3.5–5.1)
PROT SERPL-MCNC: 6.4 G/DL (ref 6.4–8.2)
PT PNL PPP: 51.7 SEC (ref 9.7–13)
RBC # BLD AUTO: 3.5 M/MM3 (ref 3.6–5.2)
SODIUM SERPL-SCNC: 146 MMOL/L (ref 136–145)
TIBC SERPL-MCNC: 279 UG/DL (ref 250–450)
WBC # BLD AUTO: 7.7 K/MM3 (ref 4–10)

## 2020-02-08 RX ADMIN — CARVEDILOL SCH MG: 6.25 TABLET, FILM COATED ORAL at 10:10

## 2020-02-08 RX ADMIN — ATORVASTATIN CALCIUM SCH MG: 10 TABLET, FILM COATED ORAL at 21:09

## 2020-02-08 RX ADMIN — ISOSORBIDE MONONITRATE SCH MG: 30 TABLET, EXTENDED RELEASE ORAL at 10:11

## 2020-02-08 RX ADMIN — DOXYCYCLINE SCH MLS/HR: 100 INJECTION, POWDER, LYOPHILIZED, FOR SOLUTION INTRAVENOUS at 23:57

## 2020-02-08 NOTE — CONSULT
Consult


Consult Specialty:: Nephrology


Reason for Consultation:: ELENITA





- History of Present Illness


Chief Complaint: shortness of breath


History of Present Illness: 





Pt is an 88 year old female with pmhx of ckd, dvt, a-fib, gi bleed, DVT, cva, 

cad, htn, and left lung cancer who presented with worsening shortness of 

breath. She was recently treated with keflex for a UTI. Her daughter is at 

bedside and assisted with history. She says that her breathing is improved 

today. Daughter also says that her mental status has been worsening. 





- History Source


History Provided By: Patient, Family Member





- Past Medical History


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 

sided residual), Dementia, Parkinson's


Cardio/Vascular: Yes: Aneurysm (AAA), CAD (angioplasty 2005), Deep Vein 

Thrombosis, HTN, Other (PPM)


Pulmonary: Yes: Cancer (left sided lung cancer resected 1999 [facility]), Pulmonary 

Embolus


Gastrointestinal: Yes: Constipation, Diverticulosis, GI Bleed, Hemorrhoids (

banded 2008), Hiatal Hernia (with Chauncey erosions 2014), Other (angiodysplasia 

of cecum, serrated tx colon adenoma removed [id number])


Psych: Yes: Anxiety





- Past Surgical History


Past Surgical History: Yes: Cholecystectomy, Colonoscopy, Hysterectomy, 

Permanent Pacemaker, Thoracotomy (left cancer thoracotomy 1999 [facility]), Upper 

Endoscopy





- Alcohol/Substance Use


Hx Alcohol Use: No


History of Substance Use: reports: None





- Smoking History


Smoking history: Never smoked


Have you smoked in the past 12 months: No


Aproximately how many cigarettes per day: 0


If you are a former smoker, when did you quit?: 40 YEARS AGO





- Social History


Usual Living Arrangement: With Child


ADL: Family Assistance


Occupation: housewife


History of Recent Travel: No





Home Medications





- Allergies


Allergies/Adverse Reactions: 


 Allergies











Allergy/AdvReac Type Severity Reaction Status Date / Time


 


oats Allergy Intermediate  Verified 02/07/20 17:11


 


Penicillins Allergy Mild  Verified 02/07/20 17:11


 


Shellfish Allergy Mild  Verified 02/07/20 17:11














- Home Medications


Home Medications: 


Ambulatory Orders





Simvastatin [Zocor -] 20 mg PO HS 05/24/14 


Diltiazem Cd [Cardizem Cd -] 180 mg PO DAILY 01/08/16 


Gabapentin [Neurontin] 100 mg PO BID 01/08/16 


Cholecalciferol (Vitamin D3) [Vitamin D3] 2,000 unit PO DAILY 01/29/16 


Quetiapine Fumarate [Seroquel -] 50 mg PO BID  tablet 02/02/16 


Furosemide [Lasix] 20 mg PO ASDIR 05/22/18 


Isosorbide Mononitrate [Imdur -] 30 mg PO DAILY 05/22/18 


Ranitidine [Zantac -] 150 mg PO DAILY 05/22/18 


Warfarin Na [Coumadin -] 1 mg PO HS 05/22/18 


Carvedilol [Coreg -] 6.25 mg PO DAILY 02/08/20 











Family Medical History


Family History: Denies





Review of Systems





- Review of Systems


Constitutional: reports: Malaise


Eyes: reports: No Symptoms


HENT: reports: No Symptoms


Neck: reports: No Symptoms


Cardiovascular: reports: No Symptoms


Respiratory: reports: No Symptoms


Gastrointestinal: reports: No Symptoms


Genitourinary: reports: No Symptoms


Musculoskeletal: reports: No Symptoms


Integumentary: reports: No Symptoms


Neurological: reports: No Symptoms


Endocrine: reports: No Symptoms


Hematology/Lymphatic: reports: No Symptoms


Psychiatric: reports: No Symptoms





Physical Exam


Vital Signs: 


 Vital Signs











Temperature  98.3 F   02/08/20 17:38


 


Pulse Rate  74   02/08/20 17:38


 


Respiratory Rate  18   02/08/20 17:38


 


Blood Pressure  97/57 L  02/08/20 17:38


 


O2 Sat by Pulse Oximetry (%)  98   02/08/20 17:38











Constitutional: Yes: Calm


Eyes: Yes: Conjunctiva Clear


HENT: Yes: Atraumatic


Neck: Yes: Supple


Cardiovascular: Yes: S1, S2


Respiratory: Yes: On Nasal O2, Rhonchi


Gastrointestinal: Yes: Soft


Renal/: Yes: WNL


Musculoskeletal: Yes: WNL


Edema: No


Neurological: Yes: Confusion


Labs: 


 CBC, BMP





 02/08/20 08:24 





 02/08/20 08:24 





 Laboratory Tests











  03/11/18 05/22/18 05/23/18





  06:30 09:55 08:17


 


Hgb   


 


INR   


 


Creatinine  1.7 H  1.4 H  1.6 H


 


Urine Protein   


 


Urine Blood   














  05/24/18 02/07/20 02/07/20





  08:27 18:00 18:00


 


Hgb   8.2 L 


 


INR   


 


Creatinine  1.6 H   2.5 H


 


Urine Protein   


 


Urine Blood   














  02/07/20 02/07/20 02/08/20





  18:00 18:00 08:24


 


Hgb    8.2 L


 


INR  > 15.00 H*  


 


Creatinine   


 


Urine Protein   Trace 


 


Urine Blood   Trace 














  02/08/20 02/08/20





  08:24 08:24


 


Hgb  


 


INR  4.32 H* 


 


Creatinine   2.2 H


 


Urine Protein  


 


Urine Blood  














Imaging





- Results


Chest X-ray: Report Reviewed


Ultrasound: Report Reviewed





Assessment/Plan





 Current Medications











Generic Name Dose Route Start Last Admin





  Trade Name Sean  PRN Reason Stop Dose Admin


 


Atorvastatin Calcium  10 mg  02/08/20 22:00  





  Lipitor -  PO   





  HS HENRY   





     





     





     





     


 


Carvedilol  6.25 mg  02/08/20 10:00  02/08/20 10:10





  Coreg -  PO   6.25 mg





  DAILY HENRY   Administration





     





     





     





     


 


Diltiazem HCl  180 mg  02/08/20 10:00  02/08/20 10:10





  Cardizem Cd -  PO   180 mg





  DAILY HENRY   Administration





     





     





     





     


 


Furosemide  40 mg  02/09/20 10:00  





  Lasix Injection -  IVPUSH   





  DAILY HENRY   





     





     





     





     


 


Doxycycline Hyclate 100 mg/  100 mls @ 100 mls/hr  02/08/20 22:00  





  Dextrose  IVPB   





  BID HNERY   





     





     





     





     


 


Isosorbide Mononitrate  30 mg  02/08/20 10:00  02/08/20 10:11





  Imdur -  PO   30 mg





  DAILY HENRY   Administration





     





     





     





     


 


Quetiapine Fumarate  50 mg  02/08/20 10:00  02/08/20 10:11





  Seroquel -  PO   50 mg





  BID HENRY   Administration





     





     





     





     











Impression


1. ELENITA


2. UTI


3. CHF ef 17 percent


4. CVA


5. HTN


6. cva


7. parkinsons


8. a-fib





Plan


- renal function is improving


- repeat labs in am


- cont lasix


- follow blood and urine cultures


- pt appears more comfortable per her daughter


- avoid nsaids


- avoid nephrotoxins


- INR is improving

## 2020-02-08 NOTE — EKG
Test Reason : 

Blood Pressure : ***/*** mmHG

Vent. Rate : 105 BPM     Atrial Rate : 122 BPM

   P-R Int : 000 ms          QRS Dur : 128 ms

    QT Int : 324 ms       P-R-T Axes : 000 235 014 degrees

   QTc Int : 428 ms

 

ATRIAL FIBRILLATION WITH RAPID VENTRICULAR RESPONSE

RIGHT SUPERIOR AXIS DEVIATION

NON-SPECIFIC INTRA-VENTRICULAR CONDUCTION BLOCK

CANNOT RULE OUT ANTERIOR INFARCT , AGE UNDETERMINED

ABNORMAL ECG

 

Confirmed by MD JANEY, DEJUAN ([id number]) on 2/8/2020 2:32:16 PM

 

Referred By:             Confirmed By:DEJUAN TOTH MD

## 2020-02-08 NOTE — CON.PULM
Consult


Consult Specialty:: PULMONARY


Referred by:: PMD


Reason for Consultation:: ABN CXR





- History of Present Illness


Chief Complaint: SOB


History of Present Illness: 








The patient is an 88 year old female with a significant past medical history of 

HTN, CHF (EF 17%), CVA (2006 with residual right sided sensitivity), chronic 

anemia, CAD s/p stenting pacemaker, paroxysmal atrial fibrillation, and remote 

PE, Parkinson's disease with dementia, and angiodysplasia of the cecum (c/w GI 

bleed) who presents to the ED for evaluation of weakness. Per family the 

patient has been generally weak for last several weeks has been having 

evaluations by his PMD as well as by visiting doctors.  She was also treated 

for recent mild UTI. The patient has had persistent general weakness so Dr. Rowan referred the patient to the ER for further evaluation. 








- History Source


History Provided By: Medical Record


Limitations to Obtaining History: Clinical Condition





- Past Medical History


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 

sided residual), Dementia, Parkinson's


Cardio/Vascular: Yes: Aneurysm (AAA), CAD (angioplasty 2005), Deep Vein 

Thrombosis, HTN, Other (PPM)


Pulmonary: Yes: Cancer (left sided lung cancer resected 1999 [facility]), Pulmonary 

Embolus


Gastrointestinal: Yes: Constipation, Diverticulosis, GI Bleed, Hemorrhoids (

banded 2008), Hiatal Hernia (with Chauncey erosions 2014), Other (angiodysplasia 

of cecum, serrated tx colon adenoma removed 29014)


Psych: Yes: Anxiety





- Past Surgical History


Past Surgical History: Yes: Cholecystectomy, Colonoscopy, Hysterectomy, 

Permanent Pacemaker, Thoracotomy (left cancer thoracotomy 1999 [facility]), Upper 

Endoscopy





- Alcohol/Substance Use


Hx Alcohol Use: No


History of Substance Use: reports: None





- Smoking History


Smoking history: Never smoked


Have you smoked in the past 12 months: No


Aproximately how many cigarettes per day: 0


If you are a former smoker, when did you quit?: 40 YEARS AGO





- Social History


Usual Living Arrangement: With Child


ADL: Family Assistance


Occupation: housewife


History of Recent Travel: No





Home Medications





- Allergies


Allergies/Adverse Reactions: 


 Allergies











Allergy/AdvReac Type Severity Reaction Status Date / Time


 


oats Allergy Intermediate  Verified 02/07/20 17:11


 


Penicillins Allergy Mild  Verified 02/07/20 17:11


 


Shellfish Allergy Mild  Verified 02/07/20 17:11














- Home Medications


Home Medications: 


Ambulatory Orders





Simvastatin [Zocor -] 20 mg PO HS 05/24/14 


Diltiazem Cd [Cardizem Cd -] 180 mg PO DAILY 01/08/16 


Gabapentin [Neurontin] 100 mg PO BID 01/08/16 


Cholecalciferol (Vitamin D3) [Vitamin D3] 2,000 unit PO DAILY 01/29/16 


Quetiapine Fumarate [Seroquel -] 50 mg PO BID  tablet 02/02/16 


Furosemide [Lasix] 20 mg PO ASDIR 05/22/18 


Isosorbide Mononitrate [Imdur -] 30 mg PO DAILY 05/22/18 


Ranitidine [Zantac -] 150 mg PO DAILY 05/22/18 


Warfarin Na [Coumadin -] 1 mg PO HS 05/22/18 


Carvedilol [Coreg -] 6.25 mg PO DAILY 02/08/20 











Family Medical History


Family History: Unremarkable





Review of Systems


Unable to obtain ROS, reason: UNABLE TO OBTAIN





Physical Exam


Vital Sings: 


 Vital Signs











Temperature  97.1 F L  02/08/20 07:36


 


Pulse Rate  94 H  02/08/20 15:24


 


Respiratory Rate  18   02/08/20 15:24


 


Blood Pressure  100/71   02/08/20 15:24


 


O2 Sat by Pulse Oximetry (%)  97   02/08/20 15:24











Constitutional: Yes: No Distress


Eyes: Yes: EOM Intact


HENT: Yes: Normocephalic


Neck: Yes: Trachea Midline


Cardiovascular: Yes: Pulse Irregular, S1, S2


Respiratory: Yes: Diminished


Gastrointestinal: Yes: Soft


Edema: No


Labs: 


 CBC, BMP





 02/08/20 08:24 





 02/08/20 08:24 











Imaging





- Results


Chest X-ray: Report Reviewed, Image Reviewed





Problem List





- Problems


(1) CAD (coronary artery disease)


Code(s): I25.10 - ATHSCL HEART DISEASE OF NATIVE CORONARY ARTERY W/O ANG PCTRS 

  


Qualifiers: 


   Coronary Disease-Associated Artery/Lesion type: native artery   Native vs. 

transplanted heart: native heart   Associated angina: without angina   

Qualified Code(s): I25.10 - Atherosclerotic heart disease of native coronary 

artery without angina pectoris   





(2) CHF exacerbation


Code(s): I50.9 - HEART FAILURE, UNSPECIFIED   


Qualifiers: 


   Heart failure type: unspecified 





(3) Chronic anemia


Code(s): D64.9 - ANEMIA, UNSPECIFIED   





(4) Chronic kidney disease (CKD), stage III (moderate)


Code(s): N18.3 - CHRONIC KIDNEY DISEASE, STAGE 3 (MODERATE)   





(5) H/O: CVA (cerebrovascular accident)


Code(s): Z86.73 - PRSNL HX OF TIA (TIA), AND CEREB INFRC W/O RESID DEFICITS   





(6) Angiodysplasia of cecum


Code(s): K55.20 - ANGIODYSPLASIA OF COLON WITHOUT HEMORRHAGE   





(7) Atrial fibrillation


Code(s): I48.91 - UNSPECIFIED ATRIAL FIBRILLATION   


Qualifiers: 


   Atrial fibrillation type: permanent 





(8) Dementia


Code(s): F03.90 - UNSPECIFIED DEMENTIA WITHOUT BEHAVIORAL DISTURBANCE   


Qualifiers: 


   Dementia type: Parkinson's disease   Dementia behavioral disturbance: 

without behavioral disturbance   Qualified Code(s): G20 - Parkinson's disease; 

F02.80 - Dementia in other diseases classified elsewhere without behavioral 

disturbance; F02.80 - Dementia in other diseases classified elsewhere without 

behavioral disturbance; F02.80 - Dementia in other diseases classified 

elsewhere without behavioral disturbance   





(9) Pacemaker


Code(s): Z95.0 - PRESENCE OF CARDIAC PACEMAKER   





Assessment/Plan





SUPPLEMENTAL O2/TRIAL OF DIURETICS


MONITOR HGB/VENTRICULAR RATE CONTROL RATE CONTROL


FOLLOW UP CXR








ADALI AL MD

## 2020-02-08 NOTE — PN
Physical Exam: 


SUBJECTIVE: Patient seen and examined in ER.   Baseline creatinine appears to 

be approximately 1.5, patient presented at 2.5 now 2.2.  Slightly 

hypernatremic.  Nephrology has been consulted.  BNP is grossly elevated but in 

the setting of renal failure this is greatly unreliable.  We will defer to 

cardiology consultation as she does require fluids.  She is been seen by Dr. Mccall in the past.  She is status post IVC filter PCI and stenting she has a 

pacemaker.  In the past anemia was mild and did not require frequent 

transfusions and so the patient was elected to continue on Coumadin per the 

2018 cardiology notes





Accurate ROS not possible due to her underlying mentation








OBJECTIVE:





 Vital Signs











 Period  Temp  Pulse  Resp  BP Sys/Card  Pulse Ox


 


 Last 24 Hr  97.1 F-98.6 F    17-20  /  











VS, labs, imaging reviewed


NAD, AAO, resting comfortably in bed.


RRR s1/2 no mgr


Normal muscle tone, moves all 5 extremities with normal apparent strength


Neck is supple, trachea midline, no felicia LN


Lungs CTAB with sym expansion


NT ND +BS no felicia organomegaly


CN2-12 wnl; no FND


NC AT EOMI PERRLA


Normal mood, appropriate behavior, euthymic affect


No skin breakdown or rashes noted











 Laboratory Results - last 24 hr











  02/07/20 02/07/20 02/07/20





  18:00 18:00 18:00


 


WBC  6.3  


 


RBC  3.51 L  


 


Hgb  8.2 L  


 


Hct  27.0 L  


 


MCV  77.0 L  


 


MCH  23.4 L D  


 


MCHC  30.3 L  


 


RDW  18.7 H  


 


Plt Count  341  D  


 


MPV  7.6  


 


Absolute Neuts (auto)  4.2  


 


Neutrophils %  66.6  


 


Lymphocytes %  11.5  D  


 


Monocytes %  13.0 H  


 


Eosinophils %  8.2 H  


 


Basophils %  0.7  


 


Nucleated RBC %  0  


 


PT with INR    > 230.00 H


 


INR    > 15.00 H*


 


PTT (Actin FS)   


 


Sodium   144 


 


Potassium   4.5 


 


Chloride   110 H 


 


Carbon Dioxide   25 


 


Anion Gap   10 


 


BUN   51.9 H 


 


Creatinine   2.5 H 


 


Est GFR (CKD-EPI)AfAm   19.24 


 


Est GFR (CKD-EPI)NonAf   16.60 


 


Random Glucose   89 


 


Calcium   8.5 


 


Phosphorus   


 


Magnesium   


 


Total Bilirubin   0.3 


 


AST   16 


 


ALT   14 


 


Alkaline Phosphatase   96 


 


Troponin I   


 


B-Natriuretic Peptide   


 


Total Protein   6.3 L 


 


Albumin   2.7 L 


 


Urine Color   


 


Urine Appearance   


 


Urine pH   


 


Ur Specific Gravity   


 


Urine Protein   


 


Urine Glucose (UA)   


 


Urine Ketones   


 


Urine Blood   


 


Urine Nitrite   


 


Urine Bilirubin   


 


Urine Urobilinogen   


 


Ur Leukocyte Esterase   


 


Urine WBC (Auto)   


 


Urine RBC (Auto)   


 


Urine Casts (Auto)   


 


U Epithel Cells (Auto)   


 


Urine Bacteria (Auto)   














  02/07/20 02/08/20 02/08/20





  18:00 08:24 08:24


 


WBC   7.7 


 


RBC   3.50 L 


 


Hgb   8.2 L 


 


Hct   27.2 L 


 


MCV   77.8 L 


 


MCH   23.5 L 


 


MCHC   30.2 L 


 


RDW   18.7 H 


 


Plt Count   339 


 


MPV   7.3 L 


 


Absolute Neuts (auto)   5.8 


 


Neutrophils %   75.4 


 


Lymphocytes %   10.1 


 


Monocytes %   11.3 H 


 


Eosinophils %   2.5 


 


Basophils %   0.7 


 


Nucleated RBC %   0 


 


PT with INR    51.70 H


 


INR    4.32 H*


 


PTT (Actin FS)    47.6 H


 


Sodium   


 


Potassium   


 


Chloride   


 


Carbon Dioxide   


 


Anion Gap   


 


BUN   


 


Creatinine   


 


Est GFR (CKD-EPI)AfAm   


 


Est GFR (CKD-EPI)NonAf   


 


Random Glucose   


 


Calcium   


 


Phosphorus   


 


Magnesium   


 


Total Bilirubin   


 


AST   


 


ALT   


 


Alkaline Phosphatase   


 


Troponin I   


 


B-Natriuretic Peptide   


 


Total Protein   


 


Albumin   


 


Urine Color  Yellow  


 


Urine Appearance  Clear  


 


Urine pH  5.0  


 


Ur Specific Gravity  1.017  


 


Urine Protein  Trace  


 


Urine Glucose (UA)  Negative  


 


Urine Ketones  Negative  


 


Urine Blood  Trace  


 


Urine Nitrite  Negative  


 


Urine Bilirubin  Negative  


 


Urine Urobilinogen  0.2  


 


Ur Leukocyte Esterase  Negative  


 


Urine WBC (Auto)  3  


 


Urine RBC (Auto)  9  


 


Urine Casts (Auto)  6  


 


U Epithel Cells (Auto)  0.9  


 


Urine Bacteria (Auto)  2.6  














  02/08/20





  08:24


 


WBC 


 


RBC 


 


Hgb 


 


Hct 


 


MCV 


 


MCH 


 


MCHC 


 


RDW 


 


Plt Count 


 


MPV 


 


Absolute Neuts (auto) 


 


Neutrophils % 


 


Lymphocytes % 


 


Monocytes % 


 


Eosinophils % 


 


Basophils % 


 


Nucleated RBC % 


 


PT with INR 


 


INR 


 


PTT (Actin FS) 


 


Sodium  146 H


 


Potassium  4.7


 


Chloride  112 H


 


Carbon Dioxide  23


 


Anion Gap  10


 


BUN  47.0 H


 


Creatinine  2.2 H


 


Est GFR (CKD-EPI)AfAm  22.46


 


Est GFR (CKD-EPI)NonAf  19.37


 


Random Glucose  111 H


 


Calcium  8.6


 


Phosphorus  4.4


 


Magnesium  2.4


 


Total Bilirubin  0.4


 


AST  12 L


 


ALT  14


 


Alkaline Phosphatase  101


 


Troponin I  < 0.02


 


B-Natriuretic Peptide  11423.2 H


 


Total Protein  6.4


 


Albumin  2.9 L


 


Urine Color 


 


Urine Appearance 


 


Urine pH 


 


Ur Specific Gravity 


 


Urine Protein 


 


Urine Glucose (UA) 


 


Urine Ketones 


 


Urine Blood 


 


Urine Nitrite 


 


Urine Bilirubin 


 


Urine Urobilinogen 


 


Ur Leukocyte Esterase 


 


Urine WBC (Auto) 


 


Urine RBC (Auto) 


 


Urine Casts (Auto) 


 


U Epithel Cells (Auto) 


 


Urine Bacteria (Auto) 








Active Medications











Generic Name Dose Route Start Last Admin





  Trade Name Freq  PRN Reason Stop Dose Admin


 


Atorvastatin Calcium  10 mg  02/08/20 22:00  





  Lipitor -  PO   





  HS HENRY   





     





     





     





     


 


Carvedilol  6.25 mg  02/08/20 10:00  02/08/20 10:10





  Coreg -  PO   6.25 mg





  DAILY HENRY   Administration





     





     





     





     


 


Diltiazem HCl  180 mg  02/08/20 10:00  02/08/20 10:10





  Cardizem Cd -  PO   180 mg





  DAILY HENRY   Administration





     





     





     





     


 


Gabapentin  100 mg  02/08/20 10:00  02/08/20 10:11





  Neurontin -  PO   100 mg





  BID HENRY   Administration





     





     





     





     


 


Sodium Chloride  1,000 mls @ 50 mls/hr  02/07/20 22:31  02/08/20 03:27





  Normal Saline -  IV   50 mls/hr





  ASDIR HENRY   Administration





     





     





     





     


 


Isosorbide Mononitrate  30 mg  02/08/20 10:00  02/08/20 10:11





  Imdur -  PO   30 mg





  DAILY HENRY   Administration





     





     





     





     


 


Quetiapine Fumarate  50 mg  02/08/20 10:00  02/08/20 10:11





  Seroquel -  PO   50 mg





  BID HENRY   Administration





     





     





     





     











CT head without acute bleed; chronic sinusitis


Renal ultrasound shows small nonobstructing stone measuring 6 mm with mild 

dilation of the right renal pelvis with no gross evidence of hydronephrosis or 

solid mass lesion bilaterally.


Chest x-ray shows left pleural fluid with atelectasis versus infiltrate with 

calcified aortic knob and a double lead pacemaker with CT recommended.


CT chest is pending without contrast





ASSESSMENT/PLAN:


Presents with ELENITA on CKD, failure to thrive, acute on chronic anemia.  

Consulting her cardiologist, checking FOBT, trending INR.  Tachycardic so 

giving an additional dose of PO coreg and consulting CV.  DCing IVF and will 

change to D5 1/2 NS @42cc/hr and consult renal.  ELENITA on CKD-3 slightly 

improved. 





Supratherapeutic INR (Likely 2/2 impaired coumadin metabolism


Severe CHF hx (Elevated BNP less useful in this case)


History of atrial fibrillation on warfarin (Holding, improved after vitamin K.  

Repeat tomorrow.  Fall precautions)


Chronic gastrointestinal bleeding


Acute on chronic anemia, hemoglobin is 8.2, was 9.5 range last year.  Checking 

iron indices and checking FOBT.


History of embolic hemorrhage during cardiac cath with right-sided residual 

deficits


Parkinson's disease


Coronary artery disease status post 2005 angioplasty


DVT


History of hypertension


History of left-sided lung cancer resected 1999 at [facility]


History of pulmonary embolism


Diverticulosis


History of hemorrhoids


History of hiatal hernia with Chauncey erosions, EGD 2014.


Dementia


Failure to thrive





DNR; will discuss goals of care with family.





Visit type





- Emergency Visit


Emergency Visit: No





- New Patient


This patient is new to me today: Yes


Date on this admission: 02/08/20





- Critical Care


Critical Care patient: No

## 2020-02-08 NOTE — PN
Teaching Attending Note


Name of Resident: Roe Castillo





ATTENDING PHYSICIAN STATEMENT





I saw and evaluated the patient.


I reviewed the resident's note and discussed the case with the resident.


I agree with the resident's findings and plan as documented.








SUBJECTIVE:


88 year old female with a significant past medical history of HTN, CHF (EF 17%)

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 

effusion,chronic anemia, CAD s/p stenting pacemaker, paroxysmal atrial 

fibrillation, and remote PE,On Coumadin Parkinson's disease with dementia, and 

angiodysplasia of the cecum, GI bleed in 2014, presents with failure to thrive. 

Daughter was at bedside reported that patient has not been taking p.o. very 

well for past several days and is more lethargic.  Daughter reports that 

patient has not had her INR taken recently. In the emergency room patient was 

found to have a markedly elevated INR.





OBJECTIVE:


 Last Vital Signs











Temp Pulse Resp BP Pulse Ox


 


 98.6 F   84   18   161/117 H  97 


 


 02/07/20 16:10  02/07/20 16:10  02/07/20 16:10  02/07/20 16:10  02/07/20 20:28





GENERAL:


Elderly, frail,Lethargic


HEENT:


Normocephalic, atraumatic. PERRLA, EOMI. No conjunctival pallor. Sclera are non-

icteric. Dry mucous membranes


NECK: 


Supple. Full ROM. No JVD. Carotid pulses 2+ and symmetric, without bruits. No 

thyromegaly. No lymphadenopathy.


CARDIOVASCULAR:


Regular rate and rhythm. No murmurs, rubs, or gallops. Distal pulses are 2+ and 

symmetric. 


PULMONARY: 


No evidence of respiratory distress. Lungs clear to auscultation bilaterally. 

No wheezing, rales or rhonchi.


ABDOMINAL:


Soft. Non-tender. Non-distended. No rebound or guarding. No organomegaly. 

Normoactive bowel sounds. 


MUSCULOSKELETAL 


Normal range of motion at all joints. No bony deformities or tenderness. No CVA 

tenderness.


EXTREMITIES: 


No cyanosis. No clubbing. No edema. No calf tenderness.


SKIN: 


Warm and dry. Normal capillary refill. No rashes. No jaundice. 


NEUROLOGICAL: 


No focal neurological deficits appreciated


PSYCHIATRIC: 


Uncooperative, no good eye contact


 Abnormal Lab Results











  02/07/20 02/07/20 02/07/20





  18:00 18:00 18:00


 


RBC  3.51 L  


 


Hgb  8.2 L  


 


Hct  27.0 L  


 


MCV  77.0 L  


 


MCH  23.4 L D  


 


MCHC  30.3 L  


 


RDW  18.7 H  


 


Monocytes %  13.0 H  


 


Eosinophils %  8.2 H  


 


PT with INR    > 230.00 H


 


INR    > 15.00 H*


 


Chloride   110 H 


 


BUN   51.9 H 


 


Creatinine   2.5 H 


 


Total Protein   6.3 L 


 


Albumin   2.7 L 








Imaging studies reviewed





ASSESSMENT AND PLAN:


88-year-old woman with multiple medical issues with failure to thrive, 

intravascular volume depletion, dehydration with markedly elevated INR likely 

secondary to decreased Coumadin metabolism. Patient has altered mental status, 

in light of elevated INR must rule out intracranial bleeding at this time.  I 

discussed this with patient's daughter and she understood.  History of 

gastrointestinal bleeding and drop in hemoglobin is concerning in light of high 

INR and stool for fecal occult blood should be checked. Must rule out 

gastrointestinal bleeding at this time.  Should monitor CBC closely. Would hold 

Coumadin and administer vitamin K and repeat INR with a.m. labs.  Monitor 

closely for bleeding.  ELENITA on CKD, suspect this likely prerenal azotemia in 

light of markedly intravascular depletion.  Would administer IV fluids, avoid 

renal toxins, monitor ins and outs and daily weights.  Consider to consult 

nephrology for possible further investigations and renal medication 

optimization.  Hypoalbuminemia and low total protein. Patient noted to be on 

furosemide and suspect that she may be over diuresed.  Would hold all diuretics 

at this time.


#Uncontrolled hypertension/severe CHF


Continue with diltiazem, carvedilol, isosorbide mononitrate


#Severe dementiasundowning 


Would continue with home dose quetiapine 50 mg p.o. twice daily


#Chronic left-sided pleural effusion


Supplemental oxygen PRN


Would evaluate for home oxygen if low O2 sat


As per daughter's wishes does not want invasive procedures to drain pleural 

effusion


#DVT prophylaxisSCDs


#Advanced directivesDNR/DNI

## 2020-02-09 LAB
ALBUMIN SERPL-MCNC: 2.4 G/DL (ref 3.4–5)
ALP SERPL-CCNC: 83 U/L (ref 45–117)
ALT SERPL-CCNC: 13 U/L (ref 13–61)
ANION GAP SERPL CALC-SCNC: 10 MMOL/L (ref 8–16)
ANION GAP SERPL CALC-SCNC: 9 MMOL/L (ref 8–16)
APTT BLD: 30.7 SECONDS (ref 25.2–36.5)
AST SERPL-CCNC: 10 U/L (ref 15–37)
BASOPHILS # BLD: 0.6 % (ref 0–2)
BILIRUB SERPL-MCNC: 0.3 MG/DL (ref 0.2–1)
BUN SERPL-MCNC: 46.3 MG/DL (ref 7–18)
BUN SERPL-MCNC: 47.7 MG/DL (ref 7–18)
CALCIUM SERPL-MCNC: 8.2 MG/DL (ref 8.5–10.1)
CALCIUM SERPL-MCNC: 8.2 MG/DL (ref 8.5–10.1)
CHLORIDE SERPL-SCNC: 110 MMOL/L (ref 98–107)
CHLORIDE SERPL-SCNC: 111 MMOL/L (ref 98–107)
CO2 SERPL-SCNC: 24 MMOL/L (ref 21–32)
CO2 SERPL-SCNC: 26 MMOL/L (ref 21–32)
CREAT SERPL-MCNC: 2 MG/DL (ref 0.55–1.3)
CREAT SERPL-MCNC: 2 MG/DL (ref 0.55–1.3)
DEPRECATED RDW RBC AUTO: 18.1 % (ref 11.6–15.6)
EOSINOPHIL # BLD: 1.9 % (ref 0–4.5)
GLUCOSE SERPL-MCNC: 112 MG/DL (ref 74–106)
GLUCOSE SERPL-MCNC: 122 MG/DL (ref 74–106)
HCT VFR BLD CALC: 23.2 % (ref 32.4–45.2)
HGB BLD-MCNC: 7 GM/DL (ref 10.7–15.3)
INR BLD: 1.39 (ref 0.83–1.09)
IRON SERPL-MCNC: 18 UG/DL (ref 50–175)
LYMPHOCYTES # BLD: 7.4 % (ref 8–40)
MCH RBC QN AUTO: 23.2 PG (ref 25.7–33.7)
MCHC RBC AUTO-ENTMCNC: 30.1 G/DL (ref 32–36)
MCV RBC: 77 FL (ref 80–96)
MONOCYTES # BLD AUTO: 10.2 % (ref 3.8–10.2)
NEUTROPHILS # BLD: 79.9 % (ref 42.8–82.8)
PLATELET # BLD AUTO: 264 K/MM3 (ref 134–434)
PMV BLD: 7.4 FL (ref 7.5–11.1)
POTASSIUM SERPLBLD-SCNC: 4.2 MMOL/L (ref 3.5–5.1)
POTASSIUM SERPLBLD-SCNC: 4.2 MMOL/L (ref 3.5–5.1)
PROT SERPL-MCNC: 5.6 G/DL (ref 6.4–8.2)
PT PNL PPP: 16.5 SEC (ref 9.7–13)
RBC # BLD AUTO: 3.01 M/MM3 (ref 3.6–5.2)
RETICS # AUTO: 2.03 % (ref 0.5–1.5)
SODIUM SERPL-SCNC: 144 MMOL/L (ref 136–145)
SODIUM SERPL-SCNC: 145 MMOL/L (ref 136–145)
TIBC SERPL-MCNC: 239 UG/DL (ref 250–450)
WBC # BLD AUTO: 7.9 K/MM3 (ref 4–10)

## 2020-02-09 RX ADMIN — DOXYCYCLINE SCH MLS/HR: 100 INJECTION, POWDER, LYOPHILIZED, FOR SOLUTION INTRAVENOUS at 21:16

## 2020-02-09 RX ADMIN — DOXYCYCLINE SCH MLS/HR: 100 INJECTION, POWDER, LYOPHILIZED, FOR SOLUTION INTRAVENOUS at 09:37

## 2020-02-09 RX ADMIN — ATORVASTATIN CALCIUM SCH MG: 10 TABLET, FILM COATED ORAL at 21:14

## 2020-02-09 RX ADMIN — CARVEDILOL SCH MG: 6.25 TABLET, FILM COATED ORAL at 09:37

## 2020-02-09 RX ADMIN — FERROUS SULFATE TAB EC 324 MG (65 MG FE EQUIVALENT) SCH MG: 324 (65 FE) TABLET DELAYED RESPONSE at 12:24

## 2020-02-09 RX ADMIN — ISOSORBIDE MONONITRATE SCH MG: 30 TABLET, EXTENDED RELEASE ORAL at 09:37

## 2020-02-09 RX ADMIN — FUROSEMIDE SCH MG: 10 INJECTION, SOLUTION INTRAVENOUS at 14:08

## 2020-02-09 NOTE — CONS
DATE OF CONSULTATION:  

 

DATE OF DICTATION:  2020

 

REQUESTING PHYSICIAN:  Hospitalist

 

CARDIOLOGY CONSULTATION

 

History obtained from the daughter.  

 

An 88-year-old female with history of coronary artery disease, angina pectoris status

post PCI/stenting, history of cerebrovascular accident precipitated following cardiac

catheterization, requiring medical thrombectomy with partial right hemiparesis. 

History of hypertension, congestive heart failure, permanent atrial fibrillation,

status post permanent pacemaker, history of carotid hypersensitivity,

hypercholesterolemia, vascular dementia.  Patient, according to the daughter,

developed loss of appetite accompanied by confusion, agitation, refusal to drink or

eat, and developed progressive weakness.  She was brought to the emergency room for

further evaluation.

 

Patient apparently had a recent upper respiratory tract infection.  No history of

dyspnea.  There is a history of progressive pedal edema.  No history of

lightheadedness, dizziness, presyncope, or syncope.  No chills or fever reported.  

 

Patient has been found to have severe anemia and currently is receiving blood

transfusions.  She has longstanding history of angiodysplasia involving the cecum and

has had previous GI bleeds.  

 

I was called on consultation because of possibility of pacemaker malfunction.  

 

PAST HISTORY:  As mentioned in the history of present illness.  History of intestinal

polyps.  History of Chauncey ulcers dating back to .  History of hiatal hernia.  

 

SURGICAL HISTORY:  

1.  Status post hysterectomy.

2.  Status post cholecystectomy.  

3.  Status post permanent pacemaker.

4.  Status post partial pneumonectomy for carcinoma of the lung.  

5.  History of endoscopy.  

6.  IVC filter.  

 

SOCIAL HISTORY:  She is a , has 3 daughters.  One of them is known to have

Perkins syndrome, requiring aortic arch dissection.  Apparently the other daughters

are healthy.  Has 1 son who apparently is healthy.

 

FAMILY HISTORY:  Her mother  of unknown causes according to her family members. 

Father apparently  of a cerebrovascular accident.  She had 2 sisters, all of them

are  and of unknown cause.  

 

ALLERGIES:  

1.  SHELL FISH.

2.  IV CONTRAST. 

3.  PENICILLIN.

4.  OATS. 

5.  SHE IS NOT ON ASPIRIN BECAUSE OF RECURRENT GI BLEEDING. 

6.  LORAZEPAM.  

7.  PREDNISONE. 

 

CURRENT MEDICATIONS:  

1.  Doxycycline 100 mg IV b.i.d. 

2.  Ampicillin sodium-sulbactam 3 g IV daily.

3.  Warfarin 1 mg p.o. daily.

4.  Seroquel 15 mg p.o. b.i.d. 

5.  Coreg 625 mg p.o. daily.

6.  Diltiazem 180 mg p.o. daily.

7.  Lipitor 10 mg p.o. daily.

8.  Iron supplements.

9.  Lasix 40 mg IV b.i.d. 

10.  Isosorbide mononitrate 13 mg p.o. daily. 

11.  Patient is currently on BiPAP for respiratory failure.  

 

REVIEW OF SYSTEMS:  

Constitutional:  See history of present illness.  No history of chills, fever, or

night sweats have been reported.  

HEENT:  Not available. 

Cardiovascular:  See history of present illness. 

Respiratory:  See history of present illness.

Gastrointestinal:  See history of present illness with recurrent GI bleeding and

anemia.  

Neurologic:  See history of present illness.  History of vascular dementia.  No

history of seizures.  

Endocrine:  No history of thyroid disorders reported.  

Genitourinary:  History is not available.  

Hematologic:  See history of present illness.  

 

PHYSICAL EXAMINATION:

General:  An 88-year-old female, was arousable, but not responsive.  Pallor 2+.  No

cyanosis, clubbing, or jaundice could be seen.  

Vital Signs:  Blood pressure 91/56 mmHg at 1500 hours, pulse was 64 bpm and

irregularly irregular, temperature was 97.5 degrees Fahrenheit.  Weight was not

available.  

Neck:  Supple, no jugular venous distention was noted.  Hepatojugular reflux appears

negative.  Carotids were 1+.  No bruits were appreciated.  There was no thyromegaly. 

 

Heart:  PMI is in the 5th intercostal space.  No heaves or thrills.  S1 was variable.

 S2 was normal, unable to appreciate any murmur or gallops.  

Lungs:  Clear on auscultation. 

Abdomen:  Protuberant, soft, and nontender.  No hepatosplenomegaly or palpable masses

were felt. 

Extremities:  No calf tenderness.  Trace bilateral ankle edema.  Dorsalis pedis and

posterior tibial pulses could not be palpated.  

 

LABORATORY DATA:  2020, CBC:  WBC count was 7900, hemoglobin was 7.0

g/dL.  Microcytic cell indices:  Platelet count was 264,000.  Reticulocytes were

elevated at 2.03.  INR on admission was over 15.0 and currently is 1.39. 

Electrolytes:  Sodium 145, potassium 4.2, chloride 110, CO2 26 mmol/L, BUN 46.3,

creatinine 2.0 mg/dL.  Random glucose is 93 mg/dL.  Calcium 8.2 mg/dL.  Liver

function tests were normal.  Total protein is 5.6, albumin is 2.4 g/dL (low).  ECG

2020, reported as atrial fibrillation with rapid ventricular response,

right superior axis deviation, nonspecific intraventricular conduction delay.  Cannot

rule out anterior infarct, age indeterminate.  

 

Chest x-ray 2020:  Single view of the chest was submitted.  Since May 23,

2018, the patient has changes to the left.  There is left pleural fluid with

atelectasis or infiltrate present.  There is a calcified aortic knob, double-lead

pacemaker, right skin fold artifact, and blunted right angle.  There are coarse right

changes.  There is chin artifact in the right apex.  The bones and soft tissues are

intact.  Correlation is recommended.  For complete evaluation and follow up imaging,

a possible CT may be helpful.  

 

Rhythm strips were reviewed and revealed atrial fibrillation with moderate to rapid

ventricular response.  Permanent pacemaker was seen sensing and pacing appropriately.

 There were rare effusion beats.  Of note, there was low voltage recorded during

spontaneous rhythm.  

 

Pacemaker interrogation was undertaken and confirmed the presence of appropriate

pacemaker function.  There were periods of rapid ventricular response noted over the

past several days.  

 

IMPRESSION:  

1.  Status post permanent pacemaker for carotid hypersensitivity.

2.  Appropriate pacemaker function.  

3.  Coronary artery disease, status post percutaneous intervention/stenting, stable

angina pectoris.

4.  Status post embolic cerebrovascular accident.  

5.  Vascular dementia.  

6.  Hypertension, hypertensive cardiovascular disease.  

7.  Severe anemia related to cecal angiodysplasia aggravated by supranormal INR.  

8.  History of congestive heart failure.  

9.  Respiratory failure. 

10.  History of abdominal aortic aneurysm. 

11.  History of hiatal hernia.

12.  Chronic kidney disease. 

13.  History of Chauncey ulceration. 

14.  Status post deep vein thrombosis.  

15.  Status post inferior vena cava filter.  

 

RECOMMENDATIONS: 

1.  If patient continues to have a rapid ventricular response after hemoglobin has

been normalized, would consider additional therapy for rate control.  

2.  Continue current cardiac medications as outlined.  

3.  Echocardiogram.  

4.  BNP.

5.  Further chest x-rays as necessary.

 

PROGNOSIS:  Critical.

 

Thank you for your referral.

 

TIME SPENT:  110 minutes reviewing record, discussing the case with the resident,

interrogating and evaluating pacemaker function, speaking to the family members.

 

 

ARPIT MCKINLEY M.D.

 

[id number]

DD: 2020 18:26

DT: 2020 23:20

Job #:  [id number]

## 2020-02-09 NOTE — PN
Physical Exam: 


SUBJECTIVE: Patient seen and examined at bedside. No complaints. Pt states she 

feels well. 








OBJECTIVE:





 Vital Signs











 Period  Temp  Pulse  Resp  BP Sys/Card  Pulse Ox


 


 Last 24 Hr  97.4 F-98.5 F    16-24  /56-80  











Gen: AAOx1


HEENT: NCAT, EOMI


Cardio: tachycardic (intermittently), normal s1s2, 3/6 systolic murmur, no r/g


Pulm: fine rales to the mid-lung b/l


Abd: obese, soft, nontender, nondistended, pos bs. No sacral edema noted


Ext: no edema














 Laboratory Results - last 24 hr











  02/08/20 02/08/20 02/08/20





  08:24 08:24 13:23


 


WBC  7.7  


 


RBC  3.50 L  


 


Hgb  8.2 L  


 


Hct  27.2 L  


 


MCV  77.8 L  


 


MCH  23.5 L  


 


MCHC  30.2 L  


 


RDW  18.7 H  


 


Plt Count  339  


 


MPV  7.3 L  


 


Absolute Neuts (auto)  5.8  


 


Neutrophils %  75.4  


 


Lymphocytes %  10.1  


 


Monocytes %  11.3 H  


 


Eosinophils %  2.5  


 


Basophils %  0.7  


 


Nucleated RBC %  0  


 


Retic Count  2.03 H D  


 


Sodium   146 H 


 


Potassium   4.7 


 


Chloride   112 H 


 


Carbon Dioxide   23 


 


Anion Gap   10 


 


BUN   47.0 H 


 


Creatinine   2.2 H 


 


Est GFR (CKD-EPI)AfAm   22.46 


 


Est GFR (CKD-EPI)NonAf   19.37 


 


POC Glucometer   


 


Random Glucose   111 H 


 


Calcium   8.6 


 


Phosphorus   4.4 


 


Magnesium   2.4 


 


Iron   21 L 


 


TIBC   279 


 


Iron Saturation   7 L 


 


Unsaturated IBC   258 


 


Total Bilirubin   0.4 


 


AST   12 L 


 


ALT   14 


 


Alkaline Phosphatase   101 


 


Troponin I   < 0.02 


 


B-Natriuretic Peptide   98017.2 H 


 


Total Protein   6.4 


 


Albumin   2.9 L 


 


Ur Random Creatinine   


 


Ur Random Sodium   


 


Ur Random Potassium   


 


Ur Random Chloride   


 


Influenza A (Rapid)    Negative


 


Influenza B (Rapid)    Negative














  02/08/20 02/08/20 02/09/20





  15:51 15:51 05:40


 


WBC   


 


RBC   


 


Hgb   


 


Hct   


 


MCV   


 


MCH   


 


MCHC   


 


RDW   


 


Plt Count   


 


MPV   


 


Absolute Neuts (auto)   


 


Neutrophils %   


 


Lymphocytes %   


 


Monocytes %   


 


Eosinophils %   


 


Basophils %   


 


Nucleated RBC %   


 


Retic Count   


 


Sodium   


 


Potassium   


 


Chloride   


 


Carbon Dioxide   


 


Anion Gap   


 


BUN   


 


Creatinine   


 


Est GFR (CKD-EPI)AfAm   


 


Est GFR (CKD-EPI)NonAf   


 


POC Glucometer    95


 


Random Glucose   


 


Calcium   


 


Phosphorus   


 


Magnesium   


 


Iron   


 


TIBC   


 


Iron Saturation   


 


Unsaturated IBC   


 


Total Bilirubin   


 


AST   


 


ALT   


 


Alkaline Phosphatase   


 


Troponin I   


 


B-Natriuretic Peptide   


 


Total Protein   


 


Albumin   


 


Ur Random Creatinine  37.0  


 


Ur Random Sodium   105 


 


Ur Random Potassium   24.0 L 


 


Ur Random Chloride   130 


 


Influenza A (Rapid)   


 


Influenza B (Rapid)   








Active Medications











Generic Name Dose Route Start Last Admin





  Trade Name Freq  PRN Reason Stop Dose Admin


 


Atorvastatin Calcium  10 mg  02/08/20 22:00  02/08/20 21:09





  Lipitor -  PO   10 mg





  HS HENRY   Administration





     





     





     





     


 


Carvedilol  6.25 mg  02/08/20 10:00  02/09/20 09:37





  Coreg -  PO   6.25 mg





  DAILY HENRY   Administration





     





     





     





     


 


Diltiazem HCl  180 mg  02/08/20 10:00  02/09/20 09:38





  Cardizem Cd -  PO   180 mg





  DAILY HENRY   Administration





     





     





     





     


 


Furosemide  40 mg  02/09/20 10:00  02/09/20 09:38





  Lasix Injection -  IVPUSH   40 mg





  DAILY HENRY   Administration





     





     





     





     


 


Doxycycline Hyclate 100 mg/  100 mls @ 100 mls/hr  02/08/20 22:00  02/09/20 09:

37





  Dextrose  IVPB   100 mls/hr





  BID HENRY   Administration





     





     





     





     


 


Ampicillin Sodium/Sulbactam  100 mls @ 200 mls/hr  02/10/20 10:00  





  Sodium 3 gm/ Sodium Chloride  IVPB   





  DAILY HENRY   





     





     





     





     


 


Isosorbide Mononitrate  30 mg  02/08/20 10:00  02/09/20 09:37





  Imdur -  PO   30 mg





  DAILY HENRY   Administration





     





     





     





     


 


Quetiapine Fumarate  50 mg  02/08/20 10:00  02/09/20 09:37





  Seroquel -  PO   50 mg





  BID HENRY   Administration





     





     





     





     











ASSESSMENT/PLAN:





89 y/o F, w/ pmh of multiple Co-morbidities including H/O DVT and Afib, GI 

bleed 2/2 to angiodysplesia, Rt LE DVT, PE and Coumadin 1 mg weekdays and off 

weekends, H/O Alzheimer's, CVA,  Parkinson's,  CAD (angioplasty 2005),  CKD, CHF

, presented to the ED c/o of 1 week hx of changes in mentation, altered 

behavior and decreased PO intake. 








#Altered mental status


-? 2/2 worsening Dementia


-workup negative so far. lytes wnl, ct head neg. 


-Per family at bedside, pt appears at baseline today





#Left  Pleural effusion 


-likely chronic


-CXR showed left lung infiltrates vs atelectasis, left pleural effusion





#Anemia


-worse today. Hb of 7 today.


-As per family, she runs in high 8 Hb


-Considering elevated INR, concern for bleed. No overt signs, however. 


-serial FOBT to r/o GI bleeds


-In Jan 2018, Hb approached 7 and pt received transfusion.


-Considering Hx CAD will transfuse





#ELENITA on CKD


-Cre and BUN elevated from baseline. Prerenal ratio


-Renal US : nonobstructing L 6mm stone, mild R renal pelvis dilation, b/l cysts


-Crt has been improving on diuretic therapy


-avoid nephrotoxins where possible


-Pt may benefit from post-transfusion diuresis





#A-Fib w/ RVR


-Currently adequately rate controlled


-monitor on tele


-Cont Diltiazem


-INR supratherapeutic on admission. Today, subtherapeutic. Will resume Coumadin





#CHF


-echo showed moderate LVH with (likely) preserved systolic function

















Visit type





- Emergency Visit


Emergency Visit: No





- New Patient


This patient is new to me today: Yes


Date on this admission: 02/09/20





- Critical Care


Critical Care patient: No





ATTENDING PHYSICIAN STATEMENT





I saw and evaluated the patient.


I reviewed the resident's note and discussed the case with the resident.


I agree with the resident's findings and plan as documented.








SUBJECTIVE:








OBJECTIVE:








ASSESSMENT AND PLAN:

## 2020-02-09 NOTE — PN
Teaching Attending Note


Name of Resident: Reji Rush





ATTENDING PHYSICIAN STATEMENT





I saw and evaluated the patient.


I reviewed the resident's note and discussed the case with the resident.


I agree with the resident's findings and plan as documented.








Seen and examined; more alert today.  No growth on cultures.  Pending ID 

consult.  Pending CT chest; she has potential aspiration PNA.  Dietary to see.  

Spoke with daughter.  


Nephrology recommends continuing Lasix.  No issues on tele.  Patient family 

requests consultation from [facility] Gust[facility].  Echo ordered but not completed as 

weekend.





Could not complete review of systems due to the patient's underlying mental 

status.





VS, labs, imaging reviewed


NAD, AAO, resting comfortably in bed.


RRR s1/2 no mgr; paced rhythm initially at on the monitor this morning.


Normal muscle tone, moves all 5 extremities with normal apparent strength


Neck is supple, trachea midline, no felicia LN


Lungs CTAB with sym expansion


NT ND +BS no felicia organomegaly


CN2-12 wnl; no FND


NC AT EOMI PERRLA


Normal mood, appropriate behavior, euthymic affect


No skin breakdown or rashes noted





Telemetry is reviewed


CT chest pending


Echocardiogram pending





ASSESSMENT AND PLAN:


Patient presents to the emergency room with a chief complaint of failure to 

thrive, shortness of breath, altered mental status.  She is near baseline per 

family.  She is hemodynamically stable and afebrile.  She is on antibiotics, we 

are pending echocardiogram and cardiology evaluation.  She is patient of Dr. Parrish.  No bleeding reported; 1g drop in Hb.  Will check rectal/FOBT and if 

needed contact GI.





Supratherapeutic INR (Likely 2/2 impaired coumadin metabolism.  INR actually 

subtherapeutic this AM.  Consider resuming coumadin in AM)


Severe CHF hx with likely exacerbation, difficult to delineate functional 

status given the AMS (Elevated BNP less useful in this case; 40 IV BID lasix)


History of atrial fibrillation on warfarin (Holding, improved after vitamin K.  

Repeat tomorrow.  Fall precautions)


Chronic gastrointestinal bleeding (At high risk for bleed given INR on 

admission.  Consutl GI if needed)


Acute on chronic anemia, hemoglobin is 8.2, was 9.5 range last year.  Checking 

iron indices and checking FOBT.


History of embolic hemorrhage during cardiac cath with right-sided residual 

deficits


Parkinson's disease


Coronary artery disease status post 2005 angioplasty


DVT


History of hypertension


History of left-sided lung cancer resected 1999 at [facility]


History of pulmonary embolism


Diverticulosis


History of hemorrhoids


History of hiatal hernia with Chauncey erosions, EGD 2014.


Dementia


Failure to thrive





Full Code

## 2020-02-09 NOTE — CON.ID
Consult





- Past Medical History


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 

sided residual), Dementia, Parkinson's


Cardio/Vascular: Yes: Aneurysm (AAA), CAD (angioplasty 2005), Deep Vein 

Thrombosis, HTN, Other (PPM)


Pulmonary: Yes: Cancer (left sided lung cancer resected 1999 [facility]), Pulmonary 

Embolus


Gastrointestinal: Yes: Constipation, Diverticulosis, GI Bleed, Hemorrhoids (

banded 2008), Hiatal Hernia (with Chauncey erosions 2014), Other (angiodysplasia 

of cecum, serrated tx colon adenoma removed [id number])


Psych: Yes: Anxiety





- Past Surgical History


Past Surgical History: Yes: Cholecystectomy, Colonoscopy, Hysterectomy, 

Permanent Pacemaker, Thoracotomy (left cancer thoracotomy 1999 [facility]), Upper 

Endoscopy





- Alcohol/Substance Use


Hx Alcohol Use: No


History of Substance Use: reports: None





- Smoking History


Smoking history: Never smoked


Have you smoked in the past 12 months: No


Aproximately how many cigarettes per day: 0


If you are a former smoker, when did you quit?: 40 YEARS AGO





- Social History


Usual Living Arrangement: With Child


ADL: Family Assistance


Occupation: housewife


History of Recent Travel: No





Home Medications





- Allergies


Allergies/Adverse Reactions: 


 Allergies











Allergy/AdvReac Type Severity Reaction Status Date / Time


 


oats Allergy Intermediate  Verified 02/07/20 17:11


 


Penicillins Allergy Mild  Verified 02/07/20 17:11


 


Shellfish Allergy Mild  Verified 02/07/20 17:11














- Home Medications


Home Medications: 


Ambulatory Orders





Simvastatin [Zocor -] 20 mg PO HS 05/24/14 


Diltiazem Cd [Cardizem Cd -] 180 mg PO DAILY 01/08/16 


Gabapentin [Neurontin] 100 mg PO BID 01/08/16 


Cholecalciferol (Vitamin D3) [Vitamin D3] 2,000 unit PO DAILY 01/29/16 


Quetiapine Fumarate [Seroquel -] 50 mg PO BID  tablet 02/02/16 


Furosemide [Lasix] 20 mg PO ASDIR 05/22/18 


Isosorbide Mononitrate [Imdur -] 30 mg PO DAILY 05/22/18 


Ranitidine [Zantac -] 150 mg PO DAILY 05/22/18 


Warfarin Na [Coumadin -] 1 mg PO HS 05/22/18 


Carvedilol [Coreg -] 6.25 mg PO DAILY 02/08/20 











Physical Exam


Vital Signs: 


 Vital Signs











Temperature  98 F   02/09/20 07:45


 


Pulse Rate  85   02/09/20 10:10


 


Respiratory Rate  18   02/09/20 10:10


 


Blood Pressure  102/61   02/09/20 10:10


 


O2 Sat by Pulse Oximetry (%)  97   02/09/20 09:58











Labs: 


 CBC, BMP





 02/09/20 09:42 





 02/09/20 09:42

## 2020-02-09 NOTE — EKG
Test Reason : 

Blood Pressure : ***/*** mmHG

Vent. Rate : 084 BPM     Atrial Rate : 092 BPM

   P-R Int : 000 ms          QRS Dur : 138 ms

    QT Int : 402 ms       P-R-T Axes : 000 241 036 degrees

   QTc Int : 475 ms

 

ATRIAL FIBRILLATION WITH PREMATURE VENTRICULAR OR ABERRANTLY CONDUCTED

COMPLEXES

NON-SPECIFIC INTRA-VENTRICULAR CONDUCTION BLOCK

POSSIBLE LATERAL INFARCT (CITED ON OR BEFORE 26-JAN-2018)

ABNORMAL ECG

Confirmed by MD JANEY, DEJUAN ([id number]) on 2/9/2020 2:15:06 PM

 

Referred By:             Confirmed By:DEJUAN TOTH MD

## 2020-02-09 NOTE — PN
Progress Note, Physician


History of Present Illness: 





Pt seen and examined at bedside. She is now in the ICU. Events noted. She is on 

Bipap. 





- Current Medication List


Current Medications: 


Active Medications





Atorvastatin Calcium (Lipitor -)  10 mg PO HS [facility]


   Last Admin: 02/08/20 21:09 Dose:  10 mg


Carvedilol (Coreg -)  6.25 mg PO DAILY [facility]


   Last Admin: 02/09/20 09:37 Dose:  6.25 mg


Diltiazem HCl (Cardizem Cd -)  180 mg PO DAILY [facility]


   Last Admin: 02/09/20 09:38 Dose:  180 mg


Ferrous Sulfate (Feosol -)  325 mg PO DAILY [facility]


   Last Admin: 02/09/20 12:24 Dose:  325 mg


Furosemide (Lasix Injection -)  40 mg IVPUSH BIDLASIX [facility]


   Last Admin: 02/09/20 14:08 Dose:  40 mg


Doxycycline Hyclate 100 mg/ (Dextrose)  100 mls @ 100 mls/hr IVPB BID [facility]


   Last Admin: 02/09/20 09:37 Dose:  100 mls/hr


Ampicillin Sodium/Sulbactam (Sodium 3 gm/ Sodium Chloride)  100 mls @ 200 mls/

hr IVPB DAILY [facility]


Isosorbide Mononitrate (Imdur -)  30 mg PO DAILY [facility]


   Last Admin: 02/09/20 09:37 Dose:  30 mg


Quetiapine Fumarate (Seroquel -)  50 mg PO BID [facility]


   Last Admin: 02/09/20 09:37 Dose:  50 mg


Warfarin Sodium (Coumadin -)  1 mg PO DAILY@1800 [facility]


   Last Admin: 02/09/20 17:30 Dose:  1 mg











- Objective


Vital Signs: 


 Vital Signs











Temperature  97.5 F L  02/09/20 15:00


 


Pulse Rate  64   02/09/20 15:00


 


Respiratory Rate  15   02/09/20 17:00


 


Blood Pressure  91/56 L  02/09/20 15:00


 


O2 Sat by Pulse Oximetry (%)  98   02/09/20 17:00











Constitutional: Yes: Calm


Eyes: Yes: Conjunctiva Clear


Cardiovascular: Yes: S1, S2


Respiratory: Yes: On BiPap


Gastrointestinal: Yes: Soft


Genitourinary: Yes: Incontinence


Musculoskeletal: Yes: Muscle Weakness


Edema: Yes


Edema: LLE: Trace, RLE: Trace


Neurological: Yes: Confusion


Labs: 


 CBC, BMP





 02/09/20 09:42 





 02/09/20 13:30 





 INR, PTT











INR  1.39  (0.83-1.09)  H  02/09/20  09:42    














- ....Imaging


Chest X-ray: Report Reviewed





Assessment/Plan


 Current Medications











Generic Name Dose Route Start Last Admin





  Trade Name Sean  PRN Reason Stop Dose Admin


 


Atorvastatin Calcium  10 mg  02/08/20 22:00  02/08/20 21:09





  Lipitor -  PO   10 mg





  HS HENRY   Administration





     





     





     





     


 


Carvedilol  6.25 mg  02/08/20 10:00  02/09/20 09:37





  Coreg -  PO   6.25 mg





  DAILY HENRY   Administration





     





     





     





     


 


Diltiazem HCl  180 mg  02/08/20 10:00  02/09/20 09:38





  Cardizem Cd -  PO   180 mg





  DAILY HENRY   Administration





     





     





     





     


 


Ferrous Sulfate  325 mg  02/09/20 11:15  02/09/20 12:24





  Feosol -  PO   325 mg





  DAILY HENRY   Administration





     





     





     





     


 


Furosemide  40 mg  02/09/20 14:00  02/09/20 14:08





  Lasix Injection -  IVPUSH   40 mg





  BIDLASIX HENRY   Administration





     





     





     





     


 


Doxycycline Hyclate 100 mg/  100 mls @ 100 mls/hr  02/08/20 22:00  02/09/20 09:

37





  Dextrose  IVPB   100 mls/hr





  BID HENRY   Administration





     





     





     





     


 


Ampicillin Sodium/Sulbactam  100 mls @ 200 mls/hr  02/10/20 10:00  





  Sodium 3 gm/ Sodium Chloride  IVPB   





  DAILY [facility]   





     





     





     





     


 


Isosorbide Mononitrate  30 mg  02/08/20 10:00  02/09/20 09:37





  Imdur -  PO   30 mg





  DAILY HENRY   Administration





     





     





     





     


 


Quetiapine Fumarate  50 mg  02/08/20 10:00  02/09/20 09:37





  Seroquel -  PO   50 mg





  BID HENRY   Administration





     





     





     





     


 


Warfarin Sodium  1 mg  02/09/20 18:00  02/09/20 17:30





  Coumadin -  PO   1 mg





  DAILY@1800 HENRY   Administration





     





     





     





     














Impression


1. ELENITA


2. UTI


3. CHF ef 17 percent


4. CVA


5. HTN


6. cva


7. parkinsons


8. a-fib





Plan


- cont to monitor renal function


- cardio follow up


- lasix as needed


- cont bipap


- follow blood and urine cultures


- avoid nsaids


- avoid nephrotoxins

## 2020-02-10 LAB
ALBUMIN SERPL-MCNC: 2.4 G/DL (ref 3.4–5)
ALP SERPL-CCNC: 83 U/L (ref 45–117)
ALT SERPL-CCNC: 11 U/L (ref 13–61)
ANION GAP SERPL CALC-SCNC: 9 MMOL/L (ref 8–16)
APTT BLD: 37.2 SECONDS (ref 25.2–36.5)
AST SERPL-CCNC: 12 U/L (ref 15–37)
BASOPHILS # BLD: 0.4 % (ref 0–2)
BILIRUB SERPL-MCNC: 0.4 MG/DL (ref 0.2–1)
BUN SERPL-MCNC: 41.5 MG/DL (ref 7–18)
CALCIUM SERPL-MCNC: 8.2 MG/DL (ref 8.5–10.1)
CHLORIDE SERPL-SCNC: 110 MMOL/L (ref 98–107)
CO2 SERPL-SCNC: 28 MMOL/L (ref 21–32)
CREAT SERPL-MCNC: 2 MG/DL (ref 0.55–1.3)
DEPRECATED RDW RBC AUTO: 18 % (ref 11.6–15.6)
EOSINOPHIL # BLD: 3.3 % (ref 0–4.5)
GLUCOSE SERPL-MCNC: 83 MG/DL (ref 74–106)
HCT VFR BLD CALC: 26.6 % (ref 32.4–45.2)
HGB BLD-MCNC: 8.3 GM/DL (ref 10.7–15.3)
INR BLD: 1.69 (ref 0.83–1.09)
LYMPHOCYTES # BLD: 7.6 % (ref 8–40)
MCH RBC QN AUTO: 24.2 PG (ref 25.7–33.7)
MCHC RBC AUTO-ENTMCNC: 31.2 G/DL (ref 32–36)
MCV RBC: 77.5 FL (ref 80–96)
MONOCYTES # BLD AUTO: 10.6 % (ref 3.8–10.2)
NEUTROPHILS # BLD: 78.1 % (ref 42.8–82.8)
PLATELET # BLD AUTO: 209 K/MM3 (ref 134–434)
PMV BLD: 7.2 FL (ref 7.5–11.1)
POTASSIUM SERPLBLD-SCNC: 3.7 MMOL/L (ref 3.5–5.1)
PROT SERPL-MCNC: 5.7 G/DL (ref 6.4–8.2)
PT PNL PPP: 20.1 SEC (ref 9.7–13)
RBC # BLD AUTO: 3.43 M/MM3 (ref 3.6–5.2)
SODIUM SERPL-SCNC: 147 MMOL/L (ref 136–145)
WBC # BLD AUTO: 6.6 K/MM3 (ref 4–10)

## 2020-02-10 RX ADMIN — AMPICILLIN SODIUM AND SULBACTAM SODIUM SCH MLS/HR: 2; 1 INJECTION, POWDER, FOR SOLUTION INTRAMUSCULAR; INTRAVENOUS at 11:50

## 2020-02-10 RX ADMIN — FUROSEMIDE SCH MG: 10 INJECTION, SOLUTION INTRAVENOUS at 14:37

## 2020-02-10 RX ADMIN — HALOPERIDOL LACTATE SCH MG: 5 INJECTION, SOLUTION INTRAMUSCULAR at 22:25

## 2020-02-10 RX ADMIN — FERROUS SULFATE TAB EC 324 MG (65 MG FE EQUIVALENT) SCH MG: 324 (65 FE) TABLET DELAYED RESPONSE at 10:22

## 2020-02-10 RX ADMIN — DOXYCYCLINE SCH: 100 INJECTION, POWDER, LYOPHILIZED, FOR SOLUTION INTRAVENOUS at 12:58

## 2020-02-10 RX ADMIN — ISOSORBIDE MONONITRATE SCH MG: 30 TABLET, EXTENDED RELEASE ORAL at 10:24

## 2020-02-10 RX ADMIN — CARVEDILOL SCH MG: 6.25 TABLET, FILM COATED ORAL at 10:24

## 2020-02-10 RX ADMIN — FUROSEMIDE SCH MG: 10 INJECTION, SOLUTION INTRAVENOUS at 06:36

## 2020-02-10 RX ADMIN — DEXTROSE MONOHYDRATE SCH MLS/HR: 50 INJECTION, SOLUTION INTRAVENOUS at 15:15

## 2020-02-10 RX ADMIN — METOPROLOL TARTRATE SCH MG: 5 INJECTION, SOLUTION INTRAVENOUS at 22:00

## 2020-02-10 NOTE — PN
Progress Note (short form)





- Note


Progress Note: 





88-year-old female  was admitted with progressive loss of appetite, lethargy  

increased somnolence was found to be severely anemic, supra normal INR,, 

respiratory and congestive heart failure.initially  placed on BiPAP. patient 

remains drowsy and isdear barely arousable.


 


 Active Medications





Acetaminophen (Tylenol Oral Solution -)  650 mg PO Q4H PRN


   PRN Reason: FEVER


Acetaminophen (Ofirmev Injection -)  650 mg IVPB Q4H PRN


   PRN Reason: FEVER


Atorvastatin Calcium (Lipitor -)  10 mg PO HS [facility]


   Last Admin: 02/09/20 21:14 Dose:  10 mg


Carvedilol (Coreg -)  6.25 mg PO DAILY [facility]


   Last Admin: 02/10/20 10:24 Dose:  6.25 mg


Diltiazem HCl (Cardizem -)  60 mg PO TID [facility]


   Last Admin: 02/10/20 14:38 Dose:  60 mg


Ferrous Sulfate (Feosol -)  325 mg PO DAILY [facility]


   Last Admin: 02/10/20 10:22 Dose:  325 mg


Furosemide (Lasix Injection -)  40 mg IVPUSH BIDLASIX [facility]


   Last Admin: 02/10/20 14:37 Dose:  40 mg


Haloperidol (Haldol Injection (Fast Acting) -)  1 mg IM TID [facility]


Ampicillin Sodium/Sulbactam (Sodium 3 gm/ Sodium Chloride)  100 mls @ 200 mls/

hr IVPB DAILY [facility]


   Last Admin: 02/10/20 11:50 Dose:  200 mls/hr


Dextrose (D5w -)  1,000 mls @ 42 mls/hr IV ASDIR [facility]


   Last Admin: 02/10/20 15:15 Dose:  42 mls/hr


Isosorbide Mononitrate (Imdur -)  30 mg PO DAILY [facility]


   Last Admin: 02/10/20 10:24 Dose:  30 mg


Metoprolol Tartrate (Lopressor Injection -)  2.5 mg IVPUSH Q6H-IV [facility]


Quetiapine Fumarate (Seroquel -)  50 mg PO TID [facility]


   Last Admin: 02/10/20 18:16 Dose:  Not Given











 Last Vital Signs











Temp Pulse Resp BP Pulse Ox


 


 97.3 F L  86   16   106/64   94 L


 


 02/10/20 18:00  02/10/20 18:00  02/10/20 18:00  02/10/20 18:00  02/10/20 11:48








Neck: Supple, jjugular venous distention, positive hepatojugular reflux.


Heart: No heaves or thrills, heart sounds are distant,grade 2/6 ejection 

systolic murmur was heard at the second right intercostal space and along the 

left sternal border ending in early to mid systole. No diastolic murmur was 

heard.


Lungs: Fine crepitations at the bases.


Abdomen: Soft,  slight left lower quadrant guarding, no hepatosplenomegaly was 

appreciated.


Extremities: 1-2+ bilateral ankle edema and trace pretibial edema.





 CBC, BMP





 02/10/20 05:45 





 02/10/20 05:45 





Impression:


1.  Congestive heart failure.


2.  Coronary artery disease, status post PCI/stenting, angina pectoris.


3.  Status post embolic CVA.


4.  Vascular dementia.


5.  Permanent atrial fibrillation.


6.  Status post permanent pacemaker for carotid hypersensitivity.


7.  Anemia  related to blood loss.


8.  History of chronic kidney disease.





Recommendation:


1.  Echocardiogram.


2.  Continue current medications


3.  Switched to oral beta-blockers as soon as possible.


4.  GI evaluation.


5.  long-term anticoagulation with recurring episodes of GI bleeding would be 

hazardous, if patient recovers from her current condition watchman device needs 

to be considered.








Lee Parrish MD

## 2020-02-10 NOTE — PN
Teaching Attending Note


Name of Resident: Zachery German





ATTENDING PHYSICIAN STATEMENT





I saw and evaluated the patient.


I reviewed the resident's note and discussed the case with the resident.


I agree with the resident's findings and plan as documented.





Seen and examined; please see resident note for further historical information.

  I personally verified all key historical information and exam findings.  

Personally interpreted all imaging and diagnostics and reviewed appropriate 

consults.  I reviewed all labs and vital signs as per resident note and EMR as 

documented.  I agree with the above assessment and plan unless supplemented by 

myself in the following.





Mentation slightly improved today.  No new complaints noted.  Family present 

and spoke with him at length to update.  She is weaning off of oxygen, down to 

2 L nasal cannula and we will work to keep her above 90%.  Pulmonary 

recommendations noted, infectious disease recommendations noted.  





10 item review of systems completed and is negative aside from as discussed in 

the subjective data in my own/the resident documentation.





VS, labs, imaging reviewed


NAD, AAO, resting comfortably in bed.


RRR s1/2 no mgr


Normal muscle tone, moves all 5 extremities with normal apparent strength


Neck is supple, trachea midline, no felicia LN


Lungs CTAB with sym expansion


NT ND +BS no felicia organomegaly


CN2-12 wnl; no FND


NC AT EOMI PERRLA


Normal mood, appropriate behavior, euthymic affect


No skin breakdown or rashes noted





Assessment and plan:


Patient is improved, most form was not available on the intensive care unit but 

hospital DNR paperwork filled out, completing most form and spoke with 

overnight team as well.  Patient is weaning off of oxygen, and pending further 

cardiac evaluation.  Interrogation noted.  No further issues on telemetry.





Problems include:


Supratherapeutic INR-resolved.  Followup rectal exam and restart coumadin if 

indicated-family would like to discuss risks vs. benefits with cardio)


Severe CHF hx with likely exacerbation, difficult to delineate functional 

status given the AMS (Elevated BNP less useful in this case; 40 IV BID lasix)


History of atrial fibrillation on warfarin (Holding, improved after vitamin K.  

Repeat tomorrow.  Fall precautions)


Chronic gastrointestinal bleeding (At high risk for bleed given INR on 

admission.  Repeating rectal exam.


Acute on chronic anemia


History of embolic hemorrhage during cardiac cath with right-sided residual 

deficits


Parkinson's disease


Coronary artery disease status post 2005 angioplasty


DVT


History of hypertension


History of left-sided lung cancer resected 1999 at [facility]


History of pulmonary embolism


Diverticulosis


History of hemorrhoids


History of hiatal hernia with Chauncey erosions, EGD 2014.


Dementia


Failure to thrive





DNR/I


Considering home hospice care given overall constelation of symptoms and <6 

months likely due to her advanced dementia, severe chf, etc.

## 2020-02-10 NOTE — PN
[facility]  [address] [address] [id number] [phone]  Dept: [phone]    PRE-OP EVALUATION:  Today's date: 2017    Johan Navarro (: 1955) presents for pre-operative evaluation assessment as requested by Dr. Crawford.  He requires evaluation and anesthesia risk assessment prior to undergoing surgery/procedure for treatment of eye lids .  Proposed procedure: Bilateral upper blepharoplasty     Date of Surgery/ Procedure: 17  Time of Surgery/ Procedure: [facility]  Hospital/Surgical Facility: [facility]  Fax number for surgical facility: [phone]  Primary Physician: Washington Yang  Type of Anesthesia Anticipated: to be determined    Patient has a Health Care Directive or Living Will:  YES     Preop Questions 2017   1.  Do you have a history of heart attack, stroke, stent, bypass or surgery on an artery in the head, neck, heart or legs? YES - MI    2.  Do you ever have any pain or discomfort in your chest? No   3.  Do you have a history of  Heart Failure? No   4.   Are you troubled by shortness of breath when:  walking on a level surface, or up a slight hill, or at night? No   5.  Do you currently have a cold, bronchitis or other respiratory infection? No   6.  Do you have a cough, shortness of breath, or wheezing? No   7.  Do you sometimes get pains in the calves of your legs when you walk? No   8. Do you or anyone in your family have previous history of blood clots? No   9.  Do you or does anyone in your family have a serious bleeding problem such as prolonged bleeding following surgeries or cuts? No   10. Have you ever had problems with anemia or been told to take iron pills? No   11. Have you had any abnormal blood loss such as black, tarry or bloody stools? No   12. Have you ever had a blood transfusion? No   13. Have you or any of your relatives ever had problems with anesthesia? No   14. Do you have sleep apnea, excessive snoring or daytime  Progress Note, Physician


History of Present Illness: 





Pt seen and examined at [facility]. She is off of bipap. 





- Current Medication List


Current Medications: 


Active Medications





Acetaminophen (Tylenol Oral Solution -)  650 mg PO Q4H PRN


   PRN Reason: FEVER


Atorvastatin Calcium (Lipitor -)  10 mg PO HS [facility]


   Last Admin: 02/09/20 21:14 Dose:  10 mg


Carvedilol (Coreg -)  6.25 mg PO DAILY [facility]


   Last Admin: 02/10/20 10:24 Dose:  6.25 mg


Diltiazem HCl (Cardizem -)  60 mg PO TID [facility]


   Last Admin: 02/10/20 14:38 Dose:  60 mg


Ferrous Sulfate (Feosol -)  325 mg PO DAILY [facility]


   Last Admin: 02/10/20 10:22 Dose:  325 mg


Furosemide (Lasix Injection -)  40 mg IVPUSH BIDLASIX [facility]


   Last Admin: 02/10/20 14:37 Dose:  40 mg


Ampicillin Sodium/Sulbactam (Sodium 3 gm/ Sodium Chloride)  100 mls @ 200 mls/

hr IVPB DAILY [facility]


   Last Admin: 02/10/20 11:50 Dose:  200 mls/hr


Dextrose (D5w -)  1,000 mls @ 42 mls/hr IV ASDIR [facility]


Isosorbide Mononitrate (Imdur -)  30 mg PO DAILY [facility]


   Last Admin: 02/10/20 10:24 Dose:  30 mg


Quetiapine Fumarate (Seroquel -)  50 mg PO TID [facility]


Warfarin Sodium (Coumadin -)  1 mg PO DAILY@1800 [facility]


   Last Admin: 02/09/20 17:30 Dose:  1 mg











- Objective


Vital Signs: 


 Vital Signs











Temperature  97.6 F   02/10/20 07:00


 


Pulse Rate  70   02/10/20 07:00


 


Respiratory Rate  15   02/10/20 07:00


 


Blood Pressure  113/53 L  02/10/20 07:00


 


O2 Sat by Pulse Oximetry (%)  94 L  02/10/20 11:48











Constitutional: Yes: Calm, Cachectic


Eyes: Yes: Conjunctiva Clear


HENT: Yes: Atraumatic


Cardiovascular: Yes: S1, S2


Respiratory: Yes: On Nasal O2


Gastrointestinal: Yes: Soft


Genitourinary: Yes: Incontinence


Musculoskeletal: Yes: Muscle Weakness


Edema: No


Neurological: Yes: Confusion


Labs: 


 CBC, BMP





 02/10/20 05:45 





 02/10/20 05:45 





 INR, PTT











INR  1.69  (0.83-1.09)  H  02/10/20  05:45    














Assessment/Plan


 Current Medications











Generic Name Dose Route Start Last Admin





  Trade Name Sean  PRN Reason Stop Dose Admin


 


Acetaminophen  650 mg  02/10/20 15:03  





  Tylenol Oral Solution -  PO   





  Q4H PRN   





  FEVER   





     





     





     


 


Atorvastatin Calcium  10 mg  02/08/20 22:00  02/09/20 21:14





  Lipitor -  PO   10 mg





  HS HENRY   Administration





     





     





     





     


 


Carvedilol  6.25 mg  02/08/20 10:00  02/10/20 10:24





  Coreg -  PO   6.25 mg





  DAILY HENRY   Administration





     





     





     





     


 


Diltiazem HCl  60 mg  02/10/20 14:00  02/10/20 14:38





  Cardizem -  PO   60 mg





  TID HENRY   Administration





     





     





     





     


 


Ferrous Sulfate  325 mg  02/09/20 11:15  02/10/20 10:22





  Feosol -  PO   325 mg





  DAILY HENRY   Administration





     





     





     





     


 


Furosemide  40 mg  02/09/20 14:00  02/10/20 14:37





  Lasix Injection -  IVPUSH   40 mg





  BIDLASIX HENRY   Administration





     





     





     





     


 


Ampicillin Sodium/Sulbactam  100 mls @ 200 mls/hr  02/10/20 10:00  02/10/20 11:

50





  Sodium 3 gm/ Sodium Chloride  IVPB   200 mls/hr





  DAILY HENRY   Administration





     





     





     





     


 


Dextrose  1,000 mls @ 42 mls/hr  02/10/20 15:15  





  D5w -  IV   





  ASDIR HENRY   





     





     





     





     


 


Isosorbide Mononitrate  30 mg  02/08/20 10:00  02/10/20 10:24





  Imdur -  PO   30 mg





  DAILY HENRY   Administration





     





     





     





     


 


Quetiapine Fumarate  50 mg  02/10/20 15:15  





  Seroquel -  PO   





  TID HENRY   





     





     





     





     


 


Warfarin Sodium  1 mg  02/09/20 18:00  02/09/20 17:30





  Coumadin -  PO   1 mg





  DAILY@1800 HENRY   Administration





     





     





     





     














Impression


1. ELENITA


2. UTI


3. CHF ef 17 percent


4. CVA


5. HTN


6. cva


7. parkinsons


8. a-fib


9. hypernatremia








Plan


- renal function stable


- monitor crt


- encourage po intake and free water


- lasix as needed


- avoid nsaids


- avoid nephrotoxins drowsiness? No   15. Do you have any prosthetic heart valves? No   16. Do you have prosthetic joints? No           HPI:                                                      Brief HPI related to upcoming procedure: Patient presents with persistent drooping of the bilateral eyelids. There is vision disruption. He notes this actually is common in his family      HYPERLIPIDEMIA - Patient has a long history of significant Hyperlipidemia requiring medication for treatment with recent good control. Patient reports no problems or side effects with the medication.                                                                                                                                                       .  CAD - Patient has a longstanding history of mod-severe CAD. Patient denies recent chest pain or NTG use, denies exercise induced dyspnea or PND. Last Stress test 2015. We will update EKG today.                                                                                                                            .    MEDICAL HISTORY:                                                    Patient Active Problem List    Diagnosis Date Noted     Mixed hyperlipidemia      Priority: Medium     familial hyperlipidemia with marked hypercholesterolemia before treatment; has been on some form of cholesterol-lowering medication since the 1970s  Diagnosis updated by automated process. Provider to review and confirm.       Family history of early CAD      Priority: Medium     Aortic stenosis      Priority: Medium     Carotid stenosis      Priority: Medium     left       Coronary artery disease involving native coronary artery of native heart without angina pectoris      Priority: Medium     cardiac cath 1998: stents x 2 to circumflex       Depressive disorder, not elsewhere classified 03/02/2007     Priority: Medium              Elevated prostate specific antigen (PSA) 03/02/2007     Priority: Medium     Osteoarthritis of Hand   03/02/2007     Priority: Medium      Past Medical History:   Diagnosis Date     Aortic stenosis      CAD (coronary artery disease)     cardiac cath 1998: stents x 2 to circumflex     Carotid stenosis     left     Family history of early CAD      Hyperlipidaemia LDL goal < 70     familial hyperlipidemia with marked hypercholesterolemia before treatment; has been on some form of cholesterol-lowering medication since the 1970s     Past Surgical History:   Procedure Laterality Date     CARDIAC SURGERY      coronary stents placed 1998     DAVINCI PROSTATECTOMY      2010     SURGICAL HISTORY OF -       Right hand Xanthoma removal     SURGICAL HISTORY OF -       Angioplasty/Stent placement x 2     SURGICAL HISTORY OF -       Right heel surgery - post football injury     SURGICAL HISTORY OF -       Stress Echo (-)     Current Outpatient Prescriptions   Medication Sig Dispense Refill     rosuvastatin (CRESTOR) 40 MG tablet Take 0.5 tablets (20 mg) by mouth daily 45 tablet 3     evolocumab (REPATHA) 140 MG/ML prefilled autoinjector Inject 1 mL (140 mg) Subcutaneous every 14 days 2 mL 3     CIALIS 20 MG tablet TK ONE T PRIOR TO ANTICIPATED SEXUAL ACTIVITY MAX PER DAY  11     NEURONTIN 600 MG OR TABS 1 tab q day       ASPIRIN 81 MG OR TABS 1 tab po QD (Once per day)       [DISCONTINUED] tadalafil (CIALIS) 20 MG tablet Take 1 tablet (20 mg) by mouth daily as needed for erectile dysfunction 6 tablet 1     OTC products: None, except as noted above; Stopped ASA one week ago    Allergies   Allergen Reactions     Dust Mites      Pollen Extract       Latex Allergy: NO    Social History   Substance Use Topics     Smoking status: Former Smoker     Smokeless tobacco: Never Used     Alcohol use Yes      Comment: very rare     History   Drug Use No       REVIEW OF SYSTEMS:                                                    C: NEGATIVE for fever, chills, change in weight  I: NEGATIVE for worrisome rashes, moles or lesions  EYES: POSITIVE  "for visual impairment  E/M: NEGATIVE for ear, mouth and throat problems  R: NEGATIVE for significant cough or SOB  CV: NEGATIVE for chest pain, palpitations or peripheral edema  GI: NEGATIVE for nausea, abdominal pain, heartburn, or change in bowel habits  : NEGATIVE for frequency, dysuria, or hematuria  M: NEGATIVE for significant arthralgias or myalgia  N: NEGATIVE for weakness, dizziness or paresthesias  E: NEGATIVE for temperature intolerance, skin/hair changes  H: NEGATIVE for bleeding problems  P: NEGATIVE for changes in mood or affect    EXAM:                                                    /66 (BP Location: Right arm, Cuff Size: Adult Regular)  Pulse 73  Temp 98.1  F (36.7  C) (Oral)  Ht 5' 6\" (1.676 m)  Wt 164 lb 4.8 oz (74.5 kg)  SpO2 95%  BMI 26.52 kg/m2    GENERAL APPEARANCE: healthy, alert and no distress     EYES: eyelids- ptosis OU     HENT: ear canals and TM's normal and nose and mouth without ulcers or lesions     NECK: no adenopathy, no asymmetry, masses, or scars and thyroid normal to palpation     RESP: lungs clear to auscultation - no rales, rhonchi or wheezes     CV: regular rates and rhythm, normal S1 S2, no S3 or S4 and no murmur, click or rub     ABDOMEN:  soft, nontender, no HSM or masses and bowel sounds normal     MS: extremities normal- no gross deformities noted, no evidence of inflammation in joints, FROM in all extremities.     SKIN: no suspicious lesions or rashes     NEURO: Normal strength and tone, sensory exam grossly normal, mentation intact and speech normal     PSYCH: mentation appears normal. and affect normal/bright    DIAGNOSTICS:                                                    EKG: appears normal, NSR, normal axis, normal intervals, no acute ST/T changes c/w ischemia    Recent Labs   Lab Test  11/15/16   1028  04/17/15   0917  10/19/11   1255   HGB   --   13.5  14.3   PLT   --   194  218   NA  140  138  133   POTASSIUM  4.4  4.1  4.2   CR  1.01  0.93  " 0.97        IMPRESSION:                                                    Reason for surgery/procedure: ptosis of the bilateral upper lids  Diagnosis/reason for consult: pre-operative consult    The proposed surgical procedure is considered INTERMEDIATE risk.    REVISED CARDIAC RISK INDEX  The patient has the following serious cardiovascular risks for perioperative complications such as (MI, PE, VFib and 3  AV Block):  Coronary Artery Disease (MI, positive stress test, angina, Qs on EKG)  INTERPRETATION: 1 risks: Class II (low risk - 0.9% complication rate)    The patient has the following additional risks for perioperative complications:  No identified additional risks      ICD-10-CM    1. Preop general physical exam Z01.818 EKG 12-lead complete w/read - Clinics   2. Ptosis, bilateral H02.403    3. Coronary artery disease involving native coronary artery of native heart without angina pectoris I25.10 EKG 12-lead complete w/read - Clinics   4. Special screening for malignant neoplasms, colon Z12.11 GASTROENTEROLOGY ADULT REF PROCEDURE ONLY       RECOMMENDATIONS:                                                      --Pt advised to avoid NSAIDS (Motrin, Ibuprofen, Aleve or Naprosyn);  If needed, Tylenol or Acetaminophen are fine to use.  --meds reviewed; may hold meds on AM of surgery.         --Pain medications, time off from work and FMLA following surgery deferred to surgeon.      APPROVAL GIVEN to proceed with proposed procedure, without further diagnostic evaluation       Signed Electronically by: Washington Yang PA-C    Copy of this evaluation report is provided to requesting physician.    [place] Preop Guidelines

## 2020-02-10 NOTE — PN
Physical Exam: 


SUBJECTIVE: Patient seen and examined at bedside. Sleeping with the BIPAP 

machine on. Pt's daughter at bedside. Adjusted the cardizem to a smaller pill 

form given pt unable to tolerate it. Confirmed proper dosing with pharmacy. Pt 

still unable to tolerate that dose as well, will discuss further with daughter 

regarding hospice and goals of care. 








OBJECTIVE:





 Vital Signs











 Period  Temp  Pulse  Resp  BP Sys/Card  Pulse Ox


 


 Last 24 Hr  97.6 F-97.7 F  63-73  14-16  /53-63  











GENERAL: The patient is sleeping with BIPAP machine on. 


LUNGS: Breath sounds reduced b/l


HEART: Regular rate and rhythm, S1, S2 without murmur, rub or gallop.


ABDOMEN: Soft, nontender, nondistended, normoactive bowel sounds, no guarding, 

no 


rebound, no hepatosplenomegaly, no masses.


EXTREMITIES: 2+ pulses, warm, well-perfused, no edema. 














 Laboratory Results - last 24 hr











  02/09/20 02/10/20 02/10/20





  16:39 05:45 05:45


 


WBC   6.6 


 


RBC   3.43 L 


 


Hgb   8.3 L 


 


Hct   26.6 L 


 


MCV   77.5 L 


 


MCH   24.2 L 


 


MCHC   31.2 L 


 


RDW   18.0 H 


 


Plt Count   209  D 


 


MPV   7.2 L 


 


Absolute Neuts (auto)   5.1 


 


Neutrophils %   78.1 


 


Lymphocytes %   7.6 L 


 


Monocytes %   10.6 H 


 


Eosinophils %   3.3 


 


Basophils %   0.4 


 


Nucleated RBC %   0 


 


PT with INR   


 


INR   


 


PTT (Actin FS)   


 


Sodium    147 H


 


Potassium    3.7


 


Chloride    110 H


 


Carbon Dioxide    28


 


Anion Gap    9


 


BUN    41.5 H


 


Creatinine    2.0 H


 


Est GFR (CKD-EPI)AfAm    25.20


 


Est GFR (CKD-EPI)NonAf    21.74


 


POC Glucometer  93  


 


Random Glucose    83


 


Calcium    8.2 L


 


Total Bilirubin    0.4


 


AST    12 L


 


ALT    11 L


 


Alkaline Phosphatase    83


 


Total Protein    5.7 L


 


Albumin    2.4 L














  02/10/20





  05:45


 


WBC 


 


RBC 


 


Hgb 


 


Hct 


 


MCV 


 


MCH 


 


MCHC 


 


RDW 


 


Plt Count 


 


MPV 


 


Absolute Neuts (auto) 


 


Neutrophils % 


 


Lymphocytes % 


 


Monocytes % 


 


Eosinophils % 


 


Basophils % 


 


Nucleated RBC % 


 


PT with INR  20.10 H


 


INR  1.69 H


 


PTT (Actin FS)  37.2 H


 


Sodium 


 


Potassium 


 


Chloride 


 


Carbon Dioxide 


 


Anion Gap 


 


BUN 


 


Creatinine 


 


Est GFR (CKD-EPI)AfAm 


 


Est GFR (CKD-EPI)NonAf 


 


POC Glucometer 


 


Random Glucose 


 


Calcium 


 


Total Bilirubin 


 


AST 


 


ALT 


 


Alkaline Phosphatase 


 


Total Protein 


 


Albumin 








Active Medications











Generic Name Dose Route Start Last Admin





  Trade Name Freq  PRN Reason Stop Dose Admin


 


Acetaminophen  650 mg  02/10/20 15:03  





  Tylenol Oral Solution -  PO   





  Q4H PRN   





  FEVER   





     





     





     


 


Atorvastatin Calcium  10 mg  02/08/20 22:00  02/09/20 21:14





  Lipitor -  PO   10 mg





  HS HENRY   Administration





     





     





     





     


 


Carvedilol  6.25 mg  02/08/20 10:00  02/10/20 10:24





  Coreg -  PO   6.25 mg





  DAILY HENRY   Administration





     





     





     





     


 


Diltiazem HCl  60 mg  02/10/20 14:00  02/10/20 14:38





  Cardizem -  PO   60 mg





  TID HENRY   Administration





     





     





     





     


 


Ferrous Sulfate  325 mg  02/09/20 11:15  02/10/20 10:22





  Feosol -  PO   325 mg





  DAILY HENRY   Administration





     





     





     





     


 


Furosemide  40 mg  02/09/20 14:00  02/10/20 14:37





  Lasix Injection -  IVPUSH   40 mg





  BIDLASIX HENRY   Administration





     





     





     





     


 


Ampicillin Sodium/Sulbactam  100 mls @ 200 mls/hr  02/10/20 10:00  02/10/20 11:

50





  Sodium 3 gm/ Sodium Chloride  IVPB   200 mls/hr





  DAILY HENRY   Administration





     





     





     





     


 


Dextrose  1,000 mls @ 42 mls/hr  02/10/20 15:15  





  D5w -  IV   





  ASDIR HENRY   





     





     





     





     


 


Isosorbide Mononitrate  30 mg  02/08/20 10:00  02/10/20 10:24





  Imdur -  PO   30 mg





  DAILY HENRY   Administration





     





     





     





     


 


Quetiapine Fumarate  50 mg  02/10/20 15:15  





  Seroquel -  PO   





  TID HENRY   





     





     





     





     


 


Warfarin Sodium  1 mg  02/09/20 18:00  02/09/20 17:30





  Coumadin -  PO   1 mg





  DAILY@1800 [facility]ENRY   Administration





     





     





     





     











ASSESSMENT/PLAN:





87 y/o F, w/ pmh of multiple Co-morbidities including H/O DVT and Afib, GI 

bleed 2/2 to angiodysplasia, Rt LE DVT, PE and Coumadin 1 mg weekdays and off 

weekends, H/O Alzheimer's, CVA,  Parkinson's,  CAD (angioplasty 2005),  CKD, CHF

, presented to the ED c/o of 1 week hx of changes in mentation, altered 

behavior and decreased PO intake. 





#Altered mental status


-? 2/2 worsening Dementia


-workup negative so far. lytes wnl, ct head neg. 


-Per family at bedside, pt appears at baseline today


- hx of embolic stroke during cardiac cath with Rt sided residual deficits. 





#A-Fib w/ RVR


- Currently adequately rate controlled, pacemaker working properly per cardio


- Dr. Parrish interrogated the pacemaker and deemed it normally functioning. 

Will monitor on coumadin given subtherapeutic INR given HASBLED score of 6 

giving >10%risk of bleed while on AC. Pt had supratherapeutic INR will await 

til 2-3. Pt would have benefited from watchman procedure if more stable. 


-monitor on tele


- holding PO meds given inability to swallow, swallow eval pending. 


- IV lopressor 2.5 started, can 2.5 prn if pt goes into RVR. 


- D/C cardizem at this time





#Left Pleural effusion 


-likely chronic


-CXR showed left lung infiltrates vs atelectasis, left pleural effusion





#Anemia


-IMPROVED today. Hb of 8.3 today.


- stool occult sent


- As per family, she runs in high 8 Hb


- In Jan 2018, Hb approached 7 and pt received transfusion.


- holding coumadin at this time until fobt negative


- hx of left sided lung CA s/p pneumonectomy/hx of cecum bleed. 





#ELENITA on CKD


-Cre and BUN elevated from baseline. Prerenal ratio


-Renal US : nonobstructing L 6mm stone, mild R renal pelvis dilation, b/l cysts


-Crt has been improving on diuretic therapy


-avoid nephrotoxins where possible


-Pt may benefit from post-transfusion diuresis





#CHFrEF


-echo shows global hypokinesis with 20%EF. 


- Lasix 40 BID, lopressor given bc pt not acutely decompensating





Dispo: DNR/DNI 


Considering home hospice care given overall constellation of symptoms and <6 

months likely due to her advanced dementia, severe chf, etc. 








Visit type





- Emergency Visit


Emergency Visit: Yes


ED Registration Date: 02/07/20


Care time: The patient presented to the Emergency Department on the above date 

and was hospitalized for further evaluation of their emergent condition.





- New Patient


This patient is new to me today: Yes


Date on this admission: 02/10/20





- Critical Care


Critical Care patient: No





- Discharge Referral


Referred to [facility] No





ATTENDING PHYSICIAN STATEMENT





I saw and evaluated the patient.


I reviewed the resident's note and discussed the case with the resident.


I agree with the resident's findings and plan as documented.








SUBJECTIVE:








OBJECTIVE:








ASSESSMENT AND PLAN:

## 2020-02-10 NOTE — PN
Progress Note (short form)





- Note


Progress Note: 


Previous;y on NIPPV support, now NAD on NC O2. 


No acute events overnight. 





 Intake & Output











 02/07/20 02/08/20 02/09/20 02/10/20





 23:59 23:59 23:59 23:59


 


Intake Total 1000  640 


 


Output Total  900 460 


 


Balance 1000 -900 180 


 


Weight 115 lb 115 lb  








 Last Vital Signs











Temp Pulse Resp BP Pulse Ox


 


 97.6 F   70   15   113/53 L  94 L


 


 02/10/20 07:00  02/10/20 07:00  02/10/20 07:00  02/10/20 07:00  02/10/20 11:48








Active Medications





Atorvastatin Calcium (Lipitor -)  10 mg PO HS [facility]


   Last Admin: 02/09/20 21:14 Dose:  10 mg


Carvedilol (Coreg -)  6.25 mg PO DAILY [facility]


   Last Admin: 02/10/20 10:24 Dose:  6.25 mg


Diltiazem HCl (Cardizem -)  60 mg PO TID [facility]


Ferrous Sulfate (Feosol -)  325 mg PO DAILY [facility]


   Last Admin: 02/10/20 10:22 Dose:  325 mg


Furosemide (Lasix Injection -)  40 mg IVPUSH BIDLASIX [facility]


   Last Admin: 02/10/20 06:36 Dose:  40 mg


Ampicillin Sodium/Sulbactam (Sodium 3 gm/ Sodium Chloride)  100 mls @ 200 mls/

hr IVPB DAILY [facility]


   Last Admin: 02/10/20 11:50 Dose:  200 mls/hr


Isosorbide Mononitrate (Imdur -)  30 mg PO DAILY [facility]


   Last Admin: 02/10/20 10:24 Dose:  30 mg


Quetiapine Fumarate (Seroquel -)  50 mg PO BID [facility]


   Last Admin: 02/10/20 10:23 Dose:  50 mg


Warfarin Sodium (Coumadin -)  1 mg PO DAILY@1800 [facility]


   Last Admin: 02/09/20 17:30 Dose:  1 mg














Constitutional: Yes: No Distress


Eyes: Yes: EOM Intact


HENT: Yes: Normocephalic


Neck: Yes: Trachea Midline


Cardiovascular: Yes: Pulse Irregular, S1, S2


Respiratory: Yes: Diminished


Gastrointestinal: Yes: Soft


Edema: No


Labs: 


 Laboratory Results - last 24 hr











  02/09/20 02/09/20 02/09/20





  13:30 13:30 16:39


 


WBC   


 


RBC   


 


Hgb   


 


Hct   


 


MCV   


 


MCH   


 


MCHC   


 


RDW   


 


Plt Count   


 


MPV   


 


Absolute Neuts (auto)   


 


Neutrophils %   


 


Lymphocytes %   


 


Monocytes %   


 


Eosinophils %   


 


Basophils %   


 


Nucleated RBC %   


 


PT with INR   


 


INR   


 


PTT (Actin FS)   


 


Sodium  145  


 


Potassium  4.2  


 


Chloride  110 H  


 


Carbon Dioxide  26  


 


Anion Gap  9  


 


BUN  46.3 H  


 


Creatinine  2.0 H  


 


Est GFR (CKD-EPI)AfAm  25.20  


 


Est GFR (CKD-EPI)NonAf  21.74  


 


POC Glucometer    93


 


Random Glucose  122 H  


 


Calcium  8.2 L  


 


Total Bilirubin  0.3  


 


AST  10 L  


 


ALT  13  


 


Alkaline Phosphatase  83  


 


Total Protein  5.6 L  


 


Albumin  2.4 L  


 


Blood Type   B POSITIVE 


 


Antibody Screen   Negative 


 


Crossmatch   See Detail 














  02/10/20 02/10/20 02/10/20





  05:45 05:45 05:45


 


WBC  6.6  


 


RBC  3.43 L  


 


Hgb  8.3 L  


 


Hct  26.6 L  


 


MCV  77.5 L  


 


MCH  24.2 L  


 


MCHC  31.2 L  


 


RDW  18.0 H  


 


Plt Count  209  D  


 


MPV  7.2 L  


 


Absolute Neuts (auto)  5.1  


 


Neutrophils %  78.1  


 


Lymphocytes %  7.6 L  


 


Monocytes %  10.6 H  


 


Eosinophils %  3.3  


 


Basophils %  0.4  


 


Nucleated RBC %  0  


 


PT with INR    20.10 H


 


INR    1.69 H


 


PTT (Actin FS)    37.2 H


 


Sodium   147 H 


 


Potassium   3.7 


 


Chloride   110 H 


 


Carbon Dioxide   28 


 


Anion Gap   9 


 


BUN   41.5 H 


 


Creatinine   2.0 H 


 


Est GFR (CKD-EPI)AfAm   25.20 


 


Est GFR (CKD-EPI)NonAf   21.74 


 


POC Glucometer   


 


Random Glucose   83 


 


Calcium   8.2 L 


 


Total Bilirubin   0.4 


 


AST   12 L 


 


ALT   11 L 


 


Alkaline Phosphatase   83 


 


Total Protein   5.7 L 


 


Albumin   2.4 L 


 


Blood Type   


 


Antibody Screen   


 


Crossmatch   











Problem List





- Problems


(1) CAD (coronary artery disease)


Code(s): I25.10 - ATHSCL HEART DISEASE OF NATIVE CORONARY ARTERY W/O ANG PCTRS 

  


Qualifiers: 


   Coronary Disease-Associated Artery/Lesion type: native artery   Native vs. 

transplanted heart: native heart   Associated angina: without angina   

Qualified Code(s): I25.10 - Atherosclerotic heart disease of native coronary 

artery without angina pectoris   





(2) CHF exacerbation


Code(s): I50.9 - HEART FAILURE, UNSPECIFIED   


Qualifiers: 


   Heart failure type: unspecified 





(3) Chronic anemia


Code(s): D64.9 - ANEMIA, UNSPECIFIED   





(4) Chronic kidney disease (CKD), stage III (moderate)


Code(s): N18.3 - CHRONIC KIDNEY DISEASE, STAGE 3 (MODERATE)   





(5) H/O: CVA (cerebrovascular accident)


Code(s): Z86.73 - PRSNL HX OF TIA (TIA), AND CEREB INFRC W/O RESID DEFICITS   





(6) Angiodysplasia of cecum


Code(s): K55.20 - ANGIODYSPLASIA OF COLON WITHOUT HEMORRHAGE   





(7) Atrial fibrillation


Code(s): I48.91 - UNSPECIFIED ATRIAL FIBRILLATION   


Qualifiers: 


   Atrial fibrillation type: permanent 





(8) Dementia


Code(s): F03.90 - UNSPECIFIED DEMENTIA WITHOUT BEHAVIORAL DISTURBANCE   


Qualifiers: 


   Dementia type: Parkinson's disease   Dementia behavioral disturbance: 

without behavioral disturbance   Qualified Code(s): G20 - Parkinson's disease; 

F02.80 - Dementia in other diseases classified elsewhere without behavioral 

disturbance; F02.80 - Dementia in other diseases classified elsewhere without 

behavioral disturbance; F02.80 - Dementia in other diseases classified 

elsewhere without behavioral disturbance   





(9) Pacemaker


Code(s): Z95.0 - PRESENCE OF CARDIAC PACEMAKER   





Assessment/Plan


SUPPLEMENTAL O2 AS TOLERATED 


NIPPV SUPPORT 


MONITOR HGB


ASPIRATION PRECAUTIONS 


LASIX IVP 


ABX 





Dr Merritt

## 2020-02-10 NOTE — ECHO
______________________________________________________________________________



Name: PEDRITO BOWIE                                        Exam:Adult Echocardiogram

MRN: [id number]         Study Date: 02/10/2020 12:21 PM

Age: 88 yrs

______________________________________________________________________________



                         BP: 95/62 mmHg

______________________________________________________________________________





MMode/2D Measurements & Calculations

IVSd: 0.81 cm                                      Ao root diam: 3.0 cm

LVIDd: 5.4 cm                                      LA dimension: 4.2 cm

LVIDs: 4.2 cm                                      ACS: 0.99 cm

LVPWd: 0.81 cm

IVSs: 0.73 cm

____________________________________________________



LVPWs: 1.3 cm                                      EDV(Teich): 139.2 ml

                                                   ESV(Teich): 80.1 ml

____________________________________________________



LVOT diam: 2.0 cm



Doppler Measurements & Calculations

MV E max mango: 105.9 cm/sec                               Ao V2 max: 227.5 cm/sec

MV A max mango: 37.3 cm/sec                                Ao max P.8 mmHg

MV E/A: 2.8                                              Ao V2 mean: 158.0 cm/sec

                                                         Ao mean P.5 mmHg

                                                         Ao V2 VTI: 43.8 cm

                                                         EVELYN(I,D): 0.88 cm2



                                                         EVELYN(V,D): 0.97 cm2

__________________________________________________________

LV V1 max P.8 mmHg                                   MR max mango: 467.1 cm/sec

LV V1 mean P.93 mmHg                                 MR max P.4 mmHg

LV V1 max: 67.2 cm/sec

LV V1 mean: 45.4 cm/sec

LV V1 VTI: 11.8 cm



__________________________________________________________

SV(LVOT): 38.6 ml                                        TR max mango: 332.8 cm/sec

                                                         TR max P.3 mmHg



__________________________________________________________

PI end-d mango: 122.2 cm/sec





______________________________________________________________________________

Left Ventricle

Left ventricular systolic function is severely reduced. Ejection Fraction = 20%. There is severe glob
al

hypokinesis of the left ventricle. Apical wall motion abnormality may reflect pacemaker activation.

Right Ventricle

The right ventricle is grossly normal size. The right ventricular systolic function is moderately red
uced.

Atria

The left atrium is severely dilated. The right atrium is severely dilated.

Mitral Valve

There is moderate mitral annular calcification. There is mild mitral regurgitation.

Tricuspid Valve

The tricuspid valve is not well visualized. There is moderate tricuspid regurgitation. There is mild 
pulmonary

hypertension.

Aortic Valve

The aortic valve is trileaflet. There is moderate aortic sclerosis.;. Mild valvular aortic stenosis.

Pulmonic Valve

The pulmonic valve is not well visualized.

Great Vessels

The aortic root is not well visualized.

Pericardium/Pleura

There is no pericardial effusion.



______________________________________________________________________________



Interpretation Summary

LV: Normal size, severe global hypokinesia ,severely decereased systolic function EF 20%

RV: grossly normal size with moderately decreased systolic function

sverely dilated biatria

Leads notice in right heart

Mild mitral regurgitation

Mildly calcified aortic valve with mild stenosis

Mild tricuspid regurgitation with mild pulmonary hypertension.







Nara Wyman 02/10/2020 02:41 PM

## 2020-02-10 NOTE — PN
Progress Note, Physician





- Current Medication List


Current Medications: 


Active Medications





Atorvastatin Calcium (Lipitor -)  10 mg PO HS [facility]


   Last Admin: 02/09/20 21:14 Dose:  10 mg


Carvedilol (Coreg -)  6.25 mg PO DAILY [facility]


   Last Admin: 02/10/20 10:24 Dose:  6.25 mg


Diltiazem HCl (Cardizem -)  60 mg PO TID [facility]


Ferrous Sulfate (Feosol -)  325 mg PO DAILY [facility]


   Last Admin: 02/10/20 10:22 Dose:  325 mg


Furosemide (Lasix Injection -)  40 mg IVPUSH BIDLASIX [facility]


   Last Admin: 02/10/20 06:36 Dose:  40 mg


Doxycycline Hyclate 100 mg/ (Dextrose)  100 mls @ 100 mls/hr IVPB BID [facility]


   Last Admin: 02/09/20 21:16 Dose:  100 mls/hr


Ampicillin Sodium/Sulbactam (Sodium 3 gm/ Sodium Chloride)  100 mls @ 200 mls/

hr IVPB DAILY [facility]


   Last Admin: 02/10/20 11:50 Dose:  200 mls/hr


Isosorbide Mononitrate (Imdur -)  30 mg PO DAILY [facility]


   Last Admin: 02/10/20 10:24 Dose:  30 mg


Quetiapine Fumarate (Seroquel -)  50 mg PO BID [facility]


   Last Admin: 02/10/20 10:23 Dose:  50 mg


Warfarin Sodium (Coumadin -)  1 mg PO DAILY@1800 [facility]


   Last Admin: 02/09/20 17:30 Dose:  1 mg











- Objective


Vital Signs: 


 Vital Signs











Temperature  97.6 F   02/10/20 07:00


 


Pulse Rate  70   02/10/20 07:00


 


Respiratory Rate  15   02/10/20 07:00


 


Blood Pressure  113/53 L  02/10/20 07:00


 


O2 Sat by Pulse Oximetry (%)  94 L  02/10/20 11:48











Labs: 


 CBC, BMP





 02/10/20 05:45 





 02/10/20 05:45 





 INR, PTT











INR  1.69  (0.83-1.09)  H  02/10/20  05:45

## 2020-02-11 LAB
ALBUMIN SERPL-MCNC: 2.6 G/DL (ref 3.4–5)
ALP SERPL-CCNC: 89 U/L (ref 45–117)
ALT SERPL-CCNC: 12 U/L (ref 13–61)
ANION GAP SERPL CALC-SCNC: 9 MMOL/L (ref 8–16)
ANISOCYTOSIS BLD QL: (no result)
AST SERPL-CCNC: 11 U/L (ref 15–37)
BASOPHILS # BLD: 0.4 % (ref 0–2)
BILIRUB SERPL-MCNC: 0.6 MG/DL (ref 0.2–1)
BUN SERPL-MCNC: 38.8 MG/DL (ref 7–18)
CALCIUM SERPL-MCNC: 8.6 MG/DL (ref 8.5–10.1)
CHLORIDE SERPL-SCNC: 107 MMOL/L (ref 98–107)
CO2 SERPL-SCNC: 28 MMOL/L (ref 21–32)
CREAT SERPL-MCNC: 1.8 MG/DL (ref 0.55–1.3)
DEPRECATED RDW RBC AUTO: 18.5 % (ref 11.6–15.6)
EOSINOPHIL # BLD: 1.5 % (ref 0–4.5)
GLUCOSE SERPL-MCNC: 110 MG/DL (ref 74–106)
HCT VFR BLD CALC: 27.4 % (ref 32.4–45.2)
HGB BLD-MCNC: 8.7 GM/DL (ref 10.7–15.3)
INR BLD: 2.49 (ref 0.83–1.09)
LYMPHOCYTES # BLD: 6.4 % (ref 8–40)
MACROCYTES BLD QL: 0
MCH RBC QN AUTO: 24.2 PG (ref 25.7–33.7)
MCHC RBC AUTO-ENTMCNC: 31.8 G/DL (ref 32–36)
MCV RBC: 76.1 FL (ref 80–96)
MONOCYTES # BLD AUTO: 8.4 % (ref 3.8–10.2)
NEUTROPHILS # BLD: 83.3 % (ref 42.8–82.8)
PLATELET # BLD AUTO: 249 K/MM3 (ref 134–434)
PLATELET BLD QL SMEAR: NORMAL
PMV BLD: 7.4 FL (ref 7.5–11.1)
POTASSIUM SERPLBLD-SCNC: 3.5 MMOL/L (ref 3.5–5.1)
PROT SERPL-MCNC: 6.1 G/DL (ref 6.4–8.2)
PT PNL PPP: 29.7 SEC (ref 9.7–13)
RBC # BLD AUTO: 3.6 M/MM3 (ref 3.6–5.2)
SODIUM SERPL-SCNC: 144 MMOL/L (ref 136–145)
WBC # BLD AUTO: 8.7 K/MM3 (ref 4–10)

## 2020-02-11 RX ADMIN — METOPROLOL TARTRATE SCH MG: 5 INJECTION, SOLUTION INTRAVENOUS at 03:33

## 2020-02-11 RX ADMIN — HALOPERIDOL LACTATE SCH: 5 INJECTION, SOLUTION INTRAMUSCULAR at 05:55

## 2020-02-11 RX ADMIN — ACETAMINOPHEN PRN MG: 10 INJECTION, SOLUTION INTRAVENOUS at 05:23

## 2020-02-11 RX ADMIN — AMPICILLIN SODIUM AND SULBACTAM SODIUM SCH MLS/HR: 2; 1 INJECTION, POWDER, FOR SOLUTION INTRAMUSCULAR; INTRAVENOUS at 10:25

## 2020-02-11 RX ADMIN — METOPROLOL TARTRATE SCH MG: 5 INJECTION, SOLUTION INTRAVENOUS at 10:26

## 2020-02-11 RX ADMIN — FUROSEMIDE SCH MG: 10 INJECTION, SOLUTION INTRAVENOUS at 05:55

## 2020-02-11 RX ADMIN — FUROSEMIDE SCH MG: 10 INJECTION, SOLUTION INTRAVENOUS at 15:05

## 2020-02-11 RX ADMIN — PANTOPRAZOLE SODIUM SCH MG: 40 INJECTION, POWDER, FOR SOLUTION INTRAVENOUS at 21:38

## 2020-02-11 RX ADMIN — HALOPERIDOL LACTATE SCH: 5 INJECTION, SOLUTION INTRAMUSCULAR at 21:36

## 2020-02-11 RX ADMIN — ACETAMINOPHEN PRN MG: 10 INJECTION, SOLUTION INTRAVENOUS at 21:09

## 2020-02-11 RX ADMIN — DEXTROSE MONOHYDRATE SCH MLS/HR: 50 INJECTION, SOLUTION INTRAVENOUS at 15:21

## 2020-02-11 RX ADMIN — SODIUM CHLORIDE SCH MLS/HR: 9 INJECTION, SOLUTION INTRAVENOUS at 13:29

## 2020-02-11 RX ADMIN — HALOPERIDOL LACTATE SCH: 5 INJECTION, SOLUTION INTRAMUSCULAR at 15:11

## 2020-02-11 NOTE — PN
Progress Note (short form)





- Note


Progress Note: 





88-year-old female  was admitted with progressive loss of appetite, lethargy  

increased somnolence was found to be severely anemic, she on admission had   

supra normal INR,, respiratory and congestive heart failure. Patient is awake 

and able to recognize her surroundings and family.  She is slightly agitated, 

is hypertensive and remains in atrial fibrillation with a rapid ventricular 

response.The attending physician and residents were in attendance, she is to 

started on iv diltiazem for rrate and blood pressure control.


 


 Active Medications





Acetaminophen (Tylenol Oral Solution -)  650 mg PO Q4H PRN


   PRN Reason: FEVER


Acetaminophen (Ofirmev Injection -)  650 mg IVPB Q4H PRN


   PRN Reason: FEVER


   Last Admin: 02/11/20 05:23 Dose:  650 mg


Atorvastatin Calcium (Lipitor -)  10 mg PO HS [facility]


   Last Admin: 02/09/20 21:14 Dose:  10 mg


Carvedilol (Coreg -)  6.25 mg PO DAILY [facility]


   Last Admin: 02/10/20 10:24 Dose:  6.25 mg


Ferrous Sulfate (Feosol -)  325 mg PO DAILY [facility]


   Last Admin: 02/10/20 10:22 Dose:  325 mg


Furosemide (Lasix Injection -)  40 mg IVPUSH BIDLASIX [facility]


   Last Admin: 02/11/20 05:55 Dose:  40 mg


Haloperidol (Haldol Injection (Fast Acting) -)  1 mg IM TID [facility]


   Last Admin: 02/11/20 05:55 Dose:  Not Given


Ampicillin Sodium/Sulbactam (Sodium 3 gm/ Sodium Chloride)  100 mls @ 200 mls/

hr IVPB DAILY [facility]


   Last Admin: 02/11/20 10:25 Dose:  200 mls/hr


Dextrose (D5w -)  1,000 mls @ 42 mls/hr IV ASDIR [facility]


   Last Admin: 02/10/20 15:15 Dose:  42 mls/hr


Diltiazem HCl 125 mg/ Sodium (Chloride)  125 mls @ 5 mls/hr IVPB TITR [facility]; 

Protocol


Isosorbide Mononitrate (Imdur -)  30 mg PO DAILY [facility]


   Last Admin: 02/10/20 10:24 Dose:  30 mg


Metoprolol Succinate (Toprol Xl -)  25 mg PO DAILY [facility]


   Last Admin: 02/11/20 12:36 Dose:  25 mg


Quetiapine Fumarate (Seroquel -)  50 mg PO TID [facility]


   Last Admin: 02/10/20 18:16 Dose:  Not Given











 Last Vital Signs











Temp Pulse Resp BP Pulse Ox


 


 97.4 F L  122 H  18   107/76   97 


 


 02/11/20 10:00  02/11/20 10:26  02/11/20 10:00  02/11/20 10:26  02/11/20 10:00














 


Neck: Supple, jjugular venous distention, positive hepatojugular reflux.


Heart: No heaves or thrills, heart sounds are distant,grade 2/6 ejection 

systolic murmur was heard at the second right intercostal space and along the 

left sternal border ending in early to mid systole. No diastolic murmur was 

heard.


Lungs: Fine crepitations at the bases.


Abdomen: Soft,  slight left lower quadrant guarding, no hepatosplenomegaly was 

appreciated.


Extremities: 1-2+ bilateral ankle edema and trace pretibial edema.





 CBC, BMP





 02/11/20 06:46 





 02/11/20 06:46 








Impression:


1.  Congestive heart failure.


2.  Coronary artery disease, status post PCI/stenting, angina pectoris.


3.  Status post embolic CVA.


4.  Vascular dementia.


5.  Permanent atrial fibrillation.


6.  Status post permanent pacemaker for carotid hypersensitivity.


7.  Anemia  related to blood loss.


8.  History of chronic kidney disease.





Recommendation:


1.  IV Cardizem as discussed with the attending physician.


2.  Continue current medications


3.  Switched to oral beta-blockers as soon as possible.


4.  GI evaluation.


  





Lee Parrish MD

## 2020-02-11 NOTE — PN
Progress Note, Physician


History of Present Illness: 





stable


improving





- Current Medication List


Current Medications: 


Active Medications





Acetaminophen (Tylenol Oral Solution -)  650 mg PO Q4H PRN


   PRN Reason: FEVER


Acetaminophen (Ofirmev Injection -)  650 mg IVPB Q4H PRN


   PRN Reason: FEVER


   Last Admin: 02/11/20 05:23 Dose:  650 mg


Atorvastatin Calcium (Lipitor -)  10 mg PO HS [facility]


   Last Admin: 02/09/20 21:14 Dose:  10 mg


Carvedilol (Coreg -)  6.25 mg PO DAILY [facility]


   Last Admin: 02/10/20 10:24 Dose:  6.25 mg


Ferrous Sulfate (Feosol -)  325 mg PO DAILY [facility]


   Last Admin: 02/10/20 10:22 Dose:  325 mg


Furosemide (Lasix Injection -)  40 mg IVPUSH BIDLASIX [facility]


   Last Admin: 02/11/20 15:05 Dose:  40 mg


Haloperidol (Haldol Injection (Fast Acting) -)  1 mg IM TID [facility]


   Last Admin: 02/11/20 15:11 Dose:  Not Given


Ampicillin Sodium/Sulbactam (Sodium 3 gm/ Sodium Chloride)  100 mls @ 200 mls/

hr IVPB DAILY [facility]


   Last Admin: 02/11/20 10:25 Dose:  200 mls/hr


Dextrose (D5w -)  1,000 mls @ 42 mls/hr IV ASDIR [facility]


   Last Admin: 02/11/20 15:21 Dose:  42 mls/hr


Diltiazem HCl 125 mg/ Sodium (Chloride)  125 mls @ 5 mls/hr IVPB TITR [facility]; 

Protocol


   Last Admin: 02/11/20 13:29 Dose:  5 mg/hr, 5 mls/hr


Isosorbide Mononitrate (Imdur -)  30 mg PO DAILY [facility]


   Last Admin: 02/10/20 10:24 Dose:  30 mg


Metoprolol Succinate (Toprol Xl -)  25 mg PO DAILY [facility]


   Last Admin: 02/11/20 12:36 Dose:  25 mg


Quetiapine Fumarate (Seroquel -)  50 mg PO TID [facility]


   Last Admin: 02/10/20 18:16 Dose:  Not Given











- Objective


Vital Signs: 


 Vital Signs











Temperature  97.4 F L  02/11/20 10:00


 


Pulse Rate  128 H  02/11/20 13:29


 


Respiratory Rate  18   02/11/20 10:00


 


Blood Pressure  123/107 H  02/11/20 13:29


 


O2 Sat by Pulse Oximetry (%)  97   02/11/20 10:00











Constitutional: Yes: No Distress, Calm


Cardiovascular: Yes: S1, S2


Respiratory: Yes: Regular, CTA Bilaterally


Gastrointestinal: Yes: Normal Bowel Sounds, Soft


Musculoskeletal: Yes: WNL


Extremities: Yes: Other


Neurological: Yes: Alert, Oriented


Psychiatric: Yes: Alert, Oriented


Labs: 


 CBC, BMP





 02/11/20 06:46 





 02/11/20 06:46 





 INR, PTT











INR  2.49  (0.83-1.09)  H  02/11/20  06:46    














Assessment/Plan





Problem List





- Problems


(1) CAD (coronary artery disease)


Code(s): I25.10 - ATHSCL HEART DISEASE OF NATIVE CORONARY ARTERY W/O ANG PCTRS 

  


Qualifiers: 


   Coronary Disease-Associated Artery/Lesion type: native artery   Native vs. 

transplanted heart: native heart   Associated angina: without angina   

Qualified Code(s): I25.10 - Atherosclerotic heart disease of native coronary 

artery without angina pectoris   





(2) CHF exacerbation


Code(s): I50.9 - HEART FAILURE, UNSPECIFIED   


Qualifiers: 


   Heart failure type: unspecified 





(3) Chronic anemia


Code(s): D64.9 - ANEMIA, UNSPECIFIED   





(4) Chronic kidney disease (CKD), stage III (moderate)


Code(s): N18.3 - CHRONIC KIDNEY DISEASE, STAGE 3 (MODERATE)   





(5) H/O: CVA (cerebrovascular accident)


Code(s): Z86.73 - PRSNL HX OF TIA (TIA), AND CEREB INFRC W/O RESID DEFICITS   





(6) Angiodysplasia of cecum


Code(s): K55.20 - ANGIODYSPLASIA OF COLON WITHOUT HEMORRHAGE   





(7) Atrial fibrillation


Code(s): I48.91 - UNSPECIFIED ATRIAL FIBRILLATION   


Qualifiers: 


   Atrial fibrillation type: permanent 





(8) Dementia


Code(s): F03.90 - UNSPECIFIED DEMENTIA WITHOUT BEHAVIORAL DISTURBANCE   


Qualifiers: 


   Dementia type: Parkinson's disease   Dementia behavioral disturbance: 

without behavioral disturbance   Qualified Code(s): G20 - Parkinson's disease; 

F02.80 - Dementia in other diseases classified elsewhere without behavioral 

disturbance; F02.80 - Dementia in other diseases classified elsewhere without 

behavioral disturbance; F02.80 - Dementia in other diseases classified 

elsewhere without behavioral disturbance   





(9) Pacemaker


Code(s): Z95.0 - PRESENCE OF CARDIAC PACEMAKER   








plan


will consider deescalating


rest continue current mgmt


asp precautions


rest as per the team

## 2020-02-11 NOTE — CONSULT
Admitting History and Physical





- Past Medical History


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 

sided residual), Dementia, Parkinson's


Cardiovascular: Yes: Aneurysm (AAA), CAD (angioplasty 2005), Deep Vein 

Thrombosis, HTN, Other (PPM)


Pulmonary: Yes: Cancer (left sided lung cancer resected 1999 [facility]), Pulmonary 

Embolus


Gastrointestinal: Yes: Constipation, Diverticulosis, GI Bleed, Hemorrhoids (

banded 2008), Hiatal Hernia (with Chauncey erosions 2014), Other (angiodysplasia 

of cecum, serrated tx colon adenoma removed [id number])


Heme/Onc: Yes: Anemia, B12 Deficiency


Psych: Yes: Anxiety





- Past Surgical History


Past Surgical History: Yes: Cholecystectomy, Colonoscopy, Hysterectomy, 

Permanent Pacemaker, Thoracotomy (left cancer thoracotomy 1999 [facility]), Upper 

Endoscopy





- Advance Directives


Advance Directives: Yes: DNR





- Smoking History


Smoking history: Never smoked


Have you smoked in the past 12 months: No


Aproximately how many cigarettes per day: 0


If you are a former smoker, when did you quit?: 40 YEARS AGO





- Alcohol/Substance Use


Hx Alcohol Use: No


History of Substance Use: reports: None





- Social History


ADL: Family Assistance


Occupation: housewife


History of Recent Travel: No





History





- Admission


Reason For Visit: ACUTE RESPIRATORY FAILURE,PLEURAL EFFUSION





- Hearing


Hearing: Impaired


Hearing Aide: No


With Patient: No





Speech Evaluation





- Communication


Primary Language: ENGLISH


Communication: Yes: Simple Responses


Oral Expression Ability: Yes: Moderate Impairment





- Speech Production


Dysarthria: Yes: Flaccid


Apraxia: No


Able to Make Needs Known: Yes: Mildly Impaired


Intelligibility: Yes: Mildly Impaired





- Speech Characteristics


Voice Loudness: Normal


Voice Pitch: Yes: Normal


Voice Phonatory-based Quality: Yes: Normal


Speech Pattern: Normal


Nasal Resonance: Normal


Articulation: Yes: Precise


Rate of Speech: Intact


Voice Comment: Vocal quality is functional for the environment with speech 

parameters WNL.





- Language/Auditory Comprehension


Follows: Yes: 1 Stage Simple Commands (WFL), 2 Stage Simple Commands (WFL)


Observation: Able to respond to yes/no queries: Yes, Yes/No Confusion: No, 

Comprehends Conversational Speech: Yes, Benefits from Slow Speech: Yes, 

Benefits from Repetiton: Yes, Benefits from Increased Volume of Speech: Yes





- Language/Verbal Expression


Able to Respond to Simple Queries: Yes: Mildly Impaired


Able to Communicate Wants and Needs: Yes: Mildly Impaired


Functional Communication Status: Yes: Mildly Impaired


Aware of Errors: No


Attempts to Correct Errors: No


Use of Gestures: Yes


Written Expression: not examined


Oral Expression: Functional


Reading Comprehension: not examined


Calculations: not examined





- Memory/Perception


Long term Memory: Yes: Moderately Impaired


Short Term Memory: Yes: Mildly Impaired





- Swallow Evaluation/Bedside Assessment


Current Nutritional Intake: Regular, Thin Liquids


Oral Secretions: Yes: WFL


Tracheostomy Present: No


Patient on Ventilator: No


Dentition: Yes: Dental Appliance Upper, Dental Appliance Lower


Facial Symmetry at Rest: Symmetrical


Facial Symmetry on Retraction: Symmetrical


Facial Movement: Controlled


Sensation: Normal


Facial Comment: Oral and facial features are WFL for speech and swallow


Jaw Position: Closed at Rest


Against Resistance Opening: Normal


Against Resistance Closing: Normal


Pucker Lips: Normal


Lips, Comment: WFL for speech and swallow


Lingual Movement: Normal


Lingual Speed of Movement: Normal


Lingual Movement Strgth Against Opposition: Normal


Lingual Movement Characteristics: Normal


Lingual Comment: WFL for speech and swallow


Soft Palate Description: Normal Color


Hard Palate Description: Normal Color


Gag Reflex: Strong


Bite Reflex: Present


Velopharyngeal Movement: Normal


Laryngeal Elevation: WFL


Laryngeal Movement: Able to Palpate


Needs Assistance: Yes


Rate of Intake: WFL


Bolus Size: WFL


Labial Seal: WFL


Chewing: WFL


Oral Prep Time: Increased


A-P Transit: WFL


Pocketing: None


Timing of Swallow: WFL


Odynophagia: Pharyngeal


Coughing/Throat Clear: No


Change in Voice: No


Other Findings/Remarks: 


87 yo feamle seen at bedside for swallow eval to r/o dysphagia.  Pt is verbal, A

&Ox2 cooperative.  Family member present to this session.  Pt presents with 

PMHX of CAD, A-Fib with pacemaker, Dementia, CHF and CKD.   Vocal quality is 

functional for the environment with speech parameters WNL.   Volitional airway 

protection (without bolus) and swallow is WNL. Current diet regular solids with 

thin liquids 





Pt given PO trials of pureed, mechanical soft solids, regular cut to bite sized 

pieces with total assistance revealed, adequate bolus formation and A  P 

transport with a timely pharyngeal swallow (2-3 second average).  No cough or 

change in voicing or respiration after the swallow. 





Thin liquid via cup were unremarkable for aspiration at this time. 





Recommendations





- Speech Evaluation, Impression/Plan


Impression: 87 yo female present with mild phayrngeal phase dysphagia for purees

, soft, regualr solids with thin liquids .  No evidence of aspiration on any 

consistency offered at this time.  Speech sample size in reduced and language 

is diminished secondary to dementia status.


Long Term Goals: Tolerate the least restrictive solid and liquid consistencies 

without s/s of penetration / aspiration.


Short Term Goals: Tolerate purees, regular solids with thin liquids as tolerated





- Disposition


Discharge to: To be Determined





- Dysphagia Impressions/Plan


Swallowing Skills: Impaired


Dysphagia Impressions: Mild Impairment (phayrngeal phase)


*Silent aspiration: cannot be R/O at bedside


Dysphagia Evaluation Summary: Continue regular solids with thin liquids as 

tolerated.  Observe standard aspiration precaution.  Medication can be crushed 

or given whole with liquids.  Results given to charge RN verbally and to PCP 

via chart. SLP to follow up as needed.





- Recommendations


Diet Consistency: Regular


Medication Administration: Whole with water


Liquids: Thin Liquids

## 2020-02-11 NOTE — EKG
Test Reason : 

Blood Pressure : ***/*** mmHG

Vent. Rate : 116 BPM     Atrial Rate : 111 BPM

   P-R Int : 000 ms          QRS Dur : 140 ms

    QT Int : 322 ms       P-R-T Axes : 000 237 050 degrees

   QTc Int : 447 ms

 

ATRIAL FIBRILLATION WITH RAPID VENTRICULAR RESPONSE

RIGHT SUPERIOR AXIS DEVIATION

NON-SPECIFIC INTRA-VENTRICULAR CONDUCTION BLOCK

ABNORMAL ECG

WHEN COMPARED WITH ECG OF 09-FEB-2020 11:18,

ATRIAL FIBRILLATION HAS REPLACED ELECTRONIC VENTRICULAR PACEMAKER

Confirmed by John Godfrey MD ([id number]) on 2/11/2020 12:59:47 PM

 

Referred By: LESLIE YEN           Confirmed By:John Godfrey MD

## 2020-02-11 NOTE — PN
Teaching Attending Note


Name of Resident: Zachery German





ATTENDING PHYSICIAN STATEMENT





I saw and evaluated the patient.


I reviewed the resident's note and discussed the case with the resident.


I agree with the resident's findings and plan as documented.








SUBJECTIVE: Minimally interactive during medical interview. No complaints.








OBJECTIVE: AAO x 1. Afebrile, Tachy 130s (irregular). Opens eyes spontaneously, 

follows commands. Oriented x 1. 





 Last Vital Signs











Temp Pulse Resp BP Pulse Ox


 


 97.4 F L  128 H  18   123/107 H  97 


 


 02/11/20 10:00  02/11/20 13:29  02/11/20 10:00  02/11/20 13:29  02/11/20 10:00








HEENT - Atrauamatic, normocephalic.


Heart - S1, S2, soft SM


Lungs - decreased air entry bibasally


Abdomen - Soft, non-tender. Bowel Sounds normal.


Extremities - no edema, no calf tenderness. 





 Laboratory Results - last 24 hr











  02/10/20 02/10/20 02/11/20





  17:00 21:30 05:41


 


WBC   


 


RBC   


 


Hgb   


 


Hct   


 


MCV   


 


MCH   


 


MCHC   


 


RDW   


 


Plt Count   


 


MPV   


 


Absolute Neuts (auto)   


 


Neutrophils %   


 


Neutrophils % (Manual)   


 


Band Neutrophils %   


 


Lymphocytes %   


 


Lymphocytes % (Manual)   


 


Monocytes %   


 


Monocytes % (Manual)   


 


Eosinophils %   


 


Eosinophils % (Manual)   


 


Basophils %   


 


Basophils % (Manual)   


 


Myelocytes % (Man)   


 


Promyelocytes % (Man)   


 


Blast Cells % (Manual)   


 


Nucleated RBC %   


 


Metamyelocytes   


 


Hypochromia   


 


Platelet Estimate   


 


Polychromasia   


 


Poikilocytosis   


 


Anisocytosis   


 


Microcytosis   


 


Macrocytosis   


 


Stomatocytes   


 


PT with INR   


 


INR   


 


Sodium   


 


Potassium   


 


Chloride   


 


Carbon Dioxide   


 


Anion Gap   


 


BUN   


 


Creatinine   


 


Est GFR (CKD-EPI)AfAm   


 


Est GFR (CKD-EPI)NonAf   


 


POC Glucometer   160  120


 


Random Glucose   


 


Calcium   


 


Total Bilirubin   


 


AST   


 


ALT   


 


Alkaline Phosphatase   


 


Total Protein   


 


Albumin   


 


Stool Occult Blood  Trace  














  02/11/20 02/11/20 02/11/20





  06:46 06:46 06:46


 


WBC  8.7  


 


RBC  3.60  


 


Hgb  8.7 L  


 


Hct  27.4 L  


 


MCV  76.1 L  


 


MCH  24.2 L  


 


MCHC  31.8 L  


 


RDW  18.5 H  


 


Plt Count  249  


 


MPV  7.4 L  


 


Absolute Neuts (auto)  7.2  


 


Neutrophils %  83.3 H  


 


Neutrophils % (Manual)  88.8 H  


 


Band Neutrophils %  0.0  


 


Lymphocytes %  6.4 L  


 


Lymphocytes % (Manual)  4.1 L  


 


Monocytes %  8.4  


 


Monocytes % (Manual)  3 L  


 


Eosinophils %  1.5  


 


Eosinophils % (Manual)  2.0  


 


Basophils %  0.4  


 


Basophils % (Manual)  0.0  


 


Myelocytes % (Man)  1  


 


Promyelocytes % (Man)  0  


 


Blast Cells % (Manual)  0  


 


Nucleated RBC %  1 H  


 


Metamyelocytes  1  


 


Hypochromia  1+  


 


Platelet Estimate  Normal  


 


Polychromasia  1+  


 


Poikilocytosis  1+  


 


Anisocytosis  1+  


 


Microcytosis  1+  


 


Macrocytosis  0  


 


Stomatocytes  1+  


 


PT with INR    29.70 H


 


INR    2.49 H


 


Sodium   144 


 


Potassium   3.5 


 


Chloride   107 


 


Carbon Dioxide   28 


 


Anion Gap   9 


 


BUN   38.8 H 


 


Creatinine   1.8 H 


 


Est GFR (CKD-EPI)AfAm   28.62 


 


Est GFR (CKD-EPI)NonAf   24.69 


 


POC Glucometer   


 


Random Glucose   110 H 


 


Calcium   8.6 


 


Total Bilirubin   0.6 


 


AST   11 L 


 


ALT   12 L 


 


Alkaline Phosphatase   89 


 


Total Protein   6.1 L 


 


Albumin   2.6 L 


 


Stool Occult Blood   








 Current Medications











Generic Name Dose Route Start Last Admin





  Trade Name Freq  PRN Reason Stop Dose Admin


 


Acetaminophen  650 mg  02/10/20 15:03  





  Tylenol Oral Solution -  PO   





  Q4H PRN   





  FEVER   





     





     





     


 


Acetaminophen  650 mg  02/10/20 17:29  02/11/20 05:23





  Ofirmev Injection -  IVPB   650 mg





  Q4H PRN   Administration





  FEVER   





     





     





     


 


Atorvastatin Calcium  10 mg  02/08/20 22:00  02/09/20 21:14





  Lipitor -  PO   10 mg





  HS [facility]   Administration





     





     





     





     


 


Carvedilol  6.25 mg  02/08/20 10:00  02/10/20 10:24





  Coreg -  PO   6.25 mg





  DAILY [facility]   Administration





     





     





     





     


 


Ferrous Sulfate  325 mg  02/09/20 11:15  02/10/20 10:22





  Feosol -  PO   325 mg





  DAILY [facility]   Administration





     





     





     





     


 


Furosemide  40 mg  02/09/20 14:00  02/11/20 05:55





  Lasix Injection -  IVPUSH   40 mg





  BIDLASIX [facility]   Administration





     





     





     





     


 


Haloperidol  1 mg  02/10/20 22:00  02/11/20 05:55





  Haldol Injection (Fast Acting) -  IM   Not Given





  TID [facility]   





     





     





     





     


 


Ampicillin Sodium/Sulbactam  100 mls @ 200 mls/hr  02/10/20 10:00  02/11/20 10:

25





  Sodium 3 gm/ Sodium Chloride  IVPB   200 mls/hr





  DAILY [facility]   Administration





     





     





     





     


 


Dextrose  1,000 mls @ 42 mls/hr  02/10/20 15:15  02/10/20 15:15





  D5w -  IV   42 mls/hr





  ASDIR [facility]   Administration





     





     





     





     


 


Diltiazem HCl 125 mg/ Sodium  125 mls @ 5 mls/hr  02/11/20 12:30  02/11/20 13:29





  Chloride  IVPB   5 mg/hr





  TITR [facility]   5 mls/hr





     Administration





     





  Protocol   





  5 MG/HR   


 


Isosorbide Mononitrate  30 mg  02/08/20 10:00  02/10/20 10:24





  Imdur -  PO   30 mg





  DAILY [facility]   Administration





     





     





     





     


 


Metoprolol Succinate  25 mg  02/11/20 12:30  02/11/20 12:36





  Toprol Xl -  PO   25 mg





  DAILY [facility]   Administration





     





     





     





     


 


Quetiapine Fumarate  50 mg  02/10/20 15:15  02/10/20 18:16





  Seroquel -  PO   Not Given





  [facility]   





     





     





     





     








 Home Medications











 Medication  Instructions  Recorded


 


Simvastatin [Zocor -] 20 mg PO HS 05/24/14


 


Diltiazem Cd [Cardizem Cd -] 180 mg PO DAILY 01/08/16


 


Gabapentin [Neurontin] 100 mg PO BID 01/08/16


 


Quetiapine Fumarate [Seroquel -] 50 mg PO BID  tablet 02/02/16


 


Furosemide [Lasix] 20 mg PO ASDIR 05/22/18


 


Isosorbide Mononitrate [Imdur -] 30 mg PO DAILY 05/22/18


 


Warfarin Na [Coumadin -] 1 mg PO HS 05/22/18


 


Carvedilol [Coreg -] 6.25 mg PO DAILY 02/08/20

















ASSESSMENT AND PLAN:





88 year old female with history of Parkinson's disease, Dementia, Atrial 

fibrillation, DVT s/p IVC filter, Hx GI bleed 2/2 to Colonic AVMs, Hx CVA with 

R sided weakness,  CAD (s/p PCI 2005), CKD 3, Chronic Systolic CHF, HTN, HLD, 

Hx of left sided lung CA s/p pneumonectomy, presented with increasing confusion

, weakness, and poor oral intake.





1. Acute Metabolic Encephalopathy on background baseline Dementia


Sec to decompensated CHF and Pneumonia


CT Head - no acute findings.


Mental status improving as per daughter at bedside





2. Acute on Chronic Systolic CHF


Echo shows global hypokinesis with 20%EF. 


Venous congestion on CT, elevated BNP


Continue IV lasix BID diuresis





3. Acute Pneumonia ?aspiration


Continue Unasyn.


Speech therapy requested for swallow eval.


L Paratracheal enlarged LN on imaging


Pulm follow up on discharge.





4. Atrial fibrillation with RVR


HR in 130s today


Seen by Cardio - recommend starting Cardizem drip pending swallow eval and 

resumption of oral rate control medications.


INR 2.49, normally on Coumadin.





5. Microcytic Anemia sec to chronic blood loss


H/H 8.7/29.4 s/p 1 unit PRBCs.


MCV 76


FOBT - trace


Hx of colonoic AVMs and Hx of Hiatal Hernia with Chauncey erosions.


GI consulted.





6. ELENITA on CKD 3 sec to Hepatorenal Syndrome


Improved with IV Lasix diuresis


Creat currently at baseline. 





7. Renal Cysts/Non-obstructing Renal Stone 


Urology follow up on discharge.





8. Dementia with Behavioral Disturbance


Continue Seroquel


Family requests Psychiatry consultation with Dr. Bond





9. Hx DVT s/p IVC filter - on Coumadin. INR initially supratherapeutic, now 

therapeutic.





10. HTN, HLD, CAD s/p PCI - Continue Imdur, BB, Zocor.








DVT Px - INR therapeutic, on Coumadin


DNR/DNI

## 2020-02-11 NOTE — PN
Progress Note (short form)





- Note


Progress Note: 


NAD on NC O2. 


Remains in Rapid AFib.  Agitated overnight. 


No acute events overnight. 


 Intake & Output











 02/08/20 02/09/20 02/10/20 02/11/20





 23:59 23:59 23:59 23:59


 


Intake Total  640 168 82


 


Output Total 900 460  


 


Balance -900 180 168 82


 


Weight 115 lb   








 Last Vital Signs











Temp Pulse Resp BP Pulse Ox


 


 97.4 F L  128 H  18   123/107 H  97 


 


 02/11/20 10:00  02/11/20 13:29  02/11/20 10:00  02/11/20 13:29  02/11/20 10:00








Active Medications





Acetaminophen (Tylenol Oral Solution -)  650 mg PO Q4H PRN


   PRN Reason: FEVER


Acetaminophen (Ofirmev Injection -)  650 mg IVPB Q4H PRN


   PRN Reason: FEVER


   Last Admin: 02/11/20 05:23 Dose:  650 mg


Atorvastatin Calcium (Lipitor -)  10 mg PO HS [facility]


   Last Admin: 02/09/20 21:14 Dose:  10 mg


Carvedilol (Coreg -)  6.25 mg PO DAILY [facility]


   Last Admin: 02/10/20 10:24 Dose:  6.25 mg


Ferrous Sulfate (Feosol -)  325 mg PO DAILY [facility]


   Last Admin: 02/10/20 10:22 Dose:  325 mg


Furosemide (Lasix Injection -)  40 mg IVPUSH BIDLASIX [facility]


   Last Admin: 02/11/20 05:55 Dose:  40 mg


Haloperidol (Haldol Injection (Fast Acting) -)  1 mg IM TID [facility]


   Last Admin: 02/11/20 05:55 Dose:  Not Given


Ampicillin Sodium/Sulbactam (Sodium 3 gm/ Sodium Chloride)  100 mls @ 200 mls/

hr IVPB DAILY [facility]


   Last Admin: 02/11/20 10:25 Dose:  200 mls/hr


Dextrose (D5w -)  1,000 mls @ 42 mls/hr IV ASDIR [facility]


   Last Admin: 02/10/20 15:15 Dose:  42 mls/hr


Diltiazem HCl 125 mg/ Sodium (Chloride)  125 mls @ 5 mls/hr IVPB TITR [facility]; 

Protocol


   Last Admin: 02/11/20 13:29 Dose:  5 mg/hr, 5 mls/hr


Isosorbide Mononitrate (Imdur -)  30 mg PO DAILY [facility]


   Last Admin: 02/10/20 10:24 Dose:  30 mg


Metoprolol Succinate (Toprol Xl -)  25 mg PO DAILY [facility]


   Last Admin: 02/11/20 12:36 Dose:  25 mg


Quetiapine Fumarate (Seroquel -)  50 mg PO TID [facility]


   Last Admin: 02/10/20 18:16 Dose:  Not Given

















Constitutional: Yes: No Distress


Eyes: Yes: EOM Intact


HENT: Yes: Normocephalic


Neck: Yes: Trachea Midline


Cardiovascular: Yes: Pulse Irregular, S1, S2


Respiratory: Yes: Diminished


Gastrointestinal: Yes: Soft


Edema: No


Labs: 


 Laboratory Results - last 24 hr











  02/10/20 02/10/20 02/11/20





  17:00 21:30 05:41


 


WBC   


 


RBC   


 


Hgb   


 


Hct   


 


MCV   


 


MCH   


 


MCHC   


 


RDW   


 


Plt Count   


 


MPV   


 


Absolute Neuts (auto)   


 


Neutrophils %   


 


Neutrophils % (Manual)   


 


Band Neutrophils %   


 


Lymphocytes %   


 


Lymphocytes % (Manual)   


 


Monocytes %   


 


Monocytes % (Manual)   


 


Eosinophils %   


 


Eosinophils % (Manual)   


 


Basophils %   


 


Basophils % (Manual)   


 


Myelocytes % (Man)   


 


Promyelocytes % (Man)   


 


Blast Cells % (Manual)   


 


Nucleated RBC %   


 


Metamyelocytes   


 


Hypochromia   


 


Platelet Estimate   


 


Polychromasia   


 


Poikilocytosis   


 


Anisocytosis   


 


Microcytosis   


 


Macrocytosis   


 


Stomatocytes   


 


PT with INR   


 


INR   


 


Sodium   


 


Potassium   


 


Chloride   


 


Carbon Dioxide   


 


Anion Gap   


 


BUN   


 


Creatinine   


 


Est GFR (CKD-EPI)AfAm   


 


Est GFR (CKD-EPI)NonAf   


 


POC Glucometer   160  120


 


Random Glucose   


 


Calcium   


 


Total Bilirubin   


 


AST   


 


ALT   


 


Alkaline Phosphatase   


 


Total Protein   


 


Albumin   


 


Stool Occult Blood  Trace  














  02/11/20 02/11/20 02/11/20





  06:46 06:46 06:46


 


WBC  8.7  


 


RBC  3.60  


 


Hgb  8.7 L  


 


Hct  27.4 L  


 


MCV  76.1 L  


 


MCH  24.2 L  


 


MCHC  31.8 L  


 


RDW  18.5 H  


 


Plt Count  249  


 


MPV  7.4 L  


 


Absolute Neuts (auto)  7.2  


 


Neutrophils %  83.3 H  


 


Neutrophils % (Manual)  88.8 H  


 


Band Neutrophils %  0.0  


 


Lymphocytes %  6.4 L  


 


Lymphocytes % (Manual)  4.1 L  


 


Monocytes %  8.4  


 


Monocytes % (Manual)  3 L  


 


Eosinophils %  1.5  


 


Eosinophils % (Manual)  2.0  


 


Basophils %  0.4  


 


Basophils % (Manual)  0.0  


 


Myelocytes % (Man)  1  


 


Promyelocytes % (Man)  0  


 


Blast Cells % (Manual)  0  


 


Nucleated RBC %  1 H  


 


Metamyelocytes  1  


 


Hypochromia  1+  


 


Platelet Estimate  Normal  


 


Polychromasia  1+  


 


Poikilocytosis  1+  


 


Anisocytosis  1+  


 


Microcytosis  1+  


 


Macrocytosis  0  


 


Stomatocytes  1+  


 


PT with INR    29.70 H


 


INR    2.49 H


 


Sodium   144 


 


Potassium   3.5 


 


Chloride   107 


 


Carbon Dioxide   28 


 


Anion Gap   9 


 


BUN   38.8 H 


 


Creatinine   1.8 H 


 


Est GFR (CKD-EPI)AfAm   28.62 


 


Est GFR (CKD-EPI)NonAf   24.69 


 


POC Glucometer   


 


Random Glucose   110 H 


 


Calcium   8.6 


 


Total Bilirubin   0.6 


 


AST   11 L 


 


ALT   12 L 


 


Alkaline Phosphatase   89 


 


Total Protein   6.1 L 


 


Albumin   2.6 L 


 


Stool Occult Blood   











Problem List





- Problems


(1) CAD (coronary artery disease)


Code(s): I25.10 - ATHSCL HEART DISEASE OF NATIVE CORONARY ARTERY W/O ANG PCTRS 

  


Qualifiers: 


   Coronary Disease-Associated Artery/Lesion type: native artery   Native vs. 

transplanted heart: native heart   Associated angina: without angina   

Qualified Code(s): I25.10 - Atherosclerotic heart disease of native coronary 

artery without angina pectoris   





(2) CHF exacerbation


Code(s): I50.9 - HEART FAILURE, UNSPECIFIED   


Qualifiers: 


   Heart failure type: unspecified 





(3) Chronic anemia


Code(s): D64.9 - ANEMIA, UNSPECIFIED   





(4) Chronic kidney disease (CKD), stage III (moderate)


Code(s): N18.3 - CHRONIC KIDNEY DISEASE, STAGE 3 (MODERATE)   





(5) H/O: CVA (cerebrovascular accident)


Code(s): Z86.73 - PRSNL HX OF TIA (TIA), AND CEREB INFRC W/O RESID DEFICITS   





(6) Angiodysplasia of cecum


Code(s): K55.20 - ANGIODYSPLASIA OF COLON WITHOUT HEMORRHAGE   





(7) Atrial fibrillation


Code(s): I48.91 - UNSPECIFIED ATRIAL FIBRILLATION   


Qualifiers: 


   Atrial fibrillation type: permanent 





(8) Dementia


Code(s): F03.90 - UNSPECIFIED DEMENTIA WITHOUT BEHAVIORAL DISTURBANCE   


Qualifiers: 


   Dementia type: Parkinson's disease   Dementia behavioral disturbance: 

without behavioral disturbance   Qualified Code(s): G20 - Parkinson's disease; 

F02.80 - Dementia in other diseases classified elsewhere without behavioral 

disturbance; F02.80 - Dementia in other diseases classified elsewhere without 

behavioral disturbance; F02.80 - Dementia in other diseases classified 

elsewhere without behavioral disturbance   





(9) Pacemaker


Code(s): Z95.0 - PRESENCE OF CARDIAC PACEMAKER   





Assessment/Plan


SUPPLEMENTAL O2 AS TOLERATED 


NIPPV SUPPORT 


RATE CONTROL 


MONITOR HGB


ASPIRATION PRECAUTIONS 


LASIX IVP 


ABX 





Dr Merritt

## 2020-02-11 NOTE — PN
Physical Exam: 


SUBJECTIVE: Patient seen and examined. Minimally interactive during medical 

interview. No complaints. Tachy irregular (130's) with Dr Martínez who placed on 

cardizem drip. 








OBJECTIVE:





 Vital Signs











 Period  Temp  Pulse  Resp  BP Sys/Card  Pulse Ox


 


 Last 24 Hr  97.3 F-97.9 F    16-22  /  93-97











HEENT - Atrauamatic, normocephalic.


Heart - S1, S2, soft SM


Lungs - decreased air entry bibasally


Abdomen - Soft, non-tender. Bowel Sounds normal.


Extremities - no edema, no calf tenderness. GENERAL: The patient is awake, alert

, and fully oriented, in no acute distress.

















 Laboratory Results - last 24 hr











  02/10/20 02/10/20 02/11/20





  17:00 21:30 05:41


 


WBC   


 


RBC   


 


Hgb   


 


Hct   


 


MCV   


 


MCH   


 


MCHC   


 


RDW   


 


Plt Count   


 


MPV   


 


Absolute Neuts (auto)   


 


Neutrophils %   


 


Neutrophils % (Manual)   


 


Band Neutrophils %   


 


Lymphocytes %   


 


Lymphocytes % (Manual)   


 


Monocytes %   


 


Monocytes % (Manual)   


 


Eosinophils %   


 


Eosinophils % (Manual)   


 


Basophils %   


 


Basophils % (Manual)   


 


Myelocytes % (Man)   


 


Promyelocytes % (Man)   


 


Blast Cells % (Manual)   


 


Nucleated RBC %   


 


Metamyelocytes   


 


Hypochromia   


 


Platelet Estimate   


 


Polychromasia   


 


Poikilocytosis   


 


Anisocytosis   


 


Microcytosis   


 


Macrocytosis   


 


Stomatocytes   


 


PT with INR   


 


INR   


 


Sodium   


 


Potassium   


 


Chloride   


 


Carbon Dioxide   


 


Anion Gap   


 


BUN   


 


Creatinine   


 


Est GFR (CKD-EPI)AfAm   


 


Est GFR (CKD-EPI)NonAf   


 


POC Glucometer   160  120


 


Random Glucose   


 


Calcium   


 


Total Bilirubin   


 


AST   


 


ALT   


 


Alkaline Phosphatase   


 


Total Protein   


 


Albumin   


 


Stool Occult Blood  Trace  














  02/11/20 02/11/20 02/11/20





  06:46 06:46 06:46


 


WBC  8.7  


 


RBC  3.60  


 


Hgb  8.7 L  


 


Hct  27.4 L  


 


MCV  76.1 L  


 


MCH  24.2 L  


 


MCHC  31.8 L  


 


RDW  18.5 H  


 


Plt Count  249  


 


MPV  7.4 L  


 


Absolute Neuts (auto)  7.2  


 


Neutrophils %  83.3 H  


 


Neutrophils % (Manual)  88.8 H  


 


Band Neutrophils %  0.0  


 


Lymphocytes %  6.4 L  


 


Lymphocytes % (Manual)  4.1 L  


 


Monocytes %  8.4  


 


Monocytes % (Manual)  3 L  


 


Eosinophils %  1.5  


 


Eosinophils % (Manual)  2.0  


 


Basophils %  0.4  


 


Basophils % (Manual)  0.0  


 


Myelocytes % (Man)  1  


 


Promyelocytes % (Man)  0  


 


Blast Cells % (Manual)  0  


 


Nucleated RBC %  1 H  


 


Metamyelocytes  1  


 


Hypochromia  1+  


 


Platelet Estimate  Normal  


 


Polychromasia  1+  


 


Poikilocytosis  1+  


 


Anisocytosis  1+  


 


Microcytosis  1+  


 


Macrocytosis  0  


 


Stomatocytes  1+  


 


PT with INR    29.70 H


 


INR    2.49 H


 


Sodium   144 


 


Potassium   3.5 


 


Chloride   107 


 


Carbon Dioxide   28 


 


Anion Gap   9 


 


BUN   38.8 H 


 


Creatinine   1.8 H 


 


Est GFR (CKD-EPI)AfAm   28.62 


 


Est GFR (CKD-EPI)NonAf   24.69 


 


POC Glucometer   


 


Random Glucose   110 H 


 


Calcium   8.6 


 


Total Bilirubin   0.6 


 


AST   11 L 


 


ALT   12 L 


 


Alkaline Phosphatase   89 


 


Total Protein   6.1 L 


 


Albumin   2.6 L 


 


Stool Occult Blood   








Active Medications











Generic Name Dose Route Start Last Admin





  Trade Name Freq  PRN Reason Stop Dose Admin


 


Acetaminophen  650 mg  02/10/20 15:03  





  Tylenol Oral Solution -  PO   





  Q4H PRN   





  FEVER   





     





     





     


 


Acetaminophen  650 mg  02/10/20 17:29  02/11/20 05:23





  Ofirmev Injection -  IVPB   650 mg





  Q4H PRN   Administration





  FEVER   





     





     





     


 


Atorvastatin Calcium  10 mg  02/08/20 22:00  02/09/20 21:14





  Lipitor -  PO   10 mg





  [facility]   Administration





     





     





     





     


 


Carvedilol  6.25 mg  02/08/20 10:00  02/10/20 10:24





  Coreg -  PO   6.25 mg





  DAILY [facility]   Administration





     





     





     





     


 


Ferrous Sulfate  325 mg  02/09/20 11:15  02/10/20 10:22





  Feosol -  PO   325 mg





  DAILY [facility]   Administration





     





     





     





     


 


Furosemide  40 mg  02/09/20 14:00  02/11/20 15:05





  Lasix Injection -  IVPUSH   40 mg





  BIDLASIX [facility]   Administration





     





     





     





     


 


Haloperidol  1 mg  02/10/20 22:00  02/11/20 15:11





  Haldol Injection (Fast Acting) -  IM   Not Given





  TID [facility]   





     





     





     





     


 


Ampicillin Sodium/Sulbactam  100 mls @ 200 mls/hr  02/10/20 10:00  02/11/20 10:

25





  Sodium 3 gm/ Sodium Chloride  IVPB   200 mls/hr





  DAILY [facility]   Administration





     





     





     





     


 


Dextrose  1,000 mls @ 42 mls/hr  02/10/20 15:15  02/11/20 15:21





  D5w -  IV   42 mls/hr





  ASDIR [facility]   Administration





     





     





     





     


 


Diltiazem HCl 125 mg/ Sodium  125 mls @ 5 mls/hr  02/11/20 12:30  02/11/20 13:29





  Chloride  IVPB   5 mg/hr





  TITR [facility]   5 mls/hr





     Administration





     





  Protocol   





  5 MG/HR   


 


Isosorbide Mononitrate  30 mg  02/08/20 10:00  02/10/20 10:24





  Imdur -  PO   30 mg





  DAILY [facility]   Administration





     





     





     





     


 


Metoprolol Succinate  25 mg  02/11/20 12:30  02/11/20 12:36





  Toprol Xl -  PO   25 mg





  DAILY [facility]   Administration





     





     





     





     


 


Quetiapine Fumarate  50 mg  02/10/20 15:15  02/10/20 18:16





  Seroquel -  PO   Not Given





  TID [facility]   





     





     





     





     











ASSESSMENT/PLAN:





89 y/o F, w/ pmh of multiple Co-morbidities including H/O DVT and Afib, GI 

bleed 2/2 to angiodysplasia, Rt LE DVT, PE and Coumadin 1 mg weekdays and off 

weekends, H/O Alzheimer's, CVA,  Parkinson's,  CAD (angioplasty 2005),  CKD, CHF

, presented to the ED c/o of 1 week hx of changes in mentation, altered 

behavior and decreased PO intake. 





#Acute metabolic encephalopathy


- 2/2 worsening Dementia


-workup negative so far. lytes wnl, ct head neg. 


-Per family at bedside, pt appears at baseline today


- hx of embolic stroke during cardiac cath with Rt sided residual deficits. 


- CT head negative





#A-Fib w/ RVR


- Currently adequately rate controlled, pacemaker working properly per cardio


- Dr. Parrish interrogated the pacemaker and deemed it normally functioning. 

Will monitor on coumadin given subtherapeutic INR given HASBLED score of 6 

giving >10%risk of bleed. Pt has INR 2.41 today


-monitor on tele


- started on dilt drip as pt went into RVR 





#Acute PNA/Left Pleural effusion 


-?Aspiration


-likely chronic


-CXR showed left lung infiltrates vs atelectasis, left pleural effusion


- unasyn


- left paratracheal LN on imaging





#Anemia


-IMPROVED today. Hb of 8.3 today.


- stool occult trace blood but pt on iron


- As per family, she runs in high 8 Hb


- continue coumadin as INR now therapeutic


- hx of left sided lung CA s/p pneumonectomy/hx of cecum bleed/vascular 

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 in stomach due to 

large hiatal hernia, esophageal stricture as well per GI


- GI recommending protonix drip empirically, pt started on 40 BID.  





#ELENITA on CKD 3 2/2 hepatorenal syndrome


- improved w Lasix 40 BID


-Cre and BUN elevated from baseline. Prerenal ratio


-Renal US : nonobstructing L 6mm stone, mild R renal pelvis dilation, b/l cysts


-Crt has been improving on 40 BID lasix


-avoid nephrotoxins where possible





#CHFrEF now in acute exacerbation


-echo shows global hypokinesis with 20%EF. 


- c/w Lasix 40 BID


- entresto ? given renal ftn 


- AICD given EF of 20% 





#Renal Cysts/Non-obstructing Renal Stone 


Urology follow up on discharge.





#Dementia with Behavioral Disturbance


Continue Seroquel


Family requests Psychiatry consultation with Dr. Bond so consulted





#Hx DVT s/p IVC filter 


- on Coumadin. INR initially supratherapeutic, now therapeutic.





#HTN, HLD, CAD s/p PCI 


- Continue Imdur, BB, Zocor.





Dispo: DNR/DNI 


Considering home hospice care given overall constellation of symptoms and <6 

months likely due to her advanced dementia, severe chf, etc. 





DVT Px - INR therapeutic, on Coumadin


DNR/DNI








Visit type





- Emergency Visit


Emergency Visit: Yes


ED Registration Date: 02/07/20


Care time: The patient presented to the Emergency Department on the above date 

and was hospitalized for further evaluation of their emergent condition.





- New Patient


This patient is new to me today: No





- Critical Care


Critical Care patient: No





- Discharge Referral


Referred to [facility] No





ATTENDING PHYSICIAN STATEMENT





I saw and evaluated the patient.


I reviewed the resident's note and discussed the case with the resident.


I agree with the resident's findings and plan as documented.








SUBJECTIVE:








OBJECTIVE:








ASSESSMENT AND PLAN:

## 2020-02-11 NOTE — CON.GI
Consult


Consult Specialty:: Gastroenterology


Referred by:: Dr Zachery German


Reason for Consultation:: occult GI bleeding





- History of Present Illness


Chief Complaint: altered mental status


History of Present Illness: 





88F is brought in by family for increising confusion and found to have a UTI. 

She is again found to have occult bleeding. She has been seen on numerous 

previous consultations here the family has opted against any endoscopic 

evaluations or feeding tube placement. Her daughter confirms the same. Please 

see my 1/27/18 consultation and the subsequent consultation by Dr Jansen.





- Past Medical History


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 

sided residual), Dementia, Parkinson's


Cardio/Vascular: Yes: Aneurysm (AAA), CAD (angioplasty 2005), Deep Vein 

Thrombosis, HTN, Other (PPM)


Pulmonary: Yes: Cancer (left sided lung cancer resected 1999 [facility]), Pulmonary 

Embolus


Gastrointestinal: Yes: Constipation, Diverticulosis, GI Bleed, Hemorrhoids (

banded 2008), Hiatal Hernia (with Chauncey erosions 2014), Other (angiodysplasia 

of cecum, serrated tx colon adenoma removed 2014)


Infectious Disease: Yes: Other (recurring UTIs)


Psych: Yes: Anxiety





- Past Surgical History


Past Surgical History: Yes: Cholecystectomy, Colonoscopy, Hysterectomy, 

Permanent Pacemaker, Thoracotomy (left cancer thoracotomy 1999 [facility]), Upper 

Endoscopy





- Alcohol/Substance Use


Hx Alcohol Use: No


History of Substance Use: reports: None





- Smoking History


Smoking history: Never smoked


Have you smoked in the past 12 months: No


Aproximately how many cigarettes per day: 0


If you are a former smoker, when did you quit?: 40 YEARS AGO





- Social History


Usual Living Arrangement: With Child


ADL: Family Assistance


Occupation: housewife


Place of Birth: [place]


History of Recent Travel: No





Home Medications





- Allergies


Allergies/Adverse Reactions: 


 Allergies











Allergy/AdvReac Type Severity Reaction Status Date / Time


 


oats Allergy Intermediate  Verified 02/07/20 17:11


 


Penicillins Allergy Mild  Verified 02/07/20 17:11


 


Shellfish Allergy Mild  Verified 02/07/20 17:11


 


aspirin AdvReac   Verified 02/09/20 17:17


 


lorazepam [From Ativan] AdvReac   Verified 02/09/20 17:17


 


prednisone AdvReac   Verified 02/09/20 17:17














- Home Medications


Home Medications: 


Ambulatory Orders





Simvastatin [Zocor -] 20 mg PO HS 05/24/14 


Diltiazem Cd [Cardizem Cd -] 180 mg PO DAILY 01/08/16 


Gabapentin [Neurontin] 100 mg PO BID 01/08/16 


Quetiapine Fumarate [Seroquel -] 50 mg PO BID  tablet 02/02/16 


Furosemide [Lasix] 20 mg PO ASDIR 05/22/18 


Isosorbide Mononitrate [Imdur -] 30 mg PO DAILY 05/22/18 


Warfarin Na [Coumadin -] 1 mg PO HS 05/22/18 


Carvedilol [Coreg -] 6.25 mg PO DAILY 02/08/20 











Family Medical History


Family Hx Cancer: Mother (liver cancer), Sister (unknown type), Brother (

unknown type)


Family Hx Respiratory Disorders: Mother (COPD)


Family Hx Nuerologic Problems: Father (CVA)





Review of Systems


Unable to obtain ROS, reason: dementia





Physical Exam-GI


Vital Signs: 


 Vital Signs











Temperature  97.9 F   02/11/20 14:00


 


Pulse Rate  108 H  02/11/20 14:00


 


Respiratory Rate  20 02/11/20 14:00


 


Blood Pressure  102/62   02/11/20 14:00


 


O2 Sat by Pulse Oximetry (%)  97   02/11/20 10:00








CBC,CMP











WBC  8.7 K/mm3 (4.0-10.0)   02/11/20  06:46    


 


RBC  3.60 M/mm3 (3.60-5.2)   02/11/20  06:46    


 


Hgb  8.7 GM/dL (10.7-15.3)  L  02/11/20  06:46    


 


Hct  27.4 % (32.4-45.2)  L  02/11/20  06:46    


 


MCV  76.1 fl (80-96)  L  02/11/20  06:46    


 


MCH  24.2 pg (25.7-33.7)  L  02/11/20  06:46    


 


MCHC  31.8 g/dl (32.0-36.0)  L  02/11/20  06:46    


 


RDW  18.5 % (11.6-15.6)  H  02/11/20  06:46    


 


Plt Count  249 K/MM3 (134-434)   02/11/20  06:46    


 


MPV  7.4 fl (7.5-11.1)  L  02/11/20  06:46    


 


Absolute Neuts (auto)  7.2 K/mm3 (1.5-8.0)   02/11/20  06:46    


 


Neutrophils %  83.3 % (42.8-82.8)  H  02/11/20  06:46    


 


Neutrophils % (Manual)  88.8 % (42.8-82.8)  H  02/11/20  06:46    


 


Band Neutrophils %  0.0 %  02/11/20  06:46    


 


Lymphocytes %  6.4 % (8-40)  L  02/11/20  06:46    


 


Lymphocytes % (Manual)  4.1 % (8-40)  L  02/11/20  06:46    


 


Monocytes %  8.4 % (3.8-10.2)   02/11/20  06:46    


 


Monocytes % (Manual)  3 % (3.8-10.2)  L  02/11/20  06:46    


 


Eosinophils %  1.5 % (0-4.5)   02/11/20  06:46    


 


Eosinophils % (Manual)  2.0 % (0-4.5)   02/11/20  06:46    


 


Basophils %  0.4 % (0-2.0)   02/11/20  06:46    


 


Basophils % (Manual)  0.0 % (0-2.0)   02/11/20  06:46    


 


Myelocytes % (Man)  1 % (0-2)   02/11/20  06:46    


 


Promyelocytes % (Man)  0 % (0-2)   02/11/20  06:46    


 


Blast Cells % (Manual)  0 % (0-0)   02/11/20  06:46    


 


Nucleated RBC %  1 % (0-0)  H  02/11/20  06:46    


 


Metamyelocytes  1 % (0-2)   02/11/20  06:46    


 


Hypochromia  1+   02/11/20  06:46    


 


Platelet Estimate  Normal   02/11/20  06:46    


 


Polychromasia  1+   02/11/20  06:46    


 


Poikilocytosis  1+   02/11/20  06:46    


 


Anisocytosis  1+   02/11/20  06:46    


 


Microcytosis  1+   02/11/20  06:46    


 


Macrocytosis  0   02/11/20  06:46    


 


Stomatocytes  1+   02/11/20  06:46    


 


Retic Count  2.03 % (0.5-1.5)  H D 02/08/20  08:24    


 


Sodium  144 mmol/L (136-145)   02/11/20  06:46    


 


Potassium  3.5 mmol/L (3.5-5.1)   02/11/20  06:46    


 


Chloride  107 mmol/L ()   02/11/20  06:46    


 


Carbon Dioxide  28 mmol/L (21-32)   02/11/20  06:46    


 


Anion Gap  9 MMOL/L (8-16)   02/11/20  06:46    


 


BUN  38.8 mg/dL (7-18)  H  02/11/20  06:46    


 


Creatinine  1.8 mg/dL (0.55-1.3)  H  02/11/20  06:46    


 


Est GFR (CKD-EPI)AfAm  28.62   02/11/20  06:46    


 


Est GFR (CKD-EPI)NonAf  24.69   02/11/20  06:46    


 


POC Glucometer  105 UNITS ()   02/11/20  17:10    


 


Random Glucose  110 mg/dL ()  H  02/11/20  06:46    


 


Calcium  8.6 mg/dL (8.5-10.1)   02/11/20  06:46    


 


Phosphorus  4.4 mg/dL (2.5-4.9)   02/08/20  08:24    


 


Magnesium  2.4 mg/dL (1.8-2.4)   02/08/20  08:24    


 


Iron  18 ug/dL ()  L  02/09/20  09:42    


 


TIBC  239 ug/dL (250-450)  L  02/09/20  09:42    


 


Iron Saturation  7 % (17.5-39)  L  02/09/20  09:42    


 


Unsaturated IBC  221 ug/dL (200-275)   02/09/20  09:42    


 


Ferritin  37.1 ng/ml (8-388)   02/09/20  09:42    


 


Total Bilirubin  0.6 mg/dL (0.2-1)   02/11/20  06:46    


 


AST  11 U/L (15-37)  L  02/11/20  06:46    


 


ALT  12 U/L (13-61)  L  02/11/20  06:46    


 


Alkaline Phosphatase  89 U/L ()   02/11/20  06:46    


 


Troponin I  < 0.02 ng/ml (0.00-0.05)   02/08/20  08:24    


 


B-Natriuretic Peptide  13166.2 pg/ml (5-450)  H  02/08/20  08:24    


 


Total Protein  6.1 g/dl (6.4-8.2)  L  02/11/20  06:46    


 


Albumin  2.6 g/dl (3.4-5.0)  L  02/11/20  06:46    








 Current Medications











Generic Name Dose Route Start Last Admin





  Trade Name Freq  PRN Reason Stop Dose Admin


 


Acetaminophen  650 mg  02/10/20 15:03  





  Tylenol Oral Solution -  PO   





  Q4H PRN   





  FEVER   





     





     





     


 


Acetaminophen  650 mg  02/10/20 17:29  02/11/20 05:23





  Ofirmev Injection -  IVPB   650 mg





  Q4H PRN   Administration





  FEVER   





     





     





     


 


Atorvastatin Calcium  10 mg  02/08/20 22:00  02/09/20 21:14





  Lipitor -  PO   10 mg





  HS HENRY   Administration





     





     





     





     


 


Carvedilol  6.25 mg  02/08/20 10:00  02/10/20 10:24





  Coreg -  PO   6.25 mg





  DAILY HENRY   Administration





     





     





     





     


 


Ferrous Sulfate  325 mg  02/09/20 11:15  02/10/20 10:22





  Feosol -  PO   325 mg





  DAILY HENRY   Administration





     





     





     





     


 


Furosemide  40 mg  02/09/20 14:00  02/11/20 15:05





  Lasix Injection -  IVPUSH   40 mg





  BIDLASIX HENRY   Administration





     





     





     





     


 


Haloperidol  1 mg  02/10/20 22:00  02/11/20 15:11





  Haldol Injection (Fast Acting) -  IM   Not Given





  TID HENRY   





     





     





     





     


 


Ampicillin Sodium/Sulbactam  100 mls @ 200 mls/hr  02/10/20 10:00  02/11/20 10:

25





  Sodium 3 gm/ Sodium Chloride  IVPB   200 mls/hr





  DAILY HENRY   Administration





     





     





     





     


 


Dextrose  1,000 mls @ 42 mls/hr  02/10/20 15:15  02/11/20 15:21





  D5w -  IV   42 mls/hr





  ASDIR HENRY   Administration





     





     





     





     


 


Diltiazem HCl 125 mg/ Sodium  125 mls @ 5 mls/hr  02/11/20 12:30  02/11/20 13:29





  Chloride  IVPB   5 mg/hr





  TITR HENRY   5 mls/hr





     Administration





     





  Protocol   





  5 MG/HR   


 


Isosorbide Mononitrate  30 mg  02/08/20 10:00  02/10/20 10:24





  Imdur -  PO   30 mg





  DAILY HENRY   Administration





     





     





     





     


 


Metoprolol Succinate  25 mg  02/11/20 12:30  02/11/20 12:36





  Toprol Xl -  PO   25 mg





  DAILY HENRY   Administration





     





     





     





     


 


Quetiapine Fumarate  50 mg  02/10/20 15:15  02/10/20 18:16





  Seroquel -  PO   Not Given





  TID HENRY   





     





     





     





     











Constitutional: Yes: Calm, Other (confused but responsive)


Eyes: Yes: Conjunctiva Clear


HENT: Yes: Atraumatic


Neck: Yes: Trachea Midline


Cardiovascular: Yes: Regular Rate and Rhythm, Murmur (2/6 NASH at [facility])


Respiratory: Yes: CTA Bilaterally


Gastrointestinal Inspection: Yes: Scars (healed lap choly and suprapubic 

incisions)


...Auscultate: Yes: Normoactive Bowel Sounds


...Palpate: Yes: Soft, Other


...Rectal Exam: Yes: Deferred


Labs: 


 CBC, BMP





 02/11/20 06:46 





 02/11/20 06:46 





 INR, PTT











INR  2.49  (0.83-1.09)  H  02/11/20  06:46    








 Laboratory Tests











  02/22/11 02/23/12 05/24/14





  02:10 06:40 12:02


 


Hgb  13.9  D  11.4  6.4 L* D














  03/12/15 02/06/16 01/03/17





  18:55 06:00 20:27


 


Hgb  11.0  D  8.8 L  5.9 L* D














  01/04/17 01/05/17 01/06/17





  15:00 06:00 06:00


 


Hgb  7.5 L D  8.6 L D  10.1 L D














  01/27/18 01/29/18 02/07/20





  06:00 07:45 18:00


 


Hgb  6.9 L*  9.3 L  8.2 L














  02/09/20 02/11/20





  09:42 06:46


 


Hgb  7.0 L  8.7 L














Problem List





- Problems


(1) Iron deficiency anemia due to chronic blood loss


Code(s): D50.0 - IRON DEFICIENCY ANEMIA SECONDARY TO BLOOD LOSS (CHRONIC)   





(2) Angiodysplasia of cecum


Code(s): K55.20 - ANGIODYSPLASIA OF COLON WITHOUT HEMORRHAGE   





(3) Chauncey lesion, chronic


Code(s): K25.7 - CHRONIC GASTRIC ULCER WITHOUT HEMORRHAGE OR PERFORATION   





(4) Serrated adenoma of colon


Code(s): D12.6 - BENIGN NEOPLASM OF COLON, UNSPECIFIED   





(5) Diverticula of colon


Code(s): K57.30 - DVRTCLOS OF LG INT W/O PERFORATION OR ABSCESS W/O BLEEDING   





(6) Constipation by delayed colonic transit


Code(s): K59.01 - SLOW TRANSIT CONSTIPATION   





(7) GI bleed


Code(s): K92.2 - GASTROINTESTINAL HEMORRHAGE, UNSPECIFIED   


Qualifiers: 


   GI bleed type/associated pathology: angiodysplasia of stomach and duodenum   

Qualified Code(s): K31.811 - Angiodysplasia of stomach and duodenum with 

bleeding   





(8) CKD (chronic kidney disease)


Code(s): N18.9 - CHRONIC KIDNEY DISEASE, UNSPECIFIED   





(9) Afib


Code(s): I48.91 - UNSPECIFIED ATRIAL FIBRILLATION   


Qualifiers: 


   Atrial fibrillation type: chronic 





(10) Chronic GI bleeding


Code(s): K92.2 - GASTROINTESTINAL HEMORRHAGE, UNSPECIFIED   





(11) DVT (deep venous thrombosis)


Code(s): I82.409 - ACUTE EMBOLISM AND THOMBOS UNSP DEEP VN UNSP LOWER EXTREMITY

   


Qualifiers: 


   DVT location: lower extremity   Affected thrombotic vein of extremity: 

popliteal   Chronicity: acute   Laterality: right   Qualified Code(s): I82.431 

- Acute embolism and thrombosis of right popliteal vein   





(12) H/O: CVA (cerebrovascular accident)


Code(s): Z86.73 - PRSNL HX OF TIA (TIA), AND CEREB INFRC W/O RESID DEFICITS   





(13) Anemia


Code(s): D64.9 - ANEMIA, UNSPECIFIED   


Qualifiers: 


   Other causes of anemia: chronic disease, other 





(14) Dementia


Code(s): F03.90 - UNSPECIFIED DEMENTIA WITHOUT BEHAVIORAL DISTURBANCE   


Qualifiers: 


   Dementia type: Parkinson's disease   Dementia behavioral disturbance: 

without behavioral disturbance   Qualified Code(s): G20 - Parkinson's disease; 

F02.80 - Dementia in other diseases classified elsewhere without behavioral 

disturbance   





(15) Hiatal hernia with GERD


Code(s): K21.9 - GASTRO-ESOPHAGEAL REFLUX DISEASE WITHOUT ESOPHAGITIS; K44.9 - 

DIAPHRAGMATIC HERNIA WITHOUT OBSTRUCTION OR GANGRENE   





(16) Stricture esophagus


Code(s): K22.2 - ESOPHAGEAL OBSTRUCTION   





(17) Systolic dysfunction without heart failure


Code(s): I51.9 - HEART DISEASE, UNSPECIFIED   





(18) Deficient knowledge of percutaneous coronary intervention (PCI) and 

stenting


Code(s): FJN4476 -    





Assessment/Plan





Assessment: 


- Chronic GI blood loss due to an assortment of possible etiologies that 

include intestinal vascular ectasias, a large hiatal hernia with GERD and prone 

to Chauncey ulceration among others


- Esophageal stricture - fortunately no dysphagia reported 


- Personal h/ o serrated adenoma


- Diverticulosis 





Plan:


-- Family declines endoscopic GI tract evaluations, polyp surveillance or G 

tube insertion


-- Empiric PPI drip for possible Chauncey ulcers, gastric or duodenal ulcers


-- Check iron level and give Venofer is needed


-- Miralax for constipation

## 2020-02-11 NOTE — PN
Progress Note, Physician


History of Present Illness: 





Pt seen and examined at bedside. She is more awake today. She denies shortness 

of breath. 





- Current Medication List


Current Medications: 


Active Medications





Acetaminophen (Tylenol Oral Solution -)  650 mg PO Q4H PRN


   PRN Reason: FEVER


Acetaminophen (Ofirmev Injection -)  650 mg IVPB Q4H PRN


   PRN Reason: FEVER


   Last Admin: 02/11/20 05:23 Dose:  650 mg


Atorvastatin Calcium (Lipitor -)  10 mg PO HS [facility]


   Last Admin: 02/09/20 21:14 Dose:  10 mg


Carvedilol (Coreg -)  6.25 mg PO DAILY [facility]


   Last Admin: 02/10/20 10:24 Dose:  6.25 mg


Ferrous Sulfate (Feosol -)  325 mg PO DAILY [facility]


   Last Admin: 02/10/20 10:22 Dose:  325 mg


Furosemide (Lasix Injection -)  40 mg IVPUSH BIDLASIX [facility]


   Last Admin: 02/11/20 05:55 Dose:  40 mg


Haloperidol (Haldol Injection (Fast Acting) -)  1 mg IM TID [facility]


   Last Admin: 02/11/20 05:55 Dose:  Not Given


Ampicillin Sodium/Sulbactam (Sodium 3 gm/ Sodium Chloride)  100 mls @ 200 mls/

hr IVPB DAILY [facility]


   Last Admin: 02/11/20 10:25 Dose:  200 mls/hr


Dextrose (D5w -)  1,000 mls @ 42 mls/hr IV ASDIR [facility]


   Last Admin: 02/10/20 15:15 Dose:  42 mls/hr


Diltiazem HCl 125 mg/ Sodium (Chloride)  125 mls @ 5 mls/hr IVPB TITR [facility]; 

Protocol


   Last Admin: 02/11/20 13:29 Dose:  5 mg/hr, 5 mls/hr


Isosorbide Mononitrate (Imdur -)  30 mg PO DAILY [facility]


   Last Admin: 02/10/20 10:24 Dose:  30 mg


Metoprolol Succinate (Toprol Xl -)  25 mg PO DAILY [facility]


   Last Admin: 02/11/20 12:36 Dose:  25 mg


Potassium Chloride (Potassium Chloride Oral Liquid)  20 meq PO ONCE ONE


   Stop: 02/11/20 14:04


Quetiapine Fumarate (Seroquel -)  50 mg PO TID [facility]


   Last Admin: 02/10/20 18:16 Dose:  Not Given











- Objective


Vital Signs: 


 Vital Signs











Temperature  97.4 F L  02/11/20 10:00


 


Pulse Rate  128 H  02/11/20 13:29


 


Respiratory Rate  18   02/11/20 10:00


 


Blood Pressure  123/107 H  02/11/20 13:29


 


O2 Sat by Pulse Oximetry (%)  97   02/11/20 10:00











Constitutional: Yes: Calm


Eyes: Yes: Conjunctiva Clear


HENT: Yes: Atraumatic


Cardiovascular: Yes: S1, S2


Respiratory: Yes: On Nasal O2


Gastrointestinal: Yes: Soft


Genitourinary: Yes: Incontinence


Musculoskeletal: Yes: WNL


Edema: No


Neurological: Yes: Confusion


Labs: 


 CBC, BMP





 02/11/20 06:46 





 02/11/20 06:46 





 INR, PTT











INR  2.49  (0.83-1.09)  H  02/11/20  06:46    














Problem List





- Problems


(1) CKD (chronic kidney disease)


Code(s): N18.9 - CHRONIC KIDNEY DISEASE, UNSPECIFIED   





(2) CHF exacerbation


Code(s): I50.9 - HEART FAILURE, UNSPECIFIED   


Qualifiers: 


   Heart failure type: unspecified   Qualified Code(s): I50.9 - Heart failure, 

unspecified   





Assessment/Plan


 Current Medications











Generic Name Dose Route Start Last Admin





  Trade Name Freq  PRN Reason Stop Dose Admin


 


Acetaminophen  650 mg  02/10/20 15:03  





  Tylenol Oral Solution -  PO   





  Q4H PRN   





  FEVER   





     





     





     


 


Acetaminophen  650 mg  02/10/20 17:29  02/11/20 05:23





  Ofirmev Injection -  IVPB   650 mg





  Q4H PRN   Administration





  FEVER   





     





     





     


 


Atorvastatin Calcium  10 mg  02/08/20 22:00  02/09/20 21:14





  Lipitor -  PO   10 mg





  HS HENRY   Administration





     





     





     





     


 


Carvedilol  6.25 mg  02/08/20 10:00  02/10/20 10:24





  Coreg -  PO   6.25 mg





  DAILY HENRY   Administration





     





     





     





     


 


Ferrous Sulfate  325 mg  02/09/20 11:15  02/10/20 10:22





  Feosol -  PO   325 mg





  DAILY HENRY   Administration





     





     





     





     


 


Furosemide  40 mg  02/09/20 14:00  02/11/20 05:55





  Lasix Injection -  IVPUSH   40 mg





  BIDLASIX HENRY   Administration





     





     





     





     


 


Haloperidol  1 mg  02/10/20 22:00  02/11/20 05:55





  Haldol Injection (Fast Acting) -  IM   Not Given





  TID HENRY   





     





     





     





     


 


Ampicillin Sodium/Sulbactam  100 mls @ 200 mls/hr  02/10/20 10:00  02/11/20 10:

25





  Sodium 3 gm/ Sodium Chloride  IVPB   200 mls/hr





  DAILY HENRY   Administration





     





     





     





     


 


Dextrose  1,000 mls @ 42 mls/hr  02/10/20 15:15  02/10/20 15:15





  D5w -  IV   42 mls/hr





  ASDIR HENRY   Administration





     





     





     





     


 


Diltiazem HCl 125 mg/ Sodium  125 mls @ 5 mls/hr  02/11/20 12:30  02/11/20 13:29





  Chloride  IVPB   5 mg/hr





  TITR HENRY   5 mls/hr





     Administration





     





  Protocol   





  5 MG/HR   


 


Isosorbide Mononitrate  30 mg  02/08/20 10:00  02/10/20 10:24





  Imdur -  PO   30 mg





  DAILY HENRY   Administration





     





     





     





     


 


Metoprolol Succinate  25 mg  02/11/20 12:30  02/11/20 12:36





  Toprol Xl -  PO   25 mg





  DAILY HENRY   Administration





     





     





     





     


 


Quetiapine Fumarate  50 mg  02/10/20 15:15  02/10/20 18:16





  Seroquel -  PO   Not Given





  TID [facility]   





     





     





     





     














Impression


1. ELENITA


2. UTI


3. CHF ef 17 percent


4. CVA


5. HTN


6. cva


7. parkinsons


8. a-fib


9. hypernatremia








Plan


- replace potassium


- monitor sodium


- encourage po intake


- cont lasix


- encourage po intake


- avoid nsaids


- avoid nephrotoxins

## 2020-02-12 LAB
ALBUMIN SERPL-MCNC: 2.7 G/DL (ref 3.4–5)
ALP SERPL-CCNC: 86 U/L (ref 45–117)
ALT SERPL-CCNC: 13 U/L (ref 13–61)
ANION GAP SERPL CALC-SCNC: 10 MMOL/L (ref 8–16)
ANISOCYTOSIS BLD QL: (no result)
AST SERPL-CCNC: 12 U/L (ref 15–37)
BASOPHILS # BLD: 0.4 % (ref 0–2)
BILIRUB SERPL-MCNC: 0.6 MG/DL (ref 0.2–1)
BUN SERPL-MCNC: 39.2 MG/DL (ref 7–18)
CALCIUM SERPL-MCNC: 8.6 MG/DL (ref 8.5–10.1)
CHLORIDE SERPL-SCNC: 106 MMOL/L (ref 98–107)
CO2 SERPL-SCNC: 29 MMOL/L (ref 21–32)
CREAT SERPL-MCNC: 1.6 MG/DL (ref 0.55–1.3)
DEPRECATED RDW RBC AUTO: 19.2 % (ref 11.6–15.6)
EOSINOPHIL # BLD: 2.9 % (ref 0–4.5)
GLUCOSE SERPL-MCNC: 111 MG/DL (ref 74–106)
HCT VFR BLD CALC: 29.9 % (ref 32.4–45.2)
HGB BLD-MCNC: 9.5 GM/DL (ref 10.7–15.3)
INR BLD: 2.91 (ref 0.83–1.09)
LYMPHOCYTES # BLD: 9.1 % (ref 8–40)
MACROCYTES BLD QL: 0
MAGNESIUM SERPL-MCNC: 1.8 MG/DL (ref 1.8–2.4)
MCH RBC QN AUTO: 24.3 PG (ref 25.7–33.7)
MCHC RBC AUTO-ENTMCNC: 31.6 G/DL (ref 32–36)
MCV RBC: 76.8 FL (ref 80–96)
MONOCYTES # BLD AUTO: 9.6 % (ref 3.8–10.2)
NEUTROPHILS # BLD: 78 % (ref 42.8–82.8)
PLATELET # BLD AUTO: 264 K/MM3 (ref 134–434)
PLATELET BLD QL SMEAR: NORMAL
PMV BLD: 7.2 FL (ref 7.5–11.1)
POTASSIUM SERPLBLD-SCNC: 3.3 MMOL/L (ref 3.5–5.1)
PROT SERPL-MCNC: 6.1 G/DL (ref 6.4–8.2)
PT PNL PPP: 34.7 SEC (ref 9.7–13)
RBC # BLD AUTO: 3.9 M/MM3 (ref 3.6–5.2)
SODIUM SERPL-SCNC: 145 MMOL/L (ref 136–145)
WBC # BLD AUTO: 8.6 K/MM3 (ref 4–10)

## 2020-02-12 RX ADMIN — FUROSEMIDE SCH MG: 10 INJECTION, SOLUTION INTRAVENOUS at 06:31

## 2020-02-12 RX ADMIN — FUROSEMIDE SCH MG: 10 INJECTION, SOLUTION INTRAVENOUS at 14:07

## 2020-02-12 RX ADMIN — METOPROLOL TARTRATE PRN MG: 5 INJECTION, SOLUTION INTRAVENOUS at 15:26

## 2020-02-12 RX ADMIN — SODIUM CHLORIDE SCH: 9 INJECTION, SOLUTION INTRAVENOUS at 12:46

## 2020-02-12 RX ADMIN — PANTOPRAZOLE SODIUM SCH MG: 40 INJECTION, POWDER, FOR SOLUTION INTRAVENOUS at 21:00

## 2020-02-12 RX ADMIN — HALOPERIDOL LACTATE SCH: 5 INJECTION, SOLUTION INTRAMUSCULAR at 05:24

## 2020-02-12 RX ADMIN — CARVEDILOL SCH MG: 6.25 TABLET, FILM COATED ORAL at 09:06

## 2020-02-12 RX ADMIN — DEXTROSE MONOHYDRATE SCH MLS/HR: 50 INJECTION, SOLUTION INTRAVENOUS at 15:26

## 2020-02-12 RX ADMIN — METOPROLOL TARTRATE PRN MG: 5 INJECTION, SOLUTION INTRAVENOUS at 23:59

## 2020-02-12 RX ADMIN — AMPICILLIN SODIUM AND SULBACTAM SODIUM SCH MLS/HR: 2; 1 INJECTION, POWDER, FOR SOLUTION INTRAMUSCULAR; INTRAVENOUS at 09:09

## 2020-02-12 RX ADMIN — FERROUS SULFATE TAB EC 324 MG (65 MG FE EQUIVALENT) SCH MG: 324 (65 FE) TABLET DELAYED RESPONSE at 09:06

## 2020-02-12 RX ADMIN — POLYETHYLENE GLYCOL 3350 SCH GM: 17 POWDER, FOR SOLUTION ORAL at 11:17

## 2020-02-12 RX ADMIN — PANTOPRAZOLE SODIUM SCH MG: 40 INJECTION, POWDER, FOR SOLUTION INTRAVENOUS at 09:06

## 2020-02-12 RX ADMIN — METOPROLOL TARTRATE PRN MG: 5 INJECTION, SOLUTION INTRAVENOUS at 19:22

## 2020-02-12 RX ADMIN — POLYETHYLENE GLYCOL 3350 SCH: 17 POWDER, FOR SOLUTION ORAL at 11:15

## 2020-02-12 NOTE — PN
Physical Exam: 


SUBJECTIVE: Patient seen and examined at bedside. More awake today, daughter at 

bedside stating pt eating more and feeling better. 








OBJECTIVE:





 Vital Signs











 Period  Temp  Pulse  Resp  BP Sys/Card  Pulse Ox


 


 Last 24 Hr  97.3 F-97.9 F    20-24  101-141/  











HEENT - Atrauamatic, normocephalic.


Heart - S1, S2, soft SM


Lungs - decreased air entry bibasally


Abdomen - Soft, non-tender. Bowel Sounds normal.


Extremities - no edema, no calf tenderness. GENERAL: The patient is awake, alert

, and fully oriented, in no acute distress.














 Laboratory Results - last 24 hr











  02/09/20 02/11/20 02/11/20





  13:30 17:10 23:25


 


WBC   


 


RBC   


 


Hgb   


 


Hct   


 


MCV   


 


MCH   


 


MCHC   


 


RDW   


 


Plt Count   


 


MPV   


 


Absolute Neuts (auto)   


 


Neutrophils %   


 


Neutrophils % (Manual)   


 


Band Neutrophils %   


 


Lymphocytes %   


 


Lymphocytes % (Manual)   


 


Monocytes %   


 


Monocytes % (Manual)   


 


Eosinophils %   


 


Eosinophils % (Manual)   


 


Basophils %   


 


Basophils % (Manual)   


 


Myelocytes % (Man)   


 


Promyelocytes % (Man)   


 


Blast Cells % (Manual)   


 


Nucleated RBC %   


 


Metamyelocytes   


 


Hypochromia   


 


Platelet Estimate   


 


Polychromasia   


 


Poikilocytosis   


 


Anisocytosis   


 


Microcytosis   


 


Macrocytosis   


 


PT with INR   


 


INR   


 


Sodium   


 


Potassium   


 


Chloride   


 


Carbon Dioxide   


 


Anion Gap   


 


BUN   


 


Creatinine   


 


Est GFR (CKD-EPI)AfAm   


 


Est GFR (CKD-EPI)NonAf   


 


POC Glucometer   105  112


 


Random Glucose   


 


Calcium   


 


Magnesium   


 


Total Bilirubin   


 


AST   


 


ALT   


 


Alkaline Phosphatase   


 


Total Protein   


 


Albumin   


 


Blood Type  B POSITIVE  


 


Antibody Screen  Negative  


 


Crossmatch  See Detail  














  02/12/20 02/12/20 02/12/20





  05:45 05:45 05:51


 


WBC   8.6 


 


RBC   3.90 


 


Hgb   9.5 L 


 


Hct   29.9 L 


 


MCV   76.8 L 


 


MCH   24.3 L 


 


MCHC   31.6 L 


 


RDW   19.2 H 


 


Plt Count   264 


 


MPV   7.2 L 


 


Absolute Neuts (auto)   6.7 


 


Neutrophils %   78.0 


 


Neutrophils % (Manual)   79.8 


 


Band Neutrophils %   0.0 


 


Lymphocytes %   9.1  D 


 


Lymphocytes % (Manual)   8.1  D 


 


Monocytes %   9.6 


 


Monocytes % (Manual)   8  D 


 


Eosinophils %   2.9  D 


 


Eosinophils % (Manual)   4.0  D 


 


Basophils %   0.4 


 


Basophils % (Manual)   0.0 


 


Myelocytes % (Man)   0  D 


 


Promyelocytes % (Man)   0 


 


Blast Cells % (Manual)   0 


 


Nucleated RBC %   0 


 


Metamyelocytes   0  D 


 


Hypochromia   1+ 


 


Platelet Estimate   Normal 


 


Polychromasia   1+ 


 


Poikilocytosis   1+ 


 


Anisocytosis   2+ 


 


Microcytosis   2+ 


 


Macrocytosis   0 


 


PT with INR   


 


INR   


 


Sodium  145  


 


Potassium  3.3 L  


 


Chloride  106  


 


Carbon Dioxide  29  


 


Anion Gap  10  


 


BUN  39.2 H  


 


Creatinine  1.6 H  


 


Est GFR (CKD-EPI)AfAm  33.00  


 


Est GFR (CKD-EPI)NonAf  28.47  


 


POC Glucometer    107


 


Random Glucose  111 H  


 


Calcium  8.6  


 


Magnesium  1.8  


 


Total Bilirubin  0.6  


 


AST  12 L  


 


ALT  13  


 


Alkaline Phosphatase  86  


 


Total Protein  6.1 L  


 


Albumin  2.7 L  


 


Blood Type   


 


Antibody Screen   


 


Crossmatch   














  02/12/20





  14:34


 


WBC 


 


RBC 


 


Hgb 


 


Hct 


 


MCV 


 


MCH 


 


MCHC 


 


RDW 


 


Plt Count 


 


MPV 


 


Absolute Neuts (auto) 


 


Neutrophils % 


 


Neutrophils % (Manual) 


 


Band Neutrophils % 


 


Lymphocytes % 


 


Lymphocytes % (Manual) 


 


Monocytes % 


 


Monocytes % (Manual) 


 


Eosinophils % 


 


Eosinophils % (Manual) 


 


Basophils % 


 


Basophils % (Manual) 


 


Myelocytes % (Man) 


 


Promyelocytes % (Man) 


 


Blast Cells % (Manual) 


 


Nucleated RBC % 


 


Metamyelocytes 


 


Hypochromia 


 


Platelet Estimate 


 


Polychromasia 


 


Poikilocytosis 


 


Anisocytosis 


 


Microcytosis 


 


Macrocytosis 


 


PT with INR  34.70 H


 


INR  2.91 H


 


Sodium 


 


Potassium 


 


Chloride 


 


Carbon Dioxide 


 


Anion Gap 


 


BUN 


 


Creatinine 


 


Est GFR (CKD-EPI)AfAm 


 


Est GFR (CKD-EPI)NonAf 


 


POC Glucometer 


 


Random Glucose 


 


Calcium 


 


Magnesium 


 


Total Bilirubin 


 


AST 


 


ALT 


 


Alkaline Phosphatase 


 


Total Protein 


 


Albumin 


 


Blood Type 


 


Antibody Screen 


 


Crossmatch 








Active Medications











Generic Name Dose Route Start Last Admin





  Trade Name Freq  PRN Reason Stop Dose Admin


 


Acetaminophen  650 mg  02/10/20 15:03  





  Tylenol Oral Solution -  PO   





  Q4H PRN   





  FEVER   





     





     





     


 


Acetaminophen  650 mg  02/10/20 17:29  02/11/20 21:09





  Ofirmev Injection -  IVPB   650 mg





  Q4H PRN   Administration





  FEVER   





     





     





     


 


Atorvastatin Calcium  10 mg  02/08/20 22:00  02/09/20 21:14





  Lipitor -  PO   10 mg





  HS HENRY   Administration





     





     





     





     


 


Ferrous Sulfate  325 mg  02/09/20 11:15  02/12/20 09:06





  Feosol -  PO   325 mg





  DAILY HENRY   Administration





     





     





     





     


 


Furosemide  20 mg  02/13/20 10:00  





  Lasix -  PO   





  DAILY HENRY   





     





     





     





     


 


Potassium Chloride 10 meq/  1,005 mls @ 42 mls/hr  02/12/20 13:19  





  Dextrose  IV   





  Q24H HENRY   





     





     





     





     


 


Isosorbide Mononitrate  30 mg  02/08/20 10:00  02/10/20 10:24





  Imdur -  PO   30 mg





  DAILY HENRY   Administration





     





     





     





     


 


Losartan Potassium  25 mg  02/13/20 10:00  





  Cozaar -  PO   





  DAILY HENRY   





     





     





     





     


 


Metoprolol Succinate  50 mg  02/13/20 10:00  





  Toprol Xl -  PO   





  DAILY HENRY   





     





     





     





     


 


Metoprolol Tartrate  5 mg  02/12/20 15:18  





  Lopressor Injection -  IVPUSH   





  Q4H PRN   





  TACHYCARDIA   





     





     





     


 


Pantoprazole Sodium  40 mg  02/11/20 22:00  02/12/20 09:06





  Protonix Iv  IVPUSH   40 mg





  BID HENRY   Administration





     





     





     





     


 


Polyethylene Glycol  17 gm  02/12/20 10:00  02/12/20 11:17





  Miralax (For Daily Use) -  PO   17 gm





  DAILY HENRY   Administration





     





     





     





     


 


Quetiapine Fumarate  50 mg  02/10/20 15:15  02/12/20 14:07





  Seroquel -  PO   50 mg





  TID HENRY   Administration





     





     





     





     











ASSESSMENT/PLAN:





87 y/o F, w/ pmh of multiple Co-morbidities including H/O DVT and Afib, GI 

bleed 2/2 to angiodysplasia, Rt LE DVT, PE and Coumadin 1 mg weekdays and off 

weekends, H/O Alzheimer's, CVA,  Parkinson's,  CAD (angioplasty 2005),  CKD, CHF

, presented to the ED c/o of 1 week hx of changes in mentation, altered 

behavior and decreased PO intake. 





#Acute metabolic encephalopathy


- 2/2 worsening Dementia


- improved today


-workup negative so far. lytes wnl, ct head neg. 


-Per family at bedside, pt appears at baseline today


- hx of embolic stroke during cardiac cath with Rt sided residual deficits. 


- CT head negative 





#A-Fib w/ RVR


- Currently adequately rate controlled, pacemaker working properly per cardio


- Dr. Parrish interrogated the pacemaker and deemed it normally functioning. 


- HASBLED score of 6 (>10%risk of bleed). 


- Pt has INR 2.91 today, will hold coumadin dose today


-monitor on tele


- discontinued dilt drip and pt went into RVR so spoke with cardio and gave one 

time dose of metoprolol 50 succinate and Lopressor IVP 5 and continued 

lopressor pushes Q6HPrn with holding parameters of <90 systolic. 





#Acute PNA/Left Pleural effusion 


-?Aspiration


-likely chronic


-CXR showed left lung infiltrates vs atelectasis, left pleural effusion


- UNASYN DISCONTINUED BY ID


- left paratracheal LN on imaging





#Anemia


-IMPROVED today. Hb of 8.3 today.


- stool occult trace blood but pt on iron


- As per family, she runs in high 8 Hb


- continue coumadin as INR now therapeutic


- hx of left sided lung CA s/p pneumonectomy/hx of cecum bleed/vascular 

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 in stomach due to 

large hiatal hernia, esophageal stricture as well per GI


- pt started on IV Protonix 40 BID in event of chronic GI bleed.  





#ELENITA on CKD 3 2/2 hepatorenal syndrome


- improved w Lasix 40 BID


-Cre and BUN elevated from baseline. Prerenal ratio


-Renal US : nonobstructing L 6mm stone, mild R renal pelvis dilation, b/l cysts


-switched to PO lasix 40 daily given improvement in Cr. 


-avoid nephrotoxins where possible


- hypokalemia of 3.3 repleted with oral 40KDUR





#CHFrEF now in acute exacerbation


-echo shows global hypokinesis with 20%EF. 


- c/w Lasix 40 PO daily (home dose is 20PO) 


- entresto ? given renal ftn cardio not choosing to start at this time


- cardio will discuss AICD given EF of 20% in AM


- started on losartan 25 after discussion with cardio, made aware of renal ftn


- cardio will assess need for aldactone as o/p





#Renal Cysts/Non-obstructing Renal Stone 


Urology follow up on discharge.





#Dementia with Behavioral Disturbance


Continue Seroquel


Family requests Psychiatry consultation with Dr. Bond so consulted





#Hx DVT s/p IVC filter 


- will hold Coumadin at this time INR 2.91 


- INR initially supratherapeutic, now therapeutic.





#HTN, HLD, CAD s/p PCI 


- DISCONTINUED Imdur previously was on hold and pt hypotensive, c/w metoprol 50 

succinate and Zocor.





Dispo: DNR/DNI Cardio will d/w family watchmen, AICD in AM.  





DVT Px - INR therapeutic, on Coumadin


DNR/DNI








Visit type





- Emergency Visit


Emergency Visit: Yes


ED Registration Date: 02/07/20


Care time: The patient presented to the Emergency Department on the above date 

and was hospitalized for further evaluation of their emergent condition.





- New Patient


This patient is new to me today: No





- Critical Care


Critical Care patient: No





- Discharge Referral


Referred to [facility] No





ATTENDING PHYSICIAN STATEMENT





I saw and evaluated the patient.


I reviewed the resident's note and discussed the case with the resident.


I agree with the resident's findings and plan as documented.








SUBJECTIVE:








OBJECTIVE:








ASSESSMENT AND PLAN:

## 2020-02-12 NOTE — PN
Progress Note (short form)





- Note


Progress Note: 


NAD on NC O2. 


HR better controlled. 


No acute events overnight. 


 Intake & Output











 02/09/20 02/10/20 02/11/20 02/12/20





 23:59 23:59 23:59 23:59


 


Intake Total 640 168 82 425


 


Output Total 460   


 


Balance 180 168 82 425











 Last Vital Signs











Temp Pulse Resp BP Pulse Ox


 


 97.3 F L  94 H  24 H  110/82   96 


 


 02/12/20 01:13  02/12/20 04:58  02/12/20 04:58  02/12/20 04:58  02/12/20 08:30








Active Medications





Acetaminophen (Tylenol Oral Solution -)  650 mg PO Q4H PRN


   PRN Reason: FEVER


Acetaminophen (Ofirmev Injection -)  650 mg IVPB Q4H PRN


   PRN Reason: FEVER


   Last Admin: 02/11/20 21:09 Dose:  650 mg


Atorvastatin Calcium (Lipitor -)  10 mg PO HS [facility]


   Last Admin: 02/09/20 21:14 Dose:  10 mg


Carvedilol (Coreg -)  6.25 mg PO DAILY [facility]


   Last Admin: 02/12/20 09:06 Dose:  6.25 mg


Diltiazem HCl (Cardizem Cd -)  180 mg PO DAILY [facility]


   Last Admin: 02/12/20 09:19 Dose:  Not Given


Ferrous Sulfate (Feosol -)  325 mg PO DAILY [facility]


   Last Admin: 02/12/20 09:06 Dose:  325 mg


Furosemide (Lasix Injection -)  40 mg IVPUSH BIDLASIX [facility]


   Last Admin: 02/12/20 06:31 Dose:  40 mg


Ampicillin Sodium/Sulbactam (Sodium 3 gm/ Sodium Chloride)  100 mls @ 200 mls/

hr IVPB DAILY [facility]


   Last Admin: 02/12/20 09:09 Dose:  200 mls/hr


Dextrose (D5w -)  1,000 mls @ 42 mls/hr IV ASDIR [facility]


   Last Admin: 02/11/20 15:21 Dose:  42 mls/hr


Diltiazem HCl 125 mg/ Sodium (Chloride)  125 mls @ 5 mls/hr IVPB TITR [facility]; 

Protocol


   Last Admin: 02/11/20 13:29 Dose:  5 mg/hr, 5 mls/hr


Isosorbide Mononitrate (Imdur -)  30 mg PO DAILY [facility]


   Last Admin: 02/10/20 10:24 Dose:  30 mg


Metoprolol Succinate (Toprol Xl -)  25 mg PO DAILY [facility]


   Last Admin: 02/12/20 09:06 Dose:  25 mg


Pantoprazole Sodium (Protonix Iv)  40 mg IVPUSH BID [facility]


   Last Admin: 02/12/20 09:06 Dose:  40 mg


Polyethylene Glycol (Miralax (For Daily Use) -)  17 gm PO DAILY [facility]


   Last Admin: 02/12/20 11:17 Dose:  17 gm


Quetiapine Fumarate (Seroquel -)  50 mg PO TID [facility]


   Last Admin: 02/10/20 18:16 Dose:  Not Given











Constitutional: Yes: No Distress


Eyes: Yes: EOM Intact


HENT: Yes: Normocephalic


Neck: Yes: Trachea Midline


Cardiovascular: Yes: Pulse Irregular, S1, S2


Respiratory: Yes: Diminished


Gastrointestinal: Yes: Soft


Edema: No


Labs: 


 Laboratory Results - last 24 hr











  02/11/20 02/11/20 02/12/20





  17:10 23:25 05:45


 


WBC   


 


RBC   


 


Hgb   


 


Hct   


 


MCV   


 


MCH   


 


MCHC   


 


RDW   


 


Plt Count   


 


MPV   


 


Absolute Neuts (auto)   


 


Neutrophils %   


 


Neutrophils % (Manual)   


 


Band Neutrophils %   


 


Lymphocytes %   


 


Lymphocytes % (Manual)   


 


Monocytes %   


 


Monocytes % (Manual)   


 


Eosinophils %   


 


Eosinophils % (Manual)   


 


Basophils %   


 


Basophils % (Manual)   


 


Myelocytes % (Man)   


 


Promyelocytes % (Man)   


 


Blast Cells % (Manual)   


 


Nucleated RBC %   


 


Metamyelocytes   


 


Hypochromia   


 


Platelet Estimate   


 


Polychromasia   


 


Poikilocytosis   


 


Anisocytosis   


 


Microcytosis   


 


Macrocytosis   


 


Sodium    145


 


Potassium    3.3 L


 


Chloride    106


 


Carbon Dioxide    29


 


Anion Gap    10


 


BUN    39.2 H


 


Creatinine    1.6 H


 


Est GFR (CKD-EPI)AfAm    33.00


 


Est GFR (CKD-EPI)NonAf    28.47


 


POC Glucometer  105  112 


 


Random Glucose    111 H


 


Calcium    8.6


 


Magnesium    1.8


 


Total Bilirubin    0.6


 


AST    12 L


 


ALT    13


 


Alkaline Phosphatase    86


 


Total Protein    6.1 L


 


Albumin    2.7 L














  02/12/20 02/12/20





  05:45 05:51


 


WBC  8.6 


 


RBC  3.90 


 


Hgb  9.5 L 


 


Hct  29.9 L 


 


MCV  76.8 L 


 


MCH  24.3 L 


 


MCHC  31.6 L 


 


RDW  19.2 H 


 


Plt Count  264 


 


MPV  7.2 L 


 


Absolute Neuts (auto)  6.7 


 


Neutrophils %  78.0 


 


Neutrophils % (Manual)  79.8 


 


Band Neutrophils %  0.0 


 


Lymphocytes %  9.1  D 


 


Lymphocytes % (Manual)  8.1  D 


 


Monocytes %  9.6 


 


Monocytes % (Manual)  8  D 


 


Eosinophils %  2.9  D 


 


Eosinophils % (Manual)  4.0  D 


 


Basophils %  0.4 


 


Basophils % (Manual)  0.0 


 


Myelocytes % (Man)  0  D 


 


Promyelocytes % (Man)  0 


 


Blast Cells % (Manual)  0 


 


Nucleated RBC %  0 


 


Metamyelocytes  0  D 


 


Hypochromia  1+ 


 


Platelet Estimate  Normal 


 


Polychromasia  1+ 


 


Poikilocytosis  1+ 


 


Anisocytosis  2+ 


 


Microcytosis  2+ 


 


Macrocytosis  0 


 


Sodium  


 


Potassium  


 


Chloride  


 


Carbon Dioxide  


 


Anion Gap  


 


BUN  


 


Creatinine  


 


Est GFR (CKD-EPI)AfAm  


 


Est GFR (CKD-EPI)NonAf  


 


POC Glucometer   107


 


Random Glucose  


 


Calcium  


 


Magnesium  


 


Total Bilirubin  


 


AST  


 


ALT  


 


Alkaline Phosphatase  


 


Total Protein  


 


Albumin  














Problem List





- Problems


(1) CAD (coronary artery disease)


Code(s): I25.10 - ATHSCL HEART DISEASE OF NATIVE CORONARY ARTERY W/O ANG PCTRS 

  


Qualifiers: 


   Coronary Disease-Associated Artery/Lesion type: native artery   Native vs. 

transplanted heart: native heart   Associated angina: without angina   

Qualified Code(s): I25.10 - Atherosclerotic heart disease of native coronary 

artery without angina pectoris   





(2) CHF exacerbation


Code(s): I50.9 - HEART FAILURE, UNSPECIFIED   


Qualifiers: 


   Heart failure type: unspecified 





(3) Chronic anemia


Code(s): D64.9 - ANEMIA, UNSPECIFIED   





(4) Chronic kidney disease (CKD), stage III (moderate)


Code(s): N18.3 - CHRONIC KIDNEY DISEASE, STAGE 3 (MODERATE)   





(5) H/O: CVA (cerebrovascular accident)


Code(s): Z86.73 - PRSNL HX OF TIA (TIA), AND CEREB INFRC W/O RESID DEFICITS   





(6) Angiodysplasia of cecum


Code(s): K55.20 - ANGIODYSPLASIA OF COLON WITHOUT HEMORRHAGE   





(7) Atrial fibrillation


Code(s): I48.91 - UNSPECIFIED ATRIAL FIBRILLATION   


Qualifiers: 


   Atrial fibrillation type: permanent 





(8) Dementia


Code(s): F03.90 - UNSPECIFIED DEMENTIA WITHOUT BEHAVIORAL DISTURBANCE   


Qualifiers: 


   Dementia type: Parkinson's disease   Dementia behavioral disturbance: 

without behavioral disturbance   Qualified Code(s): G20 - Parkinson's disease; 

F02.80 - Dementia in other diseases classified elsewhere without behavioral 

disturbance; F02.80 - Dementia in other diseases classified elsewhere without 

behavioral disturbance; F02.80 - Dementia in other diseases classified 

elsewhere without behavioral disturbance   





(9) Pacemaker


Code(s): Z95.0 - PRESENCE OF CARDIAC PACEMAKER   





Assessment/Plan


SUPPLEMENTAL O2 AS NEEDED  


RATE CONTROL 


MONITOR HGB


ASPIRATION PRECAUTIONS 


LASIX 


ABX 





Dr Merritt

## 2020-02-12 NOTE — PN
Teaching Attending Note


Name of Resident: Zachery German





ATTENDING PHYSICIAN STATEMENT





I saw and evaluated the patient.


I reviewed the resident's note and discussed the case with the resident.


I agree with the resident's findings and plan as documented.








SUBJECTIVE: Semi-interactive during medical interview. No complaints.








OBJECTIVE: AAO x 1. Afebrile, HR improved. Follows some commands. Oriented x 1. 





 Last Vital Signs











Temp Pulse Resp BP Pulse Ox


 


 97.3 F L  94 H  24 H  110/82   93 L


 


 02/12/20 01:13  02/12/20 04:58  02/12/20 04:58  02/12/20 04:58  02/12/20 12:16














HEENT - Atrauamatic, normocephalic.


Heart - S1, S2, soft SM


Lungs - decreased air entry bibasally


Abdomen - Soft, non-tender. Bowel Sounds normal.


Extremities - no edema, no calf tenderness. 





 Laboratory Results - last 24 hr











  02/11/20 02/11/20 02/12/20





  17:10 23:25 05:45


 


WBC   


 


RBC   


 


Hgb   


 


Hct   


 


MCV   


 


MCH   


 


MCHC   


 


RDW   


 


Plt Count   


 


MPV   


 


Absolute Neuts (auto)   


 


Neutrophils %   


 


Neutrophils % (Manual)   


 


Band Neutrophils %   


 


Lymphocytes %   


 


Lymphocytes % (Manual)   


 


Monocytes %   


 


Monocytes % (Manual)   


 


Eosinophils %   


 


Eosinophils % (Manual)   


 


Basophils %   


 


Basophils % (Manual)   


 


Myelocytes % (Man)   


 


Promyelocytes % (Man)   


 


Blast Cells % (Manual)   


 


Nucleated RBC %   


 


Metamyelocytes   


 


Hypochromia   


 


Platelet Estimate   


 


Polychromasia   


 


Poikilocytosis   


 


Anisocytosis   


 


Microcytosis   


 


Macrocytosis   


 


Sodium    145


 


Potassium    3.3 L


 


Chloride    106


 


Carbon Dioxide    29


 


Anion Gap    10


 


BUN    39.2 H


 


Creatinine    1.6 H


 


Est GFR (CKD-EPI)AfAm    33.00


 


Est GFR (CKD-EPI)NonAf    28.47


 


POC Glucometer  105  112 


 


Random Glucose    111 H


 


Calcium    8.6


 


Magnesium    1.8


 


Total Bilirubin    0.6


 


AST    12 L


 


ALT    13


 


Alkaline Phosphatase    86


 


Total Protein    6.1 L


 


Albumin    2.7 L














  02/12/20 02/12/20





  05:45 05:51


 


WBC  8.6 


 


RBC  3.90 


 


Hgb  9.5 L 


 


Hct  29.9 L 


 


MCV  76.8 L 


 


MCH  24.3 L 


 


MCHC  31.6 L 


 


RDW  19.2 H 


 


Plt Count  264 


 


MPV  7.2 L 


 


Absolute Neuts (auto)  6.7 


 


Neutrophils %  78.0 


 


Neutrophils % (Manual)  79.8 


 


Band Neutrophils %  0.0 


 


Lymphocytes %  9.1  D 


 


Lymphocytes % (Manual)  8.1  D 


 


Monocytes %  9.6 


 


Monocytes % (Manual)  8  D 


 


Eosinophils %  2.9  D 


 


Eosinophils % (Manual)  4.0  D 


 


Basophils %  0.4 


 


Basophils % (Manual)  0.0 


 


Myelocytes % (Man)  0  D 


 


Promyelocytes % (Man)  0 


 


Blast Cells % (Manual)  0 


 


Nucleated RBC %  0 


 


Metamyelocytes  0  D 


 


Hypochromia  1+ 


 


Platelet Estimate  Normal 


 


Polychromasia  1+ 


 


Poikilocytosis  1+ 


 


Anisocytosis  2+ 


 


Microcytosis  2+ 


 


Macrocytosis  0 


 


Sodium  


 


Potassium  


 


Chloride  


 


Carbon Dioxide  


 


Anion Gap  


 


BUN  


 


Creatinine  


 


Est GFR (CKD-EPI)AfAm  


 


Est GFR (CKD-EPI)NonAf  


 


POC Glucometer   107


 


Random Glucose  


 


Calcium  


 


Magnesium  


 


Total Bilirubin  


 


AST  


 


ALT  


 


Alkaline Phosphatase  


 


Total Protein  


 


Albumin  








 Current Medications











Generic Name Dose Route Start Last Admin





  Trade Name Freq  PRN Reason Stop Dose Admin


 


Acetaminophen  650 mg  02/10/20 15:03  





  Tylenol Oral Solution -  PO   





  Q4H PRN   





  FEVER   





     





     





     


 


Acetaminophen  650 mg  02/10/20 17:29  02/11/20 21:09





  Ofirmev Injection -  IVPB   650 mg





  Q4H PRN   Administration





  FEVER   





     





     





     


 


Atorvastatin Calcium  10 mg  02/08/20 22:00  02/09/20 21:14





  Lipitor -  PO   10 mg





  HS [facility]   Administration





     





     





     





     


 


Carvedilol  6.25 mg  02/08/20 10:00  02/12/20 09:06





  Coreg -  PO   6.25 mg





  DAILY [facility]RY   Administration





     





     





     





     


 


Diltiazem HCl  180 mg  02/12/20 10:00  02/12/20 09:19





  Cardizem Cd -  PO   Not Given





  DAILY [facility]   





     





     





     





     


 


Ferrous Sulfate  325 mg  02/09/20 11:15  02/12/20 09:06





  Feosol -  PO   325 mg





  DAILY [facility]ENRY   Administration





     





     





     





     


 


Furosemide  40 mg  02/09/20 14:00  02/12/20 06:31





  Lasix Injection -  IVPUSH   40 mg





  BIDLASIX HENRY   Administration





     





     





     





     


 


Ampicillin Sodium/Sulbactam  100 mls @ 200 mls/hr  02/10/20 10:00  02/12/20 09:

09





  Sodium 3 gm/ Sodium Chloride  IVPB   200 mls/hr





  DAILY HENRY   Administration





     





     





     





     


 


Diltiazem HCl 125 mg/ Sodium  125 mls @ 5 mls/hr  02/11/20 12:30  02/11/20 13:29





  Chloride  IVPB   5 mg/hr





  TITR [facility]EN[facility]   5 mls/hr





     Administration





     





  Protocol   





  5 MG/HR   


 


Potassium Chloride 10 meq/  1,005 mls @ 42 mls/hr  02/12/20 13:19  





  Dextrose  IV   





  Q24H [facility]   





     





     





     





     


 


Isosorbide Mononitrate  30 mg  02/08/20 10:00  02/10/20 10:24





  Imdur -  PO   30 mg





  DAILY [facility]   Administration





     





     





     





     


 


Metoprolol Succinate  25 mg  02/11/20 12:30  02/12/20 09:06





  Toprol Xl -  PO   25 mg





  DAILY [facility]   Administration





     





     





     





     


 


Pantoprazole Sodium  40 mg  02/11/20 22:00  02/12/20 09:06





  Protonix Iv  IVPUSH   40 mg





  BID [facility]   Administration





     





     





     





     


 


Polyethylene Glycol  17 gm  02/12/20 10:00  02/12/20 11:17





  Miralax (For Daily Use) -  PO   17 gm





  DAILY [facility]EN[facility]   Administration





     





     





     





     


 


Quetiapine Fumarate  50 mg  02/10/20 15:15  02/10/20 18:16





  Seroquel -  PO   Not Given





  TID [facility]   





     





     





     





     








 Home Medications











 Medication  Instructions  Recorded


 


Simvastatin [Zocor -] 20 mg PO HS 05/24/14


 


Diltiazem Cd [Cardizem Cd -] 180 mg PO DAILY 01/08/16


 


Gabapentin [Neurontin] 100 mg PO BID 01/08/16


 


Quetiapine Fumarate [Seroquel -] 50 mg PO BID  tablet 02/02/16


 


Furosemide [Lasix] 20 mg PO ASDIR 05/22/18


 


Isosorbide Mononitrate [Imdur -] 30 mg PO DAILY 05/22/18


 


Warfarin Na [Coumadin -] 1 mg PO HS 05/22/18


 


Carvedilol [Coreg -] 6.25 mg PO DAILY 02/08/20














 





ASSESSMENT AND PLAN:





88 year old female with history of Parkinson's disease, Dementia, Atrial 

fibrillation, DVT s/p IVC filter, Hx GI bleed 2/2 to Colonic AVMs, Hx CVA with 

R sided weakness,  CAD (s/p PCI 2005), CKD 3, Chronic Systolic CHF, HTN, HLD, 

Hx of left sided lung CA s/p pneumonectomy, presented with increasing confusion

, weakness, and poor oral intake.





1. Acute Metabolic Encephalopathy on background baseline Dementia


Sec to decompensated CHF and Pneumonia


CT Head - no acute findings.


Mental status improving.





2. Acute on Chronic Systolic CHF


Echo shows global hypokinesis with 20%EF. 


Venous congestion on CT, elevated BNP


Continue IV lasix BID diuresis


To discuss with Cardiology regarding transition to oral Lasix.


Optimization of HF meds as per Cardio.





3. Acute Pneumonia ?aspiration


Continue Unasyn.


Speech therapy requested for swallow eval.


L Paratracheal enlarged LN on imaging


Pulm follow up on discharge.





4. Atrial fibrillation with RVR


HR improved on Cardizem drip.


Resumed on oral Cardizem and Coreg - for transition of Cardizem drip.


On coumadin for AC - awaiting INR. 





5. Microcytic Anemia sec to chronic GI blood loss


H/H 7.5/27.9 s/p 1 unit PRBCs.


MCV 76


FOBT - trace


Hx of colonoic AVMs and Hx of Hiatal Hernia with Chauncey erosions.


GI consulted - recommend Iron replacement with IV venofer, PPI. Patient/Family 

declines EGD/Colonoscopy. She is DNR/DNI. 





6. ELENITA on CKD 3 sec to Hepatorenal Syndrome


Improved with IV Lasix diuresis


Creat currently at baseline. 





7. Renal Cysts/Non-obstructing Renal Stone 


Urology follow up on discharge.





8. Dementia with Behavioral Disturbance


Continue Seroquel


Family requests Psychiatry consultation with Dr. Bond





9. Hx DVT s/p IVC filter - on Coumadin. INR initially supratherapeutic, now 

therapeutic.





10. HTN, HLD, CAD s/p PCI - Continue Imdur, BB, Zocor.





11. Hypokalemia - repleted. 








DVT Px - On Coumadin


DNR/DNI

## 2020-02-12 NOTE — PN
Progress Note (short form)





- Note


Progress Note: 





88-year-old female  was admitted with progressive loss of appetite, lethargy  

increased somnolence was found to be severely anemic, she on admission had   

supra normal INR,, respiratory and congestive heart failure. Patient is awake 

and able to recognize her surroundings and family.  





Echocardiogram dated 2/10/20. Severe left ventricular systolic dysfunction with 

EF of 20%(see report).  These changes may be  related to tachycardia induced  

acute cardiomyopathy and under the circumstances diltiazem will need to be 

discontinued and the dose of metoprolol to be increased to 50 mg by mouth 

daily.patient has been in atrial fibrillation with mostly rapid ventricular 

response that was controlled with IV diltiazem and she was started on oral 

diltiazem today.








Patient patient is not dyspneic,  there is no pedal edema cough or 

expectoration.  She is resting comfortably.








Active Medications











Generic Name Dose Route Start Last Admin





  Trade Name Freq  PRN Reason Stop Dose Admin


 


Acetaminophen  650 mg  02/10/20 15:03  





  Tylenol Oral Solution -  PO   





  Q4H PRN   





  FEVER   





     





     





     


 


Acetaminophen  650 mg  02/10/20 17:29  02/11/20 21:09





  Ofirmev Injection -  IVPB   650 mg





  Q4H PRN   Administration





  FEVER   





     





     





     


 


Atorvastatin Calcium  10 mg  02/08/20 22:00  02/09/20 21:14





  Lipitor -  PO   10 mg





  HS [facility]EN[facility]   Administration





     





     





     





     


 


Ferrous Sulfate  325 mg  02/09/20 11:15  02/12/20 09:06





  Feosol -  PO   325 mg





  DAILY [facility]ENRY   Administration





     





     





     





     


 


Furosemide  40 mg  02/13/20 10:00  





  Lasix -  PO   





  DAILY [facility]   





     





     





     





     


 


Potassium Chloride 10 meq/  1,005 mls @ 42 mls/hr  02/12/20 13:19  02/12/20 15:

26





  Dextrose  IV   42 mls/hr





  Q24H [facility]EN[facility]   Administration





     





     





     





     


 


Lorazepam  1 mg  02/12/20 17:48  





  Ativan Injection -  IM   





  BID PRN   





  ANXIETY   





     





     





     


 


Losartan Potassium  25 mg  02/13/20 10:00  





  Cozaar -  PO   





  DAILY [facility]   





     





     





     





     


 


Metoprolol Succinate  50 mg  02/13/20 10:00  





  Toprol Xl -  PO   





  DAILY [facility]   





     





     





     





     


 


Metoprolol Tartrate  5 mg  02/12/20 15:18  02/12/20 15:26





  Lopressor Injection -  IVPUSH   5 mg





  Q4H PRN   Administration





  TACHYCARDIA   





     





     





     


 


Pantoprazole Sodium  40 mg  02/11/20 22:00  02/12/20 09:06





  Protonix Iv  IVPUSH   40 mg





  BID [facility]EN[facility]   Administration





     





     





     





     


 


Polyethylene Glycol  17 gm  02/12/20 10:00  02/12/20 11:17





  Miralax (For Daily Use) -  PO   17 gm





  DAILY HENRY   Administration





     





     





     





     


 


Quetiapine Fumarate  50 mg  02/10/20 15:15  02/12/20 14:07





  Seroquel -  PO   50 mg





  TID [facility]   Administration





     





     





     





     











 





 Last Vital Signs











Temp Pulse Resp BP Pulse Ox


 


 97.3 F L  136 irregular  24 H  125/74   93 L


 


 02/12/20 13:43  02/12/20 15:26  02/12/20 13:43  02/12/20 15:26  02/12/20 16:04














 


Neck: Supple, jjugular venous distention, positive hepatojugular reflux.


Heart: No heaves or thrills, heart sounds are distant,grade 2/6 ejection 

systolic murmur was heard at the second right intercostal space and along the 

left sternal border ending in early to mid systole. No diastolic murmur was 

heard.


Lungs: Fine crepitations at the bases.


Abdomen: Soft,  slight left lower quadrant guarding, no hepatosplenomegaly was 

appreciated.


Extremities: trace pretibial edema.


                                                                               

                                                


                                                                               

                                             CBC, BMP





 02/12/20 05:45 





 02/12/20 05:45 





 INR, PTT











INR  2.91  (0.83-1.09)  H  02/12/20  14:34    














Impression:


1.  Atrial fibrillation with rapid ventricular response.


2.  Coronary artery disease, status post PCI/stenting, angina pectoris.


3.  Status post embolic CVA.


4.  Vascular dementia.


5.  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 

cardiomyopathy.


6.  Status post permanent pacemaker for carotid hypersensitivity.


7.  Anemia  related to blood loss.


8.  History of chronic kidney disease.





Recommendation:


1.  Discussed case with resident and concur with increasing the dose of beta-

blocker and adding ARB.


2.  Continue other medications


3.  if there is difficulty in controlling the rapid ventricular response, beta-

blockers could be given in divided doses (twice a day) 


and cautiously increase the doseprovided blood pressure is stable.


4.  Close follow-up of CBC.


5. Close monitoring of INR(ppatient is not on A/C).





  Prognosis, critical.





  








Lee Parrish MD

## 2020-02-12 NOTE — PN
Progress Note, SLP





- Note


Progress Note: 


87 yo female seen at bedside for follow up to swallow eval with recommendations 

for regular solids with thin liquids.  Chart review indicate pt is consuming 50-

75% of meals without s/s of aspiration-like behaviors.  Charge RN reports good 

intake at this time. RE: Continue current diet.





Re:  continue current diet as tolerated with standard aspiration precautions.  

Provide oral care after meals.  Crush meds in pureed.  Results given verbally 

to charge RN and to PCP via chart

## 2020-02-12 NOTE — CON.PSY
Psychiatry Consult


Chief Complaint: 88 year old female known to me from the past, seen for Psych 

evaluation.casec discussed with family. Patient had been on Seroquel 50 mg po 

bid for the past 15 yrs and has done well.She apparantly had UTI, increased 

confusion and refusing all meds causing agitation and failure to thrive.


Symptoms: reports: Memory Impairment, Aggressivity, Impulsivity





- Previous Psychiatric Treatment


Outpatient: More than 6 mos ago


Inpatient: None





- Previous Substance Abuse Treatment


Outpatient: None


Inpatient: None





- Reason for Previous Treatment


Reason for Previous Treatment: Psychotic Episode





- Current Medications


Current Medications: 


Active Medications





Acetaminophen (Tylenol Oral Solution -)  650 mg PO Q4H PRN


   PRN Reason: FEVER


Acetaminophen (Ofirmev Injection -)  650 mg IVPB Q4H PRN


   PRN Reason: FEVER


   Last Admin: 20 21:09 Dose:  650 mg


Atorvastatin Calcium (Lipitor -)  10 mg PO HS [facility]


   Last Admin: 20 21:14 Dose:  10 mg


Ferrous Sulfate (Feosol -)  325 mg PO DAILY [facility]


   Last Admin: 20 09:06 Dose:  325 mg


Furosemide (Lasix -)  40 mg PO DAILY [facility]


Potassium Chloride 10 meq/ (Dextrose)  1,005 mls @ 42 mls/hr IV Q24H [facility]


   Last Admin: 20 15:26 Dose:  42 mls/hr


Losartan Potassium (Cozaar -)  25 mg PO DAILY [facility]


Metoprolol Succinate (Toprol Xl -)  50 mg PO DAILY [facility]


Metoprolol Tartrate (Lopressor Injection -)  5 mg IVPUSH Q4H PRN


   PRN Reason: TACHYCARDIA


   Last Admin: 20 15:26 Dose:  5 mg


Pantoprazole Sodium (Protonix Iv)  40 mg IVPUSH BID [facility]


   Last Admin: 20 09:06 Dose:  40 mg


Polyethylene Glycol (Miralax (For Daily Use) -)  17 gm PO DAILY [facility]


   Last Admin: 20 11:17 Dose:  17 gm


Quetiapine Fumarate (Seroquel -)  50 mg PO TID [facility]


   Last Admin: 20 14:07 Dose:  50 mg











- Allergies


Allergies: 


 Allergies











Allergy/AdvReac Type Severity Reaction Status Date / Time


 


oats Allergy Intermediate  Verified 20 17:11


 


Penicillins Allergy Mild  Verified 20 17:11


 


Shellfish Allergy Mild  Verified 20 17:11


 


aspirin AdvReac   Verified 20 17:17


 


lorazepam [From Ativan] AdvReac   Verified 20 17:17


 


prednisone AdvReac   Verified 20 17:17














- Current Living Status


Usual Living Arrangement: With Child





- Current Mental Status Evaluation


Appearance: Disheveled


Attitude: Guarded





- Affect


Affect: Constrictive


Appropriateness: Not Appropriate





- Mood


Mood: Anxious





- Speech/Language


Expressive: Delayed





- Psychomotor Activity


Psychomotor Activity: Slowed





- Thought Process


Thought Process: Circumstantial





- Thought Content


Hallucinations: Absent


Delusions: Absent





- Self Perception


Self Perception: No Impairment





- Cognition


Attention: Alert, Diminished


Orientation: Time


Memory, Immediate Recall: Impaired


Memory, Short Term: 1/3


Memory, Remote with Promptin/3





- Concentration


Serial Sevens Intact: No


Simple Calculations Intact: No





- Abstraction


Proverb Interpretation: Impaired


Judgement: Moderately Impaired





- Insight


Insight: Impaired





- Impulse Control


Impulse Control: Moderately Impaired





- Suicidal Ideation


Suicidal Ideation: No





- Homicidal Ideation


Homicidal Ideation: No





Problem List





- Problems


(1) Delirium


Code(s): R41.0 - DISORIENTATION, UNSPECIFIED   





Assessment/Plan





1) 1) continue with Seroqurel.


2) Ativan 1mg po bid PRN for agiation.

## 2020-02-12 NOTE — PN
Progress Note, Physician


History of Present Illness: 





Pt seen and examined at bedside. She is more awake and interactive today. 





- Current Medication List


Current Medications: 


Active Medications





Acetaminophen (Tylenol Oral Solution -)  650 mg PO Q4H PRN


   PRN Reason: FEVER


Acetaminophen (Ofirmev Injection -)  650 mg IVPB Q4H PRN


   PRN Reason: FEVER


   Last Admin: 02/11/20 21:09 Dose:  650 mg


Atorvastatin Calcium (Lipitor -)  10 mg PO HS [facility]


   Last Admin: 02/09/20 21:14 Dose:  10 mg


Carvedilol (Coreg -)  6.25 mg PO DAILY [facility]


   Last Admin: 02/12/20 09:06 Dose:  6.25 mg


Diltiazem HCl (Cardizem Cd -)  180 mg PO DAILY [facility]


   Last Admin: 02/12/20 09:19 Dose:  Not Given


Ferrous Sulfate (Feosol -)  325 mg PO DAILY [facility]


   Last Admin: 02/12/20 09:06 Dose:  325 mg


Furosemide (Lasix Injection -)  40 mg IVPUSH BIDLASIX [facility]


   Last Admin: 02/12/20 06:31 Dose:  40 mg


Ampicillin Sodium/Sulbactam (Sodium 3 gm/ Sodium Chloride)  100 mls @ 200 mls/

hr IVPB DAILY [facility]


   Last Admin: 02/12/20 09:09 Dose:  200 mls/hr


Dextrose (D5w -)  1,000 mls @ 42 mls/hr IV ASDIR [facility]


   Last Admin: 02/11/20 15:21 Dose:  42 mls/hr


Diltiazem HCl 125 mg/ Sodium (Chloride)  125 mls @ 5 mls/hr IVPB TITR [facility]; 

Protocol


   Last Admin: 02/11/20 13:29 Dose:  5 mg/hr, 5 mls/hr


Isosorbide Mononitrate (Imdur -)  30 mg PO DAILY [facility]


   Last Admin: 02/10/20 10:24 Dose:  30 mg


Metoprolol Succinate (Toprol Xl -)  25 mg PO DAILY [facility]


   Last Admin: 02/12/20 09:06 Dose:  25 mg


Pantoprazole Sodium (Protonix Iv)  40 mg IVPUSH BID [facility]


   Last Admin: 02/12/20 09:06 Dose:  40 mg


Polyethylene Glycol (Miralax (For Daily Use) -)  17 gm PO DAILY [facility]


   Last Admin: 02/12/20 11:17 Dose:  17 gm


Quetiapine Fumarate (Seroquel -)  50 mg PO TID [facility]


   Last Admin: 02/10/20 18:16 Dose:  Not Given











- Objective


Vital Signs: 


 Vital Signs











Temperature  97.3 F L  02/12/20 01:13


 


Pulse Rate  94 H  02/12/20 04:58


 


Respiratory Rate  24 H  02/12/20 04:58


 


Blood Pressure  110/82   02/12/20 04:58


 


O2 Sat by Pulse Oximetry (%)  93 L  02/12/20 12:16











Constitutional: Yes: Calm


Eyes: Yes: Conjunctiva Clear


Cardiovascular: Yes: S1, S2


Respiratory: Yes: On Nasal O2


Gastrointestinal: Yes: Soft


Genitourinary: Yes: Incontinence


Musculoskeletal: Yes: WNL


Edema: No


Neurological: Yes: Confusion


Labs: 


 CBC, BMP





 02/12/20 05:45 





 02/12/20 05:45 





 INR, PTT











INR  2.49  (0.83-1.09)  H  02/11/20  06:46    














Problem List





- Problems


(1) CKD (chronic kidney disease)


Code(s): N18.9 - CHRONIC KIDNEY DISEASE, UNSPECIFIED   





(2) CHF exacerbation


Code(s): I50.9 - HEART FAILURE, UNSPECIFIED   


Qualifiers: 


   Heart failure type: unspecified   Qualified Code(s): I50.9 - Heart failure, 

unspecified   





Assessment/Plan


 Current Medications











Generic Name Dose Route Start Last Admin





  Trade Name Freq  PRN Reason Stop Dose Admin


 


Acetaminophen  650 mg  02/10/20 15:03  





  Tylenol Oral Solution -  PO   





  Q4H PRN   





  FEVER   





     





     





     


 


Acetaminophen  650 mg  02/10/20 17:29  02/11/20 21:09





  Ofirmev Injection -  IVPB   650 mg





  Q4H PRN   Administration





  FEVER   





     





     





     


 


Atorvastatin Calcium  10 mg  02/08/20 22:00  02/09/20 21:14





  Lipitor -  PO   10 mg





  HS [facility]ENRY   Administration





     





     





     





     


 


Carvedilol  6.25 mg  02/08/20 10:00  02/12/20 09:06





  Coreg -  PO   6.25 mg





  DAILY [facility]ENRY   Administration





     





     





     





     


 


Diltiazem HCl  180 mg  02/12/20 10:00  02/12/20 09:19





  Cardizem Cd -  PO   Not Given





  DAILY [facility]ENRY   





     





     





     





     


 


Ferrous Sulfate  325 mg  02/09/20 11:15  02/12/20 09:06





  Feosol -  PO   325 mg





  DAILY HENRY   Administration





     





     





     





     


 


Furosemide  40 mg  02/09/20 14:00  02/12/20 06:31





  Lasix Injection -  IVPUSH   40 mg





  BIDLASIX [facility]ENRY   Administration





     





     





     





     


 


Ampicillin Sodium/Sulbactam  100 mls @ 200 mls/hr  02/10/20 10:00  02/12/20 09:

09





  Sodium 3 gm/ Sodium Chloride  IVPB   200 mls/hr





  DAILY HENRY   Administration





     





     





     





     


 


Dextrose  1,000 mls @ 42 mls/hr  02/10/20 15:15  02/11/20 15:21





  D5w -  IV   42 mls/hr





  ASDIR [facility]   Administration





     





     





     





     


 


Diltiazem HCl 125 mg/ Sodium  125 mls @ 5 mls/hr  02/11/20 12:30  02/11/20 13:29





  Chloride  IVPB   5 mg/hr





  TITR [facility]EN[facility]   5 mls/hr





     Administration





     





  Protocol   





  5 MG/HR   


 


Isosorbide Mononitrate  30 mg  02/08/20 10:00  02/10/20 10:24





  Imdur -  PO   30 mg





  DAILY HENRY   Administration





     





     





     





     


 


Metoprolol Succinate  25 mg  02/11/20 12:30  02/12/20 09:06





  Toprol Xl -  PO   25 mg





  DAILY HENRY   Administration





     





     





     





     


 


Pantoprazole Sodium  40 mg  02/11/20 22:00  02/12/20 09:06





  Protonix Iv  IVPUSH   40 mg





  BID [facility]ENRY   Administration





     





     





     





     


 


Polyethylene Glycol  17 gm  02/12/20 10:00  02/12/20 11:17





  Miralax (For Daily Use) -  PO   17 gm





  DAILY HENRY   Administration





     





     





     





     


 


Quetiapine Fumarate  50 mg  02/10/20 15:15  02/10/20 18:16





  Seroquel -  PO   Not Given





  TID HENRY   





     





     





     





     

















Impression


1. ELENITA


2. UTI


3. CHF ef 17 percent


4. CVA


5. HTN


6. cva


7. parkinsons


8. a-fib


9. hypernatremia








Plan


- replace potassium


- add potassium to d5w


- repeat labs in am


- encourage po intake


- volume status improving


- avoid nsaids


- avoid nephrotoxins

## 2020-02-12 NOTE — PN
Progress Note, Physician


History of Present Illness: 





stable


no new issues


calm


daughter in room





- Current Medication List


Current Medications: 


Active Medications





Acetaminophen (Tylenol Oral Solution -)  650 mg PO Q4H PRN


   PRN Reason: FEVER


Acetaminophen (Ofirmev Injection -)  650 mg IVPB Q4H PRN


   PRN Reason: FEVER


   Last Admin: 02/11/20 21:09 Dose:  650 mg


Atorvastatin Calcium (Lipitor -)  10 mg PO HS [facility]


   Last Admin: 02/09/20 21:14 Dose:  10 mg


Carvedilol (Coreg -)  6.25 mg PO DAILY [facility]


   Last Admin: 02/12/20 09:06 Dose:  6.25 mg


Diltiazem HCl (Cardizem Cd -)  180 mg PO DAILY [facility]


   Last Admin: 02/12/20 09:19 Dose:  Not Given


Ferrous Sulfate (Feosol -)  325 mg PO DAILY [facility]


   Last Admin: 02/12/20 09:06 Dose:  325 mg


Furosemide (Lasix Injection -)  40 mg IVPUSH BIDLASIX [facility]


   Last Admin: 02/12/20 06:31 Dose:  40 mg


Diltiazem HCl 125 mg/ Sodium (Chloride)  125 mls @ 5 mls/hr IVPB TITR [facility]; 

Protocol


   Last Admin: 02/11/20 13:29 Dose:  5 mg/hr, 5 mls/hr


Potassium Chloride 10 meq/ (Dextrose)  1,005 mls @ 42 mls/hr IV Q24H [facility]


Isosorbide Mononitrate (Imdur -)  30 mg PO DAILY [facility]


   Last Admin: 02/10/20 10:24 Dose:  30 mg


Metoprolol Succinate (Toprol Xl -)  25 mg PO DAILY [facility]


   Last Admin: 02/12/20 09:06 Dose:  25 mg


Pantoprazole Sodium (Protonix Iv)  40 mg IVPUSH BID [facility]


   Last Admin: 02/12/20 09:06 Dose:  40 mg


Polyethylene Glycol (Miralax (For Daily Use) -)  17 gm PO DAILY [facility]


   Last Admin: 02/12/20 11:17 Dose:  17 gm


Quetiapine Fumarate (Seroquel -)  50 mg PO TID [facility]


   Last Admin: 02/10/20 18:16 Dose:  Not Given











- Objective


Vital Signs: 


 Vital Signs











Temperature  97.3 F L  02/12/20 01:13


 


Pulse Rate  94 H  02/12/20 04:58


 


Respiratory Rate  24 H  02/12/20 04:58


 


Blood Pressure  110/82   02/12/20 04:58


 


O2 Sat by Pulse Oximetry (%)  93 L  02/12/20 12:16











Constitutional: Yes: No Distress, Calm


Cardiovascular: Yes: S1, S2


Respiratory: Yes: Regular, CTA Bilaterally, On Nasal O2


Gastrointestinal: Yes: Normal Bowel Sounds, Soft


Musculoskeletal: Yes: WNL


Extremities: Yes: WNL


Neurological: Yes: Alert, Oriented


Psychiatric: Yes: Alert, Oriented


Labs: 


 CBC, BMP





 02/12/20 05:45 





 02/12/20 05:45 





 INR, PTT











INR  2.49  (0.83-1.09)  H  02/11/20  06:46    














Assessment/Plan





Problem List





- Problems


(1) CAD (coronary artery disease)


Code(s): I25.10 - ATHSCL HEART DISEASE OF NATIVE CORONARY ARTERY W/O ANG PCTRS 

  


Qualifiers: 


   Coronary Disease-Associated Artery/Lesion type: native artery   Native vs. 

transplanted heart: native heart   Associated angina: without angina   

Qualified Code(s): I25.10 - Atherosclerotic heart disease of native coronary 

artery without angina pectoris   





(2) CHF exacerbation


Code(s): I50.9 - HEART FAILURE, UNSPECIFIED   


Qualifiers: 


   Heart failure type: unspecified 





(3) Chronic anemia


Code(s): D64.9 - ANEMIA, UNSPECIFIED   





(4) Chronic kidney disease (CKD), stage III (moderate)


Code(s): N18.3 - CHRONIC KIDNEY DISEASE, STAGE 3 (MODERATE)   





(5) H/O: CVA (cerebrovascular accident)


Code(s): Z86.73 - PRSNL HX OF TIA (TIA), AND CEREB INFRC W/O RESID DEFICITS   





(6) Angiodysplasia of cecum


Code(s): K55.20 - ANGIODYSPLASIA OF COLON WITHOUT HEMORRHAGE   





(7) Atrial fibrillation


Code(s): I48.91 - UNSPECIFIED ATRIAL FIBRILLATION   


Qualifiers: 


   Atrial fibrillation type: permanent 





(8) Dementia


Code(s): F03.90 - UNSPECIFIED DEMENTIA WITHOUT BEHAVIORAL DISTURBANCE   


Qualifiers: 


   Dementia type: Parkinson's disease   Dementia behavioral disturbance: 

without behavioral disturbance   Qualified Code(s): G20 - Parkinson's disease; 

F02.80 - Dementia in other diseases classified elsewhere without behavioral 

disturbance; F02.80 - Dementia in other diseases classified elsewhere without 

behavioral disturbance; F02.80 - Dementia in other diseases classified 

elsewhere without behavioral disturbance   





(9) Pacemaker


Code(s): Z95.0 - PRESENCE OF CARDIAC PACEMAKER   








plan


will stop abx and monitor


rest continue current mgmt


asp precautions


rest as per the team

## 2020-02-13 LAB
ALBUMIN SERPL-MCNC: 2.6 G/DL (ref 3.4–5)
ALP SERPL-CCNC: 84 U/L (ref 45–117)
ALT SERPL-CCNC: 13 U/L (ref 13–61)
ANION GAP SERPL CALC-SCNC: 11 MMOL/L (ref 8–16)
ANISOCYTOSIS BLD QL: (no result)
AST SERPL-CCNC: 13 U/L (ref 15–37)
BASOPHILS # BLD: 1.1 % (ref 0–2)
BILIRUB SERPL-MCNC: 0.6 MG/DL (ref 0.2–1)
BUN SERPL-MCNC: 40 MG/DL (ref 7–18)
CALCIUM SERPL-MCNC: 8.6 MG/DL (ref 8.5–10.1)
CHLORIDE SERPL-SCNC: 106 MMOL/L (ref 98–107)
CO2 SERPL-SCNC: 28 MMOL/L (ref 21–32)
CREAT SERPL-MCNC: 1.8 MG/DL (ref 0.55–1.3)
DACRYOCYTES BLD QL SMEAR: (no result)
DEPRECATED RDW RBC AUTO: 19.2 % (ref 11.6–15.6)
EOSINOPHIL # BLD: 2.2 % (ref 0–4.5)
GLUCOSE SERPL-MCNC: 130 MG/DL (ref 74–106)
HCT VFR BLD CALC: 30.9 % (ref 32.4–45.2)
HGB BLD-MCNC: 9.6 GM/DL (ref 10.7–15.3)
INR BLD: 3.12 (ref 0.83–1.09)
LYMPHOCYTES # BLD: 9.8 % (ref 8–40)
MACROCYTES BLD QL: 0
MCH RBC QN AUTO: 24.3 PG (ref 25.7–33.7)
MCHC RBC AUTO-ENTMCNC: 31.1 G/DL (ref 32–36)
MCV RBC: 78.1 FL (ref 80–96)
MONOCYTES # BLD AUTO: 11.6 % (ref 3.8–10.2)
NEUTROPHILS # BLD: 75.3 % (ref 42.8–82.8)
OVALOCYTES BLD QL SMEAR: (no result)
PLATELET # BLD AUTO: 233 K/MM3 (ref 134–434)
PLATELET BLD QL SMEAR: NORMAL
PMV BLD: 7.7 FL (ref 7.5–11.1)
POTASSIUM SERPLBLD-SCNC: 3.8 MMOL/L (ref 3.5–5.1)
PROT SERPL-MCNC: 6.1 G/DL (ref 6.4–8.2)
PT PNL PPP: 37.2 SEC (ref 9.7–13)
RBC # BLD AUTO: 3.96 M/MM3 (ref 3.6–5.2)
SODIUM SERPL-SCNC: 145 MMOL/L (ref 136–145)
TARGETS BLD QL SMEAR: (no result)
WBC # BLD AUTO: 8.6 K/MM3 (ref 4–10)

## 2020-02-13 RX ADMIN — PANTOPRAZOLE SODIUM SCH MG: 40 INJECTION, POWDER, FOR SOLUTION INTRAVENOUS at 21:17

## 2020-02-13 RX ADMIN — FERROUS SULFATE TAB EC 324 MG (65 MG FE EQUIVALENT) SCH MG: 324 (65 FE) TABLET DELAYED RESPONSE at 12:22

## 2020-02-13 RX ADMIN — POLYETHYLENE GLYCOL 3350 SCH GM: 17 POWDER, FOR SOLUTION ORAL at 12:22

## 2020-02-13 RX ADMIN — FUROSEMIDE SCH: 40 TABLET ORAL at 09:07

## 2020-02-13 RX ADMIN — PANTOPRAZOLE SODIUM SCH MG: 40 INJECTION, POWDER, FOR SOLUTION INTRAVENOUS at 12:22

## 2020-02-13 RX ADMIN — SODIUM CHLORIDE SCH MLS/HR: 9 INJECTION, SOLUTION INTRAVENOUS at 09:06

## 2020-02-13 RX ADMIN — DEXTROSE MONOHYDRATE SCH MLS/HR: 50 INJECTION, SOLUTION INTRAVENOUS at 14:33

## 2020-02-13 RX ADMIN — ACETAMINOPHEN PRN MG: 10 INJECTION, SOLUTION INTRAVENOUS at 17:23

## 2020-02-13 RX ADMIN — LOSARTAN POTASSIUM SCH: 25 TABLET, FILM COATED ORAL at 09:07

## 2020-02-13 NOTE — PN
Progress Note (short form)





- Note


Progress Note: 


NAD on NC O2.  No CP or SOB. 


HR better controlled. 


No acute events overnight. 


 Intake & Output











 02/10/20 02/11/20 02/12/20 02/13/20





 23:59 23:59 23:59 23:59


 


Intake Total 168 82 950 504


 


Output Total    4


 


Balance 168 82 950 500


 


Weight    123 lb 9.6 oz











 Last Vital Signs











Temp Pulse Resp BP Pulse Ox


 


 97.5 F L  111 H  20   107/66   96 


 


 02/13/20 08:00  02/13/20 09:34  02/13/20 09:34  02/13/20 09:34  02/13/20 12:02








Active Medications





Acetaminophen (Tylenol Oral Solution -)  650 mg PO Q4H PRN


   PRN Reason: FEVER


Acetaminophen (Ofirmev Injection -)  650 mg IVPB Q4H PRN


   PRN Reason: FEVER


   Last Admin: 02/11/20 21:09 Dose:  650 mg


Atorvastatin Calcium (Lipitor -)  10 mg PO HS [facility]


   Last Admin: 02/09/20 21:14 Dose:  10 mg


Ferrous Sulfate (Feosol -)  325 mg PO DAILY [facility]


   Last Admin: 02/12/20 09:06 Dose:  325 mg


Furosemide (Lasix -)  40 mg PO DAILY [facility]


   Last Admin: 02/13/20 09:07 Dose:  Not Given


Potassium Chloride 10 meq/ (Dextrose)  1,005 mls @ 42 mls/hr IV Q24H [facility]


   Last Admin: 02/12/20 15:26 Dose:  42 mls/hr


Diltiazem HCl 125 mg/ Sodium (Chloride)  125 mls @ 5 mls/hr IVPB TITR [facility]; 

Protocol


   Last Admin: 02/13/20 09:06 Dose:  5 mg/hr, 5 mls/hr


Lorazepam (Ativan Injection -)  1 mg IM BID PRN


   PRN Reason: ANXIETY


   Last Admin: 02/12/20 21:00 Dose:  1 mg


Losartan Potassium (Cozaar -)  25 mg PO DAILY [facility]


   Last Admin: 02/13/20 09:07 Dose:  Not Given


Metoprolol Succinate (Toprol Xl -)  50 mg PO DAILY [facility]


   Last Admin: 02/13/20 09:08 Dose:  Not Given


Metoprolol Tartrate (Lopressor Injection -)  5 mg IVPUSH Q4H PRN


   PRN Reason: TACHYCARDIA


   Last Admin: 02/12/20 23:59 Dose:  5 mg


Pantoprazole Sodium (Protonix Iv)  40 mg IVPUSH BID [facility]


   Last Admin: 02/12/20 21:00 Dose:  40 mg


Polyethylene Glycol (Miralax (For Daily Use) -)  17 gm PO DAILY [facility]


   Last Admin: 02/12/20 11:17 Dose:  17 gm


Quetiapine Fumarate (Seroquel -)  50 mg PO TID [facility]


   Last Admin: 02/13/20 05:08 Dose:  50 mg








Constitutional: Yes: No Distress


Eyes: Yes: EOM Intact


HENT: Yes: Normocephalic


Neck: Yes: Trachea Midline


Cardiovascular: Yes: Pulse Irregular, S1, S2


Respiratory: Yes: Diminished


Gastrointestinal: Yes: Soft


Edema: No


Labs: 


 Laboratory Results - last 24 hr











  02/09/20 02/12/20 02/13/20





  13:30 14:34 05:40


 


WBC   


 


RBC   


 


Hgb   


 


Hct   


 


MCV   


 


MCH   


 


MCHC   


 


RDW   


 


Plt Count   


 


MPV   


 


Absolute Neuts (auto)   


 


Neutrophils %   


 


Lymphocytes %   


 


Monocytes %   


 


Eosinophils %   


 


Basophils %   


 


Nucleated RBC %   


 


PT with INR   34.70 H  37.20 H


 


INR   2.91 H  3.12 H


 


Sodium   


 


Potassium   


 


Chloride   


 


Carbon Dioxide   


 


Anion Gap   


 


BUN   


 


Creatinine   


 


Est GFR (CKD-EPI)AfAm   


 


Est GFR (CKD-EPI)NonAf   


 


Random Glucose   


 


Calcium   


 


Total Bilirubin   


 


AST   


 


ALT   


 


Alkaline Phosphatase   


 


Total Protein   


 


Albumin   


 


Blood Type  B POSITIVE  


 


Antibody Screen  Negative  


 


Crossmatch  See Detail  














  02/13/20 02/13/20





  05:40 05:40


 


WBC  8.6 


 


RBC  3.96 


 


Hgb  9.6 L 


 


Hct  30.9 L 


 


MCV  78.1 L 


 


MCH  24.3 L 


 


MCHC  31.1 L 


 


RDW  19.2 H 


 


Plt Count  233 


 


MPV  7.7 


 


Absolute Neuts (auto)  6.4 


 


Neutrophils %  75.3 


 


Lymphocytes %  9.8 


 


Monocytes %  11.6 H 


 


Eosinophils %  2.2 


 


Basophils %  1.1 


 


Nucleated RBC %  1 H 


 


PT with INR  


 


INR  


 


Sodium   145


 


Potassium   3.8


 


Chloride   106


 


Carbon Dioxide   28


 


Anion Gap   11


 


BUN   40.0 H


 


Creatinine   1.8 H


 


Est GFR (CKD-EPI)AfAm   28.62


 


Est GFR (CKD-EPI)NonAf   24.69


 


Random Glucose   130 H


 


Calcium   8.6


 


Total Bilirubin   0.6


 


AST   13 L


 


ALT   13


 


Alkaline Phosphatase   84


 


Total Protein   6.1 L


 


Albumin   2.6 L


 


Blood Type  


 


Antibody Screen  


 


Crossmatch  














Problem List





- Problems


(1) CAD (coronary artery disease)


Code(s): I25.10 - ATHSCL HEART DISEASE OF NATIVE CORONARY ARTERY W/O ANG PCTRS 

  


Qualifiers: 


   Coronary Disease-Associated Artery/Lesion type: native artery   Native vs. 

transplanted heart: native heart   Associated angina: without angina   

Qualified Code(s): I25.10 - Atherosclerotic heart disease of native coronary 

artery without angina pectoris   





(2) CHF exacerbation


Code(s): I50.9 - HEART FAILURE, UNSPECIFIED   


Qualifiers: 


   Heart failure type: unspecified 





(3) Chronic anemia


Code(s): D64.9 - ANEMIA, UNSPECIFIED   





(4) Chronic kidney disease (CKD), stage III (moderate)


Code(s): N18.3 - CHRONIC KIDNEY DISEASE, STAGE 3 (MODERATE)   





(5) H/O: CVA (cerebrovascular accident)


Code(s): Z86.73 - PRSNL HX OF TIA (TIA), AND CEREB INFRC W/O RESID DEFICITS   





(6) Angiodysplasia of cecum


Code(s): K55.20 - ANGIODYSPLASIA OF COLON WITHOUT HEMORRHAGE   





(7) Atrial fibrillation


Code(s): I48.91 - UNSPECIFIED ATRIAL FIBRILLATION   


Qualifiers: 


   Atrial fibrillation type: permanent 





(8) Dementia


Code(s): F03.90 - UNSPECIFIED DEMENTIA WITHOUT BEHAVIORAL DISTURBANCE   


Qualifiers: 


   Dementia type: Parkinson's disease   Dementia behavioral disturbance: 

without behavioral disturbance   Qualified Code(s): G20 - Parkinson's disease; 

F02.80 - Dementia in other diseases classified elsewhere without behavioral 

disturbance; F02.80 - Dementia in other diseases classified elsewhere without 

behavioral disturbance; F02.80 - Dementia in other diseases classified 

elsewhere without behavioral disturbance   





(9) Pacemaker


Code(s): Z95.0 - PRESENCE OF CARDIAC PACEMAKER   





Assessment/Plan


SUPPLEMENTAL O2 AS NEEDED  


RATE CONTROL 


MONITOR HGB


ASPIRATION PRECAUTIONS 


LASIX 


ABX 





Dr Merritt

## 2020-02-13 NOTE — PN
Teaching Attending Note


Name of Resident: Zachery German





ATTENDING PHYSICIAN STATEMENT





I saw and evaluated the patient.


I reviewed the resident's note and discussed the case with the resident.


I agree with the resident's findings and plan as documented.








SUBJECTIVE: Semi-interactive during medical interview. No complaints. No CP/

palpitations/SOB








OBJECTIVE: Afebrile, RVR overnight. Follows some commands. Awake, comfortable, 

Oriented x 1. 





 Last Vital Signs











Temp Pulse Resp BP Pulse Ox


 


 97.5 F L  111 H  20   107/66   96 


 


 02/13/20 08:00  02/13/20 09:34  02/13/20 09:34  02/13/20 09:34  02/13/20 12:02











Heart - S1, S2, soft SM


Lungs - decreased air entry bibasally


Abdomen - Soft, non-tender. Bowel Sounds normal.


Extremities - no edema, no calf tenderness. 





 Laboratory Results - last 24 hr











  02/09/20 02/12/20 02/13/20





  13:30 14:34 05:40


 


WBC   


 


RBC   


 


Hgb   


 


Hct   


 


MCV   


 


MCH   


 


MCHC   


 


RDW   


 


Plt Count   


 


MPV   


 


Absolute Neuts (auto)   


 


Neutrophils %   


 


Lymphocytes %   


 


Monocytes %   


 


Eosinophils %   


 


Basophils %   


 


Nucleated RBC %   


 


PT with INR   34.70 H  37.20 H


 


INR   2.91 H  3.12 H


 


Sodium   


 


Potassium   


 


Chloride   


 


Carbon Dioxide   


 


Anion Gap   


 


BUN   


 


Creatinine   


 


Est GFR (CKD-EPI)AfAm   


 


Est GFR (CKD-EPI)NonAf   


 


Random Glucose   


 


Calcium   


 


Total Bilirubin   


 


AST   


 


ALT   


 


Alkaline Phosphatase   


 


Total Protein   


 


Albumin   


 


Blood Type  B POSITIVE  


 


Antibody Screen  Negative  


 


Crossmatch  See Detail  














  02/13/20 02/13/20





  05:40 05:40


 


WBC  8.6 


 


RBC  3.96 


 


Hgb  9.6 L 


 


Hct  30.9 L 


 


MCV  78.1 L 


 


MCH  24.3 L 


 


MCHC  31.1 L 


 


RDW  19.2 H 


 


Plt Count  233 


 


MPV  7.7 


 


Absolute Neuts (auto)  6.4 


 


Neutrophils %  75.3 


 


Lymphocytes %  9.8 


 


Monocytes %  11.6 H 


 


Eosinophils %  2.2 


 


Basophils %  1.1 


 


Nucleated RBC %  1 H 


 


PT with INR  


 


INR  


 


Sodium   145


 


Potassium   3.8


 


Chloride   106


 


Carbon Dioxide   28


 


Anion Gap   11


 


BUN   40.0 H


 


Creatinine   1.8 H


 


Est GFR (CKD-EPI)AfAm   28.62


 


Est GFR (CKD-EPI)NonAf   24.69


 


Random Glucose   130 H


 


Calcium   8.6


 


Total Bilirubin   0.6


 


AST   13 L


 


ALT   13


 


Alkaline Phosphatase   84


 


Total Protein   6.1 L


 


Albumin   2.6 L


 


Blood Type  


 


Antibody Screen  


 


Crossmatch  








 Current Medications











Generic Name Dose Route Start Last Admin





  Trade Name Freq  PRN Reason Stop Dose Admin


 


Acetaminophen  650 mg  02/10/20 15:03  





  Tylenol Oral Solution -  PO   





  Q4H PRN   





  FEVER   





     





     





     


 


Acetaminophen  650 mg  02/10/20 17:29  02/11/20 21:09





  Ofirmev Injection -  IVPB   650 mg





  Q4H PRN   Administration





  FEVER   





     





     





     


 


Atorvastatin Calcium  10 mg  02/08/20 22:00  02/09/20 21:14





  Lipitor -  PO   10 mg





  HS [facility]   Administration





     





     





     





     


 


Ferrous Sulfate  325 mg  02/09/20 11:15  02/13/20 12:22





  Feosol -  PO   325 mg





  DAILY [facility]   Administration





     





     





     





     


 


Furosemide  40 mg  02/13/20 10:00  02/13/20 09:07





  Lasix -  PO   Not Given





  DAILY [facility]   





     





     





     





     


 


Potassium Chloride 10 meq/  1,005 mls @ 42 mls/hr  02/12/20 13:19  02/12/20 15:

26





  Dextrose  IV   42 mls/hr





  Q24H [facility]   Administration





     





     





     





     


 


Diltiazem HCl 125 mg/ Sodium  125 mls @ 5 mls/hr  02/13/20 08:45  02/13/20 09:06





  Chloride  IVPB   5 mg/hr





  TITR [facility]   5 mls/hr





     Administration





     





  Protocol   





  5 MG/HR   


 


Lorazepam  1 mg  02/12/20 17:48  02/12/20 21:00





  Ativan Injection -  IM   1 mg





  BID PRN   Administration





  ANXIETY   





     





     





     


 


Losartan Potassium  25 mg  02/13/20 10:00  02/13/20 09:07





  Cozaar -  PO   Not Given





  DAILY [facility]   





     





     





     





     


 


Metoprolol Succinate  50 mg  02/13/20 10:00  02/13/20 09:08





  Toprol Xl -  PO   Not Given





  DAILY [facility]   





     





     





     





     


 


Metoprolol Tartrate  5 mg  02/12/20 15:18  02/12/20 23:59





  Lopressor Injection -  IVPUSH   5 mg





  Q4H PRN   Administration





  TACHYCARDIA   





     





     





     


 


Pantoprazole Sodium  40 mg  02/11/20 22:00  02/13/20 12:22





  Protonix Iv  IVPUSH   40 mg





  BID [facility]   Administration





     





     





     





     


 


Polyethylene Glycol  17 gm  02/12/20 10:00  02/13/20 12:22





  Miralax (For Daily Use) -  PO   17 gm





  DAILY [facility]   Administration





     





     





     





     


 


Quetiapine Fumarate  50 mg  02/10/20 15:15  02/13/20 05:08





  Seroquel -  PO   50 mg





  TID [facility]   Administration





     





     





     





     








 Home Medications











 Medication  Instructions  Recorded


 


Simvastatin [Zocor -] 20 mg PO HS 05/24/14


 


Diltiazem Cd [Cardizem Cd -] 180 mg PO DAILY 01/08/16


 


Gabapentin [Neurontin] 100 mg PO BID 01/08/16


 


Quetiapine Fumarate [Seroquel -] 50 mg PO BID  tablet 02/02/16


 


Furosemide [Lasix] 20 mg PO ASDIR 05/22/18


 


Isosorbide Mononitrate [Imdur -] 30 mg PO DAILY 05/22/18


 


Warfarin Na [Coumadin -] 1 mg PO HS 05/22/18


 


Carvedilol [Coreg -] 6.25 mg PO DAILY 02/08/20

















 





ASSESSMENT AND PLAN:





88 year old female with history of Parkinson's disease, Dementia, Atrial 

fibrillation, DVT s/p IVC filter, Hx GI bleed 2/2 to Colonic AVMs, Hx CVA with 

R sided weakness,  CAD (s/p PCI 2005), CKD 3, Chronic Systolic CHF, HTN, HLD, 

Hx of left sided lung CA s/p pneumonectomy, presented with increasing confusion

, weakness, and poor oral intake.





1. Acute Metabolic Encephalopathy on background baseline Dementia


Sec to decompensated CHF and Pneumonia


CT Head - no acute findings.


Mental status improving.





2. Acute on Chronic Systolic CHF


Echo shows global hypokinesis with 20%EF. 


Venous congestion on CT, elevated BNP


IV lasix transitioned to oral Lasix.


Optimization of HF meds as per Cardio - BB changed to Metoprolol XL and started 

on ARB. 


Utility of Eplerenone, Entresto discussed with Cardiology.





3. Acute Pneumonia ?aspiration


Continue Unasyn.


Speech therapy evaluated - regular solid, thin liquid diet recommended.


L Paratracheal enlarged LN on imaging


Pulm follow up on discharge.





4. Atrial fibrillation with RVR


Resume Cardizem drip this AM as per Cardio. 


For up-titration of Metoprolol XL to BID dosing if BP allows. For consideration 

of Amiodarone or loading with Digoxin with Cardiology.


On Coumadin for AC (daughter confirms that patient has been on Coumadin 

chronically at home) - INR supratherapeutic at 3.12 - will hold dose today.





5. Microcytic Anemia sec to chronic GI blood loss


H/H 9.6/30.9 s/p 1 unit PRBCs.


FOBT - trace


Hx of colonoic AVMs and Hx of Hiatal Hernia with Chauncey erosions.


GI consulted - recommend Iron replacement with IV venofer, PPI. Patient/Family 

declines EGD/Colonoscopy. She is DNR/DNI. 





6. ELENITA on CKD 3 sec to Hepatorenal Syndrome


Improved with IV Lasix diuresis, now Creat up slightly. Lasix transitioned to 

PO.





7. Renal Cysts/Non-obstructing Renal Stone 


Urology follow up on discharge.





8. Dementia with Behavioral Disturbance


Continue Seroquel


Family requests Psychiatry consultation with Dr. Bond





9. Hx DVT s/p IVC filter - on Coumadin. INR supratherapeutic. Coumadin held.





10. HTN, HLD, CAD s/p PCI - Continue BB, ARB, Zocor. Imdur held. 





11. Hypokalemia - repleted. 








DVT Px - On Coumadin


DNR/DNI

## 2020-02-13 NOTE — PN
Physical Exam: 


SUBJECTIVE: Patient seen and examined. Pt placed back on drip due to rvr and pt 

had episodes of rvr overnight Rx with metoprolol and lopressor pushes. 








OBJECTIVE:





 Vital Signs











 Period  Temp  Pulse  Resp  BP Sys/Card  Pulse Ox


 


 Last 24 Hr  97.5 F-98.3 F    18-37  /66-87  96-99











GENERAL: The patient is awake, more lethargic. 


LUNGS: Breath sounds equal, clear to auscultation bilaterally, no wheezes, no 

crackles, no 


accessory muscle use. 


HEART: tachy to 130's, normal S1, S2 without murmur, rub or gallop.


ABDOMEN: Soft, nontender, nondistended.


EXTREMITIES: 2+ pulses, warm, well-perfused, no edema. 














 Laboratory Results - last 24 hr











  02/13/20 02/13/20 02/13/20





  05:40 05:40 05:40


 


WBC   8.6 


 


RBC   3.96 


 


Hgb   9.6 L 


 


Hct   30.9 L 


 


MCV   78.1 L 


 


MCH   24.3 L 


 


MCHC   31.1 L 


 


RDW   19.2 H 


 


Plt Count   233 


 


MPV   7.7 


 


Absolute Neuts (auto)   6.4 


 


Neutrophils %   75.3 


 


Neutrophils % (Manual)   78.8 


 


Band Neutrophils %   0.0 


 


Lymphocytes %   9.8 


 


Lymphocytes % (Manual)   8.1 


 


Monocytes %   11.6 H 


 


Monocytes % (Manual)   8 


 


Eosinophils %   2.2 


 


Eosinophils % (Manual)   1.0 


 


Basophils %   1.1 


 


Basophils % (Manual)   0.0 


 


Myelocytes % (Man)   3 H D 


 


Promyelocytes % (Man)   0 


 


Blast Cells % (Manual)   0 


 


Nucleated RBC %   0 


 


Metamyelocytes   0 


 


Hypochromia   0 


 


Platelet Estimate   Normal 


 


Polychromasia   2+ 


 


Poikilocytosis   1+ 


 


Anisocytosis   1+ 


 


Microcytosis   1+ 


 


Macrocytosis   0 


 


Target Cells   1+ 


 


Tear Drop Cells   2+ 


 


Ovalocytes   2+ 


 


Sophia Cells   1+ 


 


Acanthocytes (Spur)   1+ 


 


Schistocytes   1+ 


 


PT with INR  37.20 H  


 


INR  3.12 H  


 


Sodium    145


 


Potassium    3.8


 


Chloride    106


 


Carbon Dioxide    28


 


Anion Gap    11


 


BUN    40.0 H


 


Creatinine    1.8 H


 


Est GFR (CKD-EPI)AfAm    28.62


 


Est GFR (CKD-EPI)NonAf    24.69


 


POC Glucometer   


 


Random Glucose    130 H


 


Calcium    8.6


 


Total Bilirubin    0.6


 


AST    13 L


 


ALT    13


 


Alkaline Phosphatase    84


 


Total Protein    6.1 L


 


Albumin    2.6 L














  02/13/20





  14:52


 


WBC 


 


RBC 


 


Hgb 


 


Hct 


 


MCV 


 


MCH 


 


MCHC 


 


RDW 


 


Plt Count 


 


MPV 


 


Absolute Neuts (auto) 


 


Neutrophils % 


 


Neutrophils % (Manual) 


 


Band Neutrophils % 


 


Lymphocytes % 


 


Lymphocytes % (Manual) 


 


Monocytes % 


 


Monocytes % (Manual) 


 


Eosinophils % 


 


Eosinophils % (Manual) 


 


Basophils % 


 


Basophils % (Manual) 


 


Myelocytes % (Man) 


 


Promyelocytes % (Man) 


 


Blast Cells % (Manual) 


 


Nucleated RBC % 


 


Metamyelocytes 


 


Hypochromia 


 


Platelet Estimate 


 


Polychromasia 


 


Poikilocytosis 


 


Anisocytosis 


 


Microcytosis 


 


Macrocytosis 


 


Target Cells 


 


Tear Drop Cells 


 


Ovalocytes 


 


[place] Cells 


 


Acanthocytes (Spur) 


 


Schistocytes 


 


PT with INR 


 


INR 


 


Sodium 


 


Potassium 


 


Chloride 


 


Carbon Dioxide 


 


Anion Gap 


 


BUN 


 


Creatinine 


 


Est GFR (CKD-EPI)AfAm 


 


Est GFR (CKD-EPI)NonAf 


 


POC Glucometer  243


 


Random Glucose 


 


Calcium 


 


Total Bilirubin 


 


AST 


 


ALT 


 


Alkaline Phosphatase 


 


Total Protein 


 


Albumin 








Active Medications











Generic Name Dose Route Start Last Admin





  Trade Name Freq  PRN Reason Stop Dose Admin


 


Acetaminophen  650 mg  02/10/20 15:03  





  Tylenol Oral Solution -  PO   





  Q4H PRN   





  FEVER   





     





     





     


 


Acetaminophen  650 mg  02/10/20 17:29  02/13/20 17:23





  Ofirmev Injection -  IVPB   650 mg





  Q4H PRN   Administration





  FEVER   





     





     





     


 


Atorvastatin Calcium  10 mg  02/08/20 22:00  02/09/20 21:14





  Lipitor -  PO   10 mg





  HS [facility]   Administration





     





     





     





     


 


Ferrous Sulfate  325 mg  02/09/20 11:15  02/13/20 12:22





  Feosol -  PO   325 mg





  DAILY HNERY   Administration





     





     





     





     


 


Furosemide  40 mg  02/13/20 10:00  02/13/20 09:07





  Lasix -  PO   Not Given





  DAILY HENRY   





     





     





     





     


 


Potassium Chloride 10 meq/  1,005 mls @ 42 mls/hr  02/12/20 13:19  02/13/20 14:

33





  Dextrose  IV   42 mls/hr





  Q24H HENRY   Administration





     





     





     





     


 


Diltiazem HCl 125 mg/ Sodium  125 mls @ 5 mls/hr  02/13/20 08:45  02/13/20 09:06





  Chloride  IVPB   5 mg/hr





  TITR HEN[facility]   5 mls/hr





     Administration





     





  Protocol   





  5 MG/HR   


 


Lorazepam  1 mg  02/12/20 17:48  02/12/20 21:00





  Ativan Injection -  IM   1 mg





  BID PRN   Administration





  ANXIETY   





     





     





     


 


Losartan Potassium  25 mg  02/13/20 10:00  02/13/20 09:07





  Cozaar -  PO   Not Given





  DAILY HENRY   





     





     





     





     


 


Metoprolol Succinate  50 mg  02/13/20 10:00  02/13/20 09:08





  Toprol Xl -  PO   Not Given





  DAILY HENRY   





     





     





     





     


 


Metoprolol Tartrate  5 mg  02/12/20 15:18  02/12/20 23:59





  Lopressor Injection -  IVPUSH   5 mg





  Q4H PRN   Administration





  TACHYCARDIA   





     





     





     


 


Pantoprazole Sodium  40 mg  02/11/20 22:00  02/13/20 12:22





  Protonix Iv  IVPUSH   40 mg





  BID [facility]EN[facility]   Administration





     





     





     





     


 


Polyethylene Glycol  17 gm  02/12/20 10:00  02/13/20 12:22





  Miralax (For Daily Use) -  PO   17 gm





  DAILY HENRY   Administration





     





     





     





     


 


Quetiapine Fumarate  50 mg  02/10/20 15:15  02/13/20 14:33





  Seroquel -  PO   50 mg





  TID HENRY   Administration





     





     





     





     











ASSESSMENT/PLAN:





87 y/o F, w/ pmh of multiple Co-morbidities including H/O DVT and Afib, GI 

bleed 2/2 to angiodysplasia, Rt LE DVT, PE and Coumadin 1 mg weekdays and off 

weekends, H/O Alzheimer's, CVA,  Parkinson's,  CAD (angioplasty 2005),  CKD, CHF

, presented to the ED c/o of 1 week hx of changes in mentation, altered 

behavior and decreased PO intake. 





#Acute metabolic encephalopathy


- 2/2 worsening Dementia


- improved today


-workup negative so far. lytes wnl, ct head neg. 


-Per family at bedside, pt appears at baseline today


- hx of embolic stroke during cardiac cath with Rt sided residual deficits. 


- CT head negative 





#A-Fib w/ RVR


- Currently adequately rate controlled, pacemaker working properly per cardio


- Dr. Parrish interrogated the pacemaker and deemed it normally functioning. 


- HASBLED score of 6 (>10%risk of bleed). 


- Pt has INR 2.91 today, will hold coumadin dose today


-monitor on tele


- spoke with nurse to titrate down dilt drip as tolerated given pts HR in 80's 

and stable BP. 


- digoxin is not an option due to poor renal ftn dig toxicity, and amio is not 

an option given pt has been in AF for years and likely wont work in this 

setting per cardio. 





#Acute PNA/Left Pleural effusion 


-?Aspiration


-likely chronic


-CXR showed left lung infiltrates vs atelectasis, left pleural effusion


- UNASYN DISCONTINUED BY ID


- left paratracheal LN on imaging





#Anemia


-IMPROVED today. Hb of 8.3 today.


- stool occult trace blood but pt on iron


- As per family, she runs in high 8 Hb


- continue coumadin as INR now therapeutic


- hx of left sided lung CA s/p pneumonectomy/hx of cecum bleed/vascular 

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 in stomach due to 

large hiatal hernia, esophageal stricture as well per GI


- pt started on IV Protonix 40 BID in event of chronic GI bleed.  





#ELENITA on CKD 3 2/2 hepatorenal syndrome


- improved w Lasix 40 BID


-Cre and BUN elevated from baseline. Prerenal ratio


-Renal US : nonobstructing L 6mm stone, mild R renal pelvis dilation, b/l cysts


- c/w PO lasix 40 daily given improvement in Cr. 


-avoid nephrotoxins where possible





#CHFrEF now in acute exacerbation


-echo shows global hypokinesis with 20%EF. 


- c/w Lasix 40 PO daily (home dose is 20PO) 


- held losartan 25 this am given hypotension, after discussion with cardio


- cardio will assess need for aldactone as o/p





#Renal Cysts/Non-obstructing Renal Stone 


Urology follow up on discharge.





#Dementia with Behavioral Disturbance


Continue Seroquel


Family requests Psychiatry consultation with Dr. Bond so consulted





#Hx DVT s/p IVC filter 


- will hold Coumadin at this time INR supratherapeutic today.





#HTN, HLD, CAD s/p PCI 


- DISCONTINUED Imdur previously was on hold and pt hypotensive, per cardio 

increased to metoprol 50 BID succinate, resting mgus scan to accurately depict 

EF and BNP to assess CHF severity and possible need to diurese more, c/w Zocor.





Dispo: DNR/DNI Cardio d/w family ablation and family declined transfer. 

Tomorrow Cardio [facility] group will be covering Dr. Parrish while hes away.   





DVT Px - INR therapeutic, on Coumadin


DNR/DNI








Visit type





- Emergency Visit


Emergency Visit: Yes


ED Registration Date: 02/07/20


Care time: The patient presented to the Emergency Department on the above date 

and was hospitalized for further evaluation of their emergent condition.





- New Patient


This patient is new to me today: No





- Critical Care


Critical Care patient: No





- Discharge Referral


Referred to [facility] No





ATTENDING PHYSICIAN STATEMENT





I saw and evaluated the patient.


I reviewed the resident's note and discussed the case with the resident.


I agree with the resident's findings and plan as documented.








SUBJECTIVE:








OBJECTIVE:








ASSESSMENT AND PLAN:

## 2020-02-13 NOTE — PN
Progress Note, Physician


History of Present Illness: 





stable


now


had high heart rate


on cardizem drip


daughter in room





- Current Medication List


Current Medications: 


Active Medications





Acetaminophen (Tylenol Oral Solution -)  650 mg PO Q4H PRN


   PRN Reason: FEVER


Acetaminophen (Ofirmev Injection -)  650 mg IVPB Q4H PRN


   PRN Reason: FEVER


   Last Admin: 02/11/20 21:09 Dose:  650 mg


Atorvastatin Calcium (Lipitor -)  10 mg PO HS [facility]


   Last Admin: 02/09/20 21:14 Dose:  10 mg


Ferrous Sulfate (Feosol -)  325 mg PO DAILY [facility]


   Last Admin: 02/13/20 12:22 Dose:  325 mg


Furosemide (Lasix -)  40 mg PO DAILY [facility]


   Last Admin: 02/13/20 09:07 Dose:  Not Given


Potassium Chloride 10 meq/ (Dextrose)  1,005 mls @ 42 mls/hr IV Q24H [facility]


   Last Admin: 02/12/20 15:26 Dose:  42 mls/hr


Diltiazem HCl 125 mg/ Sodium (Chloride)  125 mls @ 5 mls/hr IVPB TITR [facility]; 

Protocol


   Last Admin: 02/13/20 09:06 Dose:  5 mg/hr, 5 mls/hr


Lorazepam (Ativan Injection -)  1 mg IM BID PRN


   PRN Reason: ANXIETY


   Last Admin: 02/12/20 21:00 Dose:  1 mg


Losartan Potassium (Cozaar -)  25 mg PO DAILY [facility]


   Last Admin: 02/13/20 09:07 Dose:  Not Given


Metoprolol Succinate (Toprol Xl -)  50 mg PO DAILY [facility]


   Last Admin: 02/13/20 09:08 Dose:  Not Given


Metoprolol Tartrate (Lopressor Injection -)  5 mg IVPUSH Q4H PRN


   PRN Reason: TACHYCARDIA


   Last Admin: 02/12/20 23:59 Dose:  5 mg


Pantoprazole Sodium (Protonix Iv)  40 mg IVPUSH BID [facility]


   Last Admin: 02/13/20 12:22 Dose:  40 mg


Polyethylene Glycol (Miralax (For Daily Use) -)  17 gm PO DAILY [facility]


   Last Admin: 02/13/20 12:22 Dose:  17 gm


Quetiapine Fumarate (Seroquel -)  50 mg PO TID [facility]


   Last Admin: 02/13/20 05:08 Dose:  50 mg











- Objective


Vital Signs: 


 Vital Signs











Temperature  98 F   02/13/20 13:25


 


Pulse Rate  110 H  02/13/20 13:25


 


Respiratory Rate  20   02/13/20 09:34


 


Blood Pressure  106/76   02/13/20 13:25


 


O2 Sat by Pulse Oximetry (%)  96   02/13/20 12:02











Constitutional: Yes: No Distress, Calm


Cardiovascular: Yes: Tachycardia, S1, S2


Respiratory: Yes: Regular, CTA Bilaterally


Gastrointestinal: Yes: Normal Bowel Sounds, Soft


Musculoskeletal: Yes: WNL


Extremities: Yes: WNL


Neurological: Yes: Alert


Psychiatric: Yes: Alert


Labs: 


 CBC, BMP





 02/13/20 05:40 





 02/13/20 05:40 





 INR, PTT











INR  3.12  (0.83-1.09)  H  02/13/20  05:40    














Assessment/Plan





Problem List





- Problems


(1) CAD (coronary artery disease)


Code(s): I25.10 - ATHSCL HEART DISEASE OF NATIVE CORONARY ARTERY W/O ANG PCTRS 

  


Qualifiers: 


   Coronary Disease-Associated Artery/Lesion type: native artery   Native vs. 

transplanted heart: native heart   Associated angina: without angina   

Qualified Code(s): I25.10 - Atherosclerotic heart disease of native coronary 

artery without angina pectoris   





(2) CHF exacerbation


Code(s): I50.9 - HEART FAILURE, UNSPECIFIED   


Qualifiers: 


   Heart failure type: unspecified 





(3) Chronic anemia


Code(s): D64.9 - ANEMIA, UNSPECIFIED   





(4) Chronic kidney disease (CKD), stage III (moderate)


Code(s): N18.3 - CHRONIC KIDNEY DISEASE, STAGE 3 (MODERATE)   





(5) H/O: CVA (cerebrovascular accident)


Code(s): Z86.73 - PRSNL HX OF TIA (TIA), AND CEREB INFRC W/O RESID DEFICITS   





(6) Angiodysplasia of cecum


Code(s): K55.20 - ANGIODYSPLASIA OF COLON WITHOUT HEMORRHAGE   





(7) Atrial fibrillation


Code(s): I48.91 - UNSPECIFIED ATRIAL FIBRILLATION   


Qualifiers: 


   Atrial fibrillation type: permanent 





(8) Dementia


Code(s): F03.90 - UNSPECIFIED DEMENTIA WITHOUT BEHAVIORAL DISTURBANCE   


Qualifiers: 


   Dementia type: Parkinson's disease   Dementia behavioral disturbance: 

without behavioral disturbance   Qualified Code(s): G20 - Parkinson's disease; 

F02.80 - Dementia in other diseases classified elsewhere without behavioral 

disturbance; F02.80 - Dementia in other diseases classified elsewhere without 

behavioral disturbance; F02.80 - Dementia in other diseases classified 

elsewhere without behavioral disturbance   





(9) Pacemaker


Code(s): Z95.0 - PRESENCE OF CARDIAC PACEMAKER   








plan


continue current mgmt


rest continue current mgmt


asp precautions


rest as per the team

## 2020-02-13 NOTE — PN
Progress Note (short form)





- Note


Progress Note: 





88-year-old female  was admitted with progressive loss of appetite, lethargy  

increased somnolence was found to be severely anemic, she on admission had   

supra normal INR,, respiratory and congestive heart failure. Patient is awake 

and able to recognize her surroundings and family.  Resting comfortably.





 Severe left ventricular systolic dysfunction with EF of 20%(see report). 

Atrial fib. with periods of rapid ventricular response. These changes may be  

related to tachycardia induced cardiomyopathy. dose of metoprolol could be 

increased to 50 mg by mouth BID, provided BP is stable. Patient patient is not 

dyspneic,   she is resting comfortably. If rate remains uncontrolled may need 

to consider AV davy RFA.








Active Medications





Acetaminophen (Tylenol Oral Solution -)  650 mg PO Q4H PRN


   PRN Reason: FEVER


Acetaminophen (Ofirmev Injection -)  650 mg IVPB Q4H PRN


   PRN Reason: FEVER


   Last Admin: 02/11/20 21:09 Dose:  650 mg


Atorvastatin Calcium (Lipitor -)  10 mg PO HS [facility]


   Last Admin: 02/09/20 21:14 Dose:  10 mg


Ferrous Sulfate (Feosol -)  325 mg PO DAILY [facility]


   Last Admin: 02/12/20 09:06 Dose:  325 mg


Furosemide (Lasix -)  40 mg PO DAILY [facility]


   Last Admin: 02/13/20 09:07 Dose:  Not Given


Potassium Chloride 10 meq/ (Dextrose)  1,005 mls @ 42 mls/hr IV Q24H [facility]


   Last Admin: 02/12/20 15:26 Dose:  42 mls/hr


Diltiazem HCl 125 mg/ Sodium (Chloride)  125 mls @ 5 mls/hr IVPB TITR [facility]; 

Protocol


   Last Admin: 02/13/20 09:06 Dose:  5 mg/hr, 5 mls/hr


Lorazepam (Ativan Injection -)  1 mg IM BID PRN


   PRN Reason: ANXIETY


   Last Admin: 02/12/20 21:00 Dose:  1 mg


Losartan Potassium (Cozaar -)  25 mg PO DAILY [facility]


   Last Admin: 02/13/20 09:07 Dose:  Not Given


Metoprolol Succinate (Toprol Xl -)  50 mg PO DAILY [facility]


   Last Admin: 02/13/20 09:08 Dose:  Not Given


Metoprolol Tartrate (Lopressor Injection -)  5 mg IVPUSH Q4H PRN


   PRN Reason: TACHYCARDIA


   Last Admin: 02/12/20 23:59 Dose:  5 mg


Pantoprazole Sodium (Protonix Iv)  40 mg IVPUSH BID [facility]


   Last Admin: 02/12/20 21:00 Dose:  40 mg


Polyethylene Glycol (Miralax (For Daily Use) -)  17 gm PO DAILY [facility]


   Last Admin: 02/12/20 11:17 Dose:  17 gm


Quetiapine Fumarate (Seroquel -)  50 mg PO TID [facility]


   Last Admin: 02/13/20 05:08 Dose:  50 mg





 Last Vital Signs











Temp Pulse Resp BP Pulse Ox


 


 97.5 F L  135 H  22 H  102/83   98 


 


 02/13/20 08:00  02/13/20 09:06  02/13/20 08:00  02/13/20 09:06  02/13/20 07:58











 


Neck: Supple, jjugular venous distention, positive hepatojugular reflux.


Heart: No heaves or thrills, heart sounds are distant,grade 2/6 ejection 

systolic murmur was heard at the second right intercostal space and along the 

left sternal border ending in early to mid systole. No diastolic murmur was 

heard.


Lungs: Fine crepitations at the bases.


Abdomen: Soft,  slight left lower quadrant guarding, no hepatosplenomegaly was 

appreciated.


Extremities: trace pretibial edema.


                                                                               

                                                


                                                                               

                                             


 CBC, BMP





 02/13/20 05:40 





 02/13/20 05:40 





 INR, PTT











INR  3.12  (0.83-1.09)  H  02/13/20  05:40    

















Impression:


1.  Atrial fibrillation with periods rapid ventricular response.


2.  Coronary artery disease, status post PCI/stenting, angina pectoris.


3.  Status post embolic CVA.


4.  Vascular dementia.


5.  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 

cardiomyopathy.


6.  Status post permanent pacemaker for carotid hypersensitivity.


7.  Anemia  related to blood loss.


8.  History of chronic kidney disease.





Recommendation:


1.  Discussed case with resident and concur with increasing the dose of beta-

blocker and adding ARB.


2.  Continue other medications


3.  If there is difficulty in controlling the rapid ventricular response, Could 

increase the dose to 50mg. twice a day provided BP is stable.


4.  Close follow-up of CBC.


5.  Close monitoring of INR(patient is not on A/C).


6.  Elevated INR needs evaluation without being on warfarin.


7.  Repeat BNP.


8. May require extra dose of Lasix.


9. Resting MUGA scan.





  Prognosis, critical.





  








Lee Parrish MD

## 2020-02-13 NOTE — PN
Progress Note, Physician


History of Present Illness: 





Pt seen and examined at bedside. She is more lethargic today. 





- Current Medication List


Current Medications: 


Active Medications





Acetaminophen (Tylenol Oral Solution -)  650 mg PO Q4H PRN


   PRN Reason: FEVER


Acetaminophen (Ofirmev Injection -)  650 mg IVPB Q4H PRN


   PRN Reason: FEVER


   Last Admin: 02/11/20 21:09 Dose:  650 mg


Atorvastatin Calcium (Lipitor -)  10 mg PO HS [facility]


   Last Admin: 02/09/20 21:14 Dose:  10 mg


Ferrous Sulfate (Feosol -)  325 mg PO DAILY [facility]


   Last Admin: 02/13/20 12:22 Dose:  325 mg


Furosemide (Lasix -)  40 mg PO DAILY [facility]


   Last Admin: 02/13/20 09:07 Dose:  Not Given


Potassium Chloride 10 meq/ (Dextrose)  1,005 mls @ 42 mls/hr IV Q24H [facility]


   Last Admin: 02/13/20 14:33 Dose:  42 mls/hr


Diltiazem HCl 125 mg/ Sodium (Chloride)  125 mls @ 5 mls/hr IVPB TITR [facility]; 

Protocol


   Last Admin: 02/13/20 09:06 Dose:  5 mg/hr, 5 mls/hr


Lorazepam (Ativan Injection -)  1 mg IM BID PRN


   PRN Reason: ANXIETY


   Last Admin: 02/12/20 21:00 Dose:  1 mg


Losartan Potassium (Cozaar -)  25 mg PO DAILY [facility]


   Last Admin: 02/13/20 09:07 Dose:  Not Given


Metoprolol Succinate (Toprol Xl -)  50 mg PO DAILY [facility]


   Last Admin: 02/13/20 09:08 Dose:  Not Given


Metoprolol Tartrate (Lopressor Injection -)  5 mg IVPUSH Q4H PRN


   PRN Reason: TACHYCARDIA


   Last Admin: 02/12/20 23:59 Dose:  5 mg


Pantoprazole Sodium (Protonix Iv)  40 mg IVPUSH BID [facility]


   Last Admin: 02/13/20 12:22 Dose:  40 mg


Polyethylene Glycol (Miralax (For Daily Use) -)  17 gm PO DAILY [facility]


   Last Admin: 02/13/20 12:22 Dose:  17 gm


Quetiapine Fumarate (Seroquel -)  50 mg PO TID [facility]


   Last Admin: 02/13/20 14:33 Dose:  50 mg











- Objective


Vital Signs: 


 Vital Signs











Temperature  98 F   02/13/20 13:25


 


Pulse Rate  110 H  02/13/20 13:25


 


Respiratory Rate  18   02/13/20 12:00


 


Blood Pressure  106/76   02/13/20 13:25


 


O2 Sat by Pulse Oximetry (%)  96   02/13/20 12:02











Constitutional: Yes: Calm


Eyes: Yes: Conjunctiva Clear


HENT: Yes: Atraumatic


Neck: Yes: Supple


Cardiovascular: Yes: S1, S2


Respiratory: Yes: On Nasal O2


Gastrointestinal: Yes: Soft


Genitourinary: Yes: Incontinence


Musculoskeletal: Yes: Muscle Weakness


Edema: No


Integumentary: Yes: WNL


Neurological: Yes: Lethargy


Labs: 


 CBC, BMP





 02/13/20 05:40 





 02/13/20 05:40 





 INR, PTT











INR  3.12  (0.83-1.09)  H  02/13/20  05:40    














Problem List





- Problems


(1) CKD (chronic kidney disease)


Code(s): N18.9 - CHRONIC KIDNEY DISEASE, UNSPECIFIED   





(2) CHF exacerbation


Code(s): I50.9 - HEART FAILURE, UNSPECIFIED   


Qualifiers: 


   Heart failure type: unspecified   Qualified Code(s): I50.9 - Heart failure, 

unspecified   





Assessment/Plan


 Current Medications











Generic Name Dose Route Start Last Admin





  Trade Name Freq  PRN Reason Stop Dose Admin


 


Acetaminophen  650 mg  02/10/20 15:03  





  Tylenol Oral Solution -  PO   





  Q4H PRN   





  FEVER   





     





     





     


 


Acetaminophen  650 mg  02/10/20 17:29  02/11/20 21:09





  Ofirmev Injection -  IVPB   650 mg





  Q4H PRN   Administration





  FEVER   





     





     





     


 


Atorvastatin Calcium  10 mg  02/08/20 22:00  02/09/20 21:14





  Lipitor -  PO   10 mg





  HS [facility]EN[facility]   Administration





     





     





     





     


 


Ferrous Sulfate  325 mg  02/09/20 11:15  02/13/20 12:22





  Feosol -  PO   325 mg





  DAILY HENRY   Administration





     





     





     





     


 


Furosemide  40 mg  02/13/20 10:00  02/13/20 09:07





  Lasix -  PO   Not Given





  DAILY HENRY   





     





     





     





     


 


Potassium Chloride 10 meq/  1,005 mls @ 42 mls/hr  02/12/20 13:19  02/13/20 14:

33





  Dextrose  IV   42 mls/hr





  Q24H [facility]   Administration





     





     





     





     


 


Diltiazem HCl 125 mg/ Sodium  125 mls @ 5 mls/hr  02/13/20 08:45  02/13/20 09:06





  Chloride  IVPB   5 mg/hr





  TITR [facility]   5 mls/hr





     Administration





     





  Protocol   





  5 MG/HR   


 


Lorazepam  1 mg  02/12/20 17:48  02/12/20 21:00





  Ativan Injection -  IM   1 mg





  BID PRN   Administration





  ANXIETY   





     





     





     


 


Losartan Potassium  25 mg  02/13/20 10:00  02/13/20 09:07





  Cozaar -  PO   Not Given





  DAILY HENRY   





     





     





     





     


 


Metoprolol Succinate  50 mg  02/13/20 10:00  02/13/20 09:08





  Toprol Xl -  PO   Not Given





  DAILY HENRY   





     





     





     





     


 


Metoprolol Tartrate  5 mg  02/12/20 15:18  02/12/20 23:59





  Lopressor Injection -  IVPUSH   5 mg





  Q4H PRN   Administration





  TACHYCARDIA   





     





     





     


 


Pantoprazole Sodium  40 mg  02/11/20 22:00  02/13/20 12:22





  Protonix Iv  IVPUSH   40 mg





  BID HENRY   Administration





     





     





     





     


 


Polyethylene Glycol  17 gm  02/12/20 10:00  02/13/20 12:22





  Miralax (For Daily Use) -  PO   17 gm





  DAILY HENRY   Administration





     





     





     





     


 


Quetiapine Fumarate  50 mg  02/10/20 15:15  02/13/20 14:33





  Seroquel -  PO   50 mg





  TID HENRY   Administration





     





     





     





     














Impression


1. ELENITA


2. UTI


3. CHF ef 17 percent


4. CVA


5. HTN


6. cva


7. parkinsons


8. a-fib


9. hypernatremia








Plan


- monitor renal function


- potassium improved


- can cont d5 as she is lethargic and not eating


- volume status improving


- avoid nsaids


- avoid nephrotoxins

## 2020-02-14 LAB
ALBUMIN SERPL-MCNC: 2.4 G/DL (ref 3.4–5)
ALP SERPL-CCNC: 73 U/L (ref 45–117)
ALT SERPL-CCNC: 11 U/L (ref 13–61)
ANION GAP SERPL CALC-SCNC: 9 MMOL/L (ref 8–16)
AST SERPL-CCNC: 15 U/L (ref 15–37)
BASOPHILS # BLD: 0.7 % (ref 0–2)
BILIRUB SERPL-MCNC: 0.5 MG/DL (ref 0.2–1)
BNP SERPL-MCNC: (no result) PG/ML (ref 5–450)
BUN SERPL-MCNC: 40.1 MG/DL (ref 7–18)
CALCIUM SERPL-MCNC: 8.2 MG/DL (ref 8.5–10.1)
CHLORIDE SERPL-SCNC: 106 MMOL/L (ref 98–107)
CO2 SERPL-SCNC: 29 MMOL/L (ref 21–32)
CREAT SERPL-MCNC: 2 MG/DL (ref 0.55–1.3)
DEPRECATED RDW RBC AUTO: 19.5 % (ref 11.6–15.6)
EOSINOPHIL # BLD: 7.1 % (ref 0–4.5)
GLUCOSE SERPL-MCNC: 100 MG/DL (ref 74–106)
HCT VFR BLD CALC: 28.6 % (ref 32.4–45.2)
HGB BLD-MCNC: 8.7 GM/DL (ref 10.7–15.3)
INR BLD: 2.78 (ref 0.83–1.09)
LYMPHOCYTES # BLD: 8.4 % (ref 8–40)
MCH RBC QN AUTO: 24.4 PG (ref 25.7–33.7)
MCHC RBC AUTO-ENTMCNC: 30.6 G/DL (ref 32–36)
MCV RBC: 79.9 FL (ref 80–96)
MONOCYTES # BLD AUTO: 10.4 % (ref 3.8–10.2)
NEUTROPHILS # BLD: 73.4 % (ref 42.8–82.8)
PLATELET # BLD AUTO: 197 K/MM3 (ref 134–434)
PMV BLD: 7.5 FL (ref 7.5–11.1)
POTASSIUM SERPLBLD-SCNC: 3.9 MMOL/L (ref 3.5–5.1)
PROT SERPL-MCNC: 5.4 G/DL (ref 6.4–8.2)
PT PNL PPP: 33.1 SEC (ref 9.7–13)
RBC # BLD AUTO: 3.58 M/MM3 (ref 3.6–5.2)
SODIUM SERPL-SCNC: 144 MMOL/L (ref 136–145)
WBC # BLD AUTO: 9.4 K/MM3 (ref 4–10)

## 2020-02-14 RX ADMIN — LOSARTAN POTASSIUM SCH: 25 TABLET, FILM COATED ORAL at 10:13

## 2020-02-14 RX ADMIN — PANTOPRAZOLE SODIUM SCH MG: 40 INJECTION, POWDER, FOR SOLUTION INTRAVENOUS at 10:16

## 2020-02-14 RX ADMIN — DEXTROSE MONOHYDRATE SCH: 50 INJECTION, SOLUTION INTRAVENOUS at 15:22

## 2020-02-14 RX ADMIN — FUROSEMIDE SCH: 40 TABLET ORAL at 10:14

## 2020-02-14 RX ADMIN — FUROSEMIDE SCH MG: 40 TABLET ORAL at 14:47

## 2020-02-14 RX ADMIN — FERROUS SULFATE TAB EC 324 MG (65 MG FE EQUIVALENT) SCH: 324 (65 FE) TABLET DELAYED RESPONSE at 10:14

## 2020-02-14 RX ADMIN — SODIUM CHLORIDE SCH: 9 INJECTION, SOLUTION INTRAVENOUS at 10:13

## 2020-02-14 RX ADMIN — POLYETHYLENE GLYCOL 3350 SCH: 17 POWDER, FOR SOLUTION ORAL at 10:14

## 2020-02-14 RX ADMIN — PANTOPRAZOLE SODIUM SCH MG: 40 INJECTION, POWDER, FOR SOLUTION INTRAVENOUS at 21:34

## 2020-02-14 NOTE — PN
Teaching Attending Note


Name of Resident: Zachery German





ATTENDING PHYSICIAN STATEMENT





I saw and evaluated the patient.


I reviewed the resident's note and discussed the case with the resident.


I agree with the resident's findings and plan as documented.








SUBJECTIVE: More lethargic and drowsy today. No complaints. No CP/palpitations/

SOB.








OBJECTIVE: Afebrile, HR controlled. Lethargic, drowsy.





 Last Vital Signs











Temp Pulse Resp BP Pulse Ox


 


 98 F   70   18   92/63   100 


 


 02/14/20 09:00  02/14/20 10:13  02/13/20 22:00  02/14/20 10:13  02/14/20 10:00











Heart - S1, S2, soft SM


Lungs - decreased air entry bibasally


Abdomen - Soft, non-tender. Bowel Sounds normal.


Extremities - no edema, no calf tenderness. 





 Laboratory Results - last 24 hr











  02/13/20 02/14/20 02/14/20





  14:52 05:30 05:30


 


PT with INR   33.10 H 


 


INR   2.78 H 


 


Sodium    144


 


Potassium    3.9


 


Chloride    106


 


Carbon Dioxide    29


 


Anion Gap    9


 


BUN    40.1 H


 


Creatinine    2.0 H


 


Est GFR (CKD-EPI)AfAm    25.20


 


Est GFR (CKD-EPI)NonAf    21.74


 


POC Glucometer  243  


 


Random Glucose    100


 


Calcium    8.2 L


 


Total Bilirubin    0.5


 


AST    15


 


ALT    11 L


 


Alkaline Phosphatase    73


 


B-Natriuretic Peptide    30150.7 H


 


Total Protein    5.4 L


 


Albumin    2.4 L














  02/14/20





  11:02


 


PT with INR 


 


INR 


 


Sodium 


 


Potassium 


 


Chloride 


 


Carbon Dioxide 


 


Anion Gap 


 


BUN 


 


Creatinine 


 


Est GFR (CKD-EPI)AfAm 


 


Est GFR (CKD-EPI)NonAf 


 


POC Glucometer  96


 


Random Glucose 


 


Calcium 


 


Total Bilirubin 


 


AST 


 


ALT 


 


Alkaline Phosphatase 


 


B-Natriuretic Peptide 


 


Total Protein 


 


Albumin 








 Current Medications











Generic Name Dose Route Start Last Admin





  Trade Name Freq  PRN Reason Stop Dose Admin


 


Acetaminophen  650 mg  02/10/20 15:03  





  Tylenol Oral Solution -  PO   





  Q4H PRN   





  FEVER   





     





     





     


 


Acetaminophen  650 mg  02/10/20 17:29  02/13/20 17:23





  Ofirmev Injection -  IVPB   650 mg





  Q4H PRN   Administration





  FEVER   





     





     





     


 


Atorvastatin Calcium  10 mg  02/08/20 22:00  02/09/20 21:14





  Lipitor -  PO   10 mg





  HS [facility]   Administration





     





     





     





     


 


Ferrous Sulfate  325 mg  02/09/20 11:15  02/14/20 10:14





  Feosol -  PO   Not Given





  DAILY [facility]   





     





     





     





     


 


Furosemide  40 mg  02/14/20 14:00  





  Lasix -  PO   





  BIDLASIX [facility]   





     





     





     





     


 


Potassium Chloride 10 meq/  1,005 mls @ 42 mls/hr  02/12/20 13:19  02/13/20 14:

33





  Dextrose  IV   42 mls/hr





  Q24H [facility]   Administration





     





     





     





     


 


Diltiazem HCl 125 mg/ Sodium  125 mls @ 5 mls/hr  02/13/20 08:45  02/14/20 10:13





  Chloride  IVPB   Not Given





  TITR [facility]   





     





     





  Protocol   





  5 MG/HR   


 


Lorazepam  1 mg  02/12/20 17:48  02/12/20 21:00





  Ativan Injection -  IM   1 mg





  BID PRN   Administration





  ANXIETY   





     





     





     


 


Losartan Potassium  25 mg  02/13/20 10:00  02/14/20 10:13





  Cozaar -  PO   Not Given





  DAILY [facility]   





     





     





     





     


 


Metoprolol Succinate  50 mg  02/13/20 22:00  02/14/20 10:14





  Toprol Xl -  PO   Not Given





  BID [facility]   





     





     





     





     


 


Metoprolol Tartrate  5 mg  02/12/20 15:18  02/12/20 23:59





  Lopressor Injection -  IVPUSH   5 mg





  Q4H PRN   Administration





  TACHYCARDIA   





     





     





     


 


Pantoprazole Sodium  40 mg  02/11/20 22:00  02/14/20 10:16





  Protonix Iv  IVPUSH   40 mg





  BID [facility]   Administration





     





     





     





     


 


Polyethylene Glycol  17 gm  02/12/20 10:00  02/14/20 10:14





  Miralax (For Daily Use) -  PO   Not Given





  DAILY [facility]   





     





     





     





     


 


Quetiapine Fumarate  50 mg  02/10/20 15:15  02/14/20 05:19





  Seroquel -  PO   Not Given





  TID [facility]   





     





     





     





     








 Home Medications











 Medication  Instructions  Recorded


 


Simvastatin [Zocor -] 20 mg PO HS 05/24/14


 


Diltiazem Cd [Cardizem Cd -] 180 mg PO DAILY 01/08/16


 


Gabapentin [Neurontin] 100 mg PO BID 01/08/16


 


Quetiapine Fumarate [Seroquel -] 50 mg PO BID  tablet 02/02/16


 


Furosemide [Lasix] 20 mg PO ASDIR 05/22/18


 


Isosorbide Mononitrate [Imdur -] 30 mg PO DAILY 05/22/18


 


Warfarin Na [Coumadin -] 1 mg PO HS 05/22/18


 


Carvedilol [Coreg -] 6.25 mg PO DAILY 02/08/20

















ASSESSMENT AND PLAN:





88 year old female with history of Parkinson's disease, Dementia, Atrial 

fibrillation, DVT s/p IVC filter, Hx GI bleed 2/2 to Colonic AVMs, Hx CVA with 

R sided weakness,  CAD (s/p PCI 2005), CKD 3, Chronic Systolic CHF, HTN, HLD, 

Hx of left sided lung CA s/p pneumonectomy, presented with increasing confusion

, weakness, and poor oral intake.





1. Acute Metabolic and Toxic Encephalopathy on background baseline Dementia


Sec to decompensated CHF and Pneumonia


CT Head - no acute findings.


More lethargic today - likely sec to Seroquel (dosing increased to TID by 

Psychiatry) +/- Ativan  - will hold Seroquel/Ativan and monitor mental status. 





2. Acute on Chronic Systolic CHF


Echo shows global hypokinesis with 20%EF. 


Venous congestion on CT, elevated BNP


IV Lasix transitioned to oral Lasix - will increase dose to 40mg PO BID.


Optimization of HF meds as per Cardio - BB changed to Metoprolol XL and started 

on ARB. 


Utility of Eplerenone, Entresto discussed with Cardiology.





3. Acute Pneumonia ?aspiration


Completed Unasyn course.


Speech therapy evaluated - pureed solids, thin liquid diet recommended with 

magic cup, ensure.


L Paratracheal enlarged LN on imaging


Pulm follow up on discharge.





4. Atrial fibrillation with RVR - resolved


Continue Metoprolol XL BID dosing, wean off Cardizem drip. Home Coreg stopped 

in favor of Metoprolol.


On Coumadin for AC (daughter confirms that patient has been on Coumadin 

chronically at home) - INR 2.78 - will give Coumadin 0.5mg.





5. Microcytic Anemia sec to chronic GI blood loss


H/H 9.6/30.9 s/p 1 unit PRBCs.


FOBT - trace


Hx of colonoic AVMs and Hx of Hiatal Hernia with Chauncey erosions.


GI consulted - recommend Iron replacement with IV venofer, PPI. Patient/Family 

declines EGD/Colonoscopy. She is DNR/DNI. 





6. ELENITA on CKD 3 sec to Hepatorenal Syndrome


Improved with IV Lasix diuresis, now Creat up slightly. Lasix transitioned to 

PO.





7. Renal Cysts/Non-obstructing Renal Stone 


Urology follow up on discharge.





8. Dementia with Behavioral Disturbance


Normally on Seroquel BID - increased to TID by Dr. Bond, now patient 

lethargic, drowsy





9. Hx DVT s/p IVC filter - on Coumadin. INR 2.78. Coumadin 0.5mg today.





10. HTN, HLD, CAD s/p PCI - Continue BB, ARB, Zocor. Imdur held. 





11. Hypokalemia - repleted. 








DVT Px - On Coumadin


DNR/DNI

## 2020-02-14 NOTE — PN
Progress Note, Physician


Chief Complaint: 





lethargic


Opens eyes to name


Denies CP





Daughter at bedside thinks mental status is slowly improving.





TELE: Controlled AF at moment





- Current Medication List


Current Medications: 


Active Medications





Acetaminophen (Tylenol Oral Solution -)  650 mg PO Q4H PRN


   PRN Reason: FEVER


Acetaminophen (Ofirmev Injection -)  650 mg IVPB Q4H PRN


   PRN Reason: FEVER


   Last Admin: 02/13/20 17:23 Dose:  650 mg


Atorvastatin Calcium (Lipitor -)  10 mg PO HS [facility]


   Last Admin: 02/09/20 21:14 Dose:  10 mg


Ferrous Sulfate (Feosol -)  325 mg PO DAILY [facility]


   Last Admin: 02/13/20 12:22 Dose:  325 mg


Furosemide (Lasix -)  40 mg PO DAILY [facility]


   Last Admin: 02/13/20 09:07 Dose:  Not Given


Potassium Chloride 10 meq/ (Dextrose)  1,005 mls @ 42 mls/hr IV Q24H [facility]


   Last Admin: 02/13/20 14:33 Dose:  42 mls/hr


Diltiazem HCl 125 mg/ Sodium (Chloride)  125 mls @ 5 mls/hr IVPB TITR [facility]; 

Protocol


   Last Admin: 02/13/20 09:06 Dose:  5 mg/hr, 5 mls/hr


Lorazepam (Ativan Injection -)  1 mg IM BID PRN


   PRN Reason: ANXIETY


   Last Admin: 02/12/20 21:00 Dose:  1 mg


Losartan Potassium (Cozaar -)  25 mg PO DAILY [facility]


   Last Admin: 02/13/20 09:07 Dose:  Not Given


Metoprolol Succinate (Toprol Xl -)  50 mg PO BID [facility]


   Last Admin: 02/13/20 21:16 Dose:  50 mg


Metoprolol Tartrate (Lopressor Injection -)  5 mg IVPUSH Q4H PRN


   PRN Reason: TACHYCARDIA


   Last Admin: 02/12/20 23:59 Dose:  5 mg


Pantoprazole Sodium (Protonix Iv)  40 mg IVPUSH BID [facility]


   Last Admin: 02/13/20 21:17 Dose:  40 mg


Polyethylene Glycol (Miralax (For Daily Use) -)  17 gm PO DAILY [facility]


   Last Admin: 02/13/20 12:22 Dose:  17 gm


Quetiapine Fumarate (Seroquel -)  50 mg PO TID [facility]


   Last Admin: 02/14/20 05:19 Dose:  Not Given











- Objective


Vital Signs: 


 Vital Signs











Temperature  98.1 F   02/14/20 05:00


 


Pulse Rate  71   02/14/20 05:00


 


Respiratory Rate  18   02/13/20 22:00


 


Blood Pressure  94/57 L  02/14/20 05:00


 


O2 Sat by Pulse Oximetry (%)  100   02/14/20 00:00











Constitutional: Yes: No Distress


Cardiovascular: Yes: Pulse Irregular


Respiratory: Yes: Other (clear anteriorly)


Gastrointestinal: Yes: Soft


Edema: No


Labs: 


 CBC, BMP





 02/13/20 05:40 





 02/14/20 05:30 





 INR, PTT











INR  2.78  (0.83-1.09)  H  02/14/20  05:30    














- ....Imaging


Cat Scan: Report Reviewed





Assessment/Plan





Impression:


1.  Atrial fibrillation with periods rapid ventricular response, now improved.


2.  Coronary artery disease, status post PCI/stenting, angina pectoris.


3.  Status post embolic CVA.


4.  Vascular dementia.


5.  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 

cardiomyopathy.


6.  Status post permanent pacemaker for carotid hypersensitivity.


7.  Anemia  related to blood loss.


8.  History of chronic kidney disease.





Recommendation:


1. Rates currently controlled continue tele


2. Monitor INR goal 2-3


3. Would increase Lasix to either 60mg daily or 40mg BID as CT shows congestion

, BNP markedly elevate. Close monitoring renal fx. Creat may improve with extra 

diuresis.





  Prognosis, critical.

## 2020-02-14 NOTE — PN
Progress Note (short form)





- Note


Progress Note: 


NAD on NC O2.  


More lethargic today. 


No acute events overnight. 


 Intake & Output











 02/11/20 02/12/20 02/13/20 02/14/20





 23:59 23:59 23:59 23:59


 


Intake Total 82 950 634 90


 


Output Total   4 4


 


Balance 82 950 630 86


 


Weight   123 lb 9.6 oz 127 lb








 Last Vital Signs











Temp Pulse Resp BP Pulse Ox


 


 98 F   70   18   92/63   100 


 


 02/14/20 09:00  02/14/20 10:13  02/13/20 22:00  02/14/20 10:13  02/14/20 10:00








Active Medications





Acetaminophen (Tylenol Oral Solution -)  650 mg PO Q4H PRN


   PRN Reason: FEVER


Acetaminophen (Ofirmev Injection -)  650 mg IVPB Q4H PRN


   PRN Reason: FEVER


   Last Admin: 02/13/20 17:23 Dose:  650 mg


Atorvastatin Calcium (Lipitor -)  10 mg PO HS [facility]


   Last Admin: 02/09/20 21:14 Dose:  10 mg


Ferrous Sulfate (Feosol -)  325 mg PO DAILY [facility]


   Last Admin: 02/14/20 10:14 Dose:  Not Given


Furosemide (Lasix -)  40 mg PO BIDLASIX [facility]


Potassium Chloride 10 meq/ (Dextrose)  1,005 mls @ 42 mls/hr IV Q24H [facility]


   Last Admin: 02/13/20 14:33 Dose:  42 mls/hr


Diltiazem HCl 125 mg/ Sodium (Chloride)  125 mls @ 5 mls/hr IVPB TITR [facility]; 

Protocol


   Last Admin: 02/14/20 10:13 Dose:  Not Given


Lorazepam (Ativan Injection -)  1 mg IM BID PRN


   PRN Reason: ANXIETY


   Last Admin: 02/12/20 21:00 Dose:  1 mg


Losartan Potassium (Cozaar -)  25 mg PO DAILY [facility]


   Last Admin: 02/14/20 10:13 Dose:  Not Given


Metoprolol Succinate (Toprol Xl -)  50 mg PO BID [facility]


   Last Admin: 02/14/20 10:14 Dose:  Not Given


Metoprolol Tartrate (Lopressor Injection -)  5 mg IVPUSH Q4H PRN


   PRN Reason: TACHYCARDIA


   Last Admin: 02/12/20 23:59 Dose:  5 mg


Pantoprazole Sodium (Protonix Iv)  40 mg IVPUSH BID [facility]


   Last Admin: 02/14/20 10:16 Dose:  40 mg


Polyethylene Glycol (Miralax (For Daily Use) -)  17 gm PO DAILY [facility]


   Last Admin: 02/14/20 10:14 Dose:  Not Given


Quetiapine Fumarate (Seroquel -)  50 mg PO TID [facility]


   Last Admin: 02/14/20 05:19 Dose:  Not Given











Constitutional: Yes: Lethargic, No Distress


Eyes: Yes: EOM Intact


HENT: Yes: Normocephalic


Neck: Yes: Trachea Midline


Cardiovascular: Yes: Pulse Irregular, S1, S2


Respiratory: Yes: Diminished


Gastrointestinal: Yes: Soft


Edema: No


Labs: 


 Laboratory Results - last 24 hr











  02/13/20 02/14/20 02/14/20





  14:52 05:30 05:30


 


PT with INR   33.10 H 


 


INR   2.78 H 


 


Sodium    144


 


Potassium    3.9


 


Chloride    106


 


Carbon Dioxide    29


 


Anion Gap    9


 


BUN    40.1 H


 


Creatinine    2.0 H


 


Est GFR (CKD-EPI)AfAm    25.20


 


Est GFR (CKD-EPI)NonAf    21.74


 


POC Glucometer  243  


 


Random Glucose    100


 


Calcium    8.2 L


 


Total Bilirubin    0.5


 


AST    15


 


ALT    11 L


 


Alkaline Phosphatase    73


 


B-Natriuretic Peptide    55422.7 H


 


Total Protein    5.4 L


 


Albumin    2.4 L














  02/14/20





  11:02


 


PT with INR 


 


INR 


 


Sodium 


 


Potassium 


 


Chloride 


 


Carbon Dioxide 


 


Anion Gap 


 


BUN 


 


Creatinine 


 


Est GFR (CKD-EPI)AfAm 


 


Est GFR (CKD-EPI)NonAf 


 


POC Glucometer  96


 


Random Glucose 


 


Calcium 


 


Total Bilirubin 


 


AST 


 


ALT 


 


Alkaline Phosphatase 


 


B-Natriuretic Peptide 


 


Total Protein 


 


Albumin 

















Problem List





- Problems


(1) CAD (coronary artery disease)


Code(s): I25.10 - ATHSCL HEART DISEASE OF NATIVE CORONARY ARTERY W/O ANG PCTRS 

  


Qualifiers: 


   Coronary Disease-Associated Artery/Lesion type: native artery   Native vs. 

transplanted heart: native heart   Associated angina: without angina   

Qualified Code(s): I25.10 - Atherosclerotic heart disease of native coronary 

artery without angina pectoris   





(2) CHF exacerbation


Code(s): I50.9 - HEART FAILURE, UNSPECIFIED   


Qualifiers: 


   Heart failure type: unspecified 





(3) Chronic anemia


Code(s): D64.9 - ANEMIA, UNSPECIFIED   





(4) Chronic kidney disease (CKD), stage III (moderate)


Code(s): N18.3 - CHRONIC KIDNEY DISEASE, STAGE 3 (MODERATE)   





(5) H/O: CVA (cerebrovascular accident)


Code(s): Z86.73 - PRSNL HX OF TIA (TIA), AND CEREB INFRC W/O RESID DEFICITS   





(6) Angiodysplasia of cecum


Code(s): K55.20 - ANGIODYSPLASIA OF COLON WITHOUT HEMORRHAGE   





(7) Atrial fibrillation


Code(s): I48.91 - UNSPECIFIED ATRIAL FIBRILLATION   


Qualifiers: 


   Atrial fibrillation type: permanent 





(8) Dementia


Code(s): F03.90 - UNSPECIFIED DEMENTIA WITHOUT BEHAVIORAL DISTURBANCE   


Qualifiers: 


   Dementia type: Parkinson's disease   Dementia behavioral disturbance: 

without behavioral disturbance   Qualified Code(s): G20 - Parkinson's disease; 

F02.80 - Dementia in other diseases classified elsewhere without behavioral 

disturbance; F02.80 - Dementia in other diseases classified elsewhere without 

behavioral disturbance; F02.80 - Dementia in other diseases classified 

elsewhere without behavioral disturbance   





(9) Pacemaker


Code(s): Z95.0 - PRESENCE OF CARDIAC PACEMAKER   





Assessment/Plan


NOTED SEDATING MEDS HELD/DECREASED 


SUPPLEMENTAL O2 AS NEEDED  


RATE CONTROL 


MONITOR HGB


ASPIRATION PRECAUTIONS 


MELISSA Merritt

## 2020-02-14 NOTE — PN
Progress Note, Physician





- Current Medication List


Current Medications: 


Active Medications





Acetaminophen (Tylenol Oral Solution -)  650 mg PO Q4H PRN


   PRN Reason: FEVER


Acetaminophen (Ofirmev Injection -)  650 mg IVPB Q4H PRN


   PRN Reason: FEVER


   Last Admin: 02/13/20 17:23 Dose:  650 mg


Atorvastatin Calcium (Lipitor -)  10 mg PO HS [facility]


   Last Admin: 02/09/20 21:14 Dose:  10 mg


Ferrous Sulfate (Feosol -)  325 mg PO DAILY [facility]


   Last Admin: 02/14/20 10:14 Dose:  Not Given


Furosemide (Lasix -)  40 mg PO BIDLASIX [facility]


Potassium Chloride 10 meq/ (Dextrose)  1,005 mls @ 42 mls/hr IV Q24H [facility]


   Last Admin: 02/13/20 14:33 Dose:  42 mls/hr


Lorazepam (Ativan Injection -)  1 mg IM BID PRN


   PRN Reason: ANXIETY


   Last Admin: 02/12/20 21:00 Dose:  1 mg


Losartan Potassium (Cozaar -)  25 mg PO DAILY [facility]


   Last Admin: 02/14/20 10:13 Dose:  Not Given


Metoprolol Succinate (Toprol Xl -)  50 mg PO BID [facility]


   Last Admin: 02/14/20 10:14 Dose:  Not Given


Metoprolol Tartrate (Lopressor Injection -)  5 mg IVPUSH Q4H PRN


   PRN Reason: TACHYCARDIA


   Last Admin: 02/12/20 23:59 Dose:  5 mg


Pantoprazole Sodium (Protonix Iv)  40 mg IVPUSH BID [facility]


   Last Admin: 02/14/20 10:16 Dose:  40 mg


Polyethylene Glycol (Miralax (For Daily Use) -)  17 gm PO DAILY [facility]


   Last Admin: 02/14/20 10:14 Dose:  Not Given


Quetiapine Fumarate (Seroquel -)  50 mg PO TID [facility]


   Last Admin: 02/14/20 05:19 Dose:  Not Given











- Objective


Vital Signs: 


 Vital Signs











Temperature  98.3 F   02/14/20 14:25


 


Pulse Rate  74   02/14/20 14:25


 


Respiratory Rate  18   02/13/20 22:00


 


Blood Pressure  103/68   02/14/20 14:25


 


O2 Sat by Pulse Oximetry (%)  100   02/14/20 10:00











Labs: 


 CBC, BMP





 02/14/20 01:33 





 02/14/20 05:30 





 INR, PTT











INR  2.78  (0.83-1.09)  H  02/14/20  05:30

## 2020-02-14 NOTE — PN
Progress Note, SLP





- Note


Progress Note: 


 Selected Entries











  02/12/20 02/13/20 02/13/20





  14:01 04:00 08:00


 


Breakfast 75%  


 


Lunch 25%  


 


Temperature  98.3 F 97.5 F L














  02/13/20 02/13/20 02/13/20





  09:36 12:37 13:25


 


Breakfast 0  


 


Lunch  0 


 


Temperature   98 F














  02/13/20 02/13/20 02/14/20





  20:18 22:00 05:00


 


Breakfast   


 


Lunch  0 


 


Temperature 97.9 F  98.1 F








 Laboratory Tests











  02/12/20 02/13/20





  05:45 05:40


 


WBC  8.6  8.6











Pt normally with a good appetite. She eats reg food at home with dentures in. 

She had periods of producing copious phlegm with meals which was eliminated 

when on Zantac in the past.Lethargic which daughter feels is related to 

Seroquel and Ativan.





Lethargic, not arousable. 


Pt's daughter would like broth, pudding etc. Reviewed case and daughter in 

agreement with pureed diet. She reports occasional cough on thin liquids.





Suggest-Puree/thin liquids


If cough, dowengrade to nectar thick liquids


Defer MBS for now


Magic cup, ensure pudding, egg custard


Pt's daughter requesting RD consult

## 2020-02-14 NOTE — PN
Physical Exam: 


SUBJECTIVE: Patient seen and examined at bedside. More lethargic and drowsy 

today. Taken off dilt drip will monitor on PO negative inotropes








OBJECTIVE:





 Vital Signs











 Period  Temp  Pulse  Resp  BP Sys/Card  Pulse Ox


 


 Last 24 Hr  97.9 F-98.3 F  70-89  18  /57-81  














GENERAL: The patient is awake, more lethargic. 


LUNGS: Breath sounds equal, clear to auscultation bilaterally, no wheezes, no 

crackles, no 


accessory muscle use. 


HEART: 70's-80's HR, normal S1, S2 without murmur, rub or gallop.


ABDOMEN: Soft, nontender, nondistended.


EXTREMITIES: 2+ pulses, warm, well-perfused, no edema. 














 Laboratory Results - last 24 hr











  02/14/20 02/14/20 02/14/20





  01:33 05:30 05:30


 


WBC  9.4  


 


RBC  3.58 L  


 


Hgb  8.7 L  


 


Hct  28.6 L  


 


MCV  79.9 L  


 


MCH  24.4 L  


 


MCHC  30.6 L  


 


RDW  19.5 H  


 


Plt Count  197  


 


MPV  7.5  


 


Absolute Neuts (auto)  6.9  


 


Neutrophils %  73.4  


 


Lymphocytes %  8.4  


 


Monocytes %  10.4 H  


 


Eosinophils %  7.1 H D  


 


Basophils %  0.7  


 


Nucleated RBC %  0  


 


PT with INR   33.10 H 


 


INR   2.78 H 


 


Sodium    144


 


Potassium    3.9


 


Chloride    106


 


Carbon Dioxide    29


 


Anion Gap    9


 


BUN    40.1 H


 


Creatinine    2.0 H


 


Est GFR (CKD-EPI)AfAm    25.20


 


Est GFR (CKD-EPI)NonAf    21.74


 


POC Glucometer   


 


Random Glucose    100


 


Calcium    8.2 L


 


Total Bilirubin    0.5


 


AST    15


 


ALT    11 L


 


Alkaline Phosphatase    73


 


B-Natriuretic Peptide    28337.7 H


 


Total Protein    5.4 L


 


Albumin    2.4 L














  02/14/20





  11:02


 


WBC 


 


RBC 


 


Hgb 


 


Hct 


 


MCV 


 


MCH 


 


MCHC 


 


RDW 


 


Plt Count 


 


MPV 


 


Absolute Neuts (auto) 


 


Neutrophils % 


 


Lymphocytes % 


 


Monocytes % 


 


Eosinophils % 


 


Basophils % 


 


Nucleated RBC % 


 


PT with INR 


 


INR 


 


Sodium 


 


Potassium 


 


Chloride 


 


Carbon Dioxide 


 


Anion Gap 


 


BUN 


 


Creatinine 


 


Est GFR (CKD-EPI)AfAm 


 


Est GFR (CKD-EPI)NonAf 


 


POC Glucometer  96


 


Random Glucose 


 


Calcium 


 


Total Bilirubin 


 


AST 


 


ALT 


 


Alkaline Phosphatase 


 


B-Natriuretic Peptide 


 


Total Protein 


 


Albumin 








Active Medications











Generic Name Dose Route Start Last Admin





  Trade Name Freq  PRN Reason Stop Dose Admin


 


Acetaminophen  650 mg  02/10/20 15:03  





  Tylenol Oral Solution -  PO   





  Q4H PRN   





  FEVER   





     





     





     


 


Acetaminophen  650 mg  02/10/20 17:29  02/13/20 17:23





  Ofirmev Injection -  IVPB   650 mg





  Q4H PRN   Administration





  FEVER   





     





     





     


 


Atorvastatin Calcium  10 mg  02/08/20 22:00  02/09/20 21:14





  Lipitor -  PO   10 mg





  HS [facility]EN[facility]   Administration





     





     





     





     


 


Ferrous Sulfate  325 mg  02/09/20 11:15  02/14/20 10:14





  Feosol -  PO   Not Given





  DAILY [facility]   





     





     





     





     


 


Furosemide  40 mg  02/14/20 14:00  02/14/20 14:47





  Lasix -  PO   40 mg





  BIDLASIX [facility]ENRY   Administration





     





     





     





     


 


Potassium Chloride 10 meq/  1,005 mls @ 42 mls/hr  02/12/20 13:19  02/13/20 14:

33





  Dextrose  IV   42 mls/hr





  Q24H [facility]EN[facility]   Administration





     





     





     





     


 


Lorazepam  1 mg  02/12/20 17:48  02/12/20 21:00





  Ativan Injection -  IM   1 mg





  BID PRN   Administration





  ANXIETY   





     





     





     


 


Metoprolol Succinate  50 mg  02/13/20 22:00  02/13/20 21:16





  Toprol Xl -  PO   50 mg





  BID HENRY   Administration





     





     





     





     


 


Metoprolol Tartrate  5 mg  02/12/20 15:18  02/12/20 23:59





  Lopressor Injection -  IVPUSH   5 mg





  Q4H PRN   Administration





  TACHYCARDIA   





     





     





     


 


Pantoprazole Sodium  40 mg  02/11/20 22:00  02/14/20 10:16





  Protonix Iv  IVPUSH   40 mg





  BID [facility]ENRY   Administration





     





     





     





     


 


Polyethylene Glycol  17 gm  02/12/20 10:00  02/14/20 10:14





  Miralax (For Daily Use) -  PO   Not Given





  DAILY [facility]   





     





     





     





     


 


Quetiapine Fumarate  50 mg  02/10/20 15:15  02/14/20 05:19





  Seroquel -  PO   Not Given





  TID [facility]   





     





     





     





     











ASSESSMENT/PLAN:





87 y/o F, w/ pmh of multiple Co-morbidities including H/O DVT and Afib, GI 

bleed 2/2 to angiodysplasia, Rt LE DVT, PE and Coumadin 1 mg weekdays and off 

weekends, H/O Alzheimer's, CVA,  Parkinson's,  CAD (angioplasty 2005),  CKD, CHF

, presented to the ED c/o of 1 week hx of changes in mentation, altered 

behavior and decreased PO intake. 





#Acute metabolic encephalopathy


- 2/2 worsening Dementia


- improved today


-workup negative so far. lytes wnl, ct head neg. 


-Per family at bedside, pt appears at baseline today


- hx of embolic stroke during cardiac cath with Rt sided residual deficits. 


- CT head negative 


- more lethargic today will hold seroquel





#A-Fib w/ RVR


- Currently adequately rate controlled


- Cardio (Dr. Barahona) recommending increasing lasix to 40BID. 


- Pt has INR 2.78 today, will continue 0.5 coumadin today


- monitor on tele


- spoke with nurse to discontinue dilt drip as pt tolerated given pts HR in 80'

s and stable BP. 


- METOPROLOL 50 BID SUCCINATE


- digoxin is not an option due to poor renal ftn dig toxicity, and amio is not 

an option given pt has been in AF for years and likely wont work in this 

setting per cardio. 





#Acute PNA/Left Pleural effusion 


-?Aspiration 


- monitoring off abx, afebrile





#Anemia


-IMPROVED today. Hb of 8.7 today.


- baseline Hb 8


- continue 0.5 coumadin as INR now therapeutic


- c/w IV Protonix 40 BID in event of chronic GI bleed.  





#ELENITA on CKD 3 2/2 hepatorenal syndrome


-Cre and BUN elevated from baseline.


-Renal US : nonobstructing L 6mm stone, mild R renal pelvis dilation, b/l cysts


- Increased to PO lasix 40 bid given BNP increased. 


-avoid nephrotoxins where possible





#CHFrEF now in acute exacerbation


-echo shows global hypokinesis with 20%EF. 


- c/w Lasix 40 PO BID (home dose is 20PO) 


- held losartan 25 this am given hypotension, after discussion with cardio





#Renal Cysts/Non-obstructing Renal Stone 


Urology follow up on discharge.





#Dementia with Behavioral Disturbance


Holding Seroquel given her lethargy


Family requests Psychiatry consultation with Dr. Bond so consulted





#Hx DVT s/p IVC filter 


- will change dose to 0.5 Coumadin at this time, INR therapeutic today.





#HTN, HLD, CAD s/p PCI 


- DISCONTINUED Imdur which previously was on hold and pt hypotensive, per 

cardio increased to metoprol 50 BID succinate, requesting MGUS scan to 

accurately depict EF and BNP to assess CHF severity and possible need to 

diurese more, c/w Zocor.





Dispo: DNR/DNI 


Cardio - [facility] group will be covering Dr. Parrish while hes away.   





DVT Px - INR therapeutic, on Coumadin


DNR/DNI





Visit type





- Emergency Visit


Emergency Visit: Yes


ED Registration Date: 02/07/20


Care time: The patient presented to the Emergency Department on the above date 

and was hospitalized for further evaluation of their emergent condition.





- New Patient


This patient is new to me today: No





- Critical Care


Critical Care patient: No





- Discharge Referral


Referred to [facility] No





ATTENDING PHYSICIAN STATEMENT





I saw and evaluated the patient.


I reviewed the resident's note and discussed the case with the resident.


I agree with the resident's findings and plan as documented.








SUBJECTIVE:








OBJECTIVE:








ASSESSMENT AND PLAN:

## 2020-02-14 NOTE — PN
Progress Note, Physician


History of Present Illness: 





Pt seen and examined at bedside. She is still lethargic. SHe has poor po 

intake. 





- Current Medication List


Current Medications: 


Active Medications





Acetaminophen (Tylenol Oral Solution -)  650 mg PO Q4H PRN


   PRN Reason: FEVER


Acetaminophen (Ofirmev Injection -)  650 mg IVPB Q4H PRN


   PRN Reason: FEVER


   Last Admin: 02/13/20 17:23 Dose:  650 mg


Atorvastatin Calcium (Lipitor -)  10 [facility] PO HS [facility]


   Last Admin: 02/09/20 21:14 Dose:  10 mg


Ferrous Sulfate (Feosol -)  325 mg PO DAILY [facility]


   Last Admin: 02/14/20 10:14 Dose:  Not Given


Furosemide (Lasix -)  40 mg PO [facility]IDLASIX [facility]


   Last Admin: 02/14/20 14:47 Dose:  40 mg


Potassium Chloride 10 meq/ (Dextrose)  1,005 mls @ 42 mls/hr IV Q24H [facility]


   Last Admin: 02/13/20 14:33 Dose:  42 mls/hr


Lorazepam (Ativan Injection -)  1 mg IM BID PRN


   PRN Reason: ANXIETY


   Last Admin: 02/12/20 21:00 Dose:  1 mg


Losartan Potassium (Cozaar -)  25 mg PO DAILY [facility]


   Last Admin: 02/14/20 10:13 Dose:  Not Given


Metoprolol Succinate (Toprol Xl -)  50 mg PO BID [facility]


   Last Admin: 02/13/20 21:16 Dose:  50 mg


Metoprolol Tartrate (Lopressor Injection -)  5 mg IVPUSH Q4H PRN


   PRN Reason: TACHYCARDIA


   Last Admin: 02/12/20 23:59 Dose:  5 mg


Pantoprazole Sodium (Protonix Iv)  40 mg IVPUSH BID [facility]


   Last Admin: 02/14/20 10:16 Dose:  40 mg


Polyethylene Glycol (Miralax (For Daily Use) -)  17 gm PO DAILY [facility]


   Last Admin: 02/14/20 10:14 Dose:  Not Given


Quetiapine Fumarate (Seroquel -)  50 mg PO TID [facility]


   Last Admin: 02/14/20 05:19 Dose:  Not Given











- Objective


Vital Signs: 


 Vital Signs











Temperature  98.3 F   02/14/20 14:25


 


Pulse Rate  74   02/14/20 14:25


 


Respiratory Rate  18   02/13/20 22:00


 


Blood Pressure  103/68   02/14/20 14:25


 


O2 Sat by Pulse Oximetry (%)  100   02/14/20 10:00











Constitutional: Yes: Calm


Eyes: Yes: Conjunctiva Clear


HENT: Yes: Atraumatic


Neck: Yes: Supple


Cardiovascular: Yes: S1, S2


Respiratory: Yes: On Nasal O2


Gastrointestinal: Yes: Soft


Genitourinary: Yes: Incontinence


Musculoskeletal: Yes: Muscle Weakness


Edema: No


Neurological: Yes: Lethargy


Labs: 


 CBC, BMP





 02/14/20 01:33 





 02/14/20 05:30 





 INR, PTT











INR  2.78  (0.83-1.09)  H  02/14/20  05:30    














Problem List





- Problems


(1) CKD (chronic kidney disease)


Code(s): N18.9 - CHRONIC KIDNEY DISEASE, UNSPECIFIED   





(2) CHF exacerbation


Code(s): I50.9 - HEART FAILURE, UNSPECIFIED   


Qualifiers: 


   Heart failure type: unspecified   Qualified Code(s): I50.9 - Heart failure, 

unspecified   





Assessment/Plan


 Current Medications











Generic Name Dose Route Start Last Admin





  Trade Name Freq  PRN Reason Stop Dose Admin


 


Acetaminophen  650 mg  02/10/20 15:03  





  Tylenol Oral Solution -  PO   





  Q4H PRN   





  FEVER   





     





     





     


 


Acetaminophen  650 mg  02/10/20 17:29  02/13/20 17:23





  Ofirmev Injection -  IVPB   650 mg





  Q4H PRN   Administration





  FEVER   





     





     





     


 


Atorvastatin Calcium  10 mg  02/08/20 22:00  02/09/20 21:14





  Lipitor -  PO   10 mg





  HS [facility]EN[facility]   Administration





     





     





     





     


 


Ferrous Sulfate  325 mg  02/09/20 11:15  02/14/20 10:14





  Feosol -  PO   Not Given





  DAILY [facility]   





     





     





     





     


 


Furosemide  40 mg  02/14/20 14:00  02/14/20 14:47





  Lasix -  PO   40 mg





  BIDLASIX [facility]ENRY   Administration





     





     





     





     


 


Potassium Chloride 10 meq/  1,005 mls @ 42 mls/hr  02/12/20 13:19  02/13/20 14:

33





  Dextrose  IV   42 mls/hr





  Q24H [facility]EN[facility]   Administration





     





     





     





     


 


Lorazepam  1 mg  02/12/20 17:48  02/12/20 21:00





  Ativan Injection -  IM   1 mg





  BID PRN   Administration





  ANXIETY   





     





     





     


 


Losartan Potassium  25 mg  02/13/20 10:00  02/14/20 10:13





  Cozaar -  PO   Not Given





  DAILY [facility]   





     





     





     





     


 


Metoprolol Succinate  50 mg  02/13/20 22:00  02/13/20 21:16





  Toprol Xl -  PO   50 mg





  BID [facility]ENRY   Administration





     





     





     





     


 


Metoprolol Tartrate  5 mg  02/12/20 15:18  02/12/20 23:59





  Lopressor Injection -  IVPUSH   5 mg





  Q4H PRN   Administration





  TACHYCARDIA   





     





     





     


 


Pantoprazole Sodium  40 mg  02/11/20 22:00  02/14/20 10:16





  Protonix Iv  IVPUSH   40 mg





  BID HENRY   Administration





     





     





     





     


 


Polyethylene Glycol  17 gm  02/12/20 10:00  02/14/20 10:14





  Miralax (For Daily Use) -  PO   Not Given





  DAILY [facility]   





     





     





     





     


 


Quetiapine Fumarate  50 mg  02/10/20 15:15  02/14/20 05:19





  Seroquel -  PO   Not Given





  TID [facility]   





     





     





     





     














Impression


1. ELENITA


2. UTI


3. CHF ef 17 percent


4. CVA


5. HTN


6. cva


7. parkinsons


8. a-fib


9. hypernatremia








Plan


- crt is worsening


- bp is low


- hold losartan for now


- cont d5 as she is not eating


- cont lasix


- repeat labs in am

## 2020-02-15 LAB
ALBUMIN SERPL-MCNC: 2.5 G/DL (ref 3.4–5)
ALP SERPL-CCNC: 77 U/L (ref 45–117)
ALT SERPL-CCNC: 11 U/L (ref 13–61)
ANION GAP SERPL CALC-SCNC: 7 MMOL/L (ref 8–16)
AST SERPL-CCNC: 12 U/L (ref 15–37)
BASOPHILS # BLD: 0.3 % (ref 0–2)
BILIRUB SERPL-MCNC: 0.7 MG/DL (ref 0.2–1)
BUN SERPL-MCNC: 33.1 MG/DL (ref 7–18)
CALCIUM SERPL-MCNC: 8.2 MG/DL (ref 8.5–10.1)
CHLORIDE SERPL-SCNC: 104 MMOL/L (ref 98–107)
CO2 SERPL-SCNC: 31 MMOL/L (ref 21–32)
CREAT SERPL-MCNC: 1.8 MG/DL (ref 0.55–1.3)
DEPRECATED RDW RBC AUTO: 19.7 % (ref 11.6–15.6)
EOSINOPHIL # BLD: 3.7 % (ref 0–4.5)
GLUCOSE SERPL-MCNC: 98 MG/DL (ref 74–106)
HCT VFR BLD CALC: 30.1 % (ref 32.4–45.2)
HGB BLD-MCNC: 9.4 GM/DL (ref 10.7–15.3)
INR BLD: 3.21 (ref 0.83–1.09)
LYMPHOCYTES # BLD: 7.3 % (ref 8–40)
MCH RBC QN AUTO: 25 PG (ref 25.7–33.7)
MCHC RBC AUTO-ENTMCNC: 31.2 G/DL (ref 32–36)
MCV RBC: 80 FL (ref 80–96)
MONOCYTES # BLD AUTO: 8.1 % (ref 3.8–10.2)
NEUTROPHILS # BLD: 80.6 % (ref 42.8–82.8)
PLATELET # BLD AUTO: 206 K/MM3 (ref 134–434)
PMV BLD: 7.8 FL (ref 7.5–11.1)
POTASSIUM SERPLBLD-SCNC: 4.1 MMOL/L (ref 3.5–5.1)
PROT SERPL-MCNC: 5.7 G/DL (ref 6.4–8.2)
PT PNL PPP: 38.3 SEC (ref 9.7–13)
RBC # BLD AUTO: 3.77 M/MM3 (ref 3.6–5.2)
SODIUM SERPL-SCNC: 142 MMOL/L (ref 136–145)
WBC # BLD AUTO: 9.6 K/MM3 (ref 4–10)

## 2020-02-15 RX ADMIN — SODIUM CHLORIDE SCH MLS/HR: 9 INJECTION, SOLUTION INTRAVENOUS at 21:00

## 2020-02-15 RX ADMIN — DEXTROSE MONOHYDRATE SCH MLS/HR: 50 INJECTION, SOLUTION INTRAVENOUS at 20:01

## 2020-02-15 RX ADMIN — FUROSEMIDE SCH MG: 40 TABLET ORAL at 05:24

## 2020-02-15 RX ADMIN — FUROSEMIDE SCH MG: 40 TABLET ORAL at 15:15

## 2020-02-15 RX ADMIN — PANTOPRAZOLE SODIUM SCH MG: 40 INJECTION, POWDER, FOR SOLUTION INTRAVENOUS at 21:05

## 2020-02-15 RX ADMIN — FERROUS SULFATE TAB EC 324 MG (65 MG FE EQUIVALENT) SCH MG: 324 (65 FE) TABLET DELAYED RESPONSE at 12:04

## 2020-02-15 RX ADMIN — POLYETHYLENE GLYCOL 3350 SCH: 17 POWDER, FOR SOLUTION ORAL at 12:04

## 2020-02-15 RX ADMIN — PANTOPRAZOLE SODIUM SCH MG: 40 INJECTION, POWDER, FOR SOLUTION INTRAVENOUS at 11:12

## 2020-02-15 RX ADMIN — METOPROLOL TARTRATE PRN MG: 5 INJECTION, SOLUTION INTRAVENOUS at 19:09

## 2020-02-15 NOTE — PN
Physical Exam: 


SUBJECTIVE: Patient seen and examined. Pt taking off the dilt drip and HR went 

up to 130 overnight. Pt asymptomatic less lethargic today. 








OBJECTIVE:





 Vital Signs











 Period  Temp  Pulse  Resp  BP Sys/Card  Pulse Ox


 


 Last 24 Hr  98.3 F-98.9 F    18-24  /63-74  100











GENERAL: The patient is awake, more lethargic.  


LUNGS: Breath sounds equal, clear to auscultation bilaterally, no wheezes, no 

crackles, no 


accessory muscle use. 


HEART: 100-130 tachy up and down, irregular ryhthym, normal S1, S2 without 

murmur, rub or gallop.


ABDOMEN: Soft, nontender, nondistended.


EXTREMITIES: 2+ pulses, warm, well-perfused, no edema. 











 Laboratory Results - last 24 hr











  02/14/20 02/14/20 02/15/20





  01:33 17:40 05:45


 


WBC  9.4  


 


RBC  3.58 L  


 


Hgb  8.7 L  


 


Hct  28.6 L  


 


MCV  79.9 L  


 


MCH  24.4 L  


 


MCHC  30.6 L  


 


RDW  19.5 H  


 


Plt Count  197  


 


MPV  7.5  


 


Absolute Neuts (auto)  6.9  


 


Neutrophils %  73.4  


 


Lymphocytes %  8.4  


 


Monocytes %  10.4 H  


 


Eosinophils %  7.1 H D  


 


Basophils %  0.7  


 


Nucleated RBC %  0  


 


PT with INR    38.30 H


 


INR    3.21 H


 


Sodium   


 


Potassium   


 


Chloride   


 


Carbon Dioxide   


 


Anion Gap   


 


BUN   


 


Creatinine   


 


Est GFR (CKD-EPI)AfAm   


 


Est GFR (CKD-EPI)NonAf   


 


POC Glucometer   87 


 


Random Glucose   


 


Calcium   


 


Total Bilirubin   


 


AST   


 


ALT   


 


Alkaline Phosphatase   


 


Total Protein   


 


Albumin   














  02/15/20 02/15/20





  05:45 05:45


 


WBC  9.6 


 


RBC  3.77 


 


Hgb  9.4 L 


 


Hct  30.1 L 


 


MCV  80.0 


 


MCH  25.0 L 


 


MCHC  31.2 L 


 


RDW  19.7 H 


 


Plt Count  206 


 


MPV  7.8 


 


Absolute Neuts (auto)  7.8 


 


Neutrophils %  80.6 


 


Lymphocytes %  7.3 L 


 


Monocytes %  8.1 


 


Eosinophils %  3.7 


 


Basophils %  0.3 


 


Nucleated RBC %  0 


 


PT with INR  


 


INR  


 


Sodium   142


 


Potassium   4.1


 


Chloride   104


 


Carbon Dioxide   31


 


Anion Gap   7 L


 


BUN   33.1 H


 


Creatinine   1.8 H


 


Est GFR (CKD-EPI)AfAm   28.62


 


Est GFR (CKD-EPI)NonAf   24.69


 


POC Glucometer  


 


Random Glucose   98


 


Calcium   8.2 L


 


Total Bilirubin   0.7


 


AST   12 L


 


ALT   11 L


 


Alkaline Phosphatase   77


 


Total Protein   5.7 L


 


Albumin   2.5 L








Active Medications











Generic Name Dose Route Start Last Admin





  Trade Name Freq  PRN Reason Stop Dose Admin


 


Acetaminophen  650 mg  02/10/20 15:03  





  Tylenol Oral Solution -  PO   





  Q4H PRN   





  FEVER   





     





     





     


 


Acetaminophen  650 mg  02/10/20 17:29  02/13/20 17:23





  Ofirmev Injection -  IVPB   650 mg





  Q4H PRN   Administration





  FEVER   





     





     





     


 


Atorvastatin Calcium  10 mg  02/08/20 22:00  02/09/20 21:14





  Lipitor -  PO   10 mg





  HS [facility]ENRY   Administration





     





     





     





     


 


Ferrous Sulfate  325 mg  02/09/20 11:15  02/15/20 12:04





  Feosol -  PO   325 mg





  DAILY [facility]   Administration





     





     





     





     


 


Furosemide  40 mg  02/14/20 14:00  02/15/20 05:24





  Lasix -  PO   40 mg





  BIDLASIX HENRY   Administration





     





     





     





     


 


Potassium Chloride 10 meq/  1,005 mls @ 42 mls/hr  02/12/20 13:19  02/14/20 15:

22





  Dextrose  IV   Not Given





  Q24H [facility]EN[facility]   





     





     





     





     


 


Lorazepam  1 mg  02/12/20 17:48  02/12/20 21:00





  Ativan Injection -  IM   1 mg





  BID PRN   Administration





  ANXIETY   





     





     





     


 


Metoprolol Succinate  75 mg  02/15/20 22:00  





  Toprol Xl -  PO   





  BID HENRY   





     





     





     





     


 


Metoprolol Tartrate  5 mg  02/12/20 15:18  02/12/20 23:59





  Lopressor Injection -  IVPUSH   5 mg





  Q4H PRN   Administration





  TACHYCARDIA   





     





     





     


 


Pantoprazole Sodium  40 mg  02/11/20 22:00  02/15/20 11:12





  Protonix Iv  IVPUSH   40 mg





  BID HENRY   Administration





     





     





     





     


 


Polyethylene Glycol  17 gm  02/12/20 10:00  02/15/20 12:04





  Miralax (For Daily Use) -  PO   Not Given





  DAILY [facility]   





     





     





     





     


 


Quetiapine Fumarate  50 mg  02/10/20 15:15  02/14/20 05:19





  Seroquel -  PO   Not Given





  TID [facility]   





     





     





     





     


 


Warfarin Sodium  0.5 mg  02/14/20 18:00  02/14/20 17:42





  Coumadin -  PO   0.5 mg





  DAILY@1800 [facility]   Administration





     





     





     





     











ASSESSMENT/PLAN:





89 y/o F, w/ pmh of multiple Co-morbidities including H/O DVT and Afib, GI 

bleed 2/2 to angiodysplasia, Rt LE DVT, PE and Coumadin 1 mg weekdays and off 

weekends, H/O Alzheimer's, CVA,  Parkinson's,  CAD (angioplasty 2005),  CKD, CHF

, presented to the ED c/o of 1 week hx of changes in mentation, altered 

behavior and decreased PO intake. 





#Acute metabolic encephalopathy


- 2/2 worsening Dementia


- improved today


- workup negative so far. lytes wnl, ct head neg. 


- Per family at bedside, pt appears at baseline today


- hx of embolic stroke during cardiac cath with Rt sided residual deficits. 


- CT head negative 


- less lethargic today continue hold seroquel





#A-Fib w/ RVR


- Currently adequately rate controlled


- Cardio (Dr. Barahona) recommending increasing lasix to 40BID, will 

continue. 


- Pt has INR supratherapeutic today, will hold 0.5 coumadin today


- monitor on tele


- s/p dilt drip discontinued


- METOPROLOL increased to 75 BID SUCCINATE


- s/w Dr. Figueroa- digoxin may be an option considering we can monitor dig 

levels while in hospital but not a great long term option, and amio is another 

option given her labile BP. 





#Acute PNA/Left Pleural effusion 


-?Aspiration 


- monitoring off abx, afebrile





#Anemia


- IMPROVED today. Hb of 8.7 today.


- baseline Hb 8


- hold 0.5 coumadin as INR now therapeutic


- c/w IV Protonix 40 BID in event of chronic GI bleed.  





#ELENITA on CKD 3 2/2 hepatorenal syndrome


-Cre and BUN elevated from baseline.


-Renal US : nonobstructing L 6mm stone, mild R renal pelvis dilation, b/l 

cysts. 


-avoid nephrotoxins where possible





#CHFrEF now in acute exacerbation


-echo shows global hypokinesis with 20%EF. 


- c/w Lasix 40 PO BID (home dose is 20PO) 


- held losartan 25 this am given hypotension, after discussion with cardio





#Renal Cysts/Non-obstructing Renal Stone 


Urology follow up on discharge.





#Dementia with Behavioral Disturbance


continue to hold Seroquel given her lethargy, lethargy imptoved today  


Family requests Psychiatry consultation with Dr. Bond so consulted





#Hx DVT s/p IVC filter 


- hold 0.5 Coumadin today, INR supratherapeutic today.





#HTN, HLD, CAD s/p PCI 


- DISCONTINUED Imdur which previously was on hold and pt hypotensive, will 

await MGUS scan to accurately depict EF and BNP to assess CHF severity and 

possible need to diurese more, c/w Zocor.





Dispo: DNR/DNI 


Cardio - [facility] group will be covering Dr. Parrish while hes away.   





DVT Px - INR therapeutic, on Coumadin


DNR/DNI








Visit type





- Emergency Visit


Emergency Visit: Yes


ED Registration Date: 02/07/20


Care time: The patient presented to the Emergency Department on the above date 

and was hospitalized for further evaluation of their emergent condition.





- New Patient


This patient is new to me today: No





- Critical Care


Critical Care patient: No





- Discharge Referral


Referred to [facility] No





ATTENDING PHYSICIAN STATEMENT





I saw and evaluated the patient.


I reviewed the resident's note and discussed the case with the resident.


I agree with the resident's findings and plan as documented.








SUBJECTIVE:








OBJECTIVE:








ASSESSMENT AND PLAN:

## 2020-02-15 NOTE — PN
Progress Note (short form)





- Note


Progress Note: 


s: awake, eating breakfast, not communicating. per family she is near baseline 

mental status





tele: afib rate controlled -> afib with RVR 130s-150s this AM


 Current Medications





Acetaminophen (Tylenol Oral Solution -)  650 mg PO Q4H PRN


   PRN Reason: FEVER


Acetaminophen (Ofirmev Injection -)  650 mg IVPB Q4H PRN


   PRN Reason: FEVER


   Last Admin: 02/13/20 17:23 Dose:  650 mg


Atorvastatin Calcium (Lipitor -)  10 mg PO HS [facility]


   Last Admin: 02/09/20 21:14 Dose:  10 mg


Ferrous Sulfate (Feosol -)  325 mg PO DAILY [facility]


   Last Admin: 02/14/20 10:14 Dose:  Not Given


Furosemide (Lasix -)  40 mg PO BIDLASIX [facility]


   Last Admin: 02/15/20 05:24 Dose:  40 mg


Potassium Chloride 10 meq/ (Dextrose)  1,005 mls @ 42 mls/hr IV Q24H [facility]


   Last Admin: 02/14/20 15:22 Dose:  Not Given


Lorazepam (Ativan Injection -)  1 mg IM BID PRN


   PRN Reason: ANXIETY


   Last Admin: 02/12/20 21:00 Dose:  1 mg


Metoprolol Succinate (Toprol Xl -)  75 mg PO BID [facility]


Metoprolol Succinate (Toprol Xl -)  25 mg PO ONCE ONE


   Stop: 02/15/20 11:32


Metoprolol Tartrate (Lopressor Injection -)  5 mg IVPUSH Q4H PRN


   PRN Reason: TACHYCARDIA


   Last Admin: 02/12/20 23:59 Dose:  5 mg


Pantoprazole Sodium (Protonix Iv)  40 mg IVPUSH BID [facility]


   Last Admin: 02/15/20 11:12 Dose:  40 mg


Polyethylene Glycol (Miralax (For Daily Use) -)  17 gm PO DAILY [facility]


   Last Admin: 02/14/20 10:14 Dose:  Not Given


Quetiapine Fumarate (Seroquel -)  50 mg PO TID [facility]


   Last Admin: 02/14/20 05:19 Dose:  Not Given


Warfarin Sodium (Coumadin -)  0.5 mg PO DAILY@1800 [facility]


   Last Admin: 02/14/20 17:42 Dose:  0.5 mg





 Vital Signs











 Period  Temp  Pulse  Resp  BP Sys/Card  Pulse Ox


 


 Last 24 Hr  98.3 F-98.9 F    18-24  /63-74  100











Constitutional: Yes: No Distress


Cardiovascular: Yes: Pulse Irregular


Respiratory: Yes: Other (clear anteriorly)


Gastrointestinal: Yes: Soft,nt, nd, + bs


Edema: No


no jaundice, diaphoresis


alert


not agitated








Impression:


1.  Atrial fibrillation with periods rapid ventricular response, now improved.


2.  Coronary artery disease, status post PCI/stenting, angina pectoris.


3.  Status post embolic CVA.


4.  Vascular dementia.


5.  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 

cardiomyopathy.


6.  Status post permanent pacemaker for carotid hypersensitivity.


7.  Anemia  related to blood loss.


8.  History of chronic kidney disease.





Recommendation:


1. high rates this AM, increase metoprolol succinate to 75 mg BID


2. Monitor INR goal 2-3


3. cont lasix PO 40mg BID, monitor Cr, lytes

## 2020-02-15 NOTE — PN
Teaching Attending Note


Name of Resident: Zachery German





ATTENDING PHYSICIAN STATEMENT





I saw and evaluated the patient.


I reviewed the resident's note and discussed the case with the resident.


I agree with the resident's findings and plan as documented.








SUBJECTIVE: Lethargic, rousable but non-participatory in medical interview. No 

CP/palpitations/SOB.








OBJECTIVE: Afebrile, HR still elevated. Lethargic, drowsy.





 Last Vital Signs











Temp Pulse Resp BP Pulse Ox


 


 98.6 F   102 H  18   102/70   100 


 


 02/15/20 06:31  02/15/20 08:37  02/15/20 08:37  02/15/20 08:37  02/14/20 20:00











Heart - S1, S2, soft SM


Lungs - decreased air entry bibasally


Abdomen - Soft, non-tender. Bowel Sounds normal.


Extremities - no edema, no calf tenderness. 





 Laboratory Results - last 24 hr











  02/14/20 02/14/20 02/15/20





  01:33 17:40 05:45


 


WBC  9.4  


 


RBC  3.58 L  


 


Hgb  8.7 L  


 


Hct  28.6 L  


 


MCV  79.9 L  


 


MCH  24.4 L  


 


MCHC  30.6 L  


 


RDW  19.5 H  


 


Plt Count  197  


 


MPV  7.5  


 


Absolute Neuts (auto)  6.9  


 


Neutrophils %  73.4  


 


Lymphocytes %  8.4  


 


Monocytes %  10.4 H  


 


Eosinophils %  7.1 H D  


 


Basophils %  0.7  


 


Nucleated RBC %  0  


 


PT with INR    38.30 H


 


INR    3.21 H


 


Sodium   


 


Potassium   


 


Chloride   


 


Carbon Dioxide   


 


Anion Gap   


 


BUN   


 


Creatinine   


 


Est GFR (CKD-EPI)AfAm   


 


Est GFR (CKD-EPI)NonAf   


 


POC Glucometer   87 


 


Random Glucose   


 


Calcium   


 


Total Bilirubin   


 


AST   


 


ALT   


 


Alkaline Phosphatase   


 


Total Protein   


 


Albumin   














  02/15/20 02/15/20





  05:45 05:45


 


WBC  9.6 


 


RBC  3.77 


 


Hgb  9.4 L 


 


Hct  30.1 L 


 


MCV  80.0 


 


MCH  25.0 L 


 


MCHC  31.2 L 


 


RDW  19.7 H 


 


Plt Count  206 


 


MPV  7.8 


 


Absolute Neuts (auto)  7.8 


 


Neutrophils %  80.6 


 


Lymphocytes %  7.3 L 


 


Monocytes %  8.1 


 


Eosinophils %  3.7 


 


Basophils %  0.3 


 


Nucleated RBC %  0 


 


PT with INR  


 


INR  


 


Sodium   142


 


Potassium   4.1


 


Chloride   104


 


Carbon Dioxide   31


 


Anion Gap   7 L


 


BUN   33.1 H


 


Creatinine   1.8 H


 


Est GFR (CKD-EPI)AfAm   28.62


 


Est GFR (CKD-EPI)NonAf   24.69


 


POC Glucometer  


 


Random Glucose   98


 


Calcium   8.2 L


 


Total Bilirubin   0.7


 


AST   12 L


 


ALT   11 L


 


Alkaline Phosphatase   77


 


Total Protein   5.7 L


 


Albumin   2.5 L








 Current Medications











Generic Name Dose Route Start Last Admin





  Trade Name Freq  PRN Reason Stop Dose Admin


 


Acetaminophen  650 mg  02/10/20 15:03  





  Tylenol Oral Solution -  PO   





  Q4H PRN   





  FEVER   





     





     





     


 


Acetaminophen  650 mg  02/10/20 17:29  02/13/20 17:23





  Ofirmev Injection -  IVPB   650 mg





  Q4H PRN   Administration





  FEVER   





     





     





     


 


Atorvastatin Calcium  10 mg  02/08/20 22:00  02/09/20 21:14





  Lipitor -  PO   10 mg





  HS HENRY   Administration





     





     





     





     


 


Ferrous Sulfate  325 mg  02/09/20 11:15  02/15/20 12:04





  Feosol -  PO   325 mg





  DAILY HENRY   Administration





     





     





     





     


 


Furosemide  40 mg  02/14/20 14:00  02/15/20 05:24





  Lasix -  PO   40 mg





  BIDLASIX HENRY   Administration





     





     





     





     


 


Potassium Chloride 10 meq/  1,005 mls @ 42 mls/hr  02/12/20 13:19  02/14/20 15:

22





  Dextrose  IV   Not Given





  Q24H HENRY   





     





     





     





     


 


Lorazepam  1 mg  02/12/20 17:48  02/12/20 21:00





  Ativan Injection -  IM   1 mg





  BID PRN   Administration





  ANXIETY   





     





     





     


 


Metoprolol Succinate  75 mg  02/15/20 22:00  





  Toprol Xl -  PO   





  BID HENRY   





     





     





     





     


 


Metoprolol Tartrate  5 mg  02/12/20 15:18  02/12/20 23:59





  Lopressor Injection -  IVPUSH   5 mg





  Q4H PRN   Administration





  TACHYCARDIA   





     





     





     


 


Pantoprazole Sodium  40 mg  02/11/20 22:00  02/15/20 11:12





  Protonix Iv  IVPUSH   40 mg





  BID HENRY   Administration





     





     





     





     


 


Polyethylene Glycol  17 gm  02/12/20 10:00  02/15/20 12:04





  Miralax (For Daily Use) -  PO   Not Given





  DAILY HENRY   





     





     





     





     


 


Quetiapine Fumarate  50 mg  02/10/20 15:15  02/14/20 05:19





  Seroquel -  PO   Not Given





  TID HENRY   





     





     





     





     


 


Warfarin Sodium  0.5 mg  02/14/20 18:00  02/14/20 17:42





  Coumadin -  PO   0.5 mg





  DAILY@1800 HENRY   Administration





     





     





     





     








 Home Medications











 Medication  Instructions  Recorded


 


Simvastatin [Zocor -] 20 mg PO HS 05/24/14


 


Diltiazem Cd [Cardizem Cd -] 180 mg PO DAILY 01/08/16


 


Gabapentin [Neurontin] 100 mg PO BID 01/08/16


 


Quetiapine Fumarate [Seroquel -] 50 mg PO BID  tablet 02/02/16


 


Furosemide [Lasix] 20 mg PO ASDIR 05/22/18


 


Isosorbide Mononitrate [Imdur -] 30 mg PO DAILY 05/22/18


 


Warfarin Na [Coumadin -] 1 mg PO HS 05/22/18


 


Carvedilol [Coreg -] 6.25 mg PO DAILY 02/08/20

















ASSESSMENT AND PLAN:





88 year old female with history of Parkinson's disease, Dementia, Atrial 

fibrillation, DVT s/p IVC filter, Hx GI bleed 2/2 to Colonic AVMs, Hx CVA with 

R sided weakness,  CAD (s/p PCI 2005), CKD 3, Chronic Systolic CHF, HTN, HLD, 

Hx of left sided lung CA s/p pneumonectomy, presented with increasing confusion

, weakness, and poor oral intake.





1. Acute Metabolic and Toxic Encephalopathy on background baseline Dementia


Sec to decompensated CHF and Pneumonia


CT Head - no acute findings.


More lethargic today - likely sec to Seroquel (dosing increased to TID by 

Psychiatry) +/- Ativan  - will hold Seroquel/Ativan and monitor mental status. 





2. Acute on Chronic Systolic CHF


Echo shows global hypokinesis with 20%EF. 


Venous congestion on CT, elevated BNP


Lasix 40mg PO BID ongoing.


Optimization of HF meds as per Cardio - BB changed to Metoprolol XL (dose 

increased to 75mg BID). ARB if/when BP tolerates. 


Utility of Eplerenone, Entresto discussed with Cardiology - unable to tolerate 

as BP borderline at this time.





3. Acute Pneumonia ?aspiration


Completed Unasyn course.


Speech therapy evaluated - pureed solids, thin liquid diet recommended with 

magic cup, ensure.


L Paratracheal enlarged LN on imaging


Pulm follow up on discharge.





4. Atrial fibrillation with RVR - ongoing since weaning off Diltiazem drip.


Continue Metoprolol XL BID - dose increased to 75mg BID.


On Coumadin for AC (daughter confirms that patient has been on Coumadin 

chronically at home) - INR 3.21 - hold Coumadin.





5. Microcytic Anemia sec to chronic GI blood loss


H/H 9.6/30.9 s/p 1 unit PRBCs.


FOBT - trace


Hx of colonoic AVMs and Hx of Hiatal Hernia with Chauncey erosions.


GI consulted - recommend Iron replacement with IV venofer, PPI. Patient/Family 

declines EGD/Colonoscopy. She is DNR/DNI. 





6. ELENITA on CKD 3 sec to Hepatorenal Syndrome


Improved with IV Lasix diuresis, now Creat up slightly. Lasix transitioned to 

PO.





7. Renal Cysts/Non-obstructing Renal Stone 


Urology follow up on discharge.





8. Dementia with Behavioral Disturbance


Normally on Seroquel BID - increased to TID by Dr. Bond, now patient 

lethargic, drowsy. Will hold Seroquel until recovery of mental status.





9. Hx DVT s/p IVC filter - on Coumadin. INR 3.21. Coumadin held. 





10. HTN, HLD, CAD s/p PCI - Continue BB, ARB, Zocor. Imdur held. 





11. Hypokalemia - repleted. 








DVT Px - On Coumadin


DNR/DNI

## 2020-02-15 NOTE — PN
Progress Note (short form)





- Note


Progress Note: 





RENAL


Pt awake 


daughter present


appearfs comfortable


daughter states that pt has been asking for water





 Last Vital Signs











Temp Pulse Resp BP Pulse Ox


 


 98.6 F   104 H  18   101/74   100 


 


 02/15/20 06:31  02/15/20 06:31  02/15/20 06:31  02/15/20 06:31  02/14/20 20:00








appears comfortable


lungs decreased breath sounds on left


cvs s1s12 irreg


abd soft


ext no edema


neuro alert, did not answer





 CBC, BMP





 02/15/20 05:45 





 02/15/20 05:45 





 Current Medications











Generic Name Dose Route Start Last Admin





  Trade Name Freq  PRN Reason Stop Dose Admin


 


Acetaminophen  650 mg  02/10/20 15:03  





  Tylenol Oral Solution -  PO   





  Q4H PRN   





  FEVER   





     





     





     


 


Acetaminophen  650 mg  02/10/20 17:29  02/13/20 17:23





  Ofirmev Injection -  IVPB   650 mg





  Q4H PRN   Administration





  FEVER   





     





     





     


 


Atorvastatin Calcium  10 mg  02/08/20 22:00  02/09/20 21:14





  Lipitor -  PO   10 mg





  HS HENRY   Administration





     





     





     





     


 


Ferrous Sulfate  325 mg  02/09/20 11:15  02/14/20 10:14





  Feosol -  PO   Not Given





  DAILY HENRY   





     





     





     





     


 


Furosemide  40 mg  02/14/20 14:00  02/15/20 05:24





  Lasix -  PO   40 mg





  BIDLASIX HENRY   Administration





     





     





     





     


 


Potassium Chloride 10 meq/  1,005 mls @ 42 mls/hr  02/12/20 13:19  02/14/20 15:

22





  Dextrose  IV   Not Given





  Q24H HENRY   





     





     





     





     


 


Lorazepam  1 mg  02/12/20 17:48  02/12/20 21:00





  Ativan Injection -  IM   1 mg





  BID PRN   Administration





  ANXIETY   





     





     





     


 


Metoprolol Succinate  50 mg  02/13/20 22:00  02/14/20 21:34





  Toprol Xl -  PO   50 mg





  BID HENRY   Administration





     





     





     





     


 


Metoprolol Tartrate  5 mg  02/12/20 15:18  02/12/20 23:59





  Lopressor Injection -  IVPUSH   5 mg





  Q4H PRN   Administration





  TACHYCARDIA   





     





     





     


 


Pantoprazole Sodium  40 mg  02/11/20 22:00  02/14/20 21:34





  Protonix Iv  IVPUSH   40 mg





  BID HENRY   Administration





     





     





     





     


 


Polyethylene Glycol  17 gm  02/12/20 10:00  02/14/20 10:14





  Miralax (For Daily Use) -  PO   Not Given





  DAILY HENRY   





     





     





     





     


 


Quetiapine Fumarate  50 mg  02/10/20 15:15  02/14/20 05:19





  Seroquel -  PO   Not Given





  TID [facility]   





     





     





     





     


 


Warfarin Sodium  0.5 mg  02/14/20 18:00  02/14/20 17:42





  Coumadin -  PO   0.5 mg





  DAILY@1800 [facility]   Administration





     





     





     





     








Impression


1. ELENITA stable


2. UTI


3. CHF ef 17 percent


4. CVA


5. HTN


6. cva


7. parkinsons


8. a-fib


9. hypernatremia- resolved


10 renal cysts likely acquired


11. non obstructing calculus








Plan


-repeat xray, seems to have decreased breath sounds on left


-continue diuretics


- would ask urology to eval given fullness of left renal pelvis


- would feed if able to do so








MV

## 2020-02-15 NOTE — PN
Progress Note, Physician


History of Present Illness: 





Pt is arousable, responsive but weak. As per daughter at bedside she has poor 

PO intake. Remains afebrile, without distress.





- Current Medication List


Current Medications: 


Active Medications





Acetaminophen (Tylenol Oral Solution -)  650 mg PO Q4H PRN


   PRN Reason: FEVER


Atorvastatin Calcium (Lipitor -)  10 mg PO HS [facility]


   Last Admin: 02/09/20 21:14 Dose:  10 mg


Ferrous Sulfate (Feosol -)  325 mg PO DAILY [facility]


   Last Admin: 02/15/20 12:04 Dose:  325 mg


Furosemide (Lasix -)  40 mg PO BIDLASIX [facility]


   Last Admin: 02/15/20 15:15 Dose:  40 mg


Potassium Chloride 10 meq/ (Dextrose)  1,005 mls @ 42 mls/hr IV Q24H [facility]


   Last Admin: 02/14/20 15:22 Dose:  Not Given


Lorazepam (Ativan Injection -)  1 mg IM BID PRN


   PRN Reason: ANXIETY


   Last Admin: 02/12/20 21:00 Dose:  1 mg


Metoprolol Succinate (Toprol Xl -)  75 mg PO BID [facility]


Metoprolol Tartrate (Lopressor Injection -)  5 mg IVPUSH Q4H PRN


   PRN Reason: TACHYCARDIA


   Last Admin: 02/12/20 23:59 Dose:  5 mg


Pantoprazole Sodium (Protonix Iv)  40 mg IVPUSH BID [facility]


   Last Admin: 02/15/20 11:12 Dose:  40 mg


Polyethylene Glycol (Miralax (For Daily Use) -)  17 gm PO DAILY [facility]


   Last Admin: 02/15/20 12:04 Dose:  Not Given


Quetiapine Fumarate (Seroquel -)  50 mg PO TID [facility]


   Last Admin: 02/14/20 05:19 Dose:  Not Given


Warfarin Sodium (Coumadin -)  0.5 mg PO DAILY@1800 [facility]


   Last Admin: 02/14/20 17:42 Dose:  0.5 mg











- Objective


Vital Signs: 


 Vital Signs











Temperature  98.6 F   02/15/20 06:31


 


Pulse Rate  120 H  02/15/20 16:00


 


Respiratory Rate  20   02/15/20 16:00


 


Blood Pressure  96/71   02/15/20 16:00


 


O2 Sat by Pulse Oximetry (%)  100   02/15/20 10:00











Constitutional: Yes: No Distress, Calm


Cardiovascular: Yes: Pulse Irregular


Respiratory: Yes: Regular


Gastrointestinal: Yes: Normal Bowel Sounds, Soft


Integumentary: Yes: WNL


Neurological: Yes: Alert, Weakness (generalized)


Labs: 


 CBC, BMP





 02/15/20 05:45 





 02/15/20 05:45 





 INR, PTT











INR  3.21  (0.83-1.09)  H  02/15/20  05:45    








 Microbiology





02/08/20 11:52   Blood - Peripheral Venous   Blood Culture - Final


                            NO GROWTH AFTER 5 DAYS INCUBATION


02/08/20 12:04   Blood - Peripheral Venous   Blood Culture - Final


                            NO GROWTH AFTER 5 DAYS INCUBATION


02/07/20 18:00   Urine - Urine - Catheterized   Urine Culture - Final


                            NO GROWTH OBTAINED











- ....Imaging


Chest X-ray: Report Reviewed





Problem List





- Problems


(1) CKD (chronic kidney disease)


Code(s): N18.9 - CHRONIC KIDNEY DISEASE, UNSPECIFIED   





(2) Decreased appetite


Code(s): R63.0 - ANOREXIA   





(3) Delirium


Code(s): R41.0 - DISORIENTATION, UNSPECIFIED   





(4) Atrial fibrillation with RVR


Code(s): I48.91 - UNSPECIFIED ATRIAL FIBRILLATION   





(5) CAD (coronary artery disease)


Code(s): I25.10 - ATHSCL HEART DISEASE OF NATIVE CORONARY ARTERY W/O ANG PCTRS 

  


Qualifiers: 


   Coronary Disease-Associated Artery/Lesion type: native artery   Native vs. 

transplanted heart: native heart   Associated angina: without angina   

Qualified Code(s): I25.10 - Atherosclerotic heart disease of native coronary 

artery without angina pectoris   





(6) CHF exacerbation


Code(s): I50.9 - HEART FAILURE, UNSPECIFIED   


Qualifiers: 


   Heart failure type: unspecified   Qualified Code(s): I50.9 - Heart failure, 

unspecified   





(7) Chronic anemia


Code(s): D64.9 - ANEMIA, UNSPECIFIED   





(8) H/O: CVA (cerebrovascular accident)


Code(s): Z86.73 - PRSNL HX OF TIA (TIA), AND CEREB INFRC W/O RESID DEFICITS   





(9) Dementia


Code(s): F03.90 - UNSPECIFIED DEMENTIA WITHOUT BEHAVIORAL DISTURBANCE   


Qualifiers: 


   Dementia type: Parkinson's disease   Dementia behavioral disturbance: 

without behavioral disturbance   Qualified Code(s): G20 - Parkinson's disease; 

F02.80 - Dementia in other diseases classified elsewhere without behavioral 

disturbance   





(10) Hypercholesteremia


Code(s): E78.0 - PURE HYPERCHOLESTEROLEMIA * DO NOT USE *   





(11) Hypertension


Code(s): I10 - ESSENTIAL (PRIMARY) HYPERTENSION   


Qualifiers: 


   Hypertension type: essential hypertension   Qualified Code(s): I10 - 

Essential (primary) hypertension   





(12) Pacemaker


Code(s): Z95.0 - PRESENCE OF CARDIAC PACEMAKER   





Assessment/Plan





- Pt without distress at this time, remains afebrile


- continue monitor off of antibiotics, s/p course for PNA

## 2020-02-16 LAB
ALBUMIN SERPL-MCNC: 2.5 G/DL (ref 3.4–5)
ALP SERPL-CCNC: 73 U/L (ref 45–117)
ALT SERPL-CCNC: 11 U/L (ref 13–61)
ANION GAP SERPL CALC-SCNC: 8 MMOL/L (ref 8–16)
AST SERPL-CCNC: 14 U/L (ref 15–37)
BASOPHILS # BLD: 0.6 % (ref 0–2)
BILIRUB SERPL-MCNC: 0.6 MG/DL (ref 0.2–1)
BUN SERPL-MCNC: 35.1 MG/DL (ref 7–18)
CALCIUM SERPL-MCNC: 8.4 MG/DL (ref 8.5–10.1)
CHLORIDE SERPL-SCNC: 102 MMOL/L (ref 98–107)
CO2 SERPL-SCNC: 30 MMOL/L (ref 21–32)
CREAT SERPL-MCNC: 1.9 MG/DL (ref 0.55–1.3)
DEPRECATED RDW RBC AUTO: 20.4 % (ref 11.6–15.6)
EOSINOPHIL # BLD: 2.5 % (ref 0–4.5)
GLUCOSE SERPL-MCNC: 106 MG/DL (ref 74–106)
HCT VFR BLD CALC: 27.8 % (ref 32.4–45.2)
HGB BLD-MCNC: 8.6 GM/DL (ref 10.7–15.3)
INR BLD: 3.41 (ref 0.83–1.09)
LYMPHOCYTES # BLD: 9.6 % (ref 8–40)
MCH RBC QN AUTO: 24.9 PG (ref 25.7–33.7)
MCHC RBC AUTO-ENTMCNC: 30.9 G/DL (ref 32–36)
MCV RBC: 80.6 FL (ref 80–96)
MONOCYTES # BLD AUTO: 10.3 % (ref 3.8–10.2)
NEUTROPHILS # BLD: 77 % (ref 42.8–82.8)
PLATELET # BLD AUTO: 174 K/MM3 (ref 134–434)
PMV BLD: 8.2 FL (ref 7.5–11.1)
POTASSIUM SERPLBLD-SCNC: 3.7 MMOL/L (ref 3.5–5.1)
PROT SERPL-MCNC: 5.6 G/DL (ref 6.4–8.2)
PT PNL PPP: 40.8 SEC (ref 9.7–13)
RBC # BLD AUTO: 3.45 M/MM3 (ref 3.6–5.2)
SODIUM SERPL-SCNC: 141 MMOL/L (ref 136–145)
WBC # BLD AUTO: 8.6 K/MM3 (ref 4–10)

## 2020-02-16 RX ADMIN — FUROSEMIDE SCH: 40 TABLET ORAL at 13:00

## 2020-02-16 RX ADMIN — DEXTROSE MONOHYDRATE SCH: 50 INJECTION, SOLUTION INTRAVENOUS at 13:58

## 2020-02-16 RX ADMIN — PANTOPRAZOLE SODIUM SCH MG: 40 INJECTION, POWDER, FOR SOLUTION INTRAVENOUS at 10:12

## 2020-02-16 RX ADMIN — POLYETHYLENE GLYCOL 3350 SCH: 17 POWDER, FOR SOLUTION ORAL at 10:14

## 2020-02-16 RX ADMIN — ATORVASTATIN CALCIUM SCH MG: 10 TABLET, FILM COATED ORAL at 22:37

## 2020-02-16 RX ADMIN — FUROSEMIDE SCH: 40 TABLET ORAL at 09:50

## 2020-02-16 RX ADMIN — PANTOPRAZOLE SODIUM SCH MG: 40 INJECTION, POWDER, FOR SOLUTION INTRAVENOUS at 22:37

## 2020-02-16 RX ADMIN — FERROUS SULFATE TAB EC 324 MG (65 MG FE EQUIVALENT) SCH: 324 (65 FE) TABLET DELAYED RESPONSE at 09:54

## 2020-02-16 RX ADMIN — SODIUM CHLORIDE SCH: 9 INJECTION, SOLUTION INTRAVENOUS at 22:19

## 2020-02-16 NOTE — PN
Progress Note (short form)





- Note


Progress Note: 





RENAL


Pt awake 


appears comfortable








 





oral mucosa dry


appears comfortable


lungs decreased breath sounds on left


cvs s1s12 irreg


abd soft


ext no edema


neuro alert, did not answer





 CBC, BMP


 02/16/20 06:30 





 02/16/20 06:30 














Impression


1. ELENITA stable


2. UTI


 Current Medications











Generic Name Dose Route Start Last Admin





  Trade Name Freq  PRN Reason Stop Dose Admin


 


Acetaminophen  650 mg  02/15/20 15:44  





  Tylenol Oral Solution -  PO   





  Q4H PRN   





  FEVER   





     





     





     


 


Atorvastatin Calcium  10 mg  02/08/20 22:00  02/09/20 21:14





  Lipitor -  PO   10 mg





  HS HENRY   Administration





     





     





     





     


 


Ferrous Sulfate  325 mg  02/09/20 11:15  02/15/20 12:04





  Feosol -  PO   325 mg





  DAILY HENRY   Administration





     





     





     





     


 


Furosemide  40 mg  02/14/20 14:00  02/15/20 15:15





  Lasix -  PO   40 mg





  BIDLASIX HENRY   Administration





     





     





     





     


 


Potassium Chloride 10 meq/  1,005 mls @ 42 mls/hr  02/12/20 13:19  02/15/20 20:

01





  Dextrose  IV   42 mls/hr





  Q24H HENRY   Administration





     





     





     





     


 


Diltiazem HCl 125 mg/ Sodium  125 mls @ 5 mls/hr  02/15/20 20:45  02/15/20 21:00





  Chloride  IVPB   5 mg/hr





  TITR HENRY   5 mls/hr





     Administration





     





  Protocol   





  5 MG/HR   


 


Lorazepam  1 mg  02/12/20 17:48  02/12/20 21:00





  Ativan Injection -  IM   1 mg





  BID PRN   Administration





  ANXIETY   





     





     





     


 


Metoprolol Succinate  75 mg  02/15/20 22:00  02/15/20 21:06





  Toprol Xl -  PO   Not Given





  BID HENRY   





     





     





     





     


 


Metoprolol Tartrate  5 mg  02/12/20 15:18  02/15/20 19:09





  Lopressor Injection -  IVPUSH   5 mg





  Q4H PRN   Administration





  TACHYCARDIA   





     





     





     


 


Pantoprazole Sodium  40 mg  02/11/20 22:00  02/15/20 21:05





  Protonix Iv  IVPUSH   40 mg





  BID HENRY   Administration





     





     





     





     


 


Polyethylene Glycol  17 gm  02/12/20 10:00  02/15/20 12:04





  Miralax (For Daily Use) -  PO   Not Given





  DAILY HENRY   





     





     





     





     


 


Quetiapine Fumarate  50 mg  02/10/20 15:15  02/14/20 05:19





  Seroquel -  PO   Not Given





  TID HENRY   





     





     





     





     


 


Warfarin Sodium  0.5 mg  02/14/20 18:00  02/14/20 17:42





  Coumadin -  PO   0.5 mg





  DAILY@1800 HENRY   Administration





     





     





     





     








IMPRESSION


1. ELENITA


2. UTI


3. CHF ef 17 percent


4. CVA


5. HTN


6. cva


7. parkinsons


8. a-fib


9. hypernatremia- resolved


10 renal cysts likely acquired


11. non obstructing calculus








Plan


-xray did not show worsening failure, but would continue diuretics


-would ask urology to eval given fullness of left renal pelvis


-monitor potassium and replace as necessary, note the drop








MV











MV

## 2020-02-16 NOTE — PN
Progress Note (short form)





- Note


Progress Note: 


s: awake, not communicating. per family was at baseline mental status early 

yesterday, then did not take seroquel and was agitated in the evening, afib 

with RVR on the monitor








 Current Medications





Acetaminophen (Tylenol Oral Solution -)  650 mg PO Q4H PRN


   PRN Reason: FEVER


Acetaminophen (Ofirmev Injection -)  650 mg IVPB Q4H PRN


   PRN Reason: FEVER


   Last Admin: 02/13/20 17:23 Dose:  650 mg


Atorvastatin Calcium (Lipitor -)  10 mg PO HS [facility]


   Last Admin: 02/09/20 21:14 Dose:  10 mg


Ferrous Sulfate (Feosol -)  325 mg PO DAILY [facility]


   Last Admin: 02/14/20 10:14 Dose:  Not Given


Furosemide (Lasix -)  40 mg PO BIDLASIX [facility]


   Last Admin: 02/15/20 05:24 Dose:  40 mg


Potassium Chloride 10 meq/ (Dextrose)  1,005 mls @ 42 mls/hr IV Q24H [facility]


   Last Admin: 02/14/20 15:22 Dose:  Not Given


Lorazepam (Ativan Injection -)  1 mg IM BID PRN


   PRN Reason: ANXIETY


   Last Admin: 02/12/20 21:00 Dose:  1 mg


Metoprolol Succinate (Toprol Xl -)  75 mg PO BID HENRY


Metoprolol Succinate (Toprol Xl -)  25 mg PO ONCE ONE


   Stop: 02/15/20 11:32


Metoprolol Tartrate (Lopressor Injection -)  5 mg IVPUSH Q4H PRN


   PRN Reason: TACHYCARDIA


   Last Admin: 02/12/20 23:59 Dose:  5 mg


Pantoprazole Sodium (Protonix Iv)  40 mg IVPUSH BID [facility]


   Last Admin: 02/15/20 11:12 Dose:  40 mg


Polyethylene Glycol (Miralax (For Daily Use) -)  17 gm PO DAILY [facility]


   Last Admin: 02/14/20 10:14 Dose:  Not Given


Quetiapine Fumarate (Seroquel -)  50 mg PO TID [facility]


   Last Admin: 02/14/20 05:19 Dose:  Not Given


Warfarin Sodium (Coumadin -)  0.5 mg PO DAILY@1800 [facility]


   Last Admin: 02/14/20 17:42 Dose:  0.5 mg





 Vital Signs











 Period  Temp  Pulse  Resp  BP Sys/Card  Pulse Ox


 


 Last 24 Hr  98.3 F-98.9 F    18-24  /63-74  100











Constitutional: Yes: No Distress


Cardiovascular: Yes: Pulse Irregular


Respiratory: Yes: Other (clear anteriorly)


Gastrointestinal: Yes: Soft,nt, nd, + bs


Edema: No


no jaundice, diaphoresis


alert


not agitated





tele: afib rate controlled -> rate 150s yesterday PM, now improved





Impression:


1.  Atrial fibrillation with periods rapid ventricular response, now improved.


2.  Coronary artery disease, status post PCI/stenting, angina pectoris.


3.  Status post embolic CVA.


4.  Vascular dementia.


5.  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 

cardiomyopathy.


6.  Status post permanent pacemaker for carotid hypersensitivity.


7.  Anemia  related to blood loss.


8.  History of chronic kidney disease.





Recommendation:


1. high rates last night likely in setting of agitated after not receiving 

seroquel, diltiazem gtt was started last night, now rates improving


- wean dilt gtt, cont metoprolol succinate 75 mg BID


2. Monitor INR goal 2-3


3. cont lasix PO 40mg BID, monitor Cr, lytes. appears euvolemic

## 2020-02-16 NOTE — PN
Progress Note, Physician


History of Present Illness: 





Pt remains lethargic, arousable. No acute distress noted, has been afebrile. On 

Cardizem drip for RVR.





- Current Medication List


Current Medications: 


Active Medications





Acetaminophen (Tylenol Oral Solution -)  650 mg PO Q4H PRN


   PRN Reason: FEVER


Atorvastatin Calcium (Lipitor -)  10 mg PO HS [facility]


   Last Admin: 02/09/20 21:14 Dose:  10 mg


Furosemide (Lasix -)  40 mg PO BIDLASIX [facility]


   Last Admin: 02/16/20 13:00 Dose:  Not Given


Potassium Chloride 10 meq/ (Dextrose)  1,005 mls @ 42 mls/hr IV Q24H [facility]


   Last Admin: 02/16/20 13:58 Dose:  Not Given


Diltiazem HCl 125 mg/ Sodium (Chloride)  125 mls @ 5 mls/hr IVPB TITR [facility]; 

Protocol


   Last Titration: 02/16/20 07:15 Dose:  2 mg/hr, 2 mls/hr


Metoprolol Succinate (Toprol Xl -)  75 mg PO BID [facility]


   Last Admin: 02/16/20 10:12 Dose:  75 mg


Metoprolol Tartrate (Lopressor Injection -)  5 mg IVPUSH Q4H PRN


   PRN Reason: TACHYCARDIA


   Last Admin: 02/15/20 19:09 Dose:  5 mg


Pantoprazole Sodium (Protonix Iv)  40 mg IVPUSH BID [facility]


   Last Admin: 02/16/20 10:12 Dose:  40 mg


Polyethylene Glycol (Miralax (For Daily Use) -)  17 gm PO DAILY [facility]


   Last Admin: 02/16/20 10:14 Dose:  Not Given


Quetiapine Fumarate (Seroquel -)  50 mg PO BID [facility]











- Objective


Vital Signs: 


 Vital Signs











Temperature  97.2 F L  02/16/20 14:21


 


Pulse Rate  103 H  02/16/20 16:08


 


Respiratory Rate  20   02/16/20 16:08


 


Blood Pressure  97/67   02/16/20 16:08


 


O2 Sat by Pulse Oximetry (%)  100   02/16/20 10:15











Constitutional: Yes: No Distress


Cardiovascular: Yes: Pulse Irregular


Respiratory: Yes: Regular


Gastrointestinal: Yes: Normal Bowel Sounds, Soft


Genitourinary: Yes: WNL


Extremities: Yes: WNL


Edema: No


Integumentary: Yes: WNL


Neurological: Yes: Lethargy


Labs: 


 CBC, BMP





 02/16/20 06:30 





 02/16/20 06:30 





 INR, PTT











INR  3.41  (0.83-1.09)  H  02/16/20  06:30    








 Microbiology





02/08/20 11:52   Blood - Peripheral Venous   Blood Culture - Final


                            NO GROWTH AFTER 5 DAYS INCUBATION


02/08/20 12:04   Blood - Peripheral Venous   Blood Culture - Final


                            NO GROWTH AFTER 5 DAYS INCUBATION


02/07/20 18:00   Urine - Urine - Catheterized   Urine Culture - Final


                            NO GROWTH OBTAINED











Problem List





- Problems


(1) CKD (chronic kidney disease)


Code(s): N18.9 - CHRONIC KIDNEY DISEASE, UNSPECIFIED   





(2) Decreased appetite


Code(s): R63.0 - ANOREXIA   





(3) Delirium


Code(s): R41.0 - DISORIENTATION, UNSPECIFIED   





(4) Atrial fibrillation with RVR


Code(s): I48.91 - UNSPECIFIED ATRIAL FIBRILLATION   





(5) CAD (coronary artery disease)


Code(s): I25.10 - ATHSCL HEART DISEASE OF NATIVE CORONARY ARTERY W/O ANG PCTRS 

  


Qualifiers: 


   Coronary Disease-Associated Artery/Lesion type: native artery   Native vs. 

transplanted heart: native heart   Associated angina: without angina   

Qualified Code(s): I25.10 - Atherosclerotic heart disease of native coronary 

artery without angina pectoris   





(6) CHF exacerbation


Code(s): I50.9 - HEART FAILURE, UNSPECIFIED   


Qualifiers: 


   Heart failure type: unspecified   Qualified Code(s): I50.9 - Heart failure, 

unspecified   





(7) Chronic anemia


Code(s): D64.9 - ANEMIA, UNSPECIFIED   





(8) H/O: CVA (cerebrovascular accident)


Code(s): Z86.73 - PRSNL HX OF TIA (TIA), AND CEREB INFRC W/O RESID DEFICITS   





(9) Dementia


Code(s): F03.90 - UNSPECIFIED DEMENTIA WITHOUT BEHAVIORAL DISTURBANCE   


Qualifiers: 


   Dementia type: Parkinson's disease   Dementia behavioral disturbance: 

without behavioral disturbance   Qualified Code(s): G20 - Parkinson's disease; 

F02.80 - Dementia in other diseases classified elsewhere without behavioral 

disturbance   





(10) Hypercholesteremia


Code(s): E78.0 - PURE HYPERCHOLESTEROLEMIA * DO NOT USE *   





(11) Hypertension


Code(s): I10 - ESSENTIAL (PRIMARY) HYPERTENSION   


Qualifiers: 


   Hypertension type: essential hypertension   Qualified Code(s): I10 - 

Essential (primary) hypertension   





(12) Pacemaker


Code(s): Z95.0 - PRESENCE OF CARDIAC PACEMAKER   





Assessment/Plan





- Pt remains afebrile, without acute distress


- s/p course of antibiotics for PNA likely due to aspiration


- Cardiology following

## 2020-02-16 NOTE — PN
Progress Note (short form)





- Note


Progress Note: 





SUBJECTIVE: Still lethargic, rousable but non-participatory in medical 

interview. No CP/palpitations/SOB.








OBJECTIVE: Afebrile, RVR overnight requiring reinstatement of Cardizem drip. 

Agitated and restless last night, now lethargic, drowsy s/p Benadryl, Seroquel.


 


 Last Vital Signs











Temp Pulse Resp BP Pulse Ox


 


 97.0 F L  105 H  20   102/66   100 


 


 02/16/20 08:15  02/16/20 10:14  02/16/20 10:15  02/16/20 10:14  02/16/20 10:15








Heart - S1, S2, soft SM


Lungs - decreased air entry bibasally


Abdomen - Soft, non-tender. Bowel Sounds normal.


Extremities - no edema, no calf tenderness. 


Neuro - Lethargic, drowsy. KAY.





 Laboratory Results - last 24 hr











  02/16/20 02/16/20 02/16/20





  06:30 06:30 06:30


 


WBC  8.6  


 


RBC  3.45 L  


 


Hgb  8.6 L  


 


Hct  27.8 L  


 


MCV  80.6  


 


MCH  24.9 L  


 


MCHC  30.9 L  


 


RDW  20.4 H  


 


Plt Count  174  


 


MPV  8.2  


 


Absolute Neuts (auto)  6.7  


 


Neutrophils %  77.0  


 


Lymphocytes %  9.6  D  


 


Monocytes %  10.3 H  


 


Eosinophils %  2.5  


 


Basophils %  0.6  


 


Nucleated RBC %  0  


 


PT with INR    40.80 H


 


INR    3.41 H


 


Sodium   141 


 


Potassium   3.7 


 


Chloride   102 


 


Carbon Dioxide   30 


 


Anion Gap   8 


 


BUN   35.1 H 


 


Creatinine   1.9 H 


 


Est GFR (CKD-EPI)AfAm   26.81 


 


Est GFR (CKD-EPI)NonAf   23.13 


 


Random Glucose   106 


 


Calcium   8.4 L 


 


Total Bilirubin   0.6 


 


AST   14 L 


 


ALT   11 L 


 


Alkaline Phosphatase   73 


 


Total Protein   5.6 L 


 


Albumin   2.5 L 








 Current Medications











Generic Name Dose Route Start Last Admin





  Trade Name Freq  PRN Reason Stop Dose Admin


 


Acetaminophen  650 mg  02/15/20 15:44  





  Tylenol Oral Solution -  PO   





  Q4H PRN   





  FEVER   





     





     





     


 


Atorvastatin Calcium  10 mg  02/08/20 22:00  02/09/20 21:14





  Lipitor -  PO   10 mg





  HS [facility]   Administration





     





     





     





     


 


Ferrous Sulfate  325 mg  02/09/20 11:15  02/16/20 09:54





  Feosol -  PO   Not Given





  DAILY [facility]   





     





     





     





     


 


Furosemide  40 mg  02/14/20 14:00  02/16/20 09:50





  Lasix -  PO   Not Given





  BIDLASIX [facility]   





     





     





     





     


 


Potassium Chloride 10 meq/  1,005 mls @ 42 mls/hr  02/12/20 13:19  02/15/20 20:

01





  Dextrose  IV   42 mls/hr





  Q24H [facility]   Administration





     





     





     





     


 


Diltiazem HCl 125 mg/ Sodium  125 mls @ 5 mls/hr  02/15/20 20:45  02/16/20 07:15





  Chloride  IVPB   2 mg/hr





  TITR [facility]   2 mls/hr





     Titration





     





  Protocol   





  5 MG/HR   


 


Lorazepam  1 mg  02/12/20 17:48  02/12/20 21:00





  Ativan Injection -  IM   1 mg





  BID PRN   Administration





  ANXIETY   





     





     





     


 


Metoprolol Succinate  75 mg  02/15/20 22:00  02/16/20 10:12





  Toprol Xl -  PO   75 mg





  BID [facility]   Administration





     





     





     





     


 


Metoprolol Tartrate  5 mg  02/12/20 15:18  02/15/20 19:09





  Lopressor Injection -  IVPUSH   5 mg





  Q4H PRN   Administration





  TACHYCARDIA   





     





     





     


 


Pantoprazole Sodium  40 mg  02/11/20 22:00  02/16/20 10:12





  Protonix Iv  IVPUSH   40 mg





  BID [facility]   Administration





     





     





     





     


 


Polyethylene Glycol  17 gm  02/12/20 10:00  02/16/20 10:14





  Miralax (For Daily Use) -  PO   Not Given





  DAILY [facility]   





     





     





     





     


 


Quetiapine Fumarate  50 mg  02/10/20 15:15  02/14/20 05:19





  Seroquel -  PO   Not Given





  TID [facility]   





     





     





     





     


 


Warfarin Sodium  0.5 mg  02/14/20 18:00  02/14/20 17:42





  Coumadin -  PO   0.5 mg





  DAILY@1800 [facility]   Administration





     





     





     





     








 Home Medications











 Medication  Instructions  Recorded


 


Simvastatin [Zocor -] 20 mg PO HS 05/24/14


 


Diltiazem Cd [Cardizem Cd -] 180 mg PO DAILY 01/08/16


 


Gabapentin [Neurontin] 100 mg PO BID 01/08/16


 


Quetiapine Fumarate [Seroquel -] 50 mg PO BID  tablet 02/02/16


 


Furosemide [Lasix] 20 mg PO ASDIR 05/22/18


 


Isosorbide Mononitrate [Imdur -] 30 mg PO DAILY 05/22/18


 


Warfarin Na [Coumadin -] 1 mg PO HS 05/22/18


 


Carvedilol [Coreg -] 6.25 mg PO DAILY 02/08/20














 








ASSESSMENT AND PLAN:





88 year old female with history of Parkinson's disease, Dementia, Atrial 

fibrillation, DVT s/p IVC filter, Hx GI bleed 2/2 to Colonic AVMs, Hx CVA with 

R sided weakness,  CAD (s/p PCI 2005), CKD 3, Chronic Systolic CHF, HTN, HLD, 

Hx of left sided lung CA s/p pneumonectomy, presented with increasing confusion

, weakness, and poor oral intake.





1. Acute Metabolic and Toxic Encephalopathy on background baseline Dementia


Sec to decompensated CHF and Pneumonia as well as Ativan/Seroquel.


CT Head - no acute findings.


Still lethargic today after an episode of restlessness and agitation last 

night. Seroquel resumed due to agitation (dosing reverted to BID rather than TID

) No further Ativan to be given. 





2. Acute on Chronic Systolic CHF


Echo shows global hypokinesis with 20%EF. 


Venous congestion on CT, elevated BNP


Lasix 40mg PO BID ongoing.


Optimization of HF meds as per Cardio - BB changed to Metoprolol XL (dose 

increased to 75mg BID). ARB if/when BP tolerates. 


Utility of Eplerenone, Entresto discussed with Cardiology - unable to tolerate 

as BP borderline at this time.





3. Acute Pneumonia ?aspiration


Completed Unasyn course.


Speech therapy evaluated - pureed solids, thin liquid diet recommended with 

magic cup, ensure.


L Paratracheal enlarged LN on imaging


Pulm follow up on discharge.





4. Atrial fibrillation with RVR - required resmption of Diltiazem drip 

overnight.


As per Cardiology, continue increased dose 75mg BID and wean down Cardizem drip.


On Coumadin for AC (daughter confirms that patient has been on Coumadin 

chronically at home) - INR supratherapeutic at 3.41 - continue to hold Coumadin.





5. Microcytic Anemia sec to chronic GI blood loss


H/H 8.6/27.8 s/p 1 unit PRBCs.


FOBT - trace


Hx of colonoic AVMs and Hx of Hiatal Hernia with Chauncey erosions.


GI consulted - recommend Iron replacement with IV venofer, PPI. Patient/Family 

declines EGD/Colonoscopy. She is DNR/DNI. 


Family declines Ferrous Sulfate in favor of Venofer.





6. ELENITA on CKD 3 sec to Hepatorenal Syndrome


Improved with IV Lasix diuresis, now transitioned to PO.





7. Renal Cysts/Non-obstructing Renal Stone 


Urology follow up on discharge.





8. Dementia with Behavioral Disturbance


Resume on home dose Seroquel BID. 





9. Hx DVT s/p IVC filter - on Coumadin. INR 3.41. Coumadin held. 





10. HTN, HLD, CAD s/p PCI - Continue BB, ARB, Zocor. Imdur held. 





11. Hypokalemia - repleted. 








DVT Px - On Coumadin


DNR/DNI





Visit type





- Emergency Visit


Emergency Visit: Yes


ED Registration Date: 02/07/20


Care time: The patient presented to the Emergency Department on the above date 

and was hospitalized for further evaluation of their emergent condition.





- New Patient


This patient is new to me today: No





- Critical Care


Critical Care patient: No





- Discharge Referral


Referred to [facility] No

## 2020-02-16 NOTE — PN
Progress Note (short form)





- Note


Progress Note: 


Resting in NAD on NC O2.  


No acute events overnight. 


 Intake & Output











 02/13/20 02/14/20 02/15/20 02/16/20





 23:59 23:59 23:59 23:59


 


Intake Total 634 [phone]


 


Output Total 4 4  


 


Balance [phone]


 


Weight 123 lb 9.6 oz 127 lb  











 Last Vital Signs











Temp Pulse Resp BP Pulse Ox


 


 97.0 F L  99 H  20   98/74   100 


 


 02/16/20 08:15  02/16/20 08:15  02/16/20 08:15  02/16/20 08:15  02/15/20 20:03








Active Medications





Acetaminophen (Tylenol Oral Solution -)  650 mg PO Q4H PRN


   PRN Reason: FEVER


Atorvastatin Calcium (Lipitor -)  10 mg PO HS [facility]


   Last Admin: 02/09/20 21:14 Dose:  10 mg


Ferrous Sulfate (Feosol -)  325 mg PO DAILY [facility]


   Last Admin: 02/16/20 09:54 Dose:  Not Given


Furosemide (Lasix -)  40 mg PO BIDLASIX [facility]


   Last Admin: 02/16/20 09:50 Dose:  Not Given


Potassium Chloride 10 meq/ (Dextrose)  1,005 mls @ 42 mls/hr IV Q24H [facility]


   Last Admin: 02/15/20 20:01 Dose:  42 mls/hr


Diltiazem HCl 125 mg/ Sodium (Chloride)  125 mls @ 5 mls/hr IVPB TITR [facility]; 

Protocol


   Last Admin: 02/15/20 21:00 Dose:  5 mg/hr, 5 mls/hr


Lorazepam (Ativan Injection -)  1 mg IM BID PRN


   PRN Reason: ANXIETY


   Last Admin: 02/12/20 21:00 Dose:  1 mg


Metoprolol Succinate (Toprol Xl -)  75 mg PO BID [facility]


   Last Admin: 02/15/20 21:06 Dose:  Not Given


Metoprolol Tartrate (Lopressor Injection -)  5 mg IVPUSH Q4H PRN


   PRN Reason: TACHYCARDIA


   Last Admin: 02/15/20 19:09 Dose:  5 mg


Pantoprazole Sodium (Protonix Iv)  40 mg IVPUSH BID [facility]


   Last Admin: 02/15/20 21:05 Dose:  40 mg


Polyethylene Glycol (Miralax (For Daily Use) -)  17 gm PO DAILY [facility]


   Last Admin: 02/15/20 12:04 Dose:  Not Given


Quetiapine Fumarate (Seroquel -)  50 mg PO TID [facility]


   Last Admin: 02/14/20 05:19 Dose:  Not Given


Warfarin Sodium (Coumadin -)  0.5 mg PO DAILY@1800 [facility]


   Last Admin: 02/14/20 17:42 Dose:  0.5 mg











Constitutional: Yes: No Distress


Eyes: Yes: EOM Intact


HENT: Yes: Normocephalic


Neck: Yes: Trachea Midline


Cardiovascular: Yes: Pulse Irregular, S1, S2


Respiratory: Yes: Diminished


Gastrointestinal: Yes: Soft


Edema: No


Labs: 


  


 Laboratory Results - last 24 hr











  02/16/20 02/16/20 02/16/20





  06:30 06:30 06:30


 


WBC  8.6  


 


RBC  3.45 L  


 


Hgb  8.6 L  


 


Hct  27.8 L  


 


MCV  80.6  


 


MCH  24.9 L  


 


MCHC  30.9 L  


 


RDW  20.4 H  


 


Plt Count  174  


 


MPV  8.2  


 


Absolute Neuts (auto)  6.7  


 


Neutrophils %  77.0  


 


Lymphocytes %  9.6  D  


 


Monocytes %  10.3 H  


 


Eosinophils %  2.5  


 


Basophils %  0.6  


 


Nucleated RBC %  0  


 


PT with INR    40.80 H


 


INR    3.41 H


 


Sodium   141 


 


Potassium   3.7 


 


Chloride   102 


 


Carbon Dioxide   30 


 


Anion Gap   8 


 


BUN   35.1 H 


 


Creatinine   1.9 H 


 


Est GFR (CKD-EPI)AfAm   26.81 


 


Est GFR (CKD-EPI)NonAf   23.13 


 


Random Glucose   106 


 


Calcium   8.4 L 


 


Total Bilirubin   0.6 


 


AST   14 L 


 


ALT   11 L 


 


Alkaline Phosphatase   73 


 


Total Protein   5.6 L 


 


Albumin   2.5 L 











- Problems


(1) CAD (coronary artery disease)


Code(s): I25.10 - ATHSCL HEART DISEASE OF NATIVE CORONARY ARTERY W/O ANG PCTRS 

  


Qualifiers: 


   Coronary Disease-Associated Artery/Lesion type: native artery   Native vs. 

transplanted heart: native heart   Associated angina: without angina   

Qualified Code(s): I25.10 - Atherosclerotic heart disease of native coronary 

artery without angina pectoris   





(2) CHF exacerbation


Code(s): I50.9 - HEART FAILURE, UNSPECIFIED   


Qualifiers: 


   Heart failure type: unspecified 





(3) Chronic anemia


Code(s): D64.9 - ANEMIA, UNSPECIFIED   





(4) Chronic kidney disease (CKD), stage III (moderate)


Code(s): N18.3 - CHRONIC KIDNEY DISEASE, STAGE 3 (MODERATE)   





(5) H/O: CVA (cerebrovascular accident)


Code(s): Z86.73 - PRSNL HX OF TIA (TIA), AND CEREB INFRC W/O RESID DEFICITS   





(6) Angiodysplasia of cecum


Code(s): K55.20 - ANGIODYSPLASIA OF COLON WITHOUT HEMORRHAGE   





(7) Atrial fibrillation


Code(s): I48.91 - UNSPECIFIED ATRIAL FIBRILLATION   


Qualifiers: 


   Atrial fibrillation type: permanent 





(8) Dementia


Code(s): F03.90 - UNSPECIFIED DEMENTIA WITHOUT BEHAVIORAL DISTURBANCE   


Qualifiers: 


   Dementia type: Parkinson's disease   Dementia behavioral disturbance: 

without behavioral disturbance   Qualified Code(s): G20 - Parkinson's disease; 

F02.80 - Dementia in other diseases classified elsewhere without behavioral 

disturbance; F02.80 - Dementia in other diseases classified elsewhere without 

behavioral disturbance; F02.80 - Dementia in other diseases classified 

elsewhere without behavioral disturbance   





(9) Pacemaker


Code(s): Z95.0 - PRESENCE OF CARDIAC PACEMAKER   





Assessment/Plan


SUPPLEMENTAL O2 AS NEEDED  


RATE CONTROL 


MONITOR HGB


ASPIRATION PRECAUTIONS 


MELISSA Merritt

## 2020-02-17 LAB
ALBUMIN SERPL-MCNC: 2.5 G/DL (ref 3.4–5)
ALBUMIN SERPL-MCNC: 2.6 G/DL (ref 3.4–5)
ALP SERPL-CCNC: 73 U/L (ref 45–117)
ALP SERPL-CCNC: 76 U/L (ref 45–117)
ALT SERPL-CCNC: 10 U/L (ref 13–61)
ALT SERPL-CCNC: 12 U/L (ref 13–61)
ANION GAP SERPL CALC-SCNC: 7 MMOL/L (ref 8–16)
ANION GAP SERPL CALC-SCNC: 7 MMOL/L (ref 8–16)
ANISOCYTOSIS BLD QL: (no result)
AST SERPL-CCNC: 15 U/L (ref 15–37)
AST SERPL-CCNC: 16 U/L (ref 15–37)
BASOPHILS # BLD: 1.1 % (ref 0–2)
BILIRUB SERPL-MCNC: 0.6 MG/DL (ref 0.2–1)
BILIRUB SERPL-MCNC: 0.6 MG/DL (ref 0.2–1)
BUN SERPL-MCNC: 26.9 MG/DL (ref 7–18)
BUN SERPL-MCNC: 27 MG/DL (ref 7–18)
CALCIUM SERPL-MCNC: 8.6 MG/DL (ref 8.5–10.1)
CALCIUM SERPL-MCNC: 8.6 MG/DL (ref 8.5–10.1)
CHLORIDE SERPL-SCNC: 103 MMOL/L (ref 98–107)
CHLORIDE SERPL-SCNC: 104 MMOL/L (ref 98–107)
CO2 SERPL-SCNC: 32 MMOL/L (ref 21–32)
CO2 SERPL-SCNC: 34 MMOL/L (ref 21–32)
CREAT SERPL-MCNC: 1.7 MG/DL (ref 0.55–1.3)
CREAT SERPL-MCNC: 1.7 MG/DL (ref 0.55–1.3)
DEPRECATED RDW RBC AUTO: 20.7 % (ref 11.6–15.6)
EOSINOPHIL # BLD: 6.2 % (ref 0–4.5)
GLUCOSE SERPL-MCNC: 93 MG/DL (ref 74–106)
GLUCOSE SERPL-MCNC: 96 MG/DL (ref 74–106)
HCT VFR BLD CALC: 30.2 % (ref 32.4–45.2)
HGB BLD-MCNC: 9.3 GM/DL (ref 10.7–15.3)
INR BLD: 3 (ref 0.83–1.09)
LYMPHOCYTES # BLD: 10.6 % (ref 8–40)
MACROCYTES BLD QL: 0
MCH RBC QN AUTO: 24.9 PG (ref 25.7–33.7)
MCHC RBC AUTO-ENTMCNC: 30.9 G/DL (ref 32–36)
MCV RBC: 80.5 FL (ref 80–96)
MONOCYTES # BLD AUTO: 11.6 % (ref 3.8–10.2)
NEUTROPHILS # BLD: 70.5 % (ref 42.8–82.8)
PLATELET # BLD AUTO: 164 K/MM3 (ref 134–434)
PLATELET BLD QL SMEAR: NORMAL
PMV BLD: 8 FL (ref 7.5–11.1)
POTASSIUM SERPLBLD-SCNC: 3.7 MMOL/L (ref 3.5–5.1)
POTASSIUM SERPLBLD-SCNC: 3.9 MMOL/L (ref 3.5–5.1)
PROT SERPL-MCNC: 5.6 G/DL (ref 6.4–8.2)
PROT SERPL-MCNC: 5.9 G/DL (ref 6.4–8.2)
PT PNL PPP: 35.8 SEC (ref 9.7–13)
RBC # BLD AUTO: 3.75 M/MM3 (ref 3.6–5.2)
SODIUM SERPL-SCNC: 142 MMOL/L (ref 136–145)
SODIUM SERPL-SCNC: 143 MMOL/L (ref 136–145)
WBC # BLD AUTO: 5.7 K/MM3 (ref 4–10)

## 2020-02-17 RX ADMIN — PANTOPRAZOLE SODIUM SCH MG: 40 INJECTION, POWDER, FOR SOLUTION INTRAVENOUS at 09:17

## 2020-02-17 RX ADMIN — POLYETHYLENE GLYCOL 3350 SCH: 17 POWDER, FOR SOLUTION ORAL at 09:17

## 2020-02-17 RX ADMIN — ATORVASTATIN CALCIUM SCH MG: 10 TABLET, FILM COATED ORAL at 22:18

## 2020-02-17 RX ADMIN — SODIUM CHLORIDE SCH: 9 INJECTION, SOLUTION INTRAVENOUS at 20:50

## 2020-02-17 RX ADMIN — FUROSEMIDE SCH: 40 TABLET ORAL at 15:20

## 2020-02-17 RX ADMIN — DEXTROSE MONOHYDRATE SCH: 50 INJECTION, SOLUTION INTRAVENOUS at 15:10

## 2020-02-17 RX ADMIN — PANTOPRAZOLE SODIUM SCH MG: 40 INJECTION, POWDER, FOR SOLUTION INTRAVENOUS at 22:18

## 2020-02-17 NOTE — PN
Teaching Attending Note


Name of Resident: Diane Lopez





ATTENDING PHYSICIAN STATEMENT





I saw and evaluated the patient.


I reviewed the resident's note and discussed the case with the resident.


I agree with the resident's findings and plan as documented.








SUBJECTIVE: Lethargic, rousable, starting to interact again, asking for food. 

No CP/palpitations/SOB.








OBJECTIVE: Afebrile, Episodes of RVR overnight. 





 Last Vital Signs











Temp Pulse Resp BP Pulse Ox


 


 97.5 F L  103 H  20   98/68   96 


 


 02/17/20 09:20  02/17/20 09:20  02/17/20 10:00  02/17/20 09:20  02/17/20 10:00











 


Heart - S1, S2, soft SM


Lungs - decreased air entry bibasally


Abdomen - Soft, non-tender. Bowel Sounds normal.


Extremities - no edema, no calf tenderness. 


Neuro - Lethargic, rousable, moving all extremities. KAY.





 Laboratory Results - last 24 hr











  02/17/20 02/17/20 02/17/20





  10:08 10:08 10:08


 


WBC  5.7  


 


RBC  3.75  


 


Hgb  9.3 L  


 


Hct  30.2 L  


 


MCV  80.5  


 


MCH  24.9 L  


 


MCHC  30.9 L  


 


RDW  20.7 H  


 


Plt Count  164  


 


MPV  8.0  


 


Absolute Neuts (auto)  4.0  


 


Neutrophils %  70.5  


 


Lymphocytes %  10.6  


 


Monocytes %  11.6 H  


 


Eosinophils %  6.2 H D  


 


Basophils %  1.1  


 


Nucleated RBC %  0  


 


PT with INR   35.80 H 


 


INR   3.00 H 


 


Sodium    142


 


Potassium    3.7


 


Chloride    104


 


Carbon Dioxide    32


 


Anion Gap    7 L


 


BUN    27.0 H


 


Creatinine    1.7 H


 


Est GFR (CKD-EPI)AfAm    30.67


 


Est GFR (CKD-EPI)NonAf    26.46


 


Random Glucose    96


 


Calcium    8.6


 


Total Bilirubin    0.6


 


AST    16


 


ALT    10 L


 


Alkaline Phosphatase    73


 


Total Protein    5.6 L


 


Albumin    2.5 L








 Current Medications











Generic Name Dose Route Start Last Admin





  Trade Name Freq  PRN Reason Stop Dose Admin


 


Acetaminophen  650 mg  02/16/20 23:00  02/17/20 09:21





  Tylenol Oral Solution -  PO   650 mg





  Q4H PRN   Administration





  FEVER   





     





     





     


 


Atorvastatin Calcium  10 mg  02/16/20 19:00  02/16/20 22:37





  Lipitor -  PO   10 mg





  HS [facility]   Administration





     





     





     





     


 


Furosemide  40 mg  02/14/20 14:00  02/16/20 13:00





  Lasix -  PO   Not Given





  BIDLASIX [facility]   





     





     





     





     


 


Potassium Chloride 10 meq/  1,005 mls @ 42 mls/hr  02/12/20 13:19  02/16/20 13:

58





  Dextrose  IV   Not Given





  Q24H [facility]   





     





     





     





     


 


Diltiazem HCl 125 mg/ Sodium  125 mls @ 5 mls/hr  02/15/20 20:45  02/16/20 22:19





  Chloride  IVPB   Not Given





  TITR [facility]   





     





     





  Protocol   





  5 MG/HR   


 


Metoprolol Succinate  75 mg  02/15/20 22:00  02/17/20 09:20





  Toprol Xl -  PO   Not Given





  BID [facility]   





     





     





     





     


 


Metoprolol Tartrate  5 mg  02/12/20 15:18  02/15/20 19:09





  Lopressor Injection -  IVPUSH   5 mg





  Q4H PRN   Administration





  TACHYCARDIA   





     





     





     


 


Pantoprazole Sodium  40 mg  02/11/20 22:00  02/17/20 09:17





  Protonix Iv  IVPUSH   40 mg





  BID [facility]   Administration





     





     





     





     


 


Polyethylene Glycol  17 gm  02/12/20 10:00  02/17/20 09:17





  Miralax (For Daily Use) -  PO   Not Given





  DAILY [facility]   





     





     





     





     


 


Quetiapine Fumarate  50 mg  02/16/20 22:00  02/17/20 09:18





  Seroquel -  PO   50 mg





  BID [facility]   Administration





     





     





     





     








 Home Medications











 Medication  Instructions  Recorded


 


Simvastatin [Zocor -] 20 mg PO HS 05/24/14


 


Diltiazem Cd [Cardizem Cd -] 180 mg PO DAILY 01/08/16


 


Gabapentin [Neurontin] 100 mg PO BID 01/08/16


 


Quetiapine Fumarate [Seroquel -] 50 mg PO BID  tablet 02/02/16


 


Furosemide [Lasix] 20 mg PO ASDIR 05/22/18


 


Isosorbide Mononitrate [Imdur -] 30 mg PO DAILY 05/22/18


 


Warfarin Na [Coumadin -] 1 mg PO HS 05/22/18


 


Carvedilol [Coreg -] 6.25 mg PO DAILY 02/08/20




















ASSESSMENT AND PLAN:





88 year old female with history of Parkinson's disease, Dementia, Atrial 

fibrillation, DVT s/p IVC filter, Hx GI bleed 2/2 to Colonic AVMs, Hx CVA with 

R sided weakness,  CAD (s/p PCI 2005), CKD 3, Chronic Systolic CHF, HTN, HLD, 

Hx of left sided lung CA s/p pneumonectomy, presented with increasing confusion

, weakness, and poor oral intake.





1. Acute Metabolic and Toxic Encephalopathy on background baseline Dementia


Sec to decompensated CHF and Pneumonia as well as Ativan/Seroquel.


CT Head - no acute findings.


Still lethargic but rousable. Seroquel resumed (dosing reverted to BID rather 

than TID) No further Ativan to be given. 


Family asking for [place] re-eval to consider reducing AM Seroquel dose.





2. Acute on Chronic Systolic CHF


Echo shows global hypokinesis with 20%EF. 


Venous congestion on CT, elevated BNP


Lasix 40mg PO BID ongoing.


Optimization of HF meds as per Cardio - BB changed to Metoprolol XL (dose 

increased to 75mg BID). ARB if/when BP tolerates. 


Utility of Eplerenone, Entresto discussed with Cardiology - unable to tolerate 

as BP borderline at this time.





3. Acute Pneumonia ?aspiration


Completed Unasyn course.


Speech therapy evaluated - pureed solids, thin liquid diet recommended with 

magic cup, ensure.


L Paratracheal enlarged LN on imaging


Pulm follow up on discharge.





4. Atrial fibrillation with RVR - titrated off Cardizem drip. 


As per Cardiology, continue increased dose 75mg BID.


On Coumadin for AC (daughter confirms that patient has been on Coumadin 

chronically at home) - INR at 3.0 - continue to hold Coumadin.


INR check in AM.





5. Microcytic Anemia sec to chronic GI blood loss


H/H stable s/p 1 unit PRBCs.


FOBT - trace


Hx of colonoic AVMs and Hx of Hiatal Hernia with Chauncey erosions.


GI consulted - recommend Iron replacement with IV venofer, PPI. Patient/Family 

declines EGD/Colonoscopy. She is DNR/DNI. 


Family declines Ferrous Sulfate in favor of Venofer.





6. ELENITA on CKD 3 sec to Hepatorenal Syndrome


Improved with IV Lasix diuresis, now transitioned to PO.





7. Renal Cysts/Non-obstructing Renal Stone 


Urology follow up on discharge.





8. Dementia with Behavioral Disturbance


Resume on home dose Seroquel BID. Psychiatry consulted.





9. Hx DVT s/p IVC filter - on Coumadin. INR 3.0. Coumadin held. 





10. HTN, HLD, CAD s/p PCI - Continue BB, Zocor. ARB, Imdur held. 





11. Hypokalemia - repleted. 





DCT Px - on Coumadin, INR 3.0
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
## 2020-02-17 NOTE — PN
Progress Note, Physician


History of Present Illness: 





Pt seen and examined at bedside. She is awake and appears more comfortable. 





- Current Medication List


Current Medications: 


Active Medications





Acetaminophen (Tylenol Oral Solution -)  650 mg PO Q4H PRN


   PRN Reason: FEVER


   Last Admin: 02/17/20 09:21 Dose:  650 mg


Atorvastatin Calcium (Lipitor -)  10 mg PO HS [facility]


   Last Admin: 02/16/20 22:37 Dose:  10 mg


Furosemide (Lasix -)  40 mg PO BIDLASIX [facility]


   Last Admin: 02/16/20 13:00 Dose:  Not Given


Potassium Chloride 10 meq/ (Dextrose)  1,005 mls @ 42 mls/hr IV Q24H [facility]


   Last Admin: 02/16/20 13:58 Dose:  Not Given


Diltiazem HCl 125 mg/ Sodium (Chloride)  125 mls @ 5 mls/hr IVPB TITR [facility]; 

Protocol


   Last Admin: 02/16/20 22:19 Dose:  Not Given


Metoprolol Succinate (Toprol Xl -)  75 mg PO BID [facility]


   Last Admin: 02/17/20 09:20 Dose:  Not Given


Metoprolol Tartrate (Lopressor Injection -)  5 mg IVPUSH Q4H PRN


   PRN Reason: TACHYCARDIA


   Last Admin: 02/15/20 19:09 Dose:  5 mg


Pantoprazole Sodium (Protonix Iv)  40 mg IVPUSH BID [facility]


   Last Admin: 02/17/20 09:17 Dose:  40 mg


Polyethylene Glycol (Miralax (For Daily Use) -)  17 gm PO DAILY [facility]


   Last Admin: 02/17/20 09:17 Dose:  Not Given


Quetiapine Fumarate (Seroquel -)  50 mg PO BID [facility]


   Last Admin: 02/17/20 09:18 Dose:  50 mg











- Objective


Vital Signs: 


 Vital Signs











Temperature  97.5 F L  02/17/20 13:15


 


Pulse Rate  112 H  02/17/20 13:15


 


Respiratory Rate  23 H  02/17/20 13:15


 


Blood Pressure  124/101 H  02/17/20 13:15


 


O2 Sat by Pulse Oximetry (%)  96   02/17/20 10:00











Constitutional: Yes: Calm


Eyes: Yes: Conjunctiva Clear


HENT: Yes: Atraumatic


Neck: Yes: Supple


Cardiovascular: Yes: S1, S2


Respiratory: Yes: On Nasal O2


Gastrointestinal: Yes: Normal Bowel Sounds, Soft


Genitourinary: Yes: Incontinence


Musculoskeletal: Yes: Muscle Weakness


Edema: No


Neurological: Yes: Confusion


Labs: 


 CBC, BMP





 02/17/20 10:08 





 02/17/20 10:08 





 INR, PTT











INR  3.00  (0.83-1.09)  H  02/17/20  10:08    














Problem List





- Problems


(1) CKD (chronic kidney disease)


Code(s): N18.9 - CHRONIC KIDNEY DISEASE, UNSPECIFIED   





(2) CHF exacerbation


Code(s): I50.9 - HEART FAILURE, UNSPECIFIED   


Qualifiers: 


   Heart failure type: unspecified   Qualified Code(s): I50.9 - Heart failure, 

unspecified   





Assessment/Plan


 Current Medications











Generic Name Dose Route Start Last Admin





  Trade Name Freq  PRN Reason Stop Dose Admin


 


Acetaminophen  650 mg  02/16/20 23:00  02/17/20 09:21





  Tylenol Oral Solution -  PO   650 mg





  Q4H PRN   Administration





  FEVER   





     





     





     


 


Atorvastatin Calcium  10 mg  02/16/20 19:00  02/16/20 22:37





  Lipitor -  PO   10 mg





  HS [facility]   Administration





     





     





     





     


 


Furosemide  40 mg  02/14/20 14:00  02/16/20 13:00





  Lasix -  PO   Not Given





  BIDLASIX [facility]   





     





     





     





     


 


Potassium Chloride 10 meq/  1,005 mls @ 42 mls/hr  02/12/20 13:19  02/16/20 13:

58





  Dextrose  IV   Not Given





  Q24H [facility]   





     





     





     





     


 


Diltiazem HCl 125 mg/ Sodium  125 mls @ 5 mls/hr  02/15/20 20:45  02/16/20 22:19





  Chloride  IVPB   Not Given





  TITR [facility]   





     





     





  Protocol   





  5 MG/HR   


 


Metoprolol Succinate  75 mg  02/15/20 22:00  02/17/20 09:20





  Toprol Xl -  PO   Not Given





  BID [facility]   





     





     





     





     


 


Metoprolol Tartrate  5 mg  02/12/20 15:18  02/15/20 19:09





  Lopressor Injection -  IVPUSH   5 mg





  Q4H PRN   Administration





  TACHYCARDIA   





     





     





     


 


Pantoprazole Sodium  40 mg  02/11/20 22:00  02/17/20 09:17





  Protonix Iv  IVPUSH   40 mg





  BID [facility]   Administration





     





     





     





     


 


Polyethylene Glycol  17 gm  02/12/20 10:00  02/17/20 09:17





  Miralax (For Daily Use) -  PO   Not Given





  DAILY [facility]   





     





     





     





     


 


Quetiapine Fumarate  50 mg  02/16/20 22:00  02/17/20 09:18





  Seroquel -  PO   50 mg





  BID [facility]   Administration





     





     





     





     














Impression


1. ELENITA


2. UTI


3. CHF ef 17 percent


4. CVA


5. HTN


6. cva


7. parkinsons


8. a-fib


9. hypernatremia








Plan


- cont to monitor renal function


- crt improved


- sodium stable


- cont lasix


- losartan on hold


- repeat labs in am

## 2020-02-17 NOTE — PN
Progress Note, SLP





- Note


Progress Note: 


I spoke with pt's daughter at length on Friday 


Pt's daughter would like broth, pudding etc. Reviewed case and daughter was in 

agreement with pureed diet. 


Pt's diet not changed. I spoke today with another daughter who was adamant that 

diet remain as is and that they will choose foods.





 Selected Entries











  02/14/20 02/15/20 02/15/20





  20:00 10:00 20:03


 


Intake, Oral   





Amount   


 


Lunch 0 0 0


 


Supper 0 0 0


 


Temperature   














  02/16/20 02/16/20 02/16/20





  02:00 08:15 14:21


 


Intake, Oral   





Amount   


 


Lunch   


 


Supper   


 


Temperature 98.0 F 97.0 F L 97.2 F L














  02/16/20 02/17/20





  22:00 06:00


 


Intake, Oral  0





Amount  


 


Lunch  


 


Supper  


 


Temperature 97.4 F L 








 Laboratory Tests











  02/17/20





  10:08


 


WBC  5.7





NPO for MUGA nuclear medicine





I believe pt is barely eating, per EMR. Pt is refusing po trials. This is 

reportedly not her baseline.


f/u by RD re: improving nutrition?


Appetite stimulant? Clinimix? Palliative care?

## 2020-02-17 NOTE — PN
Progress Note, Physician





- Current Medication List


Current Medications: 


Active Medications





Acetaminophen (Tylenol Oral Solution -)  650 mg PO Q4H PRN


   PRN Reason: FEVER


   Last Admin: 02/17/20 09:21 Dose:  650 mg


Atorvastatin Calcium (Lipitor -)  10 mg PO HS [facility]


   Last Admin: 02/16/20 22:37 Dose:  10 mg


Furosemide (Lasix -)  40 mg PO BIDLASIX [facility]


   Last Admin: 02/16/20 13:00 Dose:  Not Given


Potassium Chloride 10 meq/ (Dextrose)  1,005 mls @ 42 mls/hr IV Q24H [facility]


   Last Admin: 02/16/20 13:58 Dose:  Not Given


Diltiazem HCl 125 mg/ Sodium (Chloride)  125 mls @ 5 mls/hr IVPB TITR [facility]; 

Protocol


   Last Admin: 02/16/20 22:19 Dose:  Not Given


Metoprolol Succinate (Toprol Xl -)  75 mg PO BID [facility]


   Last Admin: 02/17/20 09:20 Dose:  Not Given


Metoprolol Tartrate (Lopressor Injection -)  5 mg IVPUSH Q4H PRN


   PRN Reason: TACHYCARDIA


   Last Admin: 02/15/20 19:09 Dose:  5 mg


Pantoprazole Sodium (Protonix Iv)  40 mg IVPUSH BID [facility]


   Last Admin: 02/17/20 09:17 Dose:  40 mg


Polyethylene Glycol (Miralax (For Daily Use) -)  17 gm PO DAILY [facility]


   Last Admin: 02/17/20 09:17 Dose:  Not Given


Quetiapine Fumarate (Seroquel -)  50 mg PO BID [facility]


   Last Admin: 02/17/20 09:18 Dose:  50 mg











- Objective


Vital Signs: 


 Vital Signs











Temperature  97.5 F L  02/17/20 13:15


 


Pulse Rate  112 H  02/17/20 13:15


 


Respiratory Rate  23 H  02/17/20 13:15


 


Blood Pressure  124/101 H  02/17/20 13:15


 


O2 Sat by Pulse Oximetry (%)  96   02/17/20 10:00











Labs: 


 CBC, BMP





 02/17/20 10:08 





 02/17/20 10:08 





 INR, PTT











INR  3.00  (0.83-1.09)  H  02/17/20  10:08

## 2020-02-17 NOTE — PN
Progress Note (short form)





- Note


Progress Note: 


s: awake, not answering questions. appears comfortable.





 Current Medications





Acetaminophen (Tylenol Oral Solution -)  650 mg PO Q4H PRN


   PRN Reason: FEVER


   Last Admin: 02/17/20 09:21 Dose:  650 mg


Atorvastatin Calcium (Lipitor -)  10 mg PO HS [facility]


   Last Admin: 02/16/20 22:37 Dose:  10 mg


Furosemide (Lasix -)  40 mg PO BIDLASIX [facility]


   Last Admin: 02/16/20 13:00 Dose:  Not Given


Potassium Chloride 10 meq/ (Dextrose)  1,005 mls @ 42 mls/hr IV Q24H [facility]


   Last Admin: 02/16/20 13:58 Dose:  Not Given


Diltiazem HCl 125 mg/ Sodium (Chloride)  125 mls @ 5 mls/hr IVPB TITR [facility]; 

Protocol


   Last Admin: 02/16/20 22:19 Dose:  Not Given


Metoprolol Succinate (Toprol Xl -)  75 mg PO BID [facility]


   Last Admin: 02/17/20 09:20 Dose:  Not Given


Metoprolol Tartrate (Lopressor Injection -)  5 mg IVPUSH Q4H PRN


   PRN Reason: TACHYCARDIA


   Last Admin: 02/15/20 19:09 Dose:  5 mg


Pantoprazole Sodium (Protonix Iv)  40 mg IVPUSH BID [facility]


   Last Admin: 02/17/20 09:17 Dose:  40 mg


Polyethylene Glycol (Miralax (For Daily Use) -)  17 gm PO DAILY [facility]


   Last Admin: 02/17/20 09:17 Dose:  Not Given


Quetiapine Fumarate (Seroquel -)  50 mg PO BID [facility]


   Last Admin: 02/17/20 09:18 Dose:  50 mg





 Vital Signs











 Period  Temp  Pulse  Resp  BP Sys/Card  Pulse Ox


 


 Last 24 Hr  97.2 F-97.4 F    17-25  /54-78  96














Constitutional: Yes: No Distress


Cardiovascular: Yes: Pulse Irregular


Respiratory: Yes: Other (clear anteriorly)


Gastrointestinal: Yes: Soft,nt, nd, + bs


Edema: No


no jaundice, diaphoresis


alert


not agitated





tele: afib rate controlled, brief episodes RVR 150s last night





Impression:


1.  Atrial fibrillation with periods rapid ventricular response, now improved.


2.  Coronary artery disease, status post PCI/stenting, angina pectoris.


3.  Status post embolic CVA.


4.  Vascular dementia.


5.  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 

cardiomyopathy.


6.  Status post permanent pacemaker for carotid hypersensitivity.


7.  Anemia  related to blood loss.


8.  History of chronic kidney disease.





Recommendation:


1. high rates last night while pt agitated per family, diltiazem gtt was stopped


- rate generally controlled, cont metoprolol succinate 75 mg BID


2. Monitor INR goal 2-3


3. cont lasix PO 40mg BID, monitor Cr, lytes. appears euvolemic

## 2020-02-18 LAB
ANION GAP SERPL CALC-SCNC: 7 MMOL/L (ref 8–16)
BASOPHILS # BLD: 0.8 % (ref 0–2)
BUN SERPL-MCNC: 27.3 MG/DL (ref 7–18)
CALCIUM SERPL-MCNC: 8.4 MG/DL (ref 8.5–10.1)
CHLORIDE SERPL-SCNC: 103 MMOL/L (ref 98–107)
CO2 SERPL-SCNC: 32 MMOL/L (ref 21–32)
CREAT SERPL-MCNC: 1.6 MG/DL (ref 0.55–1.3)
DEPRECATED PREALB SERPL-CCNC: 10.5 MG/DL (ref 20–40)
DEPRECATED RDW RBC AUTO: 21.9 % (ref 11.6–15.6)
EOSINOPHIL # BLD: 7.5 % (ref 0–4.5)
GLUCOSE SERPL-MCNC: 100 MG/DL (ref 74–106)
HCT VFR BLD CALC: 29.8 % (ref 32.4–45.2)
HGB BLD-MCNC: 9.3 GM/DL (ref 10.7–15.3)
INR BLD: 2.98 (ref 0.83–1.09)
LYMPHOCYTES # BLD: 11 % (ref 8–40)
MAGNESIUM SERPL-MCNC: 1.8 MG/DL (ref 1.8–2.4)
MCH RBC QN AUTO: 25.1 PG (ref 25.7–33.7)
MCHC RBC AUTO-ENTMCNC: 31.2 G/DL (ref 32–36)
MCV RBC: 80.4 FL (ref 80–96)
MONOCYTES # BLD AUTO: 11.3 % (ref 3.8–10.2)
NEUTROPHILS # BLD: 69.4 % (ref 42.8–82.8)
PHOSPHATE SERPL-MCNC: 3.1 MG/DL (ref 2.5–4.9)
PLATELET # BLD AUTO: 166 K/MM3 (ref 134–434)
PMV BLD: 8.2 FL (ref 7.5–11.1)
POTASSIUM SERPLBLD-SCNC: 4 MMOL/L (ref 3.5–5.1)
PT PNL PPP: 35.6 SEC (ref 9.7–13)
RBC # BLD AUTO: 3.71 M/MM3 (ref 3.6–5.2)
SODIUM SERPL-SCNC: 142 MMOL/L (ref 136–145)
WBC # BLD AUTO: 6.8 K/MM3 (ref 4–10)

## 2020-02-18 RX ADMIN — DEXTROSE MONOHYDRATE SCH MLS/HR: 50 INJECTION, SOLUTION INTRAVENOUS at 15:46

## 2020-02-18 RX ADMIN — POLYETHYLENE GLYCOL 3350 SCH: 17 POWDER, FOR SOLUTION ORAL at 09:03

## 2020-02-18 RX ADMIN — FUROSEMIDE SCH MG: 40 TABLET ORAL at 05:57

## 2020-02-18 RX ADMIN — MEGESTROL ACETATE SCH MG: 40 SUSPENSION ORAL at 11:10

## 2020-02-18 RX ADMIN — PANTOPRAZOLE SODIUM SCH MG: 40 INJECTION, POWDER, FOR SOLUTION INTRAVENOUS at 21:14

## 2020-02-18 RX ADMIN — ATORVASTATIN CALCIUM SCH MG: 10 TABLET, FILM COATED ORAL at 21:10

## 2020-02-18 RX ADMIN — FUROSEMIDE SCH MG: 40 TABLET ORAL at 15:46

## 2020-02-18 RX ADMIN — PANTOPRAZOLE SODIUM SCH MG: 40 INJECTION, POWDER, FOR SOLUTION INTRAVENOUS at 09:27

## 2020-02-18 NOTE — PN
Progress Note, Physician





- Current Medication List


Current Medications: 


Active Medications





Acetaminophen (Tylenol Oral Solution -)  650 mg PO Q4H PRN


   PRN Reason: FEVER


   Last Admin: 02/17/20 09:21 Dose:  650 mg


Atorvastatin Calcium (Lipitor -)  10 mg PO HS [facility]


   Last Admin: 02/17/20 22:18 Dose:  10 mg


Furosemide (Lasix -)  40 mg PO BIDLASIX [facility]


   Last Admin: 02/18/20 05:57 Dose:  40 mg


Potassium Chloride 10 meq/ (Dextrose)  1,005 mls @ 42 mls/hr IV Q24H [facility]


   Last Admin: 02/17/20 15:10 Dose:  Not Given


Megestrol Acetate (Megace Oral Suspension -)  400 mg PO DAILY [facility]


   Last Admin: 02/18/20 11:10 Dose:  400 mg


Metoprolol Succinate (Toprol Xl -)  75 mg PO BID [facility]


   Last Admin: 02/18/20 09:03 Dose:  Not Given


Pantoprazole Sodium (Protonix Iv)  40 mg IVPUSH BID [facility]


   Last Admin: 02/18/20 09:27 Dose:  40 mg


Polyethylene Glycol (Miralax (For Daily Use) -)  17 gm PO DAILY [facility]


   Last Admin: 02/18/20 09:03 Dose:  Not Given


Quetiapine Fumarate (Seroquel -)  50 mg PO BID [facility]


   Last Admin: 02/18/20 09:27 Dose:  50 mg











- Objective


Vital Signs: 


 Vital Signs











Temperature  97.5 F L  02/18/20 11:00


 


Pulse Rate  98 H  02/18/20 11:00


 


Respiratory Rate  18   02/18/20 11:00


 


Blood Pressure  88/61 L  02/18/20 11:00


 


O2 Sat by Pulse Oximetry (%)  98   02/18/20 07:40











Labs: 


 CBC, BMP





 02/18/20 05:30 





 02/18/20 05:30 





 INR, PTT











INR  2.98  (0.83-1.09)  H  02/18/20  05:30

## 2020-02-18 NOTE — PN
Progress Note, SLP





- Note


Progress Note: 





Pt still not accepting much by mouth, only jelly, pudding. Counseled daughter 

on benefit of downgrading to DYS Puree and encouraging supplements to increase 

nutritional density. Pt's daughter agreed and appreciated the education. 

Reviewed dx of toxic-metabolic encephalopathy adversely affecting mentation and 

communication with hope for recovery.Reviewed ways to facilitate po acceptance.


rec: dys puree/thin liquids, magic cup, ensure pudding, ensure with each meal.

## 2020-02-18 NOTE — PN
Progress Note, Physician


History of Present Illness: 





Pt seen and examined at bedside. She is awake and appears more comfortable. She 

denies shortness of breath. 





- Current Medication List


Current Medications: 


Active Medications





Acetaminophen (Tylenol Oral Solution -)  650 mg PO Q4H PRN


   PRN Reason: FEVER


   Last Admin: 02/17/20 09:21 Dose:  650 mg


Atorvastatin Calcium (Lipitor -)  [facility]


   Last Admin: 02/17/20 22:18 Dose:  10 mg


Furosemide (Lasix -)  40 mg PO [facility]IDLASIX [facility]


   Last Admin: 02/18/20 05:57 Dose:  40 mg


Potassium Chloride 10 meq/ (Dextrose)  1,005 mls @ 42 mls/hr IV Q24H [facility]


   Last Admin: 02/17/20 15:10 Dose:  Not Given


Megestrol Acetate (Megace Oral Suspension -)  400 mg PO DAILY [facility]


   Last Admin: 02/18/20 11:10 Dose:  400 mg


Metoprolol Succinate (Toprol Xl -)  75 mg PO BID [facility]


   Last Admin: 02/18/20 09:03 Dose:  Not Given


Pantoprazole Sodium (Protonix Iv)  40 mg IVPUSH BID [facility]


   Last Admin: 02/18/20 09:27 Dose:  40 mg


Polyethylene Glycol (Miralax (For Daily Use) -)  17 gm PO DAILY [facility]


   Last Admin: 02/18/20 09:03 Dose:  Not Given


Quetiapine Fumarate (Seroquel -)  50 mg PO BID [facility]


   Last Admin: 02/18/20 09:27 Dose:  50 mg











- Objective


Vital Signs: 


 Vital Signs











Temperature  97.5 F L  02/18/20 11:00


 


Pulse Rate  98 H  02/18/20 11:00


 


Respiratory Rate  18   02/18/20 11:00


 


Blood Pressure  88/61 L  02/18/20 11:00


 


O2 Sat by Pulse Oximetry (%)  98   02/18/20 07:40











Constitutional: Yes: Calm


Eyes: Yes: Conjunctiva Clear


HENT: Yes: Atraumatic


Neck: Yes: Supple


Cardiovascular: Yes: S1, S2


Respiratory: Yes: CTA Bilaterally, On Nasal O2


Gastrointestinal: Yes: Soft


Genitourinary: Yes: Incontinence


Edema: No


Neurological: Yes: Confusion


Labs: 


 CBC, BMP





 02/18/20 05:30 





 02/18/20 05:30 





 INR, PTT











INR  2.98  (0.83-1.09)  H  02/18/20  05:30    














Problem List





- Problems


(1) CKD (chronic kidney disease)


Code(s): N18.9 - CHRONIC KIDNEY DISEASE, UNSPECIFIED   





(2) CHF exacerbation


Code(s): I50.9 - HEART FAILURE, UNSPECIFIED   


Qualifiers: 


   Heart failure type: unspecified   Qualified Code(s): I50.9 - Heart failure, 

unspecified   





Assessment/Plan


 Current Medications











Generic Name Dose Route Start Last Admin





  Trade Name Freq  PRN Reason Stop Dose Admin


 


Acetaminophen  650 mg  02/16/20 23:00  02/17/20 09:21





  Tylenol Oral Solution -  PO   650 mg





  Q4H PRN   Administration





  FEVER   





     





     





     


 


Atorvastatin Calcium  10 mg  02/16/20 19:00  02/17/20 22:18





  Lipitor -  PO   10 mg





  HS HENRY   Administration





     





     





     





     


 


Furosemide  40 mg  02/14/20 14:00  02/18/20 05:57





  Lasix -  PO   40 mg





  BIDLASIX HENRY   Administration





     





     





     





     


 


Potassium Chloride 10 meq/  1,005 mls @ 42 mls/hr  02/12/20 13:19  02/17/20 15:

10





  Dextrose  IV   Not Given





  Q24H HENRY   





     





     





     





     


 


Megestrol Acetate  400 mg  02/18/20 10:00  02/18/20 11:10





  Megace Oral Suspension -  PO   400 mg





  DAILY HENRY   Administration





     





     





     





     


 


Metoprolol Succinate  75 mg  02/15/20 22:00  02/18/20 09:03





  Toprol Xl -  PO   Not Given





  BID HENRY   





     





     





     





     


 


Pantoprazole Sodium  40 mg  02/11/20 22:00  02/18/20 09:27





  Protonix Iv  IVPUSH   40 mg





  BID EHNRY   Administration





     





     





     





     


 


Polyethylene Glycol  17 gm  02/12/20 10:00  02/18/20 09:03





  Miralax (For Daily Use) -  PO   Not Given





  DAILY HENRY   





     





     





     





     


 


Quetiapine Fumarate  50 mg  02/16/20 22:00  02/18/20 09:27





  Seroquel -  PO   50 mg





  BID HENRY   Administration





     





     





     





     














Impression


1. ELENITA


2. UTI


3. CHF ef 17 percent


4. CVA


5. HTN


6. cva


7. parkinsons


8. a-fib


9. hypernatremia








Plan


- cont lasix


- monitor renal function


- repeat labs in am


- discussed case with family


- losartan on hold

## 2020-02-18 NOTE — PN
Physical Exam: 


SUBJECTIVE: Patient seen and examined at bedside. Still lethargic but improved 

from previous dose change. Daughter at bedside stating pt ate more yesterday. 








OBJECTIVE:





 Vital Signs











 Period  Temp  Pulse  Resp  BP Sys/Card  Pulse Ox


 


 Last 24 Hr  97.2 F-97.9 F    16-22  84-95/50-77  98-98











GENERAL: The patient is awake, difficult to assess mental status. 


LUNGS: Breath sounds equal, clear to auscultation bilaterally, no wheezes, no 

crackles, no 


accessory muscle use. 


HEART: Regular rate (80's-90's), regular rhythm, S1, S2 without murmur, rub or 

gallop.


ABDOMEN: Soft, mild tenderness to palpation, nondistended. 


EXTREMITIES: 2+ pulses, warm, well-perfused, no edema. 


NEUROLOGICAL: difficult to assess 


PSYCH: somnolent/lethargic but improving














 Laboratory Results - last 24 hr











  02/08/20 02/17/20 02/18/20





  13:23 15:45 05:30


 


WBC    6.8


 


RBC    3.71


 


Hgb    9.3 L


 


Hct    29.8 L


 


MCV    80.4


 


MCH    25.1 L


 


MCHC    31.2 L


 


RDW    21.9 H


 


Plt Count    166


 


MPV    8.2


 


Absolute Neuts (auto)    4.7


 


Neutrophils %    69.4


 


Lymphocytes %    11.0


 


Monocytes %    11.3 H


 


Eosinophils %    7.5 H


 


Basophils %    0.8


 


Nucleated RBC %    0


 


PT with INR   


 


INR   


 


Sodium   143 


 


Potassium   3.9 


 


Chloride   103 


 


Carbon Dioxide   34 H 


 


Anion Gap   7 L 


 


BUN   26.9 H 


 


Creatinine   1.7 H 


 


Est GFR (CKD-EPI)AfAm   30.67 


 


Est GFR (CKD-EPI)NonAf   26.46 


 


Random Glucose   93 


 


Calcium   8.6 


 


Phosphorus   


 


Magnesium   


 


Total Bilirubin   0.6 


 


AST   15 


 


ALT   12 L 


 


Alkaline Phosphatase   76 


 


Total Protein   5.9 L 


 


Albumin   2.6 L 


 


Prealbumin   


 


Adenovirus (PCR)  Negative  


 


Human Metapneumovir PCR  Negative  


 


Influenza A (H1) PCR  Negative  


 


Influenza B (RT-PCR)  Negative  


 


Parainfluenza 1 (PCR)  Negative  


 


Parainfluenza 2 (PCR)  Negative  


 


Parainfluenza 3 (PCR)  Negative  


 


RSV Type A (PCR)  Negative  


 


RSV Type B (PCR)  Neagtive  


 


Rhinovirus (PCR)  Negative  














  02/18/20 02/18/20





  05:30 05:30


 


WBC  


 


RBC  


 


Hgb  


 


Hct  


 


MCV  


 


MCH  


 


MCHC  


 


RDW  


 


Plt Count  


 


MPV  


 


Absolute Neuts (auto)  


 


Neutrophils %  


 


Lymphocytes %  


 


Monocytes %  


 


Eosinophils %  


 


Basophils %  


 


Nucleated RBC %  


 


PT with INR  35.60 H 


 


INR  2.98 H 


 


Sodium   142


 


Potassium   4.0


 


Chloride   103


 


Carbon Dioxide   32


 


Anion Gap   7 L


 


BUN   27.3 H


 


Creatinine   1.6 H


 


Est GFR (CKD-EPI)AfAm   33.00


 


Est GFR (CKD-EPI)NonAf   28.47


 


Random Glucose   100


 


Calcium   8.4 L


 


Phosphorus   3.1


 


Magnesium   1.8


 


Total Bilirubin  


 


AST  


 


ALT  


 


Alkaline Phosphatase  


 


Total Protein  


 


Albumin  


 


Prealbumin   10.5 L


 


Adenovirus (PCR)  


 


Human Metapneumovir PCR  


 


Influenza A (H1) PCR  


 


Influenza B (RT-PCR)  


 


Parainfluenza 1 (PCR)  


 


Parainfluenza 2 (PCR)  


 


Parainfluenza 3 (PCR)  


 


RSV Type A (PCR)  


 


RSV Type B (PCR)  


 


Rhinovirus (PCR)  








Active Medications











Generic Name Dose Route Start Last Admin





  Trade Name Freq  PRN Reason Stop Dose Admin


 


Acetaminophen  650 mg  02/16/20 23:00  02/17/20 09:21





  Tylenol Oral Solution -  PO   650 mg





  Q4H PRN   Administration





  FEVER   





     





     





     


 


Atorvastatin Calcium  10 mg  02/16/20 19:00  02/17/20 22:18





  Lipitor -  PO   10 mg





  HS HENRY   Administration





     





     





     





     


 


Furosemide  40 mg  02/14/20 14:00  02/18/20 05:57





  Lasix -  PO   40 mg





  BIDLASIX HENRY   Administration





     





     





     





     


 


Potassium Chloride 10 meq/  1,005 mls @ 42 mls/hr  02/12/20 13:19  02/17/20 15:

10





  Dextrose  IV   Not Given





  Q24H HENRY   





     





     





     





     


 


Megestrol Acetate  400 mg  02/18/20 10:00  02/18/20 11:10





  Megace Oral Suspension -  PO   400 mg





  DAILY HENRY   Administration





     





     





     





     


 


Metoprolol Succinate  75 mg  02/15/20 22:00  02/18/20 09:03





  Toprol Xl -  PO   Not Given





  BID HENRY   





     





     





     





     


 


Pantoprazole Sodium  40 mg  02/11/20 22:00  02/18/20 09:27





  Protonix Iv  IVPUSH   40 mg





  BID HENRY   Administration





     





     





     





     


 


Polyethylene Glycol  17 gm  02/12/20 10:00  02/18/20 09:03





  Miralax (For Daily Use) -  PO   Not Given





  DAILY HENRY   





     





     





     





     


 


Quetiapine Fumarate  50 mg  02/16/20 22:00  02/18/20 09:27





  Seroquel -  PO   50 mg





  BID HENRY   Administration





     





     





     





     











ASSESSMENT/PLAN:





87 y/o F, w/ pmh of multiple Co-morbidities including H/O DVT and Afib, GI 

bleed 2/2 to angiodysplasia, Rt LE DVT, PE and Coumadin 1 mg weekdays and off 

weekends, H/O Alzheimer's, CVA,  Parkinson's,  CAD (angioplasty 2005),  CKD, CHF

, presented to the ED c/o of 1 week hx of changes in mentation, altered 

behavior and decreased PO intake. 





#Acute Metabolic Encephalopathy 2/2 acute on chronic decompensated CHF, PNA, 

Ativan/Seroquel use.


-Somnolent, but arousable. 


-CT head negative (2/8/20)


-Maintain on Seroquel 50 BID.


- megase started to increase pts appetite





#Acute on Chronic systolic CHF


-Echo showed global hypokinesis with 20% EF


-Cont Lasix 40 BID IVP


-BB changed to 75 BID Toprol XL, can add ARB when BP tolerates


- MGUS test poor study





#Protein calorie malnutrition/swalllowing dysfunction


- prealbumin low


- megase for appetite stimulant


- pt ate ice cream and soft foods yesterday evening per daughter at bedside. 





#A-Fib w/ RVR; adequately rate controlled.


-Per cardio, maintain Lasix 40 mg BID IVP. Increase Toprol XL to 75 BID, 

discontinued Lopressor 5 mg. Previously on Dilt drip, discontinued today. 


-Pt has INR therapeutic today, 2.98. Will Hold Coumadin for now and recheck INR 

in AM


-monitor on tele





#Acute on chronic RF 2/2 PNA/Left Pleural effusion;? aspiration


-Completed course of Unasyn


-monitoring off abx, afebrile


-Outpatient pulm follow up upon discharge





#Anemia


- IMPROVED today. Hb of 9.3 today.


- baseline Hb 8


- hold 0.5 coumadin as INR now therapeutic


- c/w IV Protonix 40 BID in event of chronic GI bleed.  





#ELENITA on CKD 3 2/2 hepatorenal syndrome


-Cre and BUN elevated from baseline.


-Renal US : nonobstructing L 6mm stone, mild R renal pelvis dilation, b/l 

cysts. 


-avoid nephrotoxins where possible


-holding losartan today given Cr





#Renal Cysts/Non-obstructing Renal Stone 


-Urology follow up on discharge.





#Dementia with Behavioral Disturbance


-Maintain on Seroquel 50 BID 


-Family requesting re-eval by psych for dosage adjustment of Seroquel





#Hx DVT s/p IVC filter 


-Hold 0.5 Coumadin today, INR therapeutic at 2.98. 


-Repeat INR in AM


- discussed with pharmacy regarding coumadin dose and they did not have much 

input regarding adjusting the current coumadin dose given the labile INR. 





#HTN, HLD, CAD s/p PCI 


- DISCONTINUED Imdur which previously was on hold and pt hypotensive, will 

await MUGA scan to accurately depict EF and BNP to assess CHF severity and 

possible need to diurese more, c/w Zocor.





Dispo: DNR/DNI 


Cardio - Dr. Parrish is back and per him likely will be safe to dc tm.    





DVT Px - INR therapeutic, holding Coumadin


DNR/DNI








Visit type





- Emergency Visit


Emergency Visit: Yes


ED Registration Date: 02/07/20


Care time: The patient presented to the Emergency Department on the above date 

and was hospitalized for further evaluation of their emergent condition.





- New Patient


This patient is new to me today: No





- Critical Care


Critical Care patient: No





- Discharge Referral


Referred to [facility] No





ATTENDING PHYSICIAN STATEMENT





I saw and evaluated the patient.


I reviewed the resident's note and discussed the case with the resident.


I agree with the resident's findings and plan as documented.








SUBJECTIVE:








OBJECTIVE:








ASSESSMENT AND PLAN:

## 2020-02-18 NOTE — PN
Progress Note (short form)





- Note


Progress Note: 





88-year-old female  was admitted with progressive loss of appetite, lethargy  

increased somnolence was found to be severely anemic, she on admission had   

supra normal INR,, congestive heart failure. 


Permanent atrial fib. with periods RVR partly related to aggitation and after 

beta blocker was held.


 








Active Medications





Acetaminophen (Tylenol Oral Solution -)  650 mg PO Q4H PRN


   PRN Reason: FEVER


   Last Admin: 02/17/20 09:21 Dose:  650 mg


Atorvastatin Calcium (Lipitor -)  10 mg PO HS [facility]


   Last Admin: 02/17/20 22:18 Dose:  10 mg


Furosemide (Lasix -)  40 mg PO BIDLASIX [facility]


   Last Admin: 02/18/20 15:46 Dose:  40 mg


Potassium Chloride 10 meq/ (Dextrose)  1,005 mls @ 42 mls/hr IV Q24H [facility]


   Last Admin: 02/18/20 15:46 Dose:  42 mls/hr


Megestrol Acetate (Megace Oral Suspension -)  400 mg PO DAILY [facility]


   Last Admin: 02/18/20 11:10 Dose:  400 mg


Metoprolol Succinate (Toprol Xl -)  75 mg PO BID [facility]


   Last Admin: 02/18/20 09:03 Dose:  Not Given


Pantoprazole Sodium (Protonix Iv)  40 mg IVPUSH BID [facility]


   Last Admin: 02/18/20 09:27 Dose:  40 mg


Polyethylene Glycol (Miralax (For Daily Use) -)  17 gm PO DAILY [facility]


   Last Admin: 02/18/20 09:03 Dose:  Not Given


Quetiapine Fumarate (Seroquel -)  50 mg PO BID [facility]


   Last Admin: 02/18/20 09:27 Dose:  50 mg











 Last Vital Signs











Temp Pulse Resp BP Pulse Ox


 


 98 F   129 H  18   108/71   98 


 


 02/18/20 21:08  02/18/20 21:08  02/18/20 21:08  02/18/20 21:08  02/18/20 07:40











 


Neck: Supple, jjugular venous distention, positive hepatojugular reflux.


Heart: No heaves or thrills, heart sounds are distant,grade 2/6 ejection 

systolic murmur was heard at the second right intercostal space and along the 

left sternal border ending in early to mid systole. No diastolic murmur was 

heard.


Lungs: Fine crepitations at the bases.


Abdomen: Soft,  slight left lower quadrant guarding, no hepatosplenomegaly was 

appreciated.


Extremities: trace pretibial edema.


                                                                               

                                                


                                                                               

                                             


 CBC, BMP





 02/18/20 05:30 





 02/18/20 05:30 








Impression:


1.  Atrial fibrillation with periods RVR, related to aggitation and not 

receiving betablockers.


2.  Coronary artery disease, status post PCI/stenting, angina pectoris.


3.  Status post embolic CVA.


4.  Vascular dementia.


5.  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 

cardiomyopathy.


6.  Status post permanent pacemaker for carotid hypersensitivity.


7.  Anemia  related to blood loss.


8.  History of chronic kidney disease.





Recommendation:


1. Continue cardiac meds as prescribed.


2. Bed side PT.





  Prognosis, critical.





 





Lee Parrish MD


Time spent with daughters, 35mins, discussing prognosis, medications and need 

for close monitoring INR on OP basis.

## 2020-02-18 NOTE — PN
Teaching Attending Note


Name of Resident: Zachery German





ATTENDING PHYSICIAN STATEMENT





I saw and evaluated the patient.


I reviewed the resident's note and discussed the case with the resident.


I agree with the resident's findings and plan as documented.








Seen and examined; please see resident note for further historical information.

  I personally verified all key historical information and exam findings.  

Personally interpreted all imaging and diagnostics and reviewed appropriate 

consults.  I reviewed all labs and vital signs as per resident note and EMR as 

documented.  I agree with the above assessment and plan unless supplemented by 

myself in the following.





Had a MUGA scan today.  It was technically suboptimal and limited with EF at 30%

.  Cardiology recommended using echo to determine ejection fraction.  Renal 

ultrasound is pending given the left ureter concerns with hydronephrosis.  Will 

discuss overall management with cardiology.  Patient and family has refused 

advanced therapies in the past.  Pharmacy consult for Coumadin dosing is 

pending.  Family turndown AICD.  Pre-and post for home oxygen is pending.  Will 

discuss with case management and social work regarding placement.  Overall 

symptoms are stable with low normal blood pressures and mild tachycardia which 

is been consistent with her entire hospitalization.





10 item review of systems completed and is negative aside from as discussed in 

the subjective data in my own/the resident documentation.





VS, labs, imaging reviewed


NAD, AAO, resting comfortably in bed.


irregular and borderline tachy, s1/2 slight murnur


Normal muscle tone, moves all 5 extremities with normal apparent strength


Neck is supple, trachea midline, no felicia LN


Lungs CTAB with sym expansion


NT ND +BS no felicia organomegaly


CN2-12 wnl; no FND


NC AT EOMI PERRLA


Normal mood, appropriate behavior, euthymic affect


No skin breakdown or rashes noted





Echocardiogram report reveals 20% ejection fraction with normal size with 

hypokinesis and moderate diminished RV systolic function and severe biatrial 

diarrhea the patient.  Pulmonary hypertension is noted.





Assessment and plan:


Patient is slightly improved, will need to elucidate discharge planning.  

Family his declined advanced therapeutics.  Will discuss with with family/CV





Problems include:


-Atrial fibrillation with RVR, still slightly fast, would uptitrate metoprolol 

and avoid diltiazem given the low ejection fraction.  Renal function could 

limit digoxin use, but can also consider amiodarone, lidocaine.  Pharmacy to 

dose Coumadin, considering discussion of watchman but patient has been not 

amiable to advanced procedures


-NYHA class III-IV severe right-sided/left-sided heart failure, EF 20% with 

moderate right-sided systolic dysfunction.  Renal function limits Entresto, 

clinically euvolemic, meds per cardiology.  Family has declined AICD and 

advanced procedures in the past


-Hydronephrosis, follow-up renal ultrasound


-Coronary artery disease status post PCI, 2015


-Status post embolic CVA


-Paratracheal lymphadenopathy, noted on CT


-Dysphagia, management per swallow.  Noted recommendations.


-Protein calorie malnutrition


-DVT status post IVC filter, on anticoagulation.


-Dementia, alzheimers +/- vascular, hx embolic CVA


-Physical deconditioning


-Pulmonary HTN


-History of gastrointestinal bleed, makes patient have a elevated has bled score

, consideration has been given to watchman procedure but family is unsure of 

how to proceed.





Overall, the patient has multiple severe chronic issues and likely will require 

home oxygen.  She is at extremely high risk for readmission.  Her severe CHF 

and difficult to control atrial fibrillation present 2 challenges.  Family 

wishes to take her home.  MUGA results noted.





DNR/I





Family meeting; if tachy titrate up Metoprolol.

## 2020-02-19 LAB
ALBUMIN SERPL-MCNC: 2.4 G/DL (ref 3.4–5)
ALP SERPL-CCNC: 72 U/L (ref 45–117)
ALT SERPL-CCNC: 11 U/L (ref 13–61)
ANION GAP SERPL CALC-SCNC: 12 MMOL/L (ref 8–16)
AST SERPL-CCNC: 14 U/L (ref 15–37)
BASOPHILS # BLD: 0.7 % (ref 0–2)
BILIRUB SERPL-MCNC: 0.5 MG/DL (ref 0.2–1)
BUN SERPL-MCNC: 23.5 MG/DL (ref 7–18)
CALCIUM SERPL-MCNC: 8.3 MG/DL (ref 8.5–10.1)
CHLORIDE SERPL-SCNC: 102 MMOL/L (ref 98–107)
CO2 SERPL-SCNC: 29 MMOL/L (ref 21–32)
CREAT SERPL-MCNC: 1.6 MG/DL (ref 0.55–1.3)
DEPRECATED RDW RBC AUTO: 22.5 % (ref 11.6–15.6)
EOSINOPHIL # BLD: 5.6 % (ref 0–4.5)
GLUCOSE SERPL-MCNC: 105 MG/DL (ref 74–106)
HCT VFR BLD CALC: 31.2 % (ref 32.4–45.2)
HGB BLD-MCNC: 9.6 GM/DL (ref 10.7–15.3)
INR BLD: 3.17 (ref 0.83–1.09)
LYMPHOCYTES # BLD: 8.9 % (ref 8–40)
MCH RBC QN AUTO: 25.4 PG (ref 25.7–33.7)
MCHC RBC AUTO-ENTMCNC: 31 G/DL (ref 32–36)
MCV RBC: 81.9 FL (ref 80–96)
MONOCYTES # BLD AUTO: 11.4 % (ref 3.8–10.2)
NEUTROPHILS # BLD: 73.4 % (ref 42.8–82.8)
PLATELET # BLD AUTO: 156 K/MM3 (ref 134–434)
PMV BLD: 8.7 FL (ref 7.5–11.1)
POTASSIUM SERPLBLD-SCNC: 3.5 MMOL/L (ref 3.5–5.1)
PROT SERPL-MCNC: 5.5 G/DL (ref 6.4–8.2)
PT PNL PPP: 37.8 SEC (ref 9.7–13)
RBC # BLD AUTO: 3.8 M/MM3 (ref 3.6–5.2)
SODIUM SERPL-SCNC: 142 MMOL/L (ref 136–145)
WBC # BLD AUTO: 7 K/MM3 (ref 4–10)

## 2020-02-19 RX ADMIN — DEXTROSE MONOHYDRATE SCH: 50 INJECTION, SOLUTION INTRAVENOUS at 21:11

## 2020-02-19 RX ADMIN — MEGESTROL ACETATE SCH MG: 40 SUSPENSION ORAL at 09:56

## 2020-02-19 RX ADMIN — FUROSEMIDE SCH MG: 40 TABLET ORAL at 14:03

## 2020-02-19 RX ADMIN — FUROSEMIDE SCH MG: 40 TABLET ORAL at 05:50

## 2020-02-19 RX ADMIN — POLYETHYLENE GLYCOL 3350 SCH: 17 POWDER, FOR SOLUTION ORAL at 09:56

## 2020-02-19 RX ADMIN — ATORVASTATIN CALCIUM SCH MG: 10 TABLET, FILM COATED ORAL at 21:01

## 2020-02-19 RX ADMIN — PANTOPRAZOLE SODIUM SCH MG: 40 INJECTION, POWDER, FOR SOLUTION INTRAVENOUS at 09:56

## 2020-02-19 RX ADMIN — PANTOPRAZOLE SODIUM SCH MG: 40 INJECTION, POWDER, FOR SOLUTION INTRAVENOUS at 21:01

## 2020-02-19 NOTE — PN
Physical Exam: 


SUBJECTIVE: Patient seen and examined at bedside. No acute events pt HR 80-90's 

off drip. Increased metoprolol to 100 BID.  








OBJECTIVE:





 Vital Signs











 Period  Temp  Pulse  Resp  BP Sys/Card  Pulse Ox


 


 Last 24 Hr  97.2 F-98.1 F    17-22  /61-78  98














GENERAL: The patient is awake, difficult to assess mental status. 


LUNGS: Breath sounds equal, clear to auscultation bilaterally, no wheezes, no 

crackles, no 


accessory muscle use. 


HEART: Regular rate (80's-90's), regular rhythm, S1, S2 without murmur, rub or 

gallop.


ABDOMEN: Soft, mild tenderness to palpation, nondistended. 


EXTREMITIES: 2+ pulses, warm, well-perfused, no edema. 


NEUROLOGICAL: difficult to assess 


PSYCH: somnolent/lethargic but improving

















 Laboratory Results - last 24 hr











  02/08/20 02/18/20 02/19/20





  13:23 05:30 05:25


 


WBC   


 


RBC   


 


Hgb   


 


Hct   


 


MCV   


 


MCH   


 


MCHC   


 


RDW   


 


Plt Count   


 


MPV   


 


Absolute Neuts (auto)   


 


Neutrophils %   


 


Lymphocytes %   


 


Monocytes %   


 


Eosinophils %   


 


Basophils %   


 


Nucleated RBC %   


 


PT with INR    37.80 H


 


INR    3.17 H


 


Sodium   142 


 


Potassium   4.0 


 


Chloride   103 


 


Carbon Dioxide   32 


 


Anion Gap   7 L 


 


BUN   27.3 H 


 


Creatinine   1.6 H 


 


Est GFR (CKD-EPI)AfAm   33.00 


 


Est GFR (CKD-EPI)NonAf   28.47 


 


Random Glucose   100 


 


Calcium   8.4 L 


 


Phosphorus   3.1 


 


Magnesium   1.8 


 


Prealbumin   10.5 L 


 


Adenovirus (PCR)  Negative  


 


Human Metapneumovir PCR  Negative  


 


Influenza A (H1) PCR  Negative  


 


Influenza B (RT-PCR)  Negative  


 


Parainfluenza 1 (PCR)  Negative  


 


Parainfluenza 2 (PCR)  Negative  


 


Parainfluenza 3 (PCR)  Negative  


 


RSV Type A (PCR)  Negative  


 


RSV Type B (PCR)  Neagtive  


 


Rhinovirus (PCR)  Negative  














  02/19/20





  05:25


 


WBC  7.0


 


RBC  3.80


 


Hgb  9.6 L


 


Hct  31.2 L


 


MCV  81.9


 


MCH  25.4 L


 


MCHC  31.0 L


 


RDW  22.5 H


 


Plt Count  156


 


MPV  8.7


 


Absolute Neuts (auto)  5.1


 


Neutrophils %  73.4


 


Lymphocytes %  8.9


 


Monocytes %  11.4 H


 


Eosinophils %  5.6 H


 


Basophils %  0.7


 


Nucleated RBC %  0


 


PT with INR 


 


INR 


 


Sodium 


 


Potassium 


 


Chloride 


 


Carbon Dioxide 


 


Anion Gap 


 


BUN 


 


Creatinine 


 


Est GFR (CKD-EPI)AfAm 


 


Est GFR (CKD-EPI)NonAf 


 


Random Glucose 


 


Calcium 


 


Phosphorus 


 


Magnesium 


 


Prealbumin 


 


Adenovirus (PCR) 


 


Human Metapneumovir PCR 


 


Influenza A (H1) PCR 


 


Influenza B (RT-PCR) 


 


Parainfluenza 1 (PCR) 


 


Parainfluenza 2 (PCR) 


 


Parainfluenza 3 (PCR) 


 


RSV Type A (PCR) 


 


RSV Type B (PCR) 


 


Rhinovirus (PCR) 








Active Medications











Generic Name Dose Route Start Last Admin





  Trade Name Freq  PRN Reason Stop Dose Admin


 


Acetaminophen  650 mg  02/16/20 23:00  02/17/20 09:21





  Tylenol Oral Solution -  PO   650 mg





  Q4H PRN   Administration





  FEVER   





     





     





     


 


Atorvastatin Calcium  10 mg  02/16/20 19:00  02/18/20 21:10





  Lipitor -  PO   10 mg





  HS HENRY   Administration





     





     





     





     


 


Furosemide  40 mg  02/14/20 14:00  02/19/20 05:50





  Lasix -  PO   40 mg





  BIDLASIX HENRY   Administration





     





     





     





     


 


Potassium Chloride 10 meq/  1,005 mls @ 42 mls/hr  02/12/20 13:19  02/18/20 15:

46





  Dextrose  IV   42 mls/hr





  Q24H HENRY   Administration





     





     





     





     


 


Megestrol Acetate  400 mg  02/18/20 10:00  02/18/20 11:10





  Megace Oral Suspension -  PO   400 mg





  DAILY HENRY   Administration





     





     





     





     


 


Metoprolol Succinate  75 mg  02/15/20 22:00  02/18/20 21:12





  Toprol Xl -  PO   75 mg





  BID HENRY   Administration





     





     





     





     


 


Pantoprazole Sodium  40 mg  02/11/20 22:00  02/18/20 21:14





  Protonix Iv  IVPUSH   40 mg





  BID HENRY   Administration





     





     





     





     


 


Polyethylene Glycol  17 gm  02/12/20 10:00  02/18/20 09:03





  Miralax (For Daily Use) -  PO   Not Given





  DAILY HENRY   





     





     





     





     


 


Quetiapine Fumarate  50 mg  02/16/20 22:00  02/18/20 21:11





  Seroquel -  PO   50 mg





  BID HENRY   Administration





     





     





     





     











ASSESSMENT/PLAN:





87 y/o F, w/ pmh of multiple Co-morbidities including H/O DVT and Afib, GI 

bleed 2/2 to angiodysplasia, Rt LE DVT, PE and Coumadin 1 mg weekdays and off 

weekends, H/O Alzheimer's, CVA,  Parkinson's,  CAD (angioplasty 2005),  CKD, CHF

, presented to the ED c/o of 1 week hx of changes in mentation, altered 

behavior and decreased PO intake. 





#Acute Metabolic Encephalopathy 2/2 acute on chronic decompensated CHF, PNA, 

Ativan/Seroquel use.


-Somnolent, but arousable. 


-CT head negative (2/8/20)


-Maintain on Seroquel 50 BID.


- megase started to increase pts appetite





#Acute on Chronic systolic CHF


-Echo showed global hypokinesis with 20% EF


-Cont Lasix 40 BID IVP


-BB changed to 75 BID Toprol XL, can add ARB when BP tolerates


- MGUS test poor study





#Protein calorie malnutrition/swalllowing dysfunction


- prealbumin low


- megase for appetite stimulant


- pt ate ice cream and soft foods yesterday evening per daughter at bedside. 





#A-Fib w/ RVR; adequately rate controlled.


-Per cardio, maintain Lasix 40 mg BID IVP. Increase Toprol XL to 100 BID


-Pt has INR supratherapeutic today, 3.1. 


- Will Hold Coumadin for now and recheck INR in AM


-monitor on tele





#Acute on chronic RF 2/2 PNA/Left Pleural effusion;? aspiration


-Completed course of Unasyn


-monitoring off abx, afebrile


-Outpatient pulm follow up upon discharge





#Anemia


- IMPROVED today. Hb of 9.3 today.


- baseline Hb 8


- hold 0.5 coumadin as INR now supratherapeutic


- c/w IV Protonix 40 BID in event of chronic GI bleed.  





#ELENITA on CKD 3 2/2 hepatorenal syndrome


-Cre and BUN elevated from baseline.


-Renal US : nonobstructing L 6mm stone, mild R renal pelvis dilation, b/l 

cysts. 


-avoid nephrotoxins where possible


-holding losartan today given Cr





#Renal Cysts/Non-obstructing Renal Stone 


-Urology follow up on discharge.





#Dementia with Behavioral Disturbance


-Maintain on Seroquel 50 BID 


-Family requesting re-eval by psych for dosage adjustment of Seroquel





#Hx DVT s/p IVC filter 


-Hold 0.5 Coumadin today, INR therapeutic at 2.98. 


-Repeat INR in AM


- discussed with pharmacy regarding coumadin dose and they did not have much 

input regarding adjusting the current coumadin dose given the labile INR. 





#HTN, HLD, CAD s/p PCI 


- DISCONTINUED Imdur which previously was on hold and pt hypotensive, will 

await MUGA scan to accurately depict EF and BNP to assess CHF severity and 

possible need to diurese more, c/w Zocor.





Dispo: DNR/DNI 


Cardio - Dr. Parrish is back and per him likely will be safe to dc tm. Will 

obtain home o2 per social work and dc tomorrow.    





DVT Px - INR supratherapeutic, holding Coumadin


DNR/DNI











Visit type





- Emergency Visit


Emergency Visit: Yes


ED Registration Date: 02/07/20


Care time: The patient presented to the Emergency Department on the above date 

and was hospitalized for further evaluation of their emergent condition.





- New Patient


This patient is new to me today: No





- Critical Care


Critical Care patient: No





- Discharge Referral


Referred to [facility] No





ATTENDING PHYSICIAN STATEMENT





I saw and evaluated the patient.


I reviewed the resident's note and discussed the case with the resident.


I agree with the resident's findings and plan as documented.








SUBJECTIVE:








OBJECTIVE:








ASSESSMENT AND PLAN:

## 2020-02-19 NOTE — PN
Progress Note, Physician


History of Present Illness: 





Pt seen and examined at bedside. She is more awake today. Her PO intake is 

improving. 





- Current Medication List


Current Medications: 


Active Medications





Acetaminophen (Tylenol Oral Solution -)  650 mg PO Q4H PRN


   PRN Reason: FEVER


   Last Admin: 02/17/20 09:21 Dose:  650 mg


Atorvastatin Calcium (Lipitor -)  10 mg PO HS [facility]


   Last Admin: 02/18/20 21:10 Dose:  10 mg


Furosemide (Lasix -)  40 mg PO BIDLASIX [facility]


   Last Admin: 02/19/20 05:50 Dose:  40 mg


Potassium Chloride 10 meq/ (Dextrose)  1,005 mls @ 42 mls/hr IV Q24H [facility]


   Last Admin: 02/18/20 15:46 Dose:  42 mls/hr


Megestrol Acetate (Megace Oral Suspension -)  400 mg PO DAILY [facility]


   Last Admin: 02/19/20 09:56 Dose:  400 mg


Metoprolol Succinate (Toprol Xl -)  100 mg PO BID [facility]


   Last Admin: 02/19/20 11:24 Dose:  100 mg


Pantoprazole Sodium (Protonix Iv)  40 mg IVPUSH BID [facility]


   Last Admin: 02/19/20 09:56 Dose:  40 mg


Polyethylene Glycol (Miralax (For Daily Use) -)  17 gm PO DAILY [facility]


   Last Admin: 02/19/20 09:56 Dose:  Not Given


Quetiapine Fumarate (Seroquel -)  50 mg PO BID [facility]


   Last Admin: 02/19/20 09:56 Dose:  50 mg











- Objective


Vital Signs: 


 Vital Signs











Temperature  97.7 F   02/19/20 10:00


 


Pulse Rate  96 H  02/19/20 10:00


 


Respiratory Rate  19   02/19/20 10:00


 


Blood Pressure  94/78   02/19/20 10:00


 


O2 Sat by Pulse Oximetry (%)  98   02/18/20 22:00











Constitutional: Yes: Calm


Eyes: Yes: Conjunctiva Clear


HENT: Yes: Atraumatic


Cardiovascular: Yes: S1, S2


Respiratory: Yes: CTA Bilaterally


Gastrointestinal: Yes: Soft


Genitourinary: Yes: Incontinence


Musculoskeletal: Yes: Muscle Weakness


Edema: No


Integumentary: Yes: WNL


Neurological: Yes: Confusion


Labs: 


 CBC, BMP





 02/19/20 05:25 





 02/19/20 05:25 





 INR, PTT











INR  3.17  (0.83-1.09)  H  02/19/20  05:25    














Problem List





- Problems


(1) CKD (chronic kidney disease)


Code(s): N18.9 - CHRONIC KIDNEY DISEASE, UNSPECIFIED   





(2) CHF exacerbation


Code(s): I50.9 - HEART FAILURE, UNSPECIFIED   


Qualifiers: 


   Heart failure type: unspecified   Qualified Code(s): I50.9 - Heart failure, 

unspecified   





Assessment/Plan


 Current Medications











Generic Name Dose Route Start Last Admin





  Trade Name Freq  PRN Reason Stop Dose Admin


 


Acetaminophen  650 mg  02/16/20 23:00  02/17/20 09:21





  Tylenol Oral Solution -  PO   650 mg





  Q4H PRN   Administration





  FEVER   





     





     





     


 


Atorvastatin Calcium  10 mg  02/16/20 19:00  02/18/20 21:10





  Lipitor -  PO   10 mg





  HS HENRY   Administration





     





     





     





     


 


Furosemide  40 mg  02/14/20 14:00  02/19/20 05:50





  Lasix -  PO   40 mg





  BIDLASIX HENRY   Administration





     





     





     





     


 


Potassium Chloride 10 meq/  1,005 mls @ 42 mls/hr  02/12/20 13:19  02/18/20 15:

46





  Dextrose  IV   42 mls/hr





  Q24H HENRY   Administration





     





     





     





     


 


Megestrol Acetate  400 mg  02/18/20 10:00  02/19/20 09:56





  Megace Oral Suspension -  PO   400 mg





  DAILY HENRY   Administration





     





     





     





     


 


Metoprolol Succinate  100 mg  02/19/20 10:00  02/19/20 11:24





  Toprol Xl -  PO   100 mg





  BID HENRY   Administration





     





     





     





     


 


Pantoprazole Sodium  40 mg  02/11/20 22:00  02/19/20 09:56





  Protonix Iv  IVPUSH   40 mg





  BID HENRY   Administration





     





     





     





     


 


Polyethylene Glycol  17 gm  02/12/20 10:00  02/19/20 09:56





  Miralax (For Daily Use) -  PO   Not Given





  DAILY HENRY   





     





     





     





     


 


Quetiapine Fumarate  50 mg  02/16/20 22:00  02/19/20 09:56





  Seroquel -  PO   50 mg





  BID HENRY   Administration





     





     





     





     














Impression


1. ELENITA


2. UTI


3. CHF ef 17 percent


4. CVA


5. HTN


6. cva


7. parkinsons


8. a-fib


9. hypernatremia








Plan


- renal function stable


- volume status stable


- cont lasix


- encourage po intake


- losartan on hold

## 2020-02-19 NOTE — PN
Progress Note (short form)





- Note


Progress Note: 





88-year-old female  was admitted with progressive loss of appetite, lethargy  

increased somnolence was found to be severely anemic,  congestive heart 

failure. 


Permanent atrial fib. with periods RVR . patient is alert, heart is well 

controlled. Tolerating increased Toprol dose.








Active Medications











Generic Name Dose Route Start Last Admin





  Trade Name Freq  PRN Reason Stop Dose Admin


 


Acetaminophen  650 mg  02/16/20 23:00  02/17/20 09:21





  Tylenol Oral Solution -  PO   650 mg





  Q4H PRN   Administration





  FEVER   





     





     





     


 


Atorvastatin Calcium  10 mg  02/16/20 19:00  02/19/20 21:01





  Lipitor -  PO   10 mg





  HS HENRY   Administration





     





     





     





     


 


Furosemide  40 mg  02/14/20 14:00  02/19/20 14:03





  Lasix -  PO   40 mg





  BIDLASIX HENRY   Administration





     





     





     





     


 


Potassium Chloride 10 meq/  1,005 mls @ 42 mls/hr  02/12/20 13:19  02/19/20 21:

11





  Dextrose  IV   Not Given





  Q24H HENRY   





     





     





     





     


 


Megestrol Acetate  400 mg  02/18/20 10:00  02/19/20 09:56





  Megace Oral Suspension -  PO   400 mg





  DAILY HENRY   Administration





     





     





     





     


 


Metoprolol Succinate  100 mg  02/19/20 10:00  02/19/20 21:01





  Toprol Xl -  PO   100 mg





  BID HENRY   Administration





     





     





     





     


 


Pantoprazole Sodium  40 mg  02/11/20 22:00  02/19/20 21:01





  Protonix Iv  IVPUSH   40 mg





  BID HENRY   Administration





     





     





     





     


 


Polyethylene Glycol  17 gm  02/12/20 10:00  02/19/20 09:56





  Miralax (For Daily Use) -  PO   Not Given





  DAILY HENRY   





     





     





     





     


 


Quetiapine Fumarate  50 mg  02/16/20 22:00  02/19/20 21:01





  Seroquel -  PO   50 mg





  BID HENRY   Administration





     





     





     





     














 Last Vital Signs











Temp Pulse Resp BP Pulse Ox


 


 97.7 F   99 H  24 H  94/75   95 


 


 02/19/20 10:00  02/19/20 18:00  02/19/20 18:00  02/19/20 18:00  02/19/20 14:05














 


Neck: Supple, No JVD, -ve hepatojugular reflux.


Heart: No heaves or thrills, heart sounds are distant,grade 2/6 ejection 

systolic murmur was heard at the second right intercostal space and along the 

left sternal border ending in early to mid systole. No diastolic murmur was 

heard.


Lungs: Fine crepitations at the bases.


Abdomen: Soft,  slight left lower quadrant guarding, no hepatosplenomegaly was 

appreciated.


Extremities: trace pretibial edema.


                                                                               

                                                


                                                                               

                                             


 CBC, BMP





 02/19/20 05:25 





 02/19/20 05:25 





 INR, PTT











INR  3.17  (0.83-1.09)  H  02/19/20  05:25    














Impression:


1.  Atrial fibrillation with controlled ventricular response..


2.  Coronary artery disease, status post PCI/stenting, angina pectoris.


3.  Status post embolic CVA.


4.  Vascular dementia.


5.  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 

cardiomyopathy.


6.  Status post permanent pacemaker for carotid hypersensitivity.


7.  Anemia  related to blood loss.


8.  History of chronic kidney disease.





Recommendation:


1. Continue cardiac meds as prescribed.


2. Bed side PT.


3. Note elevated INR in the absence of oral anticoagulants.





  Prognosis, critical.





 





Lee Parrish MD

## 2020-02-19 NOTE — PN
Progress Note, SLP





- Note


Progress Note: 


 Selected Entries











  02/18/20 02/18/20 02/18/20





  04:00 06:00 07:40


 


Breakfast   


 


Diet Tolerated   Poor


 


Temperature 97.8 F 97.6 F 97.2 F L














  02/18/20 02/18/20 02/18/20





  11:00 15:00 21:08


 


Breakfast   


 


Diet Tolerated   


 


Temperature 97.5 F L 97.2 F L 98 F














  02/19/20 02/19/20





  05:48 09:49


 


Breakfast  25%


 


Diet Tolerated  


 


Temperature 98.1 F 








 Laboratory Tests











  02/18/20 02/19/20





  05:30 05:25


 


WBC  6.8  7.0











Pt intermittently accepting po trials.


Continued counseling/education provided to daughters Helga and Nara on 

compensatory swallowing techniques.


She is benefitting from downgrading diet to DYS Puree and encouraging 

supplements to increase nutritional density. 


Intermittent acceptance of po trials. Pt ate mashed potatoes and magic cup 

yesterday.











Continue dys puree/thin liquids, magic cup, ensure pudding, ensure with each 

meal.

## 2020-02-19 NOTE — PN
Progress Note, Physician





- Current Medication List


Current Medications: 


Active Medications





Acetaminophen (Tylenol Oral Solution -)  650 mg PO Q4H PRN


   PRN Reason: FEVER


   Last Admin: 02/17/20 09:21 Dose:  650 mg


Atorvastatin Calcium (Lipitor -)  10 mg PO HS [facility]


   Last Admin: 02/18/20 21:10 Dose:  10 mg


Furosemide (Lasix -)  40 mg PO BIDLASIX [facility]


   Last Admin: 02/19/20 05:50 Dose:  40 mg


Potassium Chloride 10 meq/ (Dextrose)  1,005 mls @ 42 mls/hr IV Q24H [facility]


   Last Admin: 02/18/20 15:46 Dose:  42 mls/hr


Megestrol Acetate (Megace Oral Suspension -)  400 mg PO DAILY [facility]


   Last Admin: 02/19/20 09:56 Dose:  400 mg


Metoprolol Succinate (Toprol Xl -)  100 mg PO BID [facility]


   Last Admin: 02/19/20 11:24 Dose:  100 mg


Pantoprazole Sodium (Protonix Iv)  40 mg IVPUSH BID [facility]


   Last Admin: 02/19/20 09:56 Dose:  40 mg


Polyethylene Glycol (Miralax (For Daily Use) -)  17 gm PO DAILY [facility]


   Last Admin: 02/19/20 09:56 Dose:  Not Given


Quetiapine Fumarate (Seroquel -)  50 mg PO BID [facility]


   Last Admin: 02/19/20 09:56 Dose:  50 mg











- Objective


Vital Signs: 


 Vital Signs











Temperature  97.7 F   02/19/20 10:00


 


Pulse Rate  96 H  02/19/20 10:00


 


Respiratory Rate  19   02/19/20 10:00


 


Blood Pressure  94/78   02/19/20 10:00


 


O2 Sat by Pulse Oximetry (%)  100   02/19/20 10:00











Labs: 


 CBC, BMP





 02/19/20 05:25 





 02/19/20 05:25 





 INR, PTT











INR  3.17  (0.83-1.09)  H  02/19/20  05:25

## 2020-02-20 VITALS — DIASTOLIC BLOOD PRESSURE: 67 MMHG | TEMPERATURE: 97.2 F | HEART RATE: 91 BPM | SYSTOLIC BLOOD PRESSURE: 92 MMHG

## 2020-02-20 LAB
INR BLD: 3.34 (ref 0.83–1.09)
PT PNL PPP: 39.9 SEC (ref 9.7–13)

## 2020-02-20 RX ADMIN — MEGESTROL ACETATE SCH MG: 40 SUSPENSION ORAL at 09:51

## 2020-02-20 RX ADMIN — FUROSEMIDE SCH MG: 40 TABLET ORAL at 05:45

## 2020-02-20 RX ADMIN — POLYETHYLENE GLYCOL 3350 SCH: 17 POWDER, FOR SOLUTION ORAL at 09:13

## 2020-02-20 RX ADMIN — PANTOPRAZOLE SODIUM SCH MG: 40 INJECTION, POWDER, FOR SOLUTION INTRAVENOUS at 09:27

## 2020-02-20 NOTE — PN
Progress Note (short form)





- Note


Progress Note: 





88-year-old female  was admitted with progressive loss of appetite, lethargy  

increased somnolence was found to be severely anemic,  congestive heart 

failure. 


Alert ,no SOB or chest discomfort, Atrialfib with mostly controlled response.








Active 





 


Neck: Supple, No JVD, -ve hepatojugular reflux.


Heart: No heaves or thrills, heart sounds are distant,grade 2/6 ejection 

systolic murmur was heard at the second right intercostal space and along the 

left sternal border ending in early to mid systole. No diastolic murmur was 

heard.


Lungs: Fine crepitations at the bases.


Abdomen: Soft,  slight left lower quadrant guarding, no hepatosplenomegaly was 

appreciated.


Extremities: trace pretibial edema.


                                                                               

                                                





Active Medications





Acetaminophen (Tylenol Oral Solution -)  650 mg PO Q4H PRN


   PRN Reason: FEVER


   Last Admin: 02/17/20 09:21 Dose:  650 mg


Atorvastatin Calcium (Lipitor -)  10 mg PO HS [facility]


   Last Admin: 02/19/20 21:01 Dose:  10 mg


Furosemide (Lasix -)  40 mg PO BIDLASIX [facility]


   Last Admin: 02/20/20 05:45 Dose:  40 mg


Potassium Chloride 10 meq/ (Dextrose)  1,005 mls @ 42 mls/hr IV Q24H [facility]


   Last Admin: 02/19/20 21:11 Dose:  Not Given


Megestrol Acetate (Megace Oral Suspension -)  400 mg PO DAILY [facility]


   Last Admin: 02/20/20 09:51 Dose:  400 mg


Metoprolol Succinate (Toprol Xl -)  100 mg PO BID [facility]


   Last Admin: 02/20/20 09:27 Dose:  100 mg


Pantoprazole Sodium (Protonix Iv)  40 mg IVPUSH BID [facility]


   Last Admin: 02/20/20 09:27 Dose:  40 mg


Polyethylene Glycol (Miralax (For Daily Use) -)  17 gm PO DAILY [facility]


   Last Admin: 02/20/20 09:13 Dose:  Not Given


Quetiapine Fumarate (Seroquel -)  50 mg PO BID [facility]


   Last Admin: 02/20/20 09:27 Dose:  50 mg








 Last Vital Signs











Temp Pulse Resp BP Pulse Ox


 


 97.6 F   89   21 H  95/79   95 


 


 02/20/20 09:01  02/20/20 09:01  02/20/20 09:01  02/20/20 09:01  02/19/20 22:00








                                                                               

                                             


 


 Intake & Output











 02/17/20 02/18/20 02/19/20 02/20/20





 23:59 23:59 23:59 23:59


 


Intake Total 0947 [id number] [id number] 504


 


Output Total  800 500 


 


Balance 1241 118 568 504











Impression:


1.  Atrial fibrillation with controlled ventricular response..


2.  Coronary artery disease, status post PCI/stenting, angina pectoris.


3.  Status post embolic CVA.


4.  Vascular dementia.


5.  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 

cardiomyopathy.


6.  Status post permanent pacemaker for carotid hypersensitivity.


7.  Anemia  related to blood loss.


8.  History of chronic kidney disease.





Recommendation:


1. Discharged is being planned.


2. Bed side PT.


3. Note elevated INR in the absence of oral anticoagulants.





  Prognosis, critical.





 





Lee Parrish MD

## 2020-02-20 NOTE — DS
Physical Exam: 


SUBJECTIVE: Patient seen and examined. More alert today. HR 90's stable for 

discharge. 








OBJECTIVE:





 Vital Signs











 Period  Temp  Pulse  Resp  BP Sys/Card  Pulse Ox


 


 Last 24 Hr  97.2 F-97.6 F    20-28  /63-79  95-95








PHYSICAL EXAM





GENERAL: The patient is awake, alert, in no acute distress.


LUNGS: Breath sounds equal, clear to auscultation bilaterally, no wheezes, no 

crackles, no accessory muscle use. 


HEART: Regular rate and rhythm, S1, S2 without murmur, rub or gallop.


ABDOMEN: Soft, nontender, nondistended, normoactive bowel sounds, no guarding, 

no rebound, no hepatosplenomegaly, no masses.


EXTREMITIES: 2+ pulses, warm, well-perfused, no edema. 


NEUROLOGICAL: difficult to assess due to poor compliance


PSYCH: more alert today, less somnolent





LABS


 Laboratory Results - last 24 hr











  02/20/20





  05:20


 


PT with INR  39.90 H


 


INR  3.34 H











HOSPITAL COURSE:





Date of Admission:02/07/20





Images:


CXR showed left lung infiltrates vs atelectasis, left pleural effusion


Echo- global hypokinesis 20%EF


renal sono


ct head- negative for acute bleed, chornic sinusitis which we recommended f/u 

with ENT for. 


CT chest- enlarged rt paratracheal LN, bibasilar atelect/pna, 


MGUS- not a good study





This is a 87 y/o F, w/ pmh of multiple Co-morbidities including H/O DVT and Afib

, GI bleed 2/2 to angiodysplasia/lisa ulcers, Rt LE DVT, PE and Coumadin 1 

mg weekdays and off weekends, hx off Alzheimer's, CVA,  Parkinson's,  CAD (

angioplasty 2005),  CKD, CHF, admitted for changes in mentation, lethargy, and 

AF with RVR management. She was placed on a dilt drip and had an echo with a 20

% EF, from recent decline 2/2 tachycardia induced cardimyopathy in which cardio 

did not want cardiazem on board after this. Pt was increased lopressor to 100 

BID to keep pt HR controlled in low 80-90's. Pt was stable enough for discharge 

with rate of 90's. Pt was sent home on home O2 given she failed pre and post. 

She will be returning home with a VNS service at home. Pt was seen and 

evaluated by Dr. Parrish who knows pt well and optimized her rate control and s/

w family regarding watchmen and [employer] which they refused. We had GI specialist (

Dr. Kamara) see pt for trace fobt positive and deemed it is likely chronic but 

pt's daughter refused EGD or colonoscopy to investigate. Previous colon showed 

angiodysplasia and vascular ectasia, with large hiatal hernia and lisa 

uclers on egd. While here we adjusted her Coumadin dose from 1mg to 0.5mg by 

mouth 5 days a week, given has-bled score was 6. But told pt to f/u weekly for 

INR blood test with Dr. Rowan to make sure you should be getting that dose that 

day. We increased her lasix dose to 40 PO BID(overloaded), we stopped her imdur(

hypotensive), coreg (hypotensive), and gabapentin (mentation altered) while 

here. For the PNA we treated her w unasyn as per Francisca's recs. 








Date of Discharge: 02/20/20











Minutes to complete discharge: 35





Discharge Summary


Problems reviewed: Yes


Reason For Visit: ACUTE RESPIRATORY FAILURE,PLEURAL EFFUSION


Current Active Problems





CKD (chronic kidney disease) (Chronic)


Decreased appetite (Chronic)








Condition: Improved





- Instructions


Diet, Activity, Other Instructions: 





You were admitted for being increasingly lethargic and having a very fast heart 

rate. We monitored your heart function while you were here. We treated your 

rapid and irregular heart beat to a rate that is tolerable enough for 

discharge. You are currently stable and your vital signs are within normal 

limits. While you were here we had a stomach doctor (Dr. Kamara) evauate you 

for the minimal blood in your stool. You respectfully declined the necessary 

investigative measure (EDG), so I recommend you follow up with him after you 

are discharged. You are at high risk for bleeding and clotting so a close 

monitoring of your INR (blood clot test) weekly must be done with your primary 

care physician. You should follow up with Dr. Rowan in 1 week after discharge 

to optimize your medical issues. You should follow up with your cardiologist(

Dr. Parrish) after you leave here to monitor your heart beat. You were seen by a 

kidny specialist (Dr. Norris) while here who you should follow up for your 

chronic kidney disease if you do not have a kidney doctor already. Your INR was 

above 3 while you were here today so we held your coumadin so continue to hold 

it until your next INR check and speak with your PCP regarding when to restart 

coumadin. 





Medications adjusted while here:


  Coumadin dose changed from 1mg to 0.5mg by mouth once per day 5 days a week. 

But you must weekly follow up your INR blood test with Dr. Rowan to make sure 

you should be getting that dose that day. 





We increased your metoprolol to 100 mg twice daily by mouth 





We increased your lasix dose to 40mg by mouth twice a day, one in AM and one at 

night time because you had more fluid in your body.  





We stopped your Imdur due to you having low blood pressure while here. 





We started you on megase 400 mg by mouth once daily as an appetite stimulant. 





We discontinued coreg 6.25 mg as well because your blood pressure was low. 





We discontinued your cardizem because it did not adequately control your heart 

rate. 





We discontinued your gabapentin as well because you were increasingly tired and 

that medicine could increase fatigue. Please notify your primary care doctor of 

this change. 





Please continue your home medications as prescribed. 





Please return to the emergency room if you have any worsening of your current 

symptoms or: chest pain, shortness of breath, abdominal pain, bowel/bladder 

complaints. 





Referrals: 


Aissatou Bond MD [Staff Physician] - 


Maykel Kamara MD [Staff Physician] - 


Arun Rowan MD [Primary Care Provider] - 


Stephanie Norris MD [Staff Physician] - 


Lee Parrish MD [Staff Physician] - 


Disposition: HOME





- Home Medications


Comprehensive Discharge Medication List: 


Ambulatory Orders





Simvastatin [Zocor -] 20 mg PO HS 05/24/14 


Diltiazem Cd [Cardizem Cd -] 180 mg PO DAILY 01/08/16 


Gabapentin [Neurontin] 100 mg PO BID 01/08/16 


Quetiapine Fumarate [Seroquel -] 50 mg PO BID  tablet 02/02/16 


Furosemide [Lasix] 20 mg PO ASDIR 05/22/18 


Isosorbide Mononitrate [Imdur -] 30 mg PO DAILY 05/22/18 


Warfarin Na [Coumadin -] 1 mg PO HS 05/22/18 


Carvedilol [Coreg -] 6.25 mg PO DAILY 02/08/20 








This patient is new to me today: No


Emergency Visit: Yes


ED Registration Date: 02/07/20


Care time: The patient presented to the Emergency Department on the above date 

and was hospitalized for further evaluation of their emergent condition.


Critical Care patient: No





- Discharge Referral


Referred to [facility] No





ATTENDING PHYSICIAN STATEMENT





I saw and evaluated the patient.


I reviewed the resident's note and discussed the case with the resident.


I agree with the resident's findings and plan as documented.








SUBJECTIVE:








OBJECTIVE:








ASSESSMENT AND PLAN:

## 2020-02-20 NOTE — PN
Progress Note, Physician





- Current Medication List


Current Medications: 


Active Medications





Acetaminophen (Tylenol Oral Solution -)  650 mg PO Q4H PRN


   PRN Reason: FEVER


   Last Admin: 02/17/20 09:21 Dose:  650 mg


Atorvastatin Calcium (Lipitor -)  10 mg PO HS [facility]


   Last Admin: 02/19/20 21:01 Dose:  10 mg


Furosemide (Lasix -)  40 mg PO BIDLASIX [facility]


   Last Admin: 02/20/20 05:45 Dose:  40 mg


Potassium Chloride 10 meq/ (Dextrose)  1,005 mls @ 42 mls/hr IV Q24H [facility]


   Last Admin: 02/19/20 21:11 Dose:  Not Given


Megestrol Acetate (Megace Oral Suspension -)  400 mg PO DAILY [facility]


   Last Admin: 02/20/20 09:51 Dose:  400 mg


Metoprolol Succinate (Toprol Xl -)  100 mg PO BID [facility]


   Last Admin: 02/20/20 09:27 Dose:  100 mg


Pantoprazole Sodium (Protonix Iv)  40 mg IVPUSH BID [facility]


   Last Admin: 02/20/20 09:27 Dose:  40 mg


Polyethylene Glycol (Miralax (For Daily Use) -)  17 gm PO DAILY [facility]


   Last Admin: 02/20/20 09:13 Dose:  Not Given


Quetiapine Fumarate (Seroquel -)  50 mg PO BID [facility]


   Last Admin: 02/20/20 09:27 Dose:  50 mg











- Objective


Vital Signs: 


 Vital Signs











Temperature  97.6 F   02/20/20 09:01


 


Pulse Rate  89   02/20/20 09:01


 


Respiratory Rate  21 H  02/20/20 09:01


 


Blood Pressure  95/79   02/20/20 09:01


 


O2 Sat by Pulse Oximetry (%)  95   02/19/20 22:00











Labs: 


 CBC, BMP





 02/19/20 05:25 





 02/19/20 05:25 





 INR, PTT











INR  3.34  (0.83-1.09)  H  02/20/20  05:20

## 2020-02-20 NOTE — PN
Progress Note, SLP





- Note


Progress Note: 





Pending d/c home today.


 Selected Entries











  02/20/20





  09:10


 


Breakfast 25%








 Laboratory Tests











  02/19/20





  05:25


 


WBC  7.0








Family education regarding blenderizing food with added liquids.

## 2020-02-20 NOTE — EKG
Test Reason : 

Blood Pressure : ***/*** mmHG

Vent. Rate : 080 BPM     Atrial Rate : 078 BPM

   P-R Int : 000 ms          QRS Dur : 198 ms

    QT Int : 478 ms       P-R-T Axes : 000 -67 111 degrees

   QTc Int : 551 ms

 

Ventricular-paced rhythm WITH OCCASIONAL PREMATURE VENTRICULAR

COMPLEXES

ABNORMAL ECG

WHEN COMPARED WITH ECG OF 08-FEB-2020 16:02,

ELECTRONIC VENTRICULAR PACEMAKER HAS REPLACED ATRIAL FIBRILLATION

Confirmed by ISRA SANDHU, STEPHANIE ([id number]) on 2/20/2020 2:15:25 PM

 

Referred By:             Confirmed By:STEPHANIE DHALIWAL MD

## 2020-02-20 NOTE — PN
Progress Note, Physician


History of Present Illness: 





Pt seen and examined at bedside. She appears comfortable. Her PO intake is 

improved. 





- Current Medication List


Current Medications: 


Active Medications





Acetaminophen (Tylenol Oral Solution -)  650 mg PO Q4H PRN


   PRN Reason: FEVER


   Last Admin: 02/17/20 09:21 Dose:  650 mg


Atorvastatin Calcium (Lipitor -)  10 mg PO HS [facility]


   Last Admin: 02/19/20 21:01 Dose:  10 mg


Furosemide (Lasix -)  40 mg PO BIDLASIX [facility]


   Last Admin: 02/20/20 05:45 Dose:  40 mg


Potassium Chloride 10 meq/ (Dextrose)  1,005 mls @ 42 mls/hr IV Q24H [facility]


   Last Admin: 02/19/20 21:11 Dose:  Not Given


Metoprolol Succinate (Toprol Xl -)  100 mg PO BID [facility]


   Last Admin: 02/20/20 09:27 Dose:  100 mg


Mirtazapine (Remeron -)  7.5 mg PO DAILY [facility]


Pantoprazole Sodium (Protonix Iv)  40 mg IVPUSH BID [facility]


   Last Admin: 02/20/20 09:27 Dose:  40 mg


Polyethylene Glycol (Miralax (For Daily Use) -)  17 gm PO DAILY [facility]


   Last Admin: 02/20/20 09:13 Dose:  Not Given


Quetiapine Fumarate (Seroquel -)  50 mg PO BID [facility]


   Last Admin: 02/20/20 09:27 Dose:  50 mg











- Objective


Vital Signs: 


 Vital Signs











Temperature  97.6 F   02/20/20 09:01


 


Pulse Rate  96 H  02/20/20 12:00


 


Respiratory Rate  21 H  02/20/20 09:01


 


Blood Pressure  86/69 L  02/20/20 12:00


 


O2 Sat by Pulse Oximetry (%)  95   02/19/20 22:00











Constitutional: Yes: Calm


Eyes: Yes: Conjunctiva Clear


HENT: Yes: Atraumatic


Neck: Yes: Supple


Cardiovascular: Yes: S1, S2


Respiratory: Yes: CTA Bilaterally


Gastrointestinal: Yes: Soft


Genitourinary: Yes: Incontinence


Musculoskeletal: Yes: Muscle Weakness


Edema: No


Neurological: Yes: Confusion


Labs: 


 CBC, BMP





 02/19/20 05:25 





 02/19/20 05:25 





 INR, PTT











INR  3.34  (0.83-1.09)  H  02/20/20  05:20    














Problem List





- Problems


(1) CKD (chronic kidney disease)


Code(s): N18.9 - CHRONIC KIDNEY DISEASE, UNSPECIFIED   





(2) CHF exacerbation


Code(s): I50.9 - HEART FAILURE, UNSPECIFIED   


Qualifiers: 


   Heart failure type: unspecified   Qualified Code(s): I50.9 - Heart failure, 

unspecified   





Assessment/Plan


 Current Medications











Generic Name Dose Route Start Last Admin





  Trade Name Freq  PRN Reason Stop Dose Admin


 


Acetaminophen  650 mg  02/16/20 23:00  02/17/20 09:21





  Tylenol Oral Solution -  PO   650 mg





  Q4H PRN   Administration





  FEVER   





     





     





     


 


Atorvastatin Calcium  10 mg  02/16/20 19:00  02/19/20 21:01





  Lipitor -  PO   10 mg





  [facility]   Administration





     





     





     





     


 


Furosemide  40 mg  02/14/20 14:00  02/20/20 05:45





  Lasix -  PO   40 mg





  BIDLASIX [facility]   Administration





     





     





     





     


 


Potassium Chloride 10 meq/  1,005 mls @ 42 mls/hr  02/12/20 13:19  02/19/20 21:

11





  Dextrose  IV   Not Given





  Q24H [facility]   





     





     





     





     


 


Metoprolol Succinate  100 mg  02/19/20 10:00  02/20/20 09:27





  Toprol Xl -  PO   100 mg





  BID [facility]   Administration





     





     





     





     


 


Mirtazapine  7.5 mg  02/20/20 12:45  





  Remeron -  PO   





  DAILY [facility]   





     





     





     





     


 


Pantoprazole Sodium  40 mg  02/11/20 22:00  02/20/20 09:27





  Protonix Iv  IVPUSH   40 mg





  BID [facility]   Administration





     





     





     





     


 


Polyethylene Glycol  17 gm  02/12/20 10:00  02/20/20 09:13





  Miralax (For Daily Use) -  PO   Not Given





  DAILY [facility]   





     





     





     





     


 


Quetiapine Fumarate  50 mg  02/16/20 22:00  02/20/20 09:27





  Seroquel -  PO   50 mg





  BID [facility]   Administration





     





     





     





     














Impression


1. ELENITA


2. UTI


3. CHF ef 17 percent


4. CVA


5. HTN


6. cva


7. parkinsons


8. a-fib


9. hypernatremia








Plan


- no new labs


- encourage po intake


- renal function had stabilized


- monitor lytes and renal function


- losartan on hold

## 2023-04-13 NOTE — PN
Progress Note (short form)





- Note


Progress Note: 


S: Patient was admitted with severe dyspnea and profound anemia. Currently she 

has no symptoms of shortness of breath, chest pain or discomfort, palpitations, 

lightheadedness, dizziness or syncope. 





Shes a known case of CAD s/p PCI/stenting, angina pectoris, s/p cerebral 

vascular accident. And history of angiodisplacia and AVM. 





                   


                                                                               

  Active Medications











Generic Name Dose Route Start Last Admin





  Trade Name Freq  PRN Reason Stop Dose Admin


 


Atorvastatin Calcium  10 mg 01/04/17 22:00 01/05/17 21:23





  Lipitor -  PO   10 mg





  [facility]   Administration


 


Carvedilol  6.25 mg 01/04/17 10:00 01/06/17 10:00





  Coreg -  PO   6.25 mg





  BID [facility]   Administration


 


Diltiazem HCl  180 mg 01/04/17 10:00 01/06/17 10:00





  Cardizem Cd -  PO   180 mg





  DAILY [facility]   Administration


 


Ferrous Sulfate  325 mg 01/04/17 10:00 01/06/17 10:00





  Feosol -  PO   325 mg





  MoWeFr@1000 [facility]   Administration


 


Gabapentin  100 mg 01/04/17 10:00 01/06/17 10:00





  Neurontin -  PO   100 mg





  BID [facility]   Administration


 


Isosorbide Mononitrate  30 mg 01/04/17 10:44 01/06/17 10:00





  Imdur -  PO   30 mg





  DAILY [facility]   Administration


 


Quetiapine Fumarate  50 mg 01/04/17 20:00 01/06/17 08:06





  Seroquel -  PO   50 mg





  BID@0800,2000 [facility]   Administration


 


Ramipril  2.5 mg 01/04/17 12:00 01/05/17 16:01





  Altace -  PO   2.5 mg





  DAILY@1200 [facility]   Administration


 


Ranitidine HCl  150 mg 01/04/17 10:00 01/06/17 10:00





  Zantac -  PO   150 mg





  BID [facility]   Administration














O: 85 year old female was in no acute distress, no pallor, cyanosis, clubbing, 

or jaundice. 





            


 Last Vital Signs











Temp Pulse Resp BP Pulse Ox


 


 98.1 F   65   20   134/53   98 


 


 01/06/17 06:00  01/06/17 06:00  01/06/17 06:00  01/06/17 06:00  01/05/17 21:00














Neck: Supple, no JVD, negative HJR, carotids were equal and upstrokes were 

normal, no thyromegaly appreciated. 


Heart: PMI was in the 5th intercostal space, no heaves or thrills, S1 and S2 

were normal. Grade I/VI ejection systolic murmur. No gallops were appreciated. 


Lungs: Clear on auscultation bilaterally. 


Abdomen: Soft, nontender, no hepatosplenomegaly appreciated, and no palpable 

masses were felt.


Extremities: No calf tenderness or dependent edema. Pulses are normal.








               


 CBC, BMP





 01/06/17 06:00 





 01/06/17 06:00 











             


 Laboratory Results - last 24 hr











  01/03/17 01/06/17 01/06/17





  21:57 06:00 06:00


 


WBC   6.3  6.3


 


RBC   3.97  4.03


 


Hgb   10.1 L D  10.1 L


 


Hct   30.8 L  31.3 L


 


MCV   77.6 L  77.8 L


 


MCHC   32.8  32.2


 


RDW   20.9 H  20.6 H


 


Plt Count   234  243


 


MPV   7.1 L  7.0 L


 


Neutrophils %    65.7


 


Lymphocytes %    17.5


 


Monocytes %    12.1 H


 


Eosinophils %    4.1


 


Basophils %    0.6


 


Sodium   


 


Potassium   


 


Chloride   


 


Carbon Dioxide   


 


Anion Gap   


 


BUN   


 


Creatinine   


 


Random Glucose   


 


Calcium   


 


Phosphorus   


 


Magnesium   


 


Ferritin   


 


Blood Type  B POSITIVE  


 


Antibody Screen  Negative  


 


Crossmatch  See Detail  














  01/06/17 01/06/17





  06:00 06:00


 


WBC  


 


RBC  


 


Hgb  


 


Hct  


 


MCV  


 


MCHC  


 


RDW  


 


Plt Count  


 


MPV  


 


Neutrophils %  


 


Lymphocytes %  


 


Monocytes %  


 


Eosinophils %  


 


Basophils %  


 


Sodium  144 


 


Potassium  4.1 


 


Chloride  111 H 


 


Carbon Dioxide  28 


 


Anion Gap  5 L 


 


BUN  13  D 


 


Creatinine  1.2 H 


 


Random Glucose  68 L 


 


Calcium  7.8 L 


 


Phosphorus  4.0 


 


Magnesium  2.1 


 


Ferritin   12.119


 


Blood Type  


 


Antibody Screen  


 


Crossmatch  














Impression:





1. Severe dyspnea (Resolved), secondary to profound anemia. 


Code(s): D64.9 - ANEMIA, UNSPECIFIED   Qualifiers: 


     Anemia type: unspecified type     Qualified Code(s): D64.9 - Anemia, 

unspecified  





2. S/p GI bleeding related to AVM. 


Code(s): K92.2 - GASTROINTESTINAL HEMORRHAGE, UNSPECIFIED   Qualifiers: 


     GI bleed type/associated pathology: angiodysplasia of stomach and duodenum

        Qualified Code(s): K31.811 - Angiodysplasia of stomach and duodenum 

with bleeding  





3. Coronary artery disease s/p PCI/stenting, stable angina pectoris. 





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


Code(s): Z95.0 - PRESENCE OF CARDIAC PACEMAKER





5. Hypertension. 


Code(s): I10 - ESSENTIAL (PRIMARY) HYPERTENSION   Qualifiers: 


     Hypertension type: essential hypertension     Qualified Code(s): I10 - 

Essential (primary) hypertension  





Recommendations: 


1. Continue current medications. 


2. Increase ambulation. 


3. If needed do no hesitate to contact my office. 





Attestation: Documentation prepared by Kris Hernandez, acting as medical scribe 

for Lee Parrish MD. Upper right front tooth dislodged during intubation and removed intact by [facility]. Saved in specimen cup and labeled for patient.
